# Patient Record
Sex: FEMALE | Race: WHITE | Employment: FULL TIME | URBAN - METROPOLITAN AREA
[De-identification: names, ages, dates, MRNs, and addresses within clinical notes are randomized per-mention and may not be internally consistent; named-entity substitution may affect disease eponyms.]

---

## 2018-08-04 ENCOUNTER — APPOINTMENT (OUTPATIENT)
Dept: CT IMAGING | Age: 33
End: 2018-08-04
Attending: EMERGENCY MEDICINE
Payer: COMMERCIAL

## 2018-08-04 ENCOUNTER — APPOINTMENT (OUTPATIENT)
Dept: GENERAL RADIOLOGY | Age: 33
End: 2018-08-04
Attending: EMERGENCY MEDICINE
Payer: COMMERCIAL

## 2018-08-04 ENCOUNTER — HOSPITAL ENCOUNTER (EMERGENCY)
Age: 33
Discharge: HOME | End: 2018-08-05
Attending: EMERGENCY MEDICINE
Payer: COMMERCIAL

## 2018-08-04 DIAGNOSIS — R53.83 MALAISE AND FATIGUE: ICD-10-CM

## 2018-08-04 DIAGNOSIS — N20.0 NEPHROLITHIASIS: ICD-10-CM

## 2018-08-04 DIAGNOSIS — R31.0 GROSS HEMATURIA: Primary | ICD-10-CM

## 2018-08-04 DIAGNOSIS — R53.81 MALAISE AND FATIGUE: ICD-10-CM

## 2018-08-04 LAB
ALBUMIN SERPL-MCNC: 3.1 G/DL (ref 3.5–5)
ALBUMIN/GLOB SERPL: 0.8 {RATIO} (ref 1.1–2.2)
ALP SERPL-CCNC: 80 U/L (ref 45–117)
ALT SERPL-CCNC: 33 U/L (ref 12–78)
ANION GAP SERPL CALC-SCNC: 7 MMOL/L (ref 5–15)
APPEARANCE UR: ABNORMAL
AST SERPL-CCNC: 24 U/L (ref 15–37)
BACTERIA URNS QL MICRO: NEGATIVE /HPF
BASOPHILS # BLD: 0 K/UL (ref 0–0.1)
BASOPHILS NFR BLD: 0 % (ref 0–1)
BILIRUB SERPL-MCNC: 0.2 MG/DL (ref 0.2–1)
BILIRUB UR QL: NEGATIVE
BUN SERPL-MCNC: 8 MG/DL (ref 6–20)
BUN/CREAT SERPL: 7 (ref 12–20)
CALCIUM SERPL-MCNC: 9.2 MG/DL (ref 8.5–10.1)
CHLORIDE SERPL-SCNC: 106 MMOL/L (ref 97–108)
CO2 SERPL-SCNC: 25 MMOL/L (ref 21–32)
COLOR UR: ABNORMAL
CREAT SERPL-MCNC: 1.09 MG/DL (ref 0.55–1.02)
DIFFERENTIAL METHOD BLD: NORMAL
EOSINOPHIL # BLD: 0 K/UL (ref 0–0.4)
EOSINOPHIL NFR BLD: 0 % (ref 0–7)
EPITH CASTS URNS QL MICRO: ABNORMAL /LPF
ERYTHROCYTE [DISTWIDTH] IN BLOOD BY AUTOMATED COUNT: 14.3 % (ref 11.5–14.5)
GLOBULIN SER CALC-MCNC: 4 G/DL (ref 2–4)
GLUCOSE SERPL-MCNC: 83 MG/DL (ref 65–100)
GLUCOSE UR STRIP.AUTO-MCNC: NEGATIVE MG/DL
HCT VFR BLD AUTO: 38.4 % (ref 35–47)
HGB BLD-MCNC: 12.8 G/DL (ref 11.5–16)
HGB UR QL STRIP: ABNORMAL
IMM GRANULOCYTES # BLD: 0 K/UL (ref 0–0.04)
IMM GRANULOCYTES NFR BLD AUTO: 0 % (ref 0–0.5)
KETONES UR QL STRIP.AUTO: ABNORMAL MG/DL
LACTATE SERPL-SCNC: 1 MMOL/L (ref 0.4–2)
LEUKOCYTE ESTERASE UR QL STRIP.AUTO: ABNORMAL
LYMPHOCYTES # BLD: 1.8 K/UL (ref 0.8–3.5)
LYMPHOCYTES NFR BLD: 25 % (ref 12–49)
MCH RBC QN AUTO: 27.5 PG (ref 26–34)
MCHC RBC AUTO-ENTMCNC: 33.3 G/DL (ref 30–36.5)
MCV RBC AUTO: 82.4 FL (ref 80–99)
MONOCYTES # BLD: 0.8 K/UL (ref 0–1)
MONOCYTES NFR BLD: 12 % (ref 5–13)
NEUTS SEG # BLD: 4.4 K/UL (ref 1.8–8)
NEUTS SEG NFR BLD: 62 % (ref 32–75)
NITRITE UR QL STRIP.AUTO: NEGATIVE
NRBC # BLD: 0 K/UL (ref 0–0.01)
NRBC BLD-RTO: 0 PER 100 WBC
PH UR STRIP: 5.5 [PH] (ref 5–8)
PLATELET # BLD AUTO: 346 K/UL (ref 150–400)
PMV BLD AUTO: 10.2 FL (ref 8.9–12.9)
POTASSIUM SERPL-SCNC: 3.4 MMOL/L (ref 3.5–5.1)
PROT SERPL-MCNC: 7.1 G/DL (ref 6.4–8.2)
PROT UR STRIP-MCNC: 300 MG/DL
RBC # BLD AUTO: 4.66 M/UL (ref 3.8–5.2)
RBC #/AREA URNS HPF: >100 /HPF (ref 0–5)
SODIUM SERPL-SCNC: 138 MMOL/L (ref 136–145)
SP GR UR REFRACTOMETRY: 1.01 (ref 1–1.03)
UA: UC IF INDICATED,UAUC: ABNORMAL
UROBILINOGEN UR QL STRIP.AUTO: 0.2 EU/DL (ref 0.2–1)
WBC # BLD AUTO: 7 K/UL (ref 3.6–11)
WBC URNS QL MICRO: ABNORMAL /HPF (ref 0–4)

## 2018-08-04 PROCEDURE — 87040 BLOOD CULTURE FOR BACTERIA: CPT | Performed by: EMERGENCY MEDICINE

## 2018-08-04 PROCEDURE — 83605 ASSAY OF LACTIC ACID: CPT | Performed by: EMERGENCY MEDICINE

## 2018-08-04 PROCEDURE — 80053 COMPREHEN METABOLIC PANEL: CPT | Performed by: EMERGENCY MEDICINE

## 2018-08-04 PROCEDURE — 74176 CT ABD & PELVIS W/O CONTRAST: CPT

## 2018-08-04 PROCEDURE — 96361 HYDRATE IV INFUSION ADD-ON: CPT

## 2018-08-04 PROCEDURE — 96374 THER/PROPH/DIAG INJ IV PUSH: CPT

## 2018-08-04 PROCEDURE — 87086 URINE CULTURE/COLONY COUNT: CPT | Performed by: EMERGENCY MEDICINE

## 2018-08-04 PROCEDURE — 85025 COMPLETE CBC W/AUTO DIFF WBC: CPT | Performed by: EMERGENCY MEDICINE

## 2018-08-04 PROCEDURE — 81001 URINALYSIS AUTO W/SCOPE: CPT | Performed by: EMERGENCY MEDICINE

## 2018-08-04 PROCEDURE — 99285 EMERGENCY DEPT VISIT HI MDM: CPT

## 2018-08-04 PROCEDURE — 36415 COLL VENOUS BLD VENIPUNCTURE: CPT | Performed by: EMERGENCY MEDICINE

## 2018-08-04 PROCEDURE — 87880 STREP A ASSAY W/OPTIC: CPT | Performed by: EMERGENCY MEDICINE

## 2018-08-04 PROCEDURE — 74011250636 HC RX REV CODE- 250/636: Performed by: EMERGENCY MEDICINE

## 2018-08-04 PROCEDURE — 71046 X-RAY EXAM CHEST 2 VIEWS: CPT

## 2018-08-04 PROCEDURE — 93005 ELECTROCARDIOGRAM TRACING: CPT

## 2018-08-04 PROCEDURE — 74011250637 HC RX REV CODE- 250/637: Performed by: EMERGENCY MEDICINE

## 2018-08-04 PROCEDURE — 87147 CULTURE TYPE IMMUNOLOGIC: CPT | Performed by: EMERGENCY MEDICINE

## 2018-08-04 PROCEDURE — 87070 CULTURE OTHR SPECIMN AEROBIC: CPT | Performed by: EMERGENCY MEDICINE

## 2018-08-04 RX ORDER — OXYCODONE AND ACETAMINOPHEN 5; 325 MG/1; MG/1
2 TABLET ORAL
Status: COMPLETED | OUTPATIENT
Start: 2018-08-04 | End: 2018-08-04

## 2018-08-04 RX ORDER — ONDANSETRON 2 MG/ML
4 INJECTION INTRAMUSCULAR; INTRAVENOUS
Status: COMPLETED | OUTPATIENT
Start: 2018-08-04 | End: 2018-08-04

## 2018-08-04 RX ADMIN — SODIUM CHLORIDE 1000 ML: 900 INJECTION, SOLUTION INTRAVENOUS at 23:58

## 2018-08-04 RX ADMIN — OXYCODONE HYDROCHLORIDE AND ACETAMINOPHEN 2 TABLET: 5; 325 TABLET ORAL at 23:51

## 2018-08-04 RX ADMIN — SODIUM CHLORIDE 500 ML: 900 INJECTION, SOLUTION INTRAVENOUS at 23:59

## 2018-08-04 RX ADMIN — ONDANSETRON 4 MG: 2 INJECTION, SOLUTION INTRAMUSCULAR; INTRAVENOUS at 23:56

## 2018-08-05 VITALS
RESPIRATION RATE: 15 BRPM | WEIGHT: 187.39 LBS | HEIGHT: 62 IN | TEMPERATURE: 98.6 F | BODY MASS INDEX: 34.48 KG/M2 | OXYGEN SATURATION: 99 % | DIASTOLIC BLOOD PRESSURE: 70 MMHG | SYSTOLIC BLOOD PRESSURE: 118 MMHG | HEART RATE: 92 BPM

## 2018-08-05 LAB
ATRIAL RATE: 113 BPM
CALCULATED P AXIS, ECG09: 34 DEGREES
CALCULATED R AXIS, ECG10: 12 DEGREES
CALCULATED T AXIS, ECG11: 29 DEGREES
DEPRECATED S PYO AG THROAT QL EIA: NEGATIVE
DIAGNOSIS, 93000: NORMAL
P-R INTERVAL, ECG05: 130 MS
Q-T INTERVAL, ECG07: 324 MS
QRS DURATION, ECG06: 82 MS
QTC CALCULATION (BEZET), ECG08: 444 MS
VENTRICULAR RATE, ECG03: 113 BPM

## 2018-08-05 PROCEDURE — 96361 HYDRATE IV INFUSION ADD-ON: CPT

## 2018-08-05 PROCEDURE — 74011250637 HC RX REV CODE- 250/637: Performed by: EMERGENCY MEDICINE

## 2018-08-05 RX ORDER — SODIUM CHLORIDE 9 MG/ML
50 INJECTION, SOLUTION INTRAVENOUS
Status: DISCONTINUED | OUTPATIENT
Start: 2018-08-05 | End: 2018-08-05

## 2018-08-05 RX ORDER — SODIUM CHLORIDE 0.9 % (FLUSH) 0.9 %
10 SYRINGE (ML) INJECTION
Status: DISCONTINUED | OUTPATIENT
Start: 2018-08-05 | End: 2018-08-05

## 2018-08-05 RX ORDER — OXYCODONE HYDROCHLORIDE 5 MG/1
10 TABLET ORAL
Status: COMPLETED | OUTPATIENT
Start: 2018-08-05 | End: 2018-08-05

## 2018-08-05 RX ORDER — TRAMADOL HYDROCHLORIDE 50 MG/1
50 TABLET ORAL
Qty: 10 TAB | Refills: 0 | Status: SHIPPED | OUTPATIENT
Start: 2018-08-05 | End: 2018-10-05

## 2018-08-05 RX ADMIN — OXYCODONE HYDROCHLORIDE 10 MG: 5 TABLET ORAL at 02:32

## 2018-08-05 NOTE — ED PROVIDER NOTES
EMERGENCY DEPARTMENT HISTORY AND PHYSICAL EXAM 
 
Date: 2018 Patient Name: Salome Rodrigez History of Presenting Illness Chief Complaint Patient presents with  Fever  
  recent travel to [facility], swimming in ocean. reports onset of sore throat yesterday, fever today, and generalized body aches. Patient also noted potentially bloody urine  Sore Throat  Generalized Body Aches History Provided By: Patient HPI: Salome Rodrigez is a 35 y.o. female, who presents ambulatory to the ED c/o persistent fever of 102 F since this this afternoon. Pt states she just returned home from traveling to the [place], [place]. Pt states upon waking this morning she had a sore throat, and throughout the day she became progressively fatigued, weak with generalized body aches. Pt also notes mild SOB on exertion. She reports taking Tylenol without any relief of her fever or sxs. She notes she is scheduled to start her menstrual cycle next week, but when using the bathroom PTA she noticed her urine was pink in color. Pt report calling the on-call for her PCP's office who recommended the pt to come to the ED for evaluation. Pt is otherwise healthy and denies any long standing illnesses or medications. Pt specifically denies any congestion, cough, chest pain, abdominal pain, nausea, vomiting, diarrhea, dysuria, or urinary frequency. PCP: None PMHx: Significant for none PSHx: Significant for appendectomy,  Section Social Hx: -tobacco, -EtOH, -Illicit Drugs There are no other complaints, changes, or physical findings at this time. Current Facility-Administered Medications Medication Dose Route Frequency Provider Last Rate Last Dose  oxyCODONE IR (ROXICODONE) tablet 10 mg  10 mg Oral NOW Kenisha Lui MD      
 
Current Outpatient Prescriptions Medication Sig Dispense Refill  traMADol (ULTRAM) 50 mg tablet Take 1 Tab by mouth every six (6) hours as needed for Pain. Max Daily Amount: 200 mg. 10 Tab 0  
 omeprazole (PRILOSEC) 40 mg capsule Take 1 Cap by mouth daily. 30 Cap 0  
 oxyCODONE-acetaminophen (PERCOCET) 5-325 mg per tablet Take 1 Tab by mouth every six (6) hours as needed for Pain. Max Daily Amount: 4 Tabs. 10 Tab 0  
 ondansetron (ZOFRAN ODT) 4 mg disintegrating tablet Take 1 Tab by mouth every eight (8) hours as needed for Nausea. 12 Tab 0  
 sucralfate (CARAFATE) 100 mg/mL suspension Take 10 mL by mouth four (4) times daily. 414 mL 0  
 QUEtiapine (SEROQUEL) 50 mg tablet Take 50 mg by mouth two (2) times a day.  norgestimate-ethinyl estradiol (TRI-SPRINTEC, 28,) 0.18/0.215/0.25 mg-35 mcg (28) tablet Take 1 Tab by mouth daily. Past History Past Medical History: 
Past Medical History:  
Diagnosis Date  Abnormal Pap smear Biopsy done last year and came back ok  Ill-defined condition   
 kidney stones Past Surgical History: 
Past Surgical History:  
Procedure Laterality Date  HX APPENDECTOMY  HX GYN    
  Family History: 
Family History Problem Relation Age of Onset  Hypertension Mother  Heart Disease Mother  Cancer Mother  Diabetes Mother Social History: 
Social History Substance Use Topics  Smoking status: Former Smoker  Smokeless tobacco: Never Used  Alcohol use Yes Comment: occassionally Allergies: Allergies Allergen Reactions  Avelox [Moxifloxacin] Swelling and Unknown (comments)  Macrolide Antibiotics Hives, Rash and Swelling Per patient reported as a previous reaction to a \"mycin\" drug. Did not know which drug.  Nsaids (Non-Steroidal Anti-Inflammatory Drug) Other (comments) D/t esophageal erosion and GERD Review of Systems Review of Systems Constitutional: Positive for fever. HENT: Positive for sore throat. Eyes: Negative. Respiratory: Positive for shortness of breath. Cardiovascular: Negative for chest pain.   
Gastrointestinal: Negative for abdominal pain, nausea and vomiting. Endocrine: Negative. Genitourinary: Positive for hematuria. Negative for difficulty urinating, dysuria and vaginal bleeding. Musculoskeletal: Positive for myalgias. Skin: Negative. Allergic/Immunologic: Negative. Neurological: Positive for weakness. Psychiatric/Behavioral: Negative for suicidal ideas. All other systems reviewed and are negative. Physical Exam  
Physical Exam  
Constitutional: She is oriented to person, place, and time. She appears well-developed and well-nourished. She appears distressed. HENT:  
Head: Normocephalic and atraumatic. Nose: Nose normal.  
Eyes: Conjunctivae and EOM are normal. No scleral icterus. Neck: Normal range of motion. No tracheal deviation present. Cardiovascular: Regular rhythm, normal heart sounds and intact distal pulses. Tachycardia present. Exam reveals no friction rub. No murmur heard. Pulmonary/Chest: Effort normal and breath sounds normal. No stridor. No respiratory distress. She has no wheezes. She has no rales. Abdominal: Soft. Bowel sounds are normal. She exhibits no distension. There is no tenderness. There is no rebound. Musculoskeletal: Normal range of motion. She exhibits no tenderness. Neurological: She is alert and oriented to person, place, and time. She has normal strength. She is not disoriented. No cranial nerve deficit or sensory deficit. GCS eye subscore is 4. GCS verbal subscore is 5. GCS motor subscore is 6. Skin: Skin is warm and dry. No rash noted. She is not diaphoretic. Psychiatric: Her speech is normal and behavior is normal. Judgment and thought content normal. Her mood appears anxious. Cognition and memory are normal.  
Nursing note and vitals reviewed. Diagnostic Study Results Labs - Recent Results (from the past 12 hour(s)) EKG, 12 LEAD, INITIAL Collection Time: 08/04/18  9:49 PM  
Result Value Ref Range  Ventricular Rate 113 BPM  
 Atrial Rate 113 BPM  
 P-R Interval 130 ms QRS Duration 82 ms Q-T Interval 324 ms QTC Calculation (Bezet) 444 ms Calculated P Axis 34 degrees Calculated R Axis 12 degrees Calculated T Axis 29 degrees Diagnosis Sinus tachycardia No previous ECGs available CBC WITH AUTOMATED DIFF Collection Time: 08/04/18 10:05 PM  
Result Value Ref Range WBC 7.0 3.6 - 11.0 K/uL  
 RBC 4.66 3.80 - 5.20 M/uL  
 HGB 12.8 11.5 - 16.0 g/dL HCT 38.4 35.0 - 47.0 % MCV 82.4 80.0 - 99.0 FL  
 MCH 27.5 26.0 - 34.0 PG  
 MCHC 33.3 30.0 - 36.5 g/dL  
 RDW 14.3 11.5 - 14.5 % PLATELET 670 628 - 388 K/uL MPV 10.2 8.9 - 12.9 FL  
 NRBC 0.0 0  WBC ABSOLUTE NRBC 0.00 0.00 - 0.01 K/uL NEUTROPHILS 62 32 - 75 % LYMPHOCYTES 25 12 - 49 % MONOCYTES 12 5 - 13 % EOSINOPHILS 0 0 - 7 % BASOPHILS 0 0 - 1 % IMMATURE GRANULOCYTES 0 0.0 - 0.5 % ABS. NEUTROPHILS 4.4 1.8 - 8.0 K/UL  
 ABS. LYMPHOCYTES 1.8 0.8 - 3.5 K/UL  
 ABS. MONOCYTES 0.8 0.0 - 1.0 K/UL  
 ABS. EOSINOPHILS 0.0 0.0 - 0.4 K/UL  
 ABS. BASOPHILS 0.0 0.0 - 0.1 K/UL  
 ABS. IMM. GRANS. 0.0 0.00 - 0.04 K/UL  
 DF AUTOMATED METABOLIC PANEL, COMPREHENSIVE Collection Time: 08/04/18 10:05 PM  
Result Value Ref Range Sodium 138 136 - 145 mmol/L Potassium 3.4 (L) 3.5 - 5.1 mmol/L Chloride 106 97 - 108 mmol/L  
 CO2 25 21 - 32 mmol/L Anion gap 7 5 - 15 mmol/L Glucose 83 65 - 100 mg/dL BUN 8 6 - 20 MG/DL Creatinine 1.09 (H) 0.55 - 1.02 MG/DL  
 BUN/Creatinine ratio 7 (L) 12 - 20 GFR est AA >60 >60 ml/min/1.73m2 GFR est non-AA 58 (L) >60 ml/min/1.73m2 Calcium 9.2 8.5 - 10.1 MG/DL Bilirubin, total 0.2 0.2 - 1.0 MG/DL  
 ALT (SGPT) 33 12 - 78 U/L  
 AST (SGOT) 24 15 - 37 U/L Alk. phosphatase 80 45 - 117 U/L Protein, total 7.1 6.4 - 8.2 g/dL Albumin 3.1 (L) 3.5 - 5.0 g/dL Globulin 4.0 2.0 - 4.0 g/dL A-G Ratio 0.8 (L) 1.1 - 2.2 LACTIC ACID  Collection Time: 08/04/18 10:05 PM  
Result Value Ref Range Lactic acid 1.0 0.4 - 2.0 MMOL/L  
URINALYSIS W/ REFLEX CULTURE Collection Time: 08/04/18 10:05 PM  
Result Value Ref Range Color RED Appearance CLOUDY (A) CLEAR Specific gravity 1.009 1.003 - 1.030    
 pH (UA) 5.5 5.0 - 8.0 Protein 300 (A) NEG mg/dL Glucose NEGATIVE  NEG mg/dL Ketone TRACE (A) NEG mg/dL Bilirubin NEGATIVE  NEG Blood LARGE (A) NEG Urobilinogen 0.2 0.2 - 1.0 EU/dL Nitrites NEGATIVE  NEG Leukocyte Esterase SMALL (A) NEG    
 WBC 5-10 0 - 4 /hpf  
 RBC >100 (H) 0 - 5 /hpf Epithelial cells FEW FEW /lpf Bacteria NEGATIVE  NEG /hpf  
 UA:UC IF INDICATED URINE CULTURE ORDERED (A) CNI    
STREP AG SCREEN, GROUP A Collection Time: 08/04/18 11:43 PM  
Result Value Ref Range Group A Strep Ag ID NEGATIVE  NEG Radiologic Studies -  
CT ABD PELV WO CONT Final Result XR CHEST PA LAT Final Result CT Results  (Last 48 hours) 08/05/18 0026  CT ABD PELV WO CONT Final result Impression:  IMPRESSION:  
   
Nonobstructing right nephrolithiasis. No acute process on noncontrast CT. Narrative:  EXAM:  CT ABD PELV WO CONT INDICATION: Hematuria, fever, and generalized body aches. Appendectomy. COMPARISON: None. CONTRAST:  None. TECHNIQUE:   
Thin axial images were obtained through the abdomen and pelvis. Coronal and  
sagittal reconstructions were generated. Oral contrast was not administered. CT  
dose reduction was achieved through use of a standardized protocol tailored for  
this examination and automatic exposure control for dose modulation. The absence of intravenous contrast material reduces the sensitivity for  
evaluation of the solid parenchymal organs of the abdomen. FINDINGS:   
LUNG BASES: Mild dependent atelectasis. No pneumonia. INCIDENTALLY IMAGED HEART AND MEDIASTINUM: Normal cardiac size. Small  
sliding-type hiatal hernia. LIVER: No mass or biliary dilatation. GALLBLADDER: Unremarkable. SPLEEN: Normal size. PANCREAS: No inflammation. ADRENALS: Unremarkable. KIDNEYS/URETERS: Tiny 1 mm calculus is in the upper pole of the right kidney. No  
hydronephrosis. No left nephrolithiasis. STOMACH: Nondistended. SMALL BOWEL: No dilatation or wall thickening. COLON: No dilatation or wall thickening. APPENDIX: Appendectomy per PERITONEUM: No ascites or pneumoperitoneum. RETROPERITONEUM: No lymphadenopathy or aortic aneurysm. REPRODUCTIVE ORGANS: Uterus is not enlarged. Left ovarian cyst is physiologic in  
this age group. URINARY BLADDER: No mass or calculus. BONES: No destructive bone lesion. ADDITIONAL COMMENTS: N/A  
   
  
  
 
CXR Results  (Last 48 hours) 08/04/18 2351  XR CHEST PA LAT Final result Impression:  IMPRESSION:  
   
Normal PA and lateral chest views. Narrative:  EXAM:  XR CHEST PA LAT INDICATION:  Fever, generalized body aches, sore throat. COMPARISON: None TECHNIQUE: PA and lateral chest views FINDINGS: The cardiomediastinal and hilar contours are within normal limits. The  
pulmonary vasculature is within normal limits. The lungs and pleural spaces are clear. The visualized bones and upper abdomen  
are age-appropriate. Medical Decision Making I am the first provider for this patient. I reviewed the vital signs, available nursing notes, past medical history, past surgical history, family history and social history. Vital Signs-Reviewed the patient's vital signs. Patient Vitals for the past 12 hrs: 
 Temp Pulse Resp BP SpO2  
08/05/18 0200 98.6 °F (37 °C) 92 15 118/70 99 % 08/05/18 0115 - 96 - 109/70 -  
08/05/18 0100 - (!) 102 - 114/70 -  
08/05/18 0045 - 99 - 113/70 -  
08/04/18 2143 99.9 °F (37.7 °C) (!) 113 22 (!) 176/107 98 % Pulse Oximetry Analysis - 98% on RA Cardiac Monitor:  
Rate: 113 bpm 
Rhythm: Sinus Tachycardia Records Reviewed: Nursing Notes and Old Medical Records Provider Notes (Medical Decision Making): DDX: 
Uti, stone, pna, sepsis, dehydration, pregnancy Plan: 
Ua, upt, labs, ivf, analgesic, cxr, ct w/o Impression: 
Acute febrile illness, fatigue and malaise ED Course:  
Initial assessment performed. The patients presenting problems have been discussed, and they are in agreement with the care plan formulated and outlined with them. I have encouraged them to ask questions as they arise throughout their visit. I reviewed our electronic medical record system for any past medical records that were available that may contribute to the patients current condition, the nursing notes and and vital signs from today's visit Nursing notes will be reviewed as they become available in realtime while the pt has been in the ED. Mendel Branch, MD 
 
EKG interpretation 2149: sinus tach, nl Axis, rate 113; , QRS 82, QTc 444; no acute ischemia; Mendel Branch, MD 
 
I personally reviewed pt's imaging. Official read by radiology listed above. Mendel Branch, MD 
 
PROGRESS NOTE: 
2:23 AM 
Pt reevaluated. Pt reports to be feeling better after Percocet. Will plan for d/c. Written by Darling Yanez, ED Scribe, as dictated by Mendel Branch, MD 
 
2:25 AM 
Progress note: 
Pt noted to be feeling better, ready for discharge. Discussed lab and imaging findings with pt and/or family, specifically noting otherwise negative workup. Pt will follow up as instructed. All questions have been answered, pt voiced understanding and agreement with plan. If narcotics were prescribed, pt was advised not to drive or operate heavy machinery. If abx were prescribed, pt advised that diarrhea and rash are possible side effects of the medications. Specific return precautions provided in addition to instructions for pt to return to the ED immediately should sx worsen at any time.  
Mendel Branch, MD 
 
 
Critical Care Time:  
 
none PLAN: 
1. Current Discharge Medication List  
  
START taking these medications Details  
traMADol (ULTRAM) 50 mg tablet Take 1 Tab by mouth every six (6) hours as needed for Pain. Max Daily Amount: 200 mg. Qty: 10 Tab, Refills: 0 Associated Diagnoses: Malaise and fatigue 2. Follow-up Information Follow up With Details Comments Contact Info [facility] Schedule an appointment as soon as possible for a visit in 2 days  [address] [facility] [address]   [phone] Return to ED if worse Disposition: 
 
2:24 AM  
The patient's results have been reviewed with family and/or caregiver. They verbally convey their understanding and agreement of the patient's signs, symptoms, diagnosis, treatment and prognosis and additionally agree to follow up as recommended in the discharge instructions or to return to the Emergency Room should the patient's condition change prior to their follow-up appointment. The family and/or caregiver verbally agrees with the care-plan and all of their questions have been answered. The discharge instructions have also been provided to the them with educational information regarding the patient's diagnosis as well a list of reasons why the patient would want to return to the ER prior to their follow-up appointment should their condition change. Cheyenne Marcus MD 
 
 
Diagnosis Clinical Impression: 1. Gross hematuria 2. Malaise and fatigue 3. Nephrolithiasis Attestations: This note is prepared by Gayla Avila, acting as Scribe for MD Cheyenne Corrales MD : The scribe's documentation has been prepared under my direction and personally reviewed by me in its entirety. I confirm that the note above accurately reflects all work, treatment, procedures, and medical decision making performed by me. This note will not be viewable in [address]

## 2018-08-05 NOTE — DISCHARGE INSTRUCTIONS
Blood in the Urine: Care Instructions  Your Care Instructions    Blood in the urine, or hematuria, may make the urine look red, brown, or pink. There may be blood every time you urinate or just from time to time. You cannot always see blood in the urine, but it will show up in a urine test.  Blood in the urine may be serious. It should always be checked by a doctor. Your doctor may recommend more tests, including an X-ray, a CT scan, or a cystoscopy (which lets a doctor look inside the urethra and bladder). Blood in the urine can be a sign of another problem. Common causes are bladder infections and kidney stones. An injury to your groin or your genital area can also cause bleeding in the urinary tract. Very hard exercise-such as running a marathon-can cause blood in the urine. Blood in the urine can also be a sign of kidney disease or cancer in the bladder or kidney. Many cases of blood in the urine are caused by a harmless condition that runs in families. This is called benign familial hematuria. It does not need any treatment. Sometimes your urine may look red or brown even though it does not contain blood. For example, not getting enough fluids (dehydration), taking certain medicines, or having a liver problem can change the color of your urine. Eating foods such as beets, rhubarb, or blackberries or foods with red food coloring can make your urine look red or pink. Follow-up care is a key part of your treatment and safety. Be sure to make and go to all appointments, and call your doctor if you are having problems. It's also a good idea to know your test results and keep a list of the medicines you take. When should you call for help? Call your doctor now or seek immediate medical care if:    · You have symptoms of a urinary infection. For example:  ¨ You have pus in your urine. ¨ You have pain in your back just below your rib cage. This is called flank pain.   ¨ You have a fever, chills, or body aches.  ¨ It hurts to urinate. ¨ You have groin or belly pain.     · You have more blood in your urine.    Watch closely for changes in your health, and be sure to contact your doctor if:    · You have new urination problems.     · You do not get better as expected. Where can you learn more? Go to http://jody-clara.info/. Enter W082 in the search box to learn more about \"Blood in the Urine: Care Instructions. \"  Current as of: May 12, 2017  Content Version: 11.7  © 0575-2666 [employer]. Care instructions adapted under license by [employer] (which disclaims liability or warranty for this information). If you have questions about a medical condition or this instruction, always ask your healthcare professional. [address] any warranty or liability for your use of this information. Fatigue: Care Instructions  Your Care Instructions    Fatigue is a feeling of tiredness, exhaustion, or lack of energy. You may feel fatigue because of too much or not enough activity. It can also come from stress, lack of sleep, boredom, and poor diet. Many medical problems, such as viral infections, can cause fatigue. Emotional problems, especially depression, are often the cause of fatigue. Fatigue is most often a symptom of another problem. Treatment for fatigue depends on the cause. For example, if you have fatigue because you have a certain health problem, treating this problem also treats your fatigue. If depression or anxiety is the cause, treatment may help. Follow-up care is a key part of your treatment and safety. Be sure to make and go to all appointments, and call your doctor if you are having problems. It's also a good idea to know your test results and keep a list of the medicines you take. How can you care for yourself at home? · Get regular exercise. But don't overdo it. Go back and forth between rest and exercise.   · Get plenty of rest.  · Eat a healthy diet. Do not skip meals, especially breakfast.  · Reduce your use of caffeine, tobacco, and alcohol. Caffeine is most often found in coffee, tea, cola drinks, and chocolate. · Limit medicines that can cause fatigue. This includes tranquilizers and cold and allergy medicines. When should you call for help? Watch closely for changes in your health, and be sure to contact your doctor if:    · You have new symptoms such as fever or a rash.     · Your fatigue gets worse.     · You have been feeling down, depressed, or hopeless. Or you may have lost interest in things that you usually enjoy.     · You are not getting better as expected. Where can you learn more? Go to http://jody-clara.info/. Enter B092 in the search box to learn more about \"Fatigue: Care Instructions. \"  Current as of: November 20, 2017  Content Version: 11.7  © 5798-3446 [employer]. Care instructions adapted under license by [employer] (which disclaims liability or warranty for this information). If you have questions about a medical condition or this instruction, always ask your healthcare professional. [place] [address] any warranty or liability for your use of this information. Weakness: Care Instructions  Your Care Instructions    Weakness is a lack of physical or muscle strength. You may feel that you need to make extra effort to move your arms, legs, or other muscles. Generalized weakness means that you feel weak in most areas of your body. Another type of weakness may affect just one muscle or group of muscles. You may feel weak and tired after you have done too much activity, such as taking an extra-long hike. This is not a serious problem. It often goes away on its own. Feeling weak can also be caused by medical conditions like thyroid problems, depression, or a virus. Sometimes the cause can be serious.  Your doctor may want to do more tests to try to find the cause of the weakness. The doctor has checked you carefully, but problems can develop later. If you notice any problems or new symptoms, get medical treatment right away. Follow-up care is a key part of your treatment and safety. Be sure to make and go to all appointments, and call your doctor if you are having problems. It's also a good idea to know your test results and keep a list of the medicines you take. How can you care for yourself at home? · Rest when you feel tired. · Be safe with medicines. If your doctor prescribed medicine, take it exactly as prescribed. Call your doctor if you think you are having a problem with your medicine. You will get more details on the specific medicines your doctor prescribes. · Do not skip meals. Eating a balanced diet may increase your energy level. · Get some physical activity every day, but do not get too tired. When should you call for help? Call your doctor now or seek immediate medical care if:    · You have new or worse weakness.     · You are dizzy or lightheaded, or you feel like you may faint.    Watch closely for changes in your health, and be sure to contact your doctor if:    · You do not get better as expected. Where can you learn more? Go to http://jody-clara.info/. Enter 951 [id number] 8612 in the search box to learn more about \"Weakness: Care Instructions. \"  Current as of: November 20, 2017  Content Version: 11.7  © 7841-3655 [employer]. Care instructions adapted under license by [employer] (which disclaims liability or warranty for this information). If you have questions about a medical condition or this instruction, always ask your healthcare professional. [address] any warranty or liability for your use of this information.

## 2018-08-05 NOTE — ED NOTES
Patient reports continued pain relief. Ambulatory to bathroom and reports still urinating dark-colored urine.

## 2018-08-06 LAB
BACTERIA SPEC CULT: NORMAL
CC UR VC: NORMAL
SERVICE CMNT-IMP: NORMAL

## 2018-08-07 LAB
BACTERIA SPEC CULT: ABNORMAL
BACTERIA SPEC CULT: ABNORMAL
SERVICE CMNT-IMP: ABNORMAL

## 2018-08-07 RX ORDER — AMOXICILLIN 875 MG/1
875 TABLET, FILM COATED ORAL 2 TIMES DAILY
Qty: 20 TAB | Refills: 0 | Status: SHIPPED | OUTPATIENT
Start: 2018-08-07 | End: 2018-08-17

## 2018-08-07 NOTE — PROGRESS NOTES
Called patient. Verified demographics. Pt continues with ST. Will treat with Abx.  Amoxil efiled to [facility].

## 2018-08-09 LAB
BACTERIA SPEC CULT: NORMAL
SERVICE CMNT-IMP: NORMAL

## 2018-10-04 NOTE — PERIOP NOTES
Reminder call to patient. PAT appointment confirmed. Driving directions given. Pt denies hx of blood transfusion or pregnancy in the last 90 days.

## 2018-10-05 ENCOUNTER — HOSPITAL ENCOUNTER (OUTPATIENT)
Dept: SURGERY | Age: 33
Setting detail: OUTPATIENT SURGERY
Discharge: HOME | End: 2018-10-05
Payer: COMMERCIAL

## 2018-10-05 VITALS
RESPIRATION RATE: 16 BRPM | HEIGHT: 62 IN | HEART RATE: 91 BPM | BODY MASS INDEX: 34.23 KG/M2 | WEIGHT: 186 LBS | SYSTOLIC BLOOD PRESSURE: 142 MMHG | OXYGEN SATURATION: 99 % | DIASTOLIC BLOOD PRESSURE: 87 MMHG | TEMPERATURE: 98.5 F

## 2018-10-05 LAB
ABO + RH BLD: NORMAL
APPEARANCE UR: ABNORMAL
BACTERIA URNS QL MICRO: ABNORMAL /HPF
BILIRUB UR QL: NEGATIVE
BLOOD GROUP ANTIBODIES SERPL: NORMAL
COLOR UR: ABNORMAL
EPITH CASTS URNS QL MICRO: ABNORMAL /LPF
ERYTHROCYTE [DISTWIDTH] IN BLOOD BY AUTOMATED COUNT: 13.6 % (ref 11.5–14.5)
GLUCOSE UR STRIP.AUTO-MCNC: NEGATIVE MG/DL
GRAN CASTS URNS QL MICRO: ABNORMAL /LPF
HCT VFR BLD AUTO: 39 % (ref 35–47)
HGB BLD-MCNC: 12.7 G/DL (ref 11.5–16)
HGB UR QL STRIP: ABNORMAL
KETONES UR QL STRIP.AUTO: NEGATIVE MG/DL
LEUKOCYTE ESTERASE UR QL STRIP.AUTO: NEGATIVE
MCH RBC QN AUTO: 27.4 PG (ref 26–34)
MCHC RBC AUTO-ENTMCNC: 32.6 G/DL (ref 30–36.5)
MCV RBC AUTO: 84.1 FL (ref 80–99)
NITRITE UR QL STRIP.AUTO: NEGATIVE
NRBC # BLD: 0 K/UL (ref 0–0.01)
NRBC BLD-RTO: 0 PER 100 WBC
PH UR STRIP: 6 [PH] (ref 5–8)
PLATELET # BLD AUTO: 419 K/UL (ref 150–400)
PMV BLD AUTO: 10 FL (ref 8.9–12.9)
PROT UR STRIP-MCNC: 300 MG/DL
RBC # BLD AUTO: 4.64 M/UL (ref 3.8–5.2)
RBC #/AREA URNS HPF: ABNORMAL /HPF (ref 0–5)
SP GR UR REFRACTOMETRY: 1.02 (ref 1–1.03)
SPECIMEN EXP DATE BLD: NORMAL
UA: UC IF INDICATED,UAUC: ABNORMAL
UROBILINOGEN UR QL STRIP.AUTO: 0.2 EU/DL (ref 0.2–1)
WBC # BLD AUTO: 8.1 K/UL (ref 3.6–11)
WBC URNS QL MICRO: ABNORMAL /HPF (ref 0–4)
YEAST BUDDING URNS QL: PRESENT

## 2018-10-05 PROCEDURE — 86900 BLOOD TYPING SEROLOGIC ABO: CPT | Performed by: ANESTHESIOLOGY

## 2018-10-05 PROCEDURE — 81001 URINALYSIS AUTO W/SCOPE: CPT | Performed by: ANESTHESIOLOGY

## 2018-10-05 PROCEDURE — 85027 COMPLETE CBC AUTOMATED: CPT | Performed by: ANESTHESIOLOGY

## 2018-10-05 PROCEDURE — 87086 URINE CULTURE/COLONY COUNT: CPT | Performed by: ANESTHESIOLOGY

## 2018-10-05 PROCEDURE — 36415 COLL VENOUS BLD VENIPUNCTURE: CPT | Performed by: ANESTHESIOLOGY

## 2018-10-05 NOTE — PERIOP NOTES
Lab draw attempt x 2, by self, unable to get flash, labwork done by LYNNE Holland RN to R inner Wrist, psi and dsg applied, no c/o pain, pt did get lightheaded, wet wash cloth provided along with quease ease.

## 2018-10-05 NOTE — PERIOP NOTES
[facility] Ambulatory Surgery Unit Pre-operative Instructions Surgery/Procedure Date  Monday, Oct 15, 2018            Tentative Arrival Time unable to give 1. On the day of your surgery/procedure, please report to the Ambulatory Surgery Unit Registration Desk and sign in at your designated time. The Ambulatory Surgery Unit is located in [facility] on the [facility] side of the [facility] across from the [address] [place] [address]. Please have all of your health insurance cards and a photo ID. 2. You must have someone with you to drive you home, as you should not drive a car for 24 hours following anesthesia. Please make arrangements for a responsible adult friend or family member to stay with you for at least the first 24 hours after your surgery. 3. Do not have anything to eat or drink (including water, gum, mints, coffee, juice) after 11:59 PM, Sunday. This may not apply to medications prescribed by your physician. (Please note below the special instructions with medications to take the morning of surgery, if applicable.) 4. We recommend you do not drink any alcoholic beverages for 24 hours before and after your surgery. 5. Contact your surgeons office for instructions on the following medications: non-steroidal anti-inflammatory drugs (i.e. Advil, Aleve), vitamins, and supplements. (Some surgeons will want you to stop these medications prior to surgery and others may allow you to take them) **If you are currently taking Plavix, Coumadin, Aspirin and/or other blood-thinning agents, contact your surgeon for instructions. ** Your surgeon will partner with the physician prescribing these medications to determine if it is safe to stop or if you need to continue taking. Please do not stop taking these medications without instructions from your surgeon.  
 
6. In an effort to help prevent surgical site infection, we ask that you shower with an anti-bacterial soap (i.e. Dial or Safeguard) for 3 days prior to and on the morning of surgery, using a fresh towel after each shower. (Please begin this process with fresh bed linens.) Do not apply any lotions, powders, or deodorants after the shower on the day of your procedure. If applicable, please do not shave the operative site for 48 hours prior to surgery. 7. Wear comfortable clothes. Wear glasses instead of contacts. Do not bring any jewelry or money (other than copays or fees as instructed). Do not wear make-up, particularly mascara, the morning of your surgery. Do not wear nail polish, particularly if you are having foot /hand surgery. Wear your hair loose or down, no ponytails, buns, abelino pins or clips. All body piercings must be removed. 8. You should understand that if you do not follow these instructions your surgery may be cancelled. If your physical condition changes (i.e. fever, cold or flu) please contact your surgeon as soon as possible. 9. It is important that you be on time. If a situation occurs where you may be late, or if you have any questions or problems, please call [phone]. 
 
10. Your surgery time may be subject to change. You will receive a phone call the day prior to surgery to confirm your arrival time. 11. Pediatric patients: please bring a change of clothes, diapers, bottle/sippy cup, pacifier, etc. 
 
 
Special Instructions: Take all medications and inhalers, as prescribed, on the morning of surgery with a sip of water EXCEPT: no medications day of surgery except for omeprazole I understand a pre-operative phone call will be made to verify my surgery time. In the event that I am not available, I give permission for a message to be left on my answering service and/or with another person? yes Preop instructions reviewed  Pt verbalized understanding.  
 
 ___________________      ___________________      ________________ (Signature of Patient)          (Witness)                   (Date and Time)

## 2018-10-06 LAB
BACTERIA SPEC CULT: NORMAL
CC UR VC: NORMAL
SERVICE CMNT-IMP: NORMAL

## 2018-10-08 NOTE — PERIOP NOTES
Pt had stopped by to get instructions at 7:30, I was not here, called and went over instructions again with pt, verbalized understanding. Checked urine and results showed no growth.

## 2018-10-12 ENCOUNTER — ANESTHESIA EVENT (OUTPATIENT)
Dept: SURGERY | Age: 33
End: 2018-10-12
Payer: COMMERCIAL

## 2018-10-12 NOTE — H&P
Chief Complaint Pre-op rm-1 Vitals Ht: 5 ft 2 in (157.48 cm) 10/11/2018 01:26 pm 
Wt: 185.4 lbs (84.1 kg) 10/11/2018 01:27 pm 
BMI: 33.9 10/11/2018 01:27 pm 
BP: 140/92 10/11/2018 01:31 pm 
 
Allergies Reviewed Allergies AVELOX: Hives (Moderate) - Reaction: hives, swelling;Severity: Critical; Comment: Entered By: Alexandra JONESigned By: Nico Mendenhall MDUncoded: Y;  
NSAIDS (NON-STEROIDAL ANTI-INFLAMMATORY DRUG): - Reaction: Pt has esophageal erosion & cannot take NSAIDS - reaction has been severe & required hospitalization. ;Severity: Severe; Comment: Entered By: Ariel EspañaSigned By: Ariel EspañaType: GPICode: [id number]: N;  
MYACINS (Active): Reaction: itching, GI, rash;Severity: Moderate; OnsetDate: 2010; Comment: Entered By: Arturo Petty By: Kit Roberts MDUncoded: Y; 
 
Medications Reviewed Medications NexIUM 40 mg capsule,delayed release Take 1 capsule(s) every day by oral route. 10/04/18   entered LYDIA Viveros Family History Reviewed Family History Mother - Hypertensive disorder 
  - Heart failure Maternal Grandmother - Malignant tumor of breast 
Unspecified Relation - Malignant tumor of colon 
- aunt Social History Reviewed Social History OB/GYN Social History Smoking Status: Former smoker On average, how many days per week do you engage in moderate to strenuous EXERCISE (like walking fast, running, jogging, dancing, swimming, biking, or other activities that cause a light or heavy sweat)?: 7 How often do you have a DRINK containing ALCOHOL?: Monthly or less Surgical History Reviewed Surgical History Appendectomy Oral / Dental Surgery - wisdom teeth  LEEP - 2017 GYN History Reviewed GYN History Date of LMP: 10/01/2018. Date of Last Pap Smear: 2018 (Notes: HGSIL/hpv pos). Date of HPV testin2018 (Notes: positive). HPV testing results: Positive. 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 , LPN Obstetric History Reviewed Obstetric History TOTAL FULL PRE AB. I AB. S ECTOPICS MULTIPLE LIVING 
2 1 1     1 Past Medical History Past Medical History not reviewed (last reviewed 10/04/2018) Gastroesophageal Reflux Disease (GERD): Y - erosions in esophagus IBS: Y Kidney Stones: Y Notes: Erosions in Espohagus G E R D Hernia hx of Abnormal Pap Smear IBS Nephrolithiasis 2010 small hiatal hernia Weight Disorder HPI Patient presents for pre-op visit. Patient is scheduled for TLH and bilateral salpingectomy 10/15/18. Doing well. previously had long discussion regarding patient's clinical course and desire to definitively eliminate possibility for cervical dysplasia/cancer patient advised of possible risk of vaginal dysplasia and need for continued pap patient advised of possible risk of occult cancer with hyst and benign surgery being inadequate treatment in rare case of this but she desires to forgo oncologic evaluation 
 
also has heavy menses failing medical management ROS Additionally reports: Except as noted in the HPI, the review of systems is negative for General, Breast, , CV, Resp, GI, Endo, MS, Derm, Neuro, Psych, Eyes, ENT, Allergy and Heme. Physical Exam 
Patient is a 70-year-old female. Constitutional: General Appearance: well developed and nourished and pleasant. Level of Distress: no acute distress. Ambulation: ambulating normally. Head: Head: normocephalic. Eyes: Extraocular Movements extraocular movements intact. Ears, Nose, Mouth, Throat: Ears normal hearing. Nose: no external nose lesions. Neck: Appearance supple, trachea midline, and no neck masses. Thyroid: no enlargement or nodules and non-tender. Lungs / Chest: Respiratory effort: unlabored. Inspection / Palpation: no deformity, tenderness, or swelling. Auscultation: no wheezing or rales/crackles. Cardiovascular: Rate And Rhythm: regular.  Heart Sounds: no murmurs, rub, or gallops. Extremities: no clubbing, cyanosis, or edema. Abdomen: Inspection and Palpation: no tenderness, guarding, masses, rebound tenderness, or CVA tenderness and soft and non-distended; markedly obesity limiting exam, well healed c/s scar. Liver: non-tender; no masses. Spleen: no masses. Hernia: none palpable. Extremities: Extremities: no swelling or inflammation of extremities and pulses normal. 
 
Skin: General Skin no rash or suspicious lesions. Neurologic: Gait and Station: normal gait. Sensation: grossly intact. Motor: grossly intact. Mental Status Exam: Orientation oriented to person, place, and time. Assessment / Plan 1. High grade squamous intraepithelial lesion on cervical Papanicolaou smear - We will plan total laparoscopic hysterectomy. We discussed the risks and benefits of ovarian conservation and the pt would like to have her ovaries left in place as long as they are normal. She understands that oopherectomy would mean immediate menopause I reviewed with the patient indications, alternatives, risks, and benefits of proposed procedure, the risks of which include a possibility of laparotomy conversion, injury to bowel, bladder, nerves, blood vessels, or ureters, injury to any other intraabdominal structure, risk of bleeding and infection, and inherent risks of anesthesia, including death. The patient indicates understanding of these risks, and agrees to the proposed procedure 
 
again discussed possible risk of cancer and need for further surgery or insufficient margins 
 
declines oncologic referral 
 
rx's to be given in recovery 
 
consent signed R87.613: High grade squamous intraepithelial lesion on cytologic smear of cervix (HGSIL) 2. Menorrhagia - 
as above 
 
N92.0: Excessive and frequent menstruation with regular cycle

## 2018-10-15 ENCOUNTER — ANESTHESIA (OUTPATIENT)
Dept: SURGERY | Age: 33
End: 2018-10-15
Payer: COMMERCIAL

## 2018-10-15 ENCOUNTER — HOSPITAL ENCOUNTER (OUTPATIENT)
Age: 33
Setting detail: OUTPATIENT SURGERY
Discharge: HOME | End: 2018-10-15
Attending: OBSTETRICS & GYNECOLOGY | Admitting: OBSTETRICS & GYNECOLOGY
Payer: COMMERCIAL

## 2018-10-15 VITALS
BODY MASS INDEX: 34.32 KG/M2 | TEMPERATURE: 98.3 F | DIASTOLIC BLOOD PRESSURE: 88 MMHG | RESPIRATION RATE: 13 BRPM | HEART RATE: 89 BPM | SYSTOLIC BLOOD PRESSURE: 121 MMHG | OXYGEN SATURATION: 94 % | WEIGHT: 186.5 LBS | HEIGHT: 62 IN

## 2018-10-15 DIAGNOSIS — Z90.710 S/P HYSTERECTOMY: Primary | ICD-10-CM

## 2018-10-15 LAB — HCG UR QL: NEGATIVE

## 2018-10-15 PROCEDURE — 74011000250 HC RX REV CODE- 250: Performed by: OBSTETRICS & GYNECOLOGY

## 2018-10-15 PROCEDURE — 76030000003 HC AMB SURG OR TIME 1.5 TO 2: Performed by: OBSTETRICS & GYNECOLOGY

## 2018-10-15 PROCEDURE — 74011000250 HC RX REV CODE- 250

## 2018-10-15 PROCEDURE — 76210000055 HC AMBSU PH II REC 2 TO 2.5 HR: Performed by: OBSTETRICS & GYNECOLOGY

## 2018-10-15 PROCEDURE — 77030005538 HC CATH URETH FOL44 BARD -B: Performed by: OBSTETRICS & GYNECOLOGY

## 2018-10-15 PROCEDURE — 77030018684: Performed by: OBSTETRICS & GYNECOLOGY

## 2018-10-15 PROCEDURE — 77030008606 HC TRCR ENDOSC KII AMR -B: Performed by: OBSTETRICS & GYNECOLOGY

## 2018-10-15 PROCEDURE — 77030018832 HC SOL IRR H20 ICUM -A: Performed by: OBSTETRICS & GYNECOLOGY

## 2018-10-15 PROCEDURE — 77030020255 HC SOL INJ LR 1000ML BG: Performed by: OBSTETRICS & GYNECOLOGY

## 2018-10-15 PROCEDURE — 74011250636 HC RX REV CODE- 250/636: Performed by: OBSTETRICS & GYNECOLOGY

## 2018-10-15 PROCEDURE — 77030018778 HC MANIP UTER VCAR CNMD -B: Performed by: OBSTETRICS & GYNECOLOGY

## 2018-10-15 PROCEDURE — 88309 TISSUE EXAM BY PATHOLOGIST: CPT | Performed by: OBSTETRICS & GYNECOLOGY

## 2018-10-15 PROCEDURE — 77030019908 HC STETH ESOPH SIMS -A: Performed by: ANESTHESIOLOGY

## 2018-10-15 PROCEDURE — 74011250636 HC RX REV CODE- 250/636: Performed by: ANESTHESIOLOGY

## 2018-10-15 PROCEDURE — 77030020061 HC IV BLD WRMR ADMIN SET 3M -B: Performed by: ANESTHESIOLOGY

## 2018-10-15 PROCEDURE — 77030034154 HC SHR COAG HARM ACE J&J -F: Performed by: OBSTETRICS & GYNECOLOGY

## 2018-10-15 PROCEDURE — 77030035236 HC SUT PDS STRATFX BARB J&J -B: Performed by: OBSTETRICS & GYNECOLOGY

## 2018-10-15 PROCEDURE — 74011250637 HC RX REV CODE- 250/637

## 2018-10-15 PROCEDURE — 77030008771 HC TU NG SALEM SUMP -A: Performed by: ANESTHESIOLOGY

## 2018-10-15 PROCEDURE — 77030008684 HC TU ET CUF COVD -B: Performed by: ANESTHESIOLOGY

## 2018-10-15 PROCEDURE — 74011250636 HC RX REV CODE- 250/636

## 2018-10-15 PROCEDURE — 77030039266 HC ADH SKN EXOFIN S2SG -A: Performed by: OBSTETRICS & GYNECOLOGY

## 2018-10-15 PROCEDURE — 81025 URINE PREGNANCY TEST: CPT

## 2018-10-15 PROCEDURE — 77030021352 HC CBL LD SYS DISP COVD -B: Performed by: OBSTETRICS & GYNECOLOGY

## 2018-10-15 PROCEDURE — 77030018836 HC SOL IRR NACL ICUM -A: Performed by: OBSTETRICS & GYNECOLOGY

## 2018-10-15 PROCEDURE — 77030011640 HC PAD GRND REM COVD -A: Performed by: OBSTETRICS & GYNECOLOGY

## 2018-10-15 PROCEDURE — 77030026438 HC STYL ET INTUB CARD -A: Performed by: ANESTHESIOLOGY

## 2018-10-15 PROCEDURE — 76210000036 HC AMBSU PH I REC 1.5 TO 2 HR: Performed by: OBSTETRICS & GYNECOLOGY

## 2018-10-15 PROCEDURE — 76060000063 HC AMB SURG ANES 1.5 TO 2 HR: Performed by: OBSTETRICS & GYNECOLOGY

## 2018-10-15 PROCEDURE — 77030002933 HC SUT MCRYL J&J -A: Performed by: OBSTETRICS & GYNECOLOGY

## 2018-10-15 RX ORDER — OXYCODONE AND ACETAMINOPHEN 5; 325 MG/1; MG/1
1 TABLET ORAL
Status: DISCONTINUED | OUTPATIENT
Start: 2018-10-15 | End: 2018-10-15 | Stop reason: HOSPADM

## 2018-10-15 RX ORDER — SCOLOPAMINE TRANSDERMAL SYSTEM 1 MG/1
PATCH, EXTENDED RELEASE TRANSDERMAL AS NEEDED
Status: DISCONTINUED | OUTPATIENT
Start: 2018-10-15 | End: 2018-10-15 | Stop reason: HOSPADM

## 2018-10-15 RX ORDER — ROCURONIUM BROMIDE 10 MG/ML
INJECTION, SOLUTION INTRAVENOUS AS NEEDED
Status: DISCONTINUED | OUTPATIENT
Start: 2018-10-15 | End: 2018-10-15 | Stop reason: HOSPADM

## 2018-10-15 RX ORDER — SODIUM CHLORIDE 0.9 % (FLUSH) 0.9 %
5-10 SYRINGE (ML) INJECTION AS NEEDED
Status: DISCONTINUED | OUTPATIENT
Start: 2018-10-15 | End: 2018-10-15 | Stop reason: HOSPADM

## 2018-10-15 RX ORDER — SUCCINYLCHOLINE CHLORIDE 20 MG/ML
INJECTION INTRAMUSCULAR; INTRAVENOUS AS NEEDED
Status: DISCONTINUED | OUTPATIENT
Start: 2018-10-15 | End: 2018-10-15 | Stop reason: HOSPADM

## 2018-10-15 RX ORDER — FENTANYL CITRATE 50 UG/ML
25 INJECTION, SOLUTION INTRAMUSCULAR; INTRAVENOUS
Status: DISCONTINUED | OUTPATIENT
Start: 2018-10-15 | End: 2018-10-15 | Stop reason: HOSPADM

## 2018-10-15 RX ORDER — ONDANSETRON 4 MG/1
4 TABLET, ORALLY DISINTEGRATING ORAL
Qty: 10 TAB | Refills: 1 | Status: SHIPPED | OUTPATIENT
Start: 2018-10-15 | End: 2019-07-13

## 2018-10-15 RX ORDER — SODIUM CHLORIDE, SODIUM LACTATE, POTASSIUM CHLORIDE, CALCIUM CHLORIDE 600; 310; 30; 20 MG/100ML; MG/100ML; MG/100ML; MG/100ML
25 INJECTION, SOLUTION INTRAVENOUS CONTINUOUS
Status: DISCONTINUED | OUTPATIENT
Start: 2018-10-15 | End: 2018-10-15 | Stop reason: HOSPADM

## 2018-10-15 RX ORDER — DOCUSATE SODIUM 100 MG/1
100 CAPSULE, LIQUID FILLED ORAL 2 TIMES DAILY
Qty: 20 CAP | Refills: 0 | Status: SHIPPED | OUTPATIENT
Start: 2018-10-15 | End: 2018-11-14

## 2018-10-15 RX ORDER — DEXAMETHASONE SODIUM PHOSPHATE 4 MG/ML
INJECTION, SOLUTION INTRA-ARTICULAR; INTRALESIONAL; INTRAMUSCULAR; INTRAVENOUS; SOFT TISSUE AS NEEDED
Status: DISCONTINUED | OUTPATIENT
Start: 2018-10-15 | End: 2018-10-15 | Stop reason: HOSPADM

## 2018-10-15 RX ORDER — SODIUM CHLORIDE 0.9 % (FLUSH) 0.9 %
5-10 SYRINGE (ML) INJECTION EVERY 8 HOURS
Status: DISCONTINUED | OUTPATIENT
Start: 2018-10-15 | End: 2018-10-15 | Stop reason: HOSPADM

## 2018-10-15 RX ORDER — HYDROMORPHONE HYDROCHLORIDE 2 MG/ML
INJECTION, SOLUTION INTRAMUSCULAR; INTRAVENOUS; SUBCUTANEOUS AS NEEDED
Status: DISCONTINUED | OUTPATIENT
Start: 2018-10-15 | End: 2018-10-15 | Stop reason: HOSPADM

## 2018-10-15 RX ORDER — LIDOCAINE HYDROCHLORIDE 20 MG/ML
INJECTION, SOLUTION EPIDURAL; INFILTRATION; INTRACAUDAL; PERINEURAL AS NEEDED
Status: DISCONTINUED | OUTPATIENT
Start: 2018-10-15 | End: 2018-10-15 | Stop reason: HOSPADM

## 2018-10-15 RX ORDER — MIDAZOLAM HYDROCHLORIDE 1 MG/ML
INJECTION, SOLUTION INTRAMUSCULAR; INTRAVENOUS AS NEEDED
Status: DISCONTINUED | OUTPATIENT
Start: 2018-10-15 | End: 2018-10-15 | Stop reason: HOSPADM

## 2018-10-15 RX ORDER — HYDROGEN PEROXIDE 3 %
20 SOLUTION, NON-ORAL MISCELLANEOUS DAILY
COMMUNITY
End: 2019-07-13

## 2018-10-15 RX ORDER — LIDOCAINE HYDROCHLORIDE 10 MG/ML
0.1 INJECTION, SOLUTION EPIDURAL; INFILTRATION; INTRACAUDAL; PERINEURAL AS NEEDED
Status: DISCONTINUED | OUTPATIENT
Start: 2018-10-15 | End: 2018-10-15 | Stop reason: HOSPADM

## 2018-10-15 RX ORDER — BUPIVACAINE HYDROCHLORIDE AND EPINEPHRINE 5; 5 MG/ML; UG/ML
30 INJECTION, SOLUTION EPIDURAL; INTRACAUDAL; PERINEURAL ONCE
Status: COMPLETED | OUTPATIENT
Start: 2018-10-15 | End: 2018-10-15

## 2018-10-15 RX ORDER — CEFAZOLIN SODIUM/WATER 2 G/20 ML
2 SYRINGE (ML) INTRAVENOUS ONCE
Status: COMPLETED | OUTPATIENT
Start: 2018-10-15 | End: 2018-10-15

## 2018-10-15 RX ORDER — HYDROMORPHONE HYDROCHLORIDE 1 MG/ML
.2-.5 INJECTION, SOLUTION INTRAMUSCULAR; INTRAVENOUS; SUBCUTANEOUS ONCE
Status: DISCONTINUED | OUTPATIENT
Start: 2018-10-15 | End: 2018-10-15 | Stop reason: HOSPADM

## 2018-10-15 RX ORDER — NEOSTIGMINE METHYLSULFATE 1 MG/ML
INJECTION INTRAVENOUS AS NEEDED
Status: DISCONTINUED | OUTPATIENT
Start: 2018-10-15 | End: 2018-10-15 | Stop reason: HOSPADM

## 2018-10-15 RX ORDER — ACETAMINOPHEN 10 MG/ML
1000 INJECTION, SOLUTION INTRAVENOUS ONCE
Status: COMPLETED | OUTPATIENT
Start: 2018-10-15 | End: 2018-10-15

## 2018-10-15 RX ORDER — FENTANYL CITRATE 50 UG/ML
INJECTION, SOLUTION INTRAMUSCULAR; INTRAVENOUS AS NEEDED
Status: DISCONTINUED | OUTPATIENT
Start: 2018-10-15 | End: 2018-10-15 | Stop reason: HOSPADM

## 2018-10-15 RX ORDER — OXYCODONE AND ACETAMINOPHEN 5; 325 MG/1; MG/1
1-2 TABLET ORAL
Qty: 30 TAB | Refills: 0 | Status: SHIPPED | OUTPATIENT
Start: 2018-10-15 | End: 2019-07-13

## 2018-10-15 RX ORDER — ACETAMINOPHEN 10 MG/ML
INJECTION, SOLUTION INTRAVENOUS
Status: COMPLETED
Start: 2018-10-15 | End: 2018-10-15

## 2018-10-15 RX ORDER — ONDANSETRON 2 MG/ML
INJECTION INTRAMUSCULAR; INTRAVENOUS AS NEEDED
Status: DISCONTINUED | OUTPATIENT
Start: 2018-10-15 | End: 2018-10-15 | Stop reason: HOSPADM

## 2018-10-15 RX ORDER — PROPOFOL 10 MG/ML
INJECTION, EMULSION INTRAVENOUS AS NEEDED
Status: DISCONTINUED | OUTPATIENT
Start: 2018-10-15 | End: 2018-10-15 | Stop reason: HOSPADM

## 2018-10-15 RX ORDER — DIPHENHYDRAMINE HYDROCHLORIDE 50 MG/ML
12.5 INJECTION, SOLUTION INTRAMUSCULAR; INTRAVENOUS AS NEEDED
Status: DISCONTINUED | OUTPATIENT
Start: 2018-10-15 | End: 2018-10-15 | Stop reason: HOSPADM

## 2018-10-15 RX ORDER — MORPHINE SULFATE 10 MG/ML
2 INJECTION, SOLUTION INTRAMUSCULAR; INTRAVENOUS
Status: DISCONTINUED | OUTPATIENT
Start: 2018-10-15 | End: 2018-10-15 | Stop reason: HOSPADM

## 2018-10-15 RX ORDER — GLYCOPYRROLATE 0.2 MG/ML
INJECTION INTRAMUSCULAR; INTRAVENOUS AS NEEDED
Status: DISCONTINUED | OUTPATIENT
Start: 2018-10-15 | End: 2018-10-15 | Stop reason: HOSPADM

## 2018-10-15 RX ADMIN — FENTANYL CITRATE 25 MCG: 50 INJECTION, SOLUTION INTRAMUSCULAR; INTRAVENOUS at 10:30

## 2018-10-15 RX ADMIN — LIDOCAINE HYDROCHLORIDE 80 MG: 20 INJECTION, SOLUTION EPIDURAL; INFILTRATION; INTRACAUDAL; PERINEURAL at 07:35

## 2018-10-15 RX ADMIN — ROCURONIUM BROMIDE 25 MG: 10 INJECTION, SOLUTION INTRAVENOUS at 07:45

## 2018-10-15 RX ADMIN — ONDANSETRON 4 MG: 2 INJECTION INTRAMUSCULAR; INTRAVENOUS at 08:50

## 2018-10-15 RX ADMIN — MIDAZOLAM HYDROCHLORIDE 2 MG: 1 INJECTION, SOLUTION INTRAMUSCULAR; INTRAVENOUS at 07:32

## 2018-10-15 RX ADMIN — PROPOFOL 150 MG: 10 INJECTION, EMULSION INTRAVENOUS at 07:35

## 2018-10-15 RX ADMIN — FENTANYL CITRATE 100 MCG: 50 INJECTION, SOLUTION INTRAMUSCULAR; INTRAVENOUS at 07:35

## 2018-10-15 RX ADMIN — GLYCOPYRROLATE 0.4 MG: 0.2 INJECTION INTRAMUSCULAR; INTRAVENOUS at 08:56

## 2018-10-15 RX ADMIN — FENTANYL CITRATE 50 MCG: 50 INJECTION, SOLUTION INTRAMUSCULAR; INTRAVENOUS at 07:47

## 2018-10-15 RX ADMIN — SODIUM CHLORIDE, SODIUM LACTATE, POTASSIUM CHLORIDE, AND CALCIUM CHLORIDE 25 ML/HR: 600; 310; 30; 20 INJECTION, SOLUTION INTRAVENOUS at 11:44

## 2018-10-15 RX ADMIN — SODIUM CHLORIDE, SODIUM LACTATE, POTASSIUM CHLORIDE, AND CALCIUM CHLORIDE 25 ML/HR: 600; 310; 30; 20 INJECTION, SOLUTION INTRAVENOUS at 12:48

## 2018-10-15 RX ADMIN — PROMETHAZINE HYDROCHLORIDE 2.5 MG: 25 INJECTION INTRAMUSCULAR; INTRAVENOUS at 11:39

## 2018-10-15 RX ADMIN — DEXAMETHASONE SODIUM PHOSPHATE 8 MG: 4 INJECTION, SOLUTION INTRA-ARTICULAR; INTRALESIONAL; INTRAMUSCULAR; INTRAVENOUS; SOFT TISSUE at 07:45

## 2018-10-15 RX ADMIN — SCOLOPAMINE TRANSDERMAL SYSTEM 1 PATCH: 1 PATCH, EXTENDED RELEASE TRANSDERMAL at 07:39

## 2018-10-15 RX ADMIN — FENTANYL CITRATE 25 MCG: 50 INJECTION, SOLUTION INTRAMUSCULAR; INTRAVENOUS at 11:06

## 2018-10-15 RX ADMIN — HYDROMORPHONE HYDROCHLORIDE 0.5 MG: 2 INJECTION, SOLUTION INTRAMUSCULAR; INTRAVENOUS; SUBCUTANEOUS at 08:30

## 2018-10-15 RX ADMIN — NEOSTIGMINE METHYLSULFATE 2 MG: 1 INJECTION INTRAVENOUS at 08:56

## 2018-10-15 RX ADMIN — SUCCINYLCHOLINE CHLORIDE 120 MG: 20 INJECTION INTRAMUSCULAR; INTRAVENOUS at 07:35

## 2018-10-15 RX ADMIN — ACETAMINOPHEN 1000 MG: 10 INJECTION, SOLUTION INTRAVENOUS at 07:23

## 2018-10-15 RX ADMIN — SODIUM CHLORIDE, SODIUM LACTATE, POTASSIUM CHLORIDE, AND CALCIUM CHLORIDE 25 ML/HR: 600; 310; 30; 20 INJECTION, SOLUTION INTRAVENOUS at 07:30

## 2018-10-15 RX ADMIN — Medication 2 G: at 07:45

## 2018-10-15 RX ADMIN — ROCURONIUM BROMIDE 5 MG: 10 INJECTION, SOLUTION INTRAVENOUS at 07:35

## 2018-10-15 RX ADMIN — MEPERIDINE HYDROCHLORIDE 12.5 MG: 50 INJECTION, SOLUTION INTRAMUSCULAR; INTRAVENOUS; SUBCUTANEOUS at 10:05

## 2018-10-15 RX ADMIN — FENTANYL CITRATE 50 MCG: 50 INJECTION, SOLUTION INTRAMUSCULAR; INTRAVENOUS at 07:56

## 2018-10-15 NOTE — ANESTHESIA POSTPROCEDURE EVALUATION
Post-Anesthesia Evaluation and Assessment Patient: Ida Gunn MRN: [id number]  SSN: [id number] YOB: 1985  Age: 35 y.o. Sex: female Cardiovascular Function/Vital Signs Visit Vitals  /88 (BP 1 Location: Left arm, BP Patient Position: At rest)  Pulse 89  Temp 36.8 °C (98.3 °F)  Resp 13  Ht 5' 2\" (1.575 m)  Wt 84.6 kg (186 lb 8 oz)  SpO2 94%  BMI 34.11 kg/m2 Patient is status post general anesthesia for Procedure(s): 
TOTAL LAPAROSCOPIC HYSTERECTOMY, BILATERAL SALPINGECTOMY. Nausea/Vomiting: None Postoperative hydration reviewed and adequate. Pain: 
Pain Scale 1: Numeric (0 - 10) (10/15/18 1245) Pain Intensity 1: 0 (10/15/18 1245) Managed Neurological Status:  
Neuro (WDL): Within Defined Limits (10/15/18 1245) Neuro Neurologic State: Alert (10/15/18 1245) At baseline Mental Status and Level of Consciousness: Arousable Pulmonary Status:  
O2 Device: Room air (10/15/18 1230) Adequate oxygenation and airway patent Complications related to anesthesia: None Post-anesthesia assessment completed. No concerns Signed By: Ravindra Beltran MD   
 October 15, 2018

## 2018-10-15 NOTE — PERIOP NOTES
Permission received to review discharge instructions and discuss private health information with  Magalys Esquivel

## 2018-10-15 NOTE — IP AVS SNAPSHOT
Summary of Care Report The Summary of Care report has been created to help improve care coordination. Users with access to [employer] or [address] (Web-based application) may access additional patient information including the Discharge Summary. If you are not currently a [address] user and need more information, please call the number listed below in the [place] [address] section and ask to be connected with Medical Records. Facility Information Name Address Phone [address] [address] [phone] Patient Information Patient Name Sex PTARICIA Walters ([id number]) Female 1985 Discharge Information Admitting Provider Service Area Unit Rolanda Lesches, MD / [phone] [address] [facility] [address] [facility] / [phone] Discharge Provider Discharge Date/Time Discharge Disposition Destination (none) 10/15/2018 Morning (Pending) AHR (none) Patient Language Language ENGLISH [13] Hospital Problems as of 10/15/2018  Reviewed: 10/15/2018  7:22 AM by Kathrin Paul MD  
  
  
  
 Class Noted - Resolved Last Modified POA Active Problems S/P hysterectomy  10/15/2018 - Present 10/15/2018 by Rolanda Lesches, MD Unknown Entered by Rolanda Lesches, MD  
  
Non-Hospital Problems as of 10/15/2018  Reviewed: 10/15/2018  7:22 AM by Kathrin Paul MD  
  
  
  
 Class Noted - Resolved Last Modified Active Problems Normal pregnancy  2011 - Present 1/3/2012 Entered by Daniela Worthington MD  
  SROM (spontaneous rupture of membranes)  2011 - Present 1/3/2012 Entered by Daniela Worthington MD  
  Arrested active phase of labor  2012 - Present 1/3/2012 Entered by Sandra Davila MD  
  
You are allergic to the following Allergen Reactions Avelox (Moxifloxacin) Swelling Unknown (comments) Macrolide Antibiotics Hives Rash Swelling Per patient reported as a previous reaction to a \"mycin\" drug. Did not know which drug. Nsaids (Non-Steroidal Anti-Inflammatory Drug) Other (comments) D/t esophageal erosion and GERD Current Discharge Medication List  
  
START taking these medications Dose & Instructions Dispensing Information Comments  
 docusate sodium 100 mg capsule Commonly known as:  Nimo Celso Dose:  100 mg Take 1 Cap by mouth two (2) times a day for 30 days. Quantity:  20 Cap Refills:  0  
   
 oxyCODONE-acetaminophen 5-325 mg per tablet Commonly known as:  PERCOCET Dose:  1-2 Tab Take 1-2 Tabs by mouth every four (4) hours as needed for Pain. Max Daily Amount: 12 Tabs. Quantity:  30 Tab Refills:  0 CONTINUE these medications which have CHANGED Dose & Instructions Dispensing Information Comments * ondansetron 4 mg disintegrating tablet Commonly known as:  ZOFRAN ODT What changed:  Another medication with the same name was added. Make sure you understand how and when to take each. Dose:  4 mg Take 1 Tab by mouth every eight (8) hours as needed for Nausea. Quantity:  12 Tab Refills:  0  
   
 * ondansetron 4 mg disintegrating tablet Commonly known as:  ZOFRAN ODT What changed: You were already taking a medication with the same name, and this prescription was added. Make sure you understand how and when to take each. Dose:  4 mg Take 1 Tab by mouth every eight (8) hours as needed for Nausea. Quantity:  10 Tab Refills:  1  
   
 * Notice: This list has 2 medication(s) that are the same as other medications prescribed for you. Read the directions carefully, and ask your doctor or other care provider to review them with you. CONTINUE these medications which have NOT CHANGED Dose & Instructions Dispensing Information Comments NexIUM 20 mg capsule Generic drug:  esomeprazole Dose:  20 mg Take 20 mg by mouth daily. Refills:  0  
   
 omeprazole 40 mg capsule Commonly known as:  PRILOSEC Dose:  40 mg Take 1 Cap by mouth daily. Quantity:  30 Cap Refills:  0 Surgery Information ID Date/Time Status Primary Surgeon All Procedures Location [id number] 10/15/2018 0730 Unposted Alline Mohs, MD TOTAL LAPAROSCOPIC HYSTERECTOMY, BILATERAL SALPINGECTOMY [facility] Follow-up Information Follow up With Details Comments Contact Info None   None (395) Patient stated that they have no PCP Discharge Instructions Laparoscopic Hysterectomy: What to Expect at [facility] Your Recovery A hysterectomy is surgery to take out the uterus. In some cases, the ovaries and fallopian tubes also are taken out at the same time. You can expect to feel better and stronger each day, but you may need pain medicine for a week or two. You may get tired easily or have less energy than usual. The tiredness may last for several weeks after surgery. You will probably notice that your belly is swollen and puffy. This is common. The swelling will take several weeks to go down. You may take about 4 to 6 weeks to fully recover. It is important to avoid lifting while you are recovering so that you can heal. 
This care sheet gives you a general idea about how long it will take for you to recover. But each person recovers at a different pace. Follow the steps below to get better as quickly as possible. How can you care for yourself at home? Activity 
  · Rest when you feel tired.  
  · Be active. Walking is a good choice.  
  · Allow the area to heal. Don't move quickly or lift anything heavy until you are feeling better.  
  · You may shower 24 to 48 hours after surgery, if your doctor okays it. Pat the incision dry. Do not take a bath for the first 2 weeks, or until your doctor tells you it is okay.   · Ask your doctor when it is okay for you to have sex. Diet 
  · You can eat your normal diet. If your stomach is upset, try bland, low-fat foods like plain rice, broiled chicken, toast, and yogurt.  
  · If your bowel movements are not regular right after surgery, try to avoid constipation and straining. Drink plenty of water. Your doctor may suggest fiber, a stool softener, or a mild laxative. Medicines 
  · Your doctor will tell you if and when you can restart your medicines. He or she will also give you instructions about taking any new medicines.  
  · If you take blood thinners, such as warfarin (Coumadin), clopidogrel (Plavix), or aspirin, be sure to talk to your doctor. He or she will tell you if and when to start taking those medicines again. Make sure that you understand exactly what your doctor wants you to do.  
  · Be safe with medicines. Read and follow all instructions on the label. ¨ If the doctor gave you a prescription medicine for pain, take it as prescribed. ¨ If you are not taking a prescription pain medicine, ask your doctor if you can take an over-the-counter medicine.  
  · If your doctor prescribed antibiotics, take them as directed. Do not stop taking them just because you feel better. You need to take the full course of antibiotics. Incision care 
  · You may have stitches over the cuts (incisions) the doctor made in your belly.  
  · If you have strips of tape on the cut (incision) the doctor made, leave the tape on for a week or until it falls off.  
  · Wash the area daily with warm, soapy water, and pat it dry. Don't use hydrogen peroxide or alcohol. They can slow healing.  
  · You may cover the area with a gauze bandage if it oozes fluid or rubs against clothing.  
  · Change the bandage every day. Other instructions 
  · You may have some light vaginal bleeding. Wear sanitary pads if needed. Do not douche or use tampons.   · Don't have sex until the doctor says it is okay. Follow-up care is a key part of your treatment and safety. Be sure to make and go to all appointments, and call your doctor if you are having problems. It's also a good idea to know your test results and keep a list of the medicines you take. When should you call for help? Call 911 anytime you think you may need emergency care. For example, call if: 
  · You passed out (lost consciousness).  
  · You have chest pain, are short of breath, or cough up blood.  
 Call your doctor now or seek immediate medical care if: 
  · You have pain that does not get better after you take pain medicine.  
  · You cannot pass stoolsl or gas.  
  · You have vaginal discharge that has increased in amount or smells bad.  
  · You are sick to your stomach or cannot drink fluids.  
  · You have loose stitches, or your incision comes open.  
  · Bright red blood has soaked through the bandage over your incision.  
  · You have signs of infection, such as: 
¨ Increased pain, swelling, warmth, or redness. ¨ Red streaks leading from the incision. ¨ Pus draining from the incision. ¨ A fever.  
  · You have bright red vaginal bleeding that soaks one or more pads in an hour, or you have large clots.  
  · You have signs of a blood clot in your leg (called a deep vein thrombosis), such as: 
¨ Pain in your calf, back of the knee, thigh, or groin. ¨ Redness and swelling in your leg or groin.  
 Watch closely for changes in your health, and be sure to contact your doctor if you have any problems. Where can you learn more? Go to http://jody-clara.info/. Enter Q131 in the search box to learn more about \"Laparoscopic Hysterectomy: What to Expect at Home. \" Current as of: May 15, 2018 Content Version: 11.8 © 1878-3545 [employer].  Care instructions adapted under license by [employer] (which disclaims liability or warranty for this information). If you have questions about a medical condition or this instruction, always ask your healthcare professional. [place] [address] any warranty or liability for your use of this information. 
 
>>>You received an IV form of Tylenol 1000mg during your surgery, you may take tylenol (or pain medication containing Tylenol or Acetaminophen) in 6 hours at 1:20pm.<<< 
 
DO  [facility] [address], [address], [address] [address] [facility]. TAKE NARCOTIC PAIN MEDICATIONS WITH FOOD Narcotics tend to be constipating, we suggest taking a stool softener such as Colace or Miralax (follow package instructions). DO NOT DRIVE WHILE TAKING NARCOTIC PAIN MEDICATIONS. DO NOT TAKE SLEEPING MEDICATIONS OR ANTIANXIETY MEDICATIONS WHILE TAKING NARCOTIC PAIN MEDICATIONS,  ESPECIALLY THE NIGHT OF ANESTHESIA! CPAP PATIENTS BE SURE TO WEAR MACHINE WHENEVER NAPPING OR SLEEPING! DISCHARGE SUMMARY from Nurse The following personal items collected during your admission are returned to you:  
Dental Appliance: Dental Appliances: None Vision: Visual Aid: Glasses Hearing Aid:   
Jewelry: Jewelry: None Clothing: Clothing: With patient Other Valuables: Other Valuables: Ron Early Valuables sent to safe:   
 
 
PATIENT INSTRUCTIONS: 
 
 
B - Balance E - Eyes F-  Face looks uneven A-  Arms unable to move or move even S-  Speech slurred or non-existent T-  Time-call 911 as soon as signs and symptoms begin-DO NOT go Back to bed or wait to see if you get better-TIME IS BRAIN. If you have not received your influenza and/or pneumococcal vaccine, please follow up with your primary care physician. The discharge information has been reviewed with the patient and caregiver. The patient and caregiver verbalized understanding. [address] THROMBOSIS AND PULMONARY EMBOLUS 
 
SURGICAL PATIENTS Surgical patients are the #1 risk for DVT and PE. WHAT IS DVT? WHAT IS PE? 
DVT is a serious condition where blood clots develop deep in the veins of the legs. PE occurs when a blood clot breaks loose from the wall of a vein and travels to the lungs blocking the pulmonary artery or one of its branches impairing blood flow from the heart, which could result in death. RISK FACTORS 
? Surgery lasting longer than 45 minutes ? History of inflammatory bowel disease 
? Oral contraceptive or hormone replacement therapy ? Immobilization ? Varicose veins / swollen legs ? Smoking  
? CHF / Acute MI / Irregular heart beat ? Family history of thrombosis ? General anesthesia greater than 2 hours ? Obesity ? Infection of less than one month ? Less than 1 month postpartum ? COPD / Pneumonia ? Arthroscopic surgery ? Malignancy / cancer ? Spine surgery ? Blood abnormalities ? Stroke / Paralysis / Coma SIGNS AND SYMPTOMS OF DEEP VEIN TROMBOSIS Usually occurs in one leg, above or below the knee ? Swelling  one calf or thigh may be larger than the other ? Feeling increased warmth in the area of the leg that is swollen or painful ? Leg pain, which may increase when standing or walking ? Swelling along the vein of the leg ? When swollen areas is pressed with a finger, a depression may remain ? Tenderness of the leg that may be confined to one area ? Change in leg color (bluish or red) SIGNS AND SYMPTOMS OF PULMONARY EMBOLUS 
? Chest pain that gets worse with deep breath, coughing or chest movement ? Coughing up blood ? Sweating ? Shortness of breath or difficulty breathing ? Rapid heart beat ? Lightheadedness HOW TO REDUCE THE POSSIBLE RISK OF DVT ? Exercise  simple activities as rotating ankles and wrists, wiggling toes and fingers, tightening and relaxing muscles in calves and thighs promotes circulation while recovering from surgery. Please do these exercises every hour during waking hours ? Take mediation as prescribed by your physician (Lovenox, Coumadin, Aspirin) ? Resume your normal activities as soon as your doctor advises you to do so. 
? Remember, when traveling, to [place] your legs frequently. PATIENTS WHO BELIEVE THEY MAY BE EXPERIENCING SIGNS AND SYMPTOMS OF DVT OR PE SHOULD SEEK MEDICAL HELP IMMEDIATELY Chart Review Routing History No Routing History on File

## 2018-10-15 NOTE — OP NOTES
Operative Note    Patient ID:   Name: Maren Edmondson    Medical Record Number: [id number]    YOB: 1985    Preoperative Diagnosis: HGSIL CERVIX, MENOMETRORRHAGIA    Postoperative Diagnosis: HGSIL CERVIX, MENOMETRORRHAGIA    Surgeon: Sari Ayala MD     Assistant:  Toni Miguel    Anesthesia: General    Procedure: Total Laparoscopic Hysterectomy with bilateral  salpingectomy less than 250 grams    Findings: normal uterus, endometriosis and normal BSO    Estimated Blood Loss: 15 mL    Drains: none    Pathology /Specimens:     ID Type Source Tests Collected by Time Destination   1 : UTERUS, CERVIX, AND BILATERAL FALLOPIAN TUBES Preservative Uterus with Bilateral Fallopian Tubes  Sari Ayala MD 10/15/2018 4974 Pathology       DVT Prophylaxis: ANNALEE Hose    Antibiotic Prophylaxis: Ancef    The patient was taken to the operating room with intravenous fluids running, where she was administered general anesthesia in the supine position. Arcos was placed in the bladder using sterile techniques. She was then placed in the dorsal lithotomy position and prepped and draped in usual sterile fashion.  A M/A-COMare uterine manipulator is placed and the balloon inflated with 3 cc sterile saline.  Gloves are changed and attention is turned to the abdomen.       A LUQ incision is made, and access is gained using the optiview technique with a 5 mm trocar.  Pneumoperitoneum was obtained and intraabdominal placement was verified. Chantale Harjinder was no bleeding and no evidence of injury to the bowel. 5 mm incisions were then made in the umbilical as well as left and right lower quadrants and 5 mm ports placed in each of those under direct visualization. Findings were noted as above. The Sonicision was used. The ureters were identified on either side.  The left round ligament was transected with the Harmonic and take down to the level of the lower uterine segment where adhesion and endometriotic implants were noted to be obscuring the bladder reflection, the bladder flap was carefully developed layer by layer until normal anatomy was restored,  The posterior leaf of the broad ligament was then identified and a small window was made to isolate the uteroovarian ligament, insuring the ureter was well lateral and inferior. The uteroovarian ligament was then cauterized with the bipolar and transected with the harmonic scalpel.  At this time the posterior peritoneum was taken down further and additional blunt dissection was done thus skeletonizing the uterine arteries. The uterine arteries were clamped, coagulated and cut across the ascending branches using bipolar cautery and the Sonicision. Cardinal ligament was developed. The same thing was done on the right hand only requiring less overall dissection but the same step wise approach to restore normal anatomy. The fornices of the vagina are identified via the VCare cup.  Colpotomy was made with the Sonicision. The remainder of the cardinal and uterosacral ligaments were serially clamped, coagulated, and cut, and colpotomy carried around the VCare cup. When the specimen was free, it was delivered to the opening of the vagina     An asepto bulb was placed in the vagina to assist with maintaining the pneumoperitoneum. The vaginal cuff was then closed with a running suture of 2-0 stratafix with care taken to incorporate the angles of the cuff on each side.  Hemostasis was achieved. The pelvis was irrigated with copious amounts of saline and suctioned away. Hemostasis was assured. Next the tubes were excised using the harmonic scalpel and withdrawn through the lower quadrant ports intact. The pelvis was irrigated and clots and debris suctioned away prior to hemostasis being confirmed once again. The left and right lower trocars as well as the LUQ trocar were removed under direct visualization. Once the pneumoperitoneum was completely reduced and hemostasis is still effective, the final trocar was removed.  Skin of all incisions was closed with 4-0 Monocryl in a subcuticular closure.  0.5% Marcaine with epinephrine is injected at each incision site.  Dermabond was applied to the skin. The bulb is removed from the vagina and bright red blood was noted at the os. The canal was inspected and superficial bilateral vaginal sidewall lacerations were noted. Pressure was held bilaterally and the bleeding slowed to a light ooze. The canal was unperturbed for ~4-5 minutes and then re inspected and no further significant bleeding was seen.  All sponge, lap, needle, and instrument counts were correct times two and the flores was removed.     Signed By: Estelle Gastelum MD

## 2018-10-15 NOTE — PERIOP NOTES
Factor 
1985 [id number] Situation: 
Verbal report given from: Cameron Bruner and  
Noris Ledesma Procedure: Procedure(s): 
TOTAL LAPAROSCOPIC HYSTERECTOMY, BILATERAL SALPINGECTOMY Background: 
 
Preoperative diagnosis: HGSIL CERVIX Postoperative diagnosis: HGSIL CERVIX :  Dr. Manjit Hurtado Assistant(s): Circ-1: Aryan Gordon RN Scrub Tech-1: Bridgette [employer] Surg Asst-1: Shakira Berry [facility] Staff: Lauren Ji RN Specimens:  
ID Type Source Tests Collected by Time Destination 1 : UTERUS, CERVIX, AND BILATERAL FALLOPIAN TUBES Preservative Uterus with Bilateral Fallopian Tubes  Elba Mullins MD 10/15/2018 5664 Pathology Assessment: 
Intra-procedure medications Anesthesia gave intra-procedure sedation and medications, see anesthesia flow sheet Intravenous fluids: Candie Billy Vital signs stable Recommendation: 
 
Permission to share finding with  Queta Sellers

## 2018-10-15 NOTE — IP AVS SNAPSHOT
[address] [facility] 
[phone] Patient: Desi Russell MRN: [id number] ZJD:[id number] About your hospitalization You were admitted on:  October 15, 2018 You last received care in the:  [facility] PACU You were discharged on:  October 15, 2018 Why you were hospitalized Your primary diagnosis was:  Not on File Your diagnoses also included:  S/P Hysterectomy Follow-up Information Follow up With Details Comments Contact Info None   None ([phone]) Patient stated that they have no PCP Discharge Orders None A check bobo indicates which time of day the medication should be taken. My Medications START taking these medications Instructions Each Dose to Equal  
 Morning Noon Evening Bedtime  
 docusate sodium 100 mg capsule Commonly known as:  Maximo Hess Your last dose was: Your next dose is: Take 1 Cap by mouth two (2) times a day for 30 days. 100 mg  
    
   
   
   
  
 oxyCODONE-acetaminophen 5-325 mg per tablet Commonly known as:  PERCOCET Your last dose was: Your next dose is: Take 1-2 Tabs by mouth every four (4) hours as needed for Pain. Max Daily Amount: 12 Tabs. 1-2 Tab CHANGE how you take these medications Instructions Each Dose to Equal  
 Morning Noon Evening Bedtime * ondansetron 4 mg disintegrating tablet Commonly known as:  ZOFRAN ODT What changed:  Another medication with the same name was added. Make sure you understand how and when to take each. Your last dose was: Your next dose is: Take 1 Tab by mouth every eight (8) hours as needed for Nausea. 4 mg * ondansetron 4 mg disintegrating tablet Commonly known as:  ZOFRAN ODT What changed:   You were already taking a medication with the same name, and this prescription was added. Make sure you understand how and when to take each. Your last dose was: Your next dose is: Take 1 Tab by mouth every eight (8) hours as needed for Nausea. 4 mg * Notice: This list has 2 medication(s) that are the same as other medications prescribed for you. Read the directions carefully, and ask your doctor or other care provider to review them with you. CONTINUE taking these medications Instructions Each Dose to Equal  
 Morning Noon Evening Bedtime NexIUM 20 mg capsule Generic drug:  esomeprazole Your last dose was: Your next dose is: Take 20 mg by mouth daily. 20 mg  
    
   
   
   
  
 omeprazole 40 mg capsule Commonly known as:  PRILOSEC Your last dose was: Your next dose is: Take 1 Cap by mouth daily. 40 mg Where to Get Your Medications Information on where to get these meds will be given to you by the nurse or doctor. ! Ask your nurse or doctor about these medications  
  docusate sodium 100 mg capsule  
 ondansetron 4 mg disintegrating tablet  
 oxyCODONE-acetaminophen 5-325 mg per tablet Opioid Education Prescription Opioids: What You Need to Know: 
 
Prescription opioids can be used to help relieve moderate-to-severe pain and are often prescribed following a surgery or injury, or for certain health conditions. These medications can be an important part of treatment but also come with serious risks. Opioids are strong pain medicines. Examples include hydrocodone, oxycodone, fentanyl, and morphine. Heroin is an example of an illegal opioid. It is important to work with your health care provider to make sure you are getting the safest, most effective care. WHAT ARE THE RISKS AND SIDE EFFECTS OF OPIOID USE?  
Prescription opioids carry serious risks of addiction and overdose, especially with prolonged use. An opioid overdose, often marked by slow breathing, can cause sudden death. The use of prescription opioids can have a number of side effects as well, even when taken as directed. · Tolerance-meaning you might need to take more of a medication for the same pain relief · Physical dependence-meaning you have symptoms of withdrawal when the medication is stopped. Withdrawal symptoms can include nausea, sweating, chills, diarrhea, stomach cramps, and muscle aches. Withdrawal can last up to several weeks, depending on which drug you took and how long you took it. · Increased sensitivity to pain · Constipation · Nausea, vomiting, and dry mouth · Sleepiness and dizziness · Confusion · Depression · Low levels of testosterone that can result in lower sex drive, energy, and strength · Itching and sweating RISKS ARE GREATER WITH:      
· History of drug misuse, substance use disorder, or overdose · Mental health conditions (such as depression or anxiety) · Sleep apnea · Older age (72 years or older) · Pregnancy Avoid alcohol while taking prescription opioids. Also, unless specifically advised by your health care provider, medications to avoid include: · Benzodiazepines (such as Xanax or Valium) · Muscle relaxants (such as Soma or Flexeril) · Hypnotics (such as Ambien or Lunesta) · Other prescription opioids KNOW YOUR OPTIONS Talk to your health care provider about ways to manage your pain that don't involve prescription opioids. Some of these options may actually work better and have fewer risks and side effects. Consult your physician before adding or stopping any medications, treatments, or physical activity. Options may include: 
· Pain relievers such as acetaminophen, ibuprofen, and naproxen · Some medications that are also used for depression or seizures · Physical therapy and exercise · Counseling to help patients learn how to cope better with triggers of pain and stress. · Application of heat or cold compress · Massage therapy · Relaxation techniques Be Informed Make sure you know the name of your medication, how much and how often to take it, and its potential risks & side effects. IF YOU ARE PRESCRIBED OPIOIDS FOR PAIN: 
· Never take opioids in greater amounts or more often than prescribed. Remember the goal is not to be pain-free but to manage your pain at a tolerable level. · Follow up with your primary care provider to: · Work together to create a plan on how to manage your pain. · Talk about ways to help manage your pain that don't involve prescription opioids. · Talk about any and all concerns and side effects. · Help prevent misuse and abuse. · Never sell or share prescription opioids · Help prevent misuse and abuse. · Store prescription opioids in a secure place and out of reach of others (this may include visitors, children, friends, and family). · Safely dispose of unused/unwanted prescription opioids: Find your community drug take-back program or your pharmacy mail-back program, or flush them down the toilet, following guidance from the Food and Drug Administration (www.fda.gov/Drugs/ResourcesForYou). · Visit www.cdc.gov/drugoverdose to learn about the risks of opioid abuse and overdose. · If you believe you may be struggling with addiction, tell your health care provider and ask for guidance or call [address] [place], [place] [address] at 2-933-455-TWIV. Discharge Instructions Laparoscopic Hysterectomy: What to Expect at [facility] Your Recovery A hysterectomy is surgery to take out the uterus. In some cases, the ovaries and fallopian tubes also are taken out at the same time.  
You can expect to feel better and stronger each day, but you may need pain medicine for a week or two. You may get tired easily or have less energy than usual. The tiredness may last for several weeks after surgery. You will probably notice that your belly is swollen and puffy. This is common. The swelling will take several weeks to go down. You may take about 4 to 6 weeks to fully recover. It is important to avoid lifting while you are recovering so that you can heal. 
This care sheet gives you a general idea about how long it will take for you to recover. But each person recovers at a different pace. Follow the steps below to get better as quickly as possible. How can you care for yourself at home? Activity 
  · Rest when you feel tired.  
  · Be active. Walking is a good choice.  
  · Allow the area to heal. Don't move quickly or lift anything heavy until you are feeling better.  
  · You may shower 24 to 48 hours after surgery, if your doctor okays it. Pat the incision dry. Do not take a bath for the first 2 weeks, or until your doctor tells you it is okay.  
  · Ask your doctor when it is okay for you to have sex. Diet 
  · You can eat your normal diet. If your stomach is upset, try bland, low-fat foods like plain rice, broiled chicken, toast, and yogurt.  
  · If your bowel movements are not regular right after surgery, try to avoid constipation and straining. Drink plenty of water. Your doctor may suggest fiber, a stool softener, or a mild laxative. Medicines 
  · Your doctor will tell you if and when you can restart your medicines. He or she will also give you instructions about taking any new medicines.  
  · If you take blood thinners, such as warfarin (Coumadin), clopidogrel (Plavix), or aspirin, be sure to talk to your doctor. He or she will tell you if and when to start taking those medicines again. Make sure that you understand exactly what your doctor wants you to do.  
  · Be safe with medicines. Read and follow all instructions on the label. ¨ If the doctor gave you a prescription medicine for pain, take it as prescribed. ¨ If you are not taking a prescription pain medicine, ask your doctor if you can take an over-the-counter medicine.  
  · If your doctor prescribed antibiotics, take them as directed. Do not stop taking them just because you feel better. You need to take the full course of antibiotics. Incision care 
  · You may have stitches over the cuts (incisions) the doctor made in your belly.  
  · If you have strips of tape on the cut (incision) the doctor made, leave the tape on for a week or until it falls off.  
  · Wash the area daily with warm, soapy water, and pat it dry. Don't use hydrogen peroxide or alcohol. They can slow healing.  
  · You may cover the area with a gauze bandage if it oozes fluid or rubs against clothing.  
  · Change the bandage every day. Other instructions 
  · You may have some light vaginal bleeding. Wear sanitary pads if needed. Do not douche or use tampons.  
  · Don't have sex until the doctor says it is okay. Follow-up care is a key part of your treatment and safety. Be sure to make and go to all appointments, and call your doctor if you are having problems. It's also a good idea to know your test results and keep a list of the medicines you take. When should you call for help? Call 911 anytime you think you may need emergency care. For example, call if: 
  · You passed out (lost consciousness).  
  · You have chest pain, are short of breath, or cough up blood.  
 Call your doctor now or seek immediate medical care if: 
  · You have pain that does not get better after you take pain medicine.  
  · You cannot pass stoolsl or gas.  
  · You have vaginal discharge that has increased in amount or smells bad.  
  · You are sick to your stomach or cannot drink fluids.  
  · You have loose stitches, or your incision comes open.  
  · Bright red blood has soaked through the bandage over your incision.   · You have signs of infection, such as: 
¨ Increased pain, swelling, warmth, or redness. ¨ Red streaks leading from the incision. ¨ Pus draining from the incision. ¨ A fever.  
  · You have bright red vaginal bleeding that soaks one or more pads in an hour, or you have large clots.  
  · You have signs of a blood clot in your leg (called a deep vein thrombosis), such as: 
¨ Pain in your calf, back of the knee, thigh, or groin. ¨ Redness and swelling in your leg or groin.  
 Watch closely for changes in your health, and be sure to contact your doctor if you have any problems. Where can you learn more? Go to http://jody-clara.info/. Enter Q131 in the search box to learn more about \"Laparoscopic Hysterectomy: What to Expect at Home. \" Current as of: May 15, 2018 Content Version: 11.8 © 4809-7457 [employer]. Care instructions adapted under license by [employer] (which disclaims liability or warranty for this information). If you have questions about a medical condition or this instruction, always ask your healthcare professional. [place] [address] any warranty or liability for your use of this information. 
 
>>>You received an IV form of Tylenol 1000mg during your surgery, you may take tylenol (or pain medication containing Tylenol or Acetaminophen) in 6 hours at 1:20pm.<<< 
 
DO  [facility], [address], [address] [address] [facility]. TAKE NARCOTIC PAIN MEDICATIONS WITH FOOD Narcotics tend to be constipating, we suggest taking a stool softener such as Colace or Miralax (follow package instructions). DO NOT DRIVE WHILE TAKING NARCOTIC PAIN MEDICATIONS. DO NOT TAKE SLEEPING MEDICATIONS OR ANTIANXIETY MEDICATIONS WHILE TAKING NARCOTIC PAIN MEDICATIONS,  ESPECIALLY THE NIGHT OF ANESTHESIA! CPAP PATIENTS BE SURE TO WEAR MACHINE WHENEVER NAPPING OR SLEEPING! DISCHARGE SUMMARY from Nurse The following personal items collected during your admission are returned to you:  
Dental Appliance: Dental Appliances: None Vision: Visual Aid: Glasses Hearing Aid:   
Jewelry: Jewelry: None Clothing: Clothing: With patient Other Valuables: Other Valuables: Dulcie Line Valuables sent to safe:   
 
 
PATIENT INSTRUCTIONS: 
 
 
B - Balance E - Eyes F-  Face looks uneven A-  Arms unable to move or move even S-  Speech slurred or non-existent T-  Time-call 911 as soon as signs and symptoms begin-DO NOT go Back to bed or wait to see if you get better-TIME IS BRAIN. If you have not received your influenza and/or pneumococcal vaccine, please follow up with your primary care physician.  
 
The discharge information has been reviewed with the patient and caregiver. The patient and caregiver verbalized understanding. [address] THROMBOSIS AND PULMONARY EMBOLUS 
 
SURGICAL PATIENTS Surgical patients are the #1 risk for DVT and PE. WHAT IS DVT? WHAT IS PE? 
DVT is a serious condition where blood clots develop deep in the veins of the legs. PE occurs when a blood clot breaks loose from the wall of a vein and travels to the lungs blocking the pulmonary artery or one of its branches impairing blood flow from the heart, which could result in death. RISK FACTORS 
? Surgery lasting longer than 45 minutes ? History of inflammatory bowel disease 
? Oral contraceptive or hormone replacement therapy ? Immobilization ? Varicose veins / swollen legs ? Smoking  
? CHF / Acute MI / Irregular heart beat ? Family history of thrombosis ? General anesthesia greater than 2 hours ? Obesity ? Infection of less than one month ? Less than 1 month postpartum 
? COPD / Pneumonia ? Arthroscopic surgery ? Malignancy / cancer ? Spine surgery ? Blood abnormalities ? Stroke / Paralysis / Coma SIGNS AND SYMPTOMS OF DEEP VEIN TROMBOSIS Usually occurs in one leg, above or below the knee ? Swelling  one calf or thigh may be larger than the other ? Feeling increased warmth in the area of the leg that is swollen or painful ? Leg pain, which may increase when standing or walking ? Swelling along the vein of the leg ? When swollen areas is pressed with a finger, a depression may remain ? Tenderness of the leg that may be confined to one area ? Change in leg color (bluish or red) SIGNS AND SYMPTOMS OF PULMONARY EMBOLUS 
? Chest pain that gets worse with deep breath, coughing or chest movement ? Coughing up blood ? Sweating ? Shortness of breath or difficulty breathing ? Rapid heart beat ? Lightheadedness HOW TO REDUCE THE POSSIBLE RISK OF DVT ?  Exercise  simple activities as rotating ankles and wrists, wiggling toes and fingers, tightening and relaxing muscles in calves and thighs promotes circulation while recovering from surgery. Please do these exercises every hour during waking hours ? Take mediation as prescribed by your physician (Lovenox, Coumadin, Aspirin) ? Resume your normal activities as soon as your doctor advises you to do so. 
? Remember, when traveling, to [place] your legs frequently. PATIENTS WHO BELIEVE THEY MAY BE EXPERIENCING SIGNS AND SYMPTOMS OF DVT OR PE SHOULD SEEK MEDICAL HELP IMMEDIATELY Introducing [facility]! [facility] introduces [employer] patient portal. Now you can access parts of your medical record, email your doctor's office, and request medication refills online. 1. In your internet browser, go to [email or web address] 2. Click on the First Time User? Click Here link in the Sign In box. You will see the New Member Sign Up page. 3. Enter your Adomos Access Code exactly as it appears below. You will not need to use this code after youve completed the sign-up process. If you do not sign up before the expiration date, you must request a new code. · Adomos Access Code: [email or web address] Expires: 11/2/2018  9:43 PM 
 
4. Enter the last four digits of your Social Security Number (xxxx) and Date of Birth (mm/dd/yyyy) as indicated and click Submit. You will be taken to the next sign-up page. 5. Create a Adomos ID. This will be your Adomos login ID and cannot be changed, so think of one that is secure and easy to remember. 6. Create a Adomos password. You can change your password at any time. 7. Enter your Password Reset Question and Answer. This can be used at a later time if you forget your password. 8. Enter your e-mail address. You will receive e-mail notification when new information is available in [address] 9. Click Sign Up. You can now view and download portions of your medical record. 10. Click the Download Summary menu link to download a portable copy of your medical information. If you have questions, please visit the Frequently Asked Questions section of the [employer] website. Remember, [employer] is NOT to be used for urgent needs. For medical emergencies, dial 911. Now available from your iPhone and Android! Introducing Boone [place] As a [facility] patient, I wanted to make you aware of our electronic visit tool called [place]. [employer] 24/7 allows you to connect within minutes with a medical provider 24 hours a day, seven days a week via a mobile device or tablet or logging into a secure website from your computer. You can access Boone [place] from anywhere in the [place]. A virtual visit might be right for you when you have a simple condition and feel like you just dont want to get out of bed, or cant get away from work for an appointment, when your regular [facility] Eloy provider is not available (evenings, weekends or holidays), or when youre out of town and need minor care. Electronic visits cost only $49 and if the [employer] 24/7 provider determines a prescription is needed to treat your condition, one can be electronically transmitted to a nearby pharmacy*. Please take a moment to enroll today if you have not already done so. The enrollment process is free and takes just a few minutes. To enroll, please download the [employer] 24/7 rogers to your tablet or phone, or visit [email or web address] to enroll on your computer. And, as an [address] [place] [address] patient with a [email or web address] account, the results of your visits will be scanned into your electronic medical record and your primary care provider will be able to view the scanned results. We urge you to continue to see your regular [facility] provider for your ongoing medical care.   And while your primary care provider may not be the one available when you seek a [employer] virtual visit, the peace of mind you get from getting a real diagnosis real time can be priceless. For more information on [employer], view our Frequently Asked Questions (FAQs) at [email or web address]. Sincerely, 
 
Ariella Lopez MD 
Chief Medical Officer [place] [place] *:  certain medications cannot be prescribed via [employer] Providers Seen During Your Hospitalization Provider Specialty Primary office phone Jama Morales MD Obstetrics & Gynecology [phone] Your Primary Care Physician (PCP) Primary Care Physician Office Phone Office Fax NONE ** None ** ** None ** You are allergic to the following Allergen Reactions Avelox (Moxifloxacin) Swelling Unknown (comments) Macrolide Antibiotics Hives Rash Swelling Per patient reported as a previous reaction to a \"mycin\" drug. Did not know which drug. Nsaids (Non-Steroidal Anti-Inflammatory Drug) Other (comments) D/t esophageal erosion and GERD Recent Documentation Height Weight BMI OB Status Smoking Status 1.575 m 84.6 kg 34.11 kg/m2 Having regular periods Former Smoker Emergency Contacts Name Discharge Info Relation Home Work Mobile Telly Jerez CAREGIVER [3] Spouse [3] [phone] Patient Belongings The following personal items are in your possession at time of discharge: 
  Dental Appliances: None  Visual Aid: Glasses      Home Medications: None   Jewelry: None  Clothing: With patient    Other Valuables: Eyeglasses Discharge Instructions Attachments/References MEFS - OXYCODONE/ACETAMINOPHEN (PERCOCET, ROXICET) - (BY MOUTH) (ENGLISH) MEFS - ONDANSETRON (ZOFRAN, ZOFRAN ODT, ZUPLENZ) - (BY MOUTH, INTO THE MOUTH) (ENGLISH) MEFS - LAXATIVE, STOOL SOFTENERS (DOCULAX, COLACE, COLACE CLEAR, DSS) - (BY MOUTH) (ENGLISH) MEFS - SCOPOLAMINE (TRANSDERM SCOP) - (ABSORBED THROUGH THE SKIN) (ENGLISH) Patient Handouts Oxycodone/Acetaminophen (Percocet, Roxicet) - (By mouth) Why this medicine is used:  
Treats pain. This medicine contains a narcotic pain reliever. Contact a nurse or doctor right away if you have: 
· Extreme weakness, shallow breathing, slow heartbeat · Sweating or cold, clammy skin · Skin blisters, rash, or peeling Common side effects: 
· Constipation · Nausea, vomiting · Tiredness © 2017 [facility] Information is for End User's use only and may not be sold, redistributed or otherwise used for commercial purposes. Ondansetron (Zofran, Zofran ODT, Zuplenz) - (By mouth, Into the mouth) Why this medicine is used:  
Prevents nausea and vomiting. Contact a nurse or doctor right away if you have: 
· Fast, pounding, or uneven heartbeat · Lightheadedness or fainting · Trouble breathing Common side effects: 
· Headache, tiredness · Constipation, diarrhea © 2017 [facility] Information is for End User's use only and may not be sold, redistributed or otherwise used for commercial purposes. Laxative, Stool Softeners (Doculax, Colace, Colace Clear, DSS) - (By mouth) Why this medicine is used:  
Treats constipation by helping you have a bowel movement. Contact a nurse or doctor right away if you have: · Dark urine or pale stools · Vomiting, loss of appetite, stomach pain · Yellow skin or eyes Common side effects: 
· Nausea, diarrhea, stomach cramps, bitter taste in mouth © 2017 [facility] Information is for End User's use only and may not be sold, redistributed or otherwise used for commercial purposes. Scopolamine (Transderm Scop) - (Absorbed through the skin) Why this medicine is used:  
Treat nausea and vomiting. Contact a nurse or doctor right away if you have: · Seeing, hearing, or feeling things that are not there · Confusion or memory loss · Lightheadedness, dizziness, drowsiness, or fainting · Blurred vision · Trouble urinating Common side effects: 
· Skin rash or redness · Restlessness or tiredness · Dry mouth © 2017 [facility] Information is for End User's use only and may not be sold, redistributed or otherwise used for commercial purposes. Please provide this summary of care documentation to your next provider. Signatures-by signing, you are acknowledging that this After Visit Summary has been reviewed with you and you have received a copy. Patient Signature:  ____________________________________________________________ Date:  ____________________________________________________________  
  
[facility] Provider Signature:  ____________________________________________________________ Date:  ____________________________________________________________

## 2018-10-15 NOTE — PROGRESS NOTES
Date of Surgery Update: 
Stan Singh was seen and examined. History and physical has been reviewed. The patient has been examined.  There have been no significant clinical changes since the completion of the originally dated History and Physical. 
 
Signed By: Den Villagomez MD   
 October 15, 2018 7:22 AM

## 2018-10-15 NOTE — PERIOP NOTES
Patient: Mauricio Gamboa MRN: [id number]  SSN: [id number] YOB: 1985  Age: 35 y.o. Sex: female Patient is status post Procedure(s): 
TOTAL LAPAROSCOPIC HYSTERECTOMY, BILATERAL SALPINGECTOMY. Surgeon(s) and Role: 
   * Estelle Gastelum MD - Primary Local/Dose/Irrigation:  SEE MAR Peripheral IV 10/15/18 Right Wrist (Active) Site Assessment Clean, dry, & intact 10/15/2018  7:23 AM  
Phlebitis Assessment 0 10/15/2018  7:23 AM  
Infiltration Assessment 0 10/15/2018  7:23 AM  
Dressing Status Clean, dry, & intact 10/15/2018  7:23 AM  
Dressing Type Tape;Transparent 10/15/2018  7:23 AM  
Hub Color/Line Status Pink; Infusing 10/15/2018  7:23 AM  
                 
 
 
 
Dressing/Packing:  Wound Abdomen-DRESSING TYPE: Topical skin adhesive/glue (TO 4 TROCAR SITES) (10/15/18 0700) Wound Vagina-DRESSING TYPE: Carlita-pad (10/15/18 0700)

## 2018-10-15 NOTE — DISCHARGE INSTRUCTIONS
Laparoscopic Hysterectomy: What to Expect at [address]    A hysterectomy is surgery to take out the uterus. In some cases, the ovaries and fallopian tubes also are taken out at the same time. You can expect to feel better and stronger each day, but you may need pain medicine for a week or two. You may get tired easily or have less energy than usual. The tiredness may last for several weeks after surgery. You will probably notice that your belly is swollen and puffy. This is common. The swelling will take several weeks to go down. You may take about 4 to 6 weeks to fully recover. It is important to avoid lifting while you are recovering so that you can heal.  This care sheet gives you a general idea about how long it will take for you to recover. But each person recovers at a different pace. Follow the steps below to get better as quickly as possible. How can you care for yourself at home? Activity    · Rest when you feel tired.     · Be active. Walking is a good choice.     · Allow the area to heal. Don't move quickly or lift anything heavy until you are feeling better.     · You may shower 24 to 48 hours after surgery, if your doctor okays it. Pat the incision dry. Do not take a bath for the first 2 weeks, or until your doctor tells you it is okay.     · Ask your doctor when it is okay for you to have sex. Diet    · You can eat your normal diet. If your stomach is upset, try bland, low-fat foods like plain rice, broiled chicken, toast, and yogurt.     · If your bowel movements are not regular right after surgery, try to avoid constipation and straining. Drink plenty of water. Your doctor may suggest fiber, a stool softener, or a mild laxative. Medicines    · Your doctor will tell you if and when you can restart your medicines.  He or she will also give you instructions about taking any new medicines.     · If you take blood thinners, such as warfarin (Coumadin), clopidogrel (Plavix), or aspirin, be sure to talk to your doctor. He or she will tell you if and when to start taking those medicines again. Make sure that you understand exactly what your doctor wants you to do.     · Be safe with medicines. Read and follow all instructions on the label. ¨ If the doctor gave you a prescription medicine for pain, take it as prescribed. ¨ If you are not taking a prescription pain medicine, ask your doctor if you can take an over-the-counter medicine.     · If your doctor prescribed antibiotics, take them as directed. Do not stop taking them just because you feel better. You need to take the full course of antibiotics. Incision care    · You may have stitches over the cuts (incisions) the doctor made in your belly.     · If you have strips of tape on the cut (incision) the doctor made, leave the tape on for a week or until it falls off.     · Wash the area daily with warm, soapy water, and pat it dry. Don't use hydrogen peroxide or alcohol. They can slow healing.     · You may cover the area with a gauze bandage if it oozes fluid or rubs against clothing.     · Change the bandage every day. Other instructions    · You may have some light vaginal bleeding. Wear sanitary pads if needed. Do not douche or use tampons.     · Don't have sex until the doctor says it is okay. Follow-up care is a key part of your treatment and safety. Be sure to make and go to all appointments, and call your doctor if you are having problems. It's also a good idea to know your test results and keep a list of the medicines you take. When should you call for help? Call 911 anytime you think you may need emergency care.  For example, call if:    · You passed out (lost consciousness).     · You have chest pain, are short of breath, or cough up blood.    Call your doctor now or seek immediate medical care if:    · You have pain that does not get better after you take pain medicine.     · You cannot pass stoolsl or gas.     · You have vaginal discharge that has increased in amount or smells bad.     · You are sick to your stomach or cannot drink fluids.     · You have loose stitches, or your incision comes open.     · Bright red blood has soaked through the bandage over your incision.     · You have signs of infection, such as:  ¨ Increased pain, swelling, warmth, or redness. ¨ Red streaks leading from the incision. ¨ Pus draining from the incision. ¨ A fever.     · You have bright red vaginal bleeding that soaks one or more pads in an hour, or you have large clots.     · You have signs of a blood clot in your leg (called a deep vein thrombosis), such as:  ¨ Pain in your calf, back of the knee, thigh, or groin. ¨ Redness and swelling in your leg or groin.    Watch closely for changes in your health, and be sure to contact your doctor if you have any problems. Where can you learn more? Go to http://jody-clara.info/. Enter Q131 in the search box to learn more about \"Laparoscopic Hysterectomy: What to Expect at Home. \"  Current as of: May 15, 2018  Content Version: 11.8  © 0976-7553 [employer]. Care instructions adapted under license by [employer] (which disclaims liability or warranty for this information). If you have questions about a medical condition or this instruction, always ask your healthcare professional. [place] [address] any warranty or liability for your use of this information.    >>>You received an IV form of Tylenol 1000mg during your surgery, you may take tylenol (or pain medication containing Tylenol or Acetaminophen) in 6 hours at 1:20pm.<<<    DO  [facility], [address], [address] [address] [facility]. TAKE NARCOTIC PAIN MEDICATIONS WITH FOOD       Narcotics tend to be constipating, we suggest taking a stool softener such as Colace or Miralax (follow package instructions). DO NOT DRIVE WHILE TAKING NARCOTIC PAIN MEDICATIONS.     DO NOT TAKE SLEEPING MEDICATIONS OR ANTIANXIETY MEDICATIONS WHILE TAKING NARCOTIC PAIN MEDICATIONS,  ESPECIALLY THE NIGHT OF ANESTHESIA! CPAP PATIENTS BE SURE TO WEAR MACHINE WHENEVER NAPPING OR SLEEPING! DISCHARGE SUMMARY from Nurse    The following personal items collected during your admission are returned to you:   Dental Appliance: Dental Appliances: None  Vision: Visual Aid: Glasses  Hearing Aid:    Jewelry: Jewelry: None  Clothing: Clothing: With patient  Other Valuables: Other Valuables: Eyeglasses  Valuables sent to safe:        PATIENT INSTRUCTIONS:    After General Anesthesia or Intravenous Sedation, for 24 hours or while taking prescription Narcotics:        Someone should be with you for the next 24 hours. For your own safety, a responsible adult must drive you home. · Limit your activities  · Recommended activity: Rest today, up with assistance today. Do not climb stairs or shower unattended for the next 24 hours. · Please start with a soft bland diet and advance as tolerated (no nausea) to regular diet. · If you have a sore throat you should try the following: fluids, warm salt water gargles, or throat lozenges. If it does not improve after several days please follow up with your primary physician. · Do not drive and operate hazardous machinery  · Do not make important personal or business decisions  · Do  not drink alcoholic beverages  · If you have not urinated within 8 hours after discharge, please contact your surgeon on call. Report the following to your surgeon:  · Excessive pain, swelling, redness or odor of or around the surgical area  · Temperature over 100.5  · Nausea and vomiting lasting longer than 4 hours or if unable to take medications      · You will receive a Post Operative Call from one of the Recovery Room Nurses on the day after your surgery to check on you. It is very important for us to know how you are recovering after your surgery.  If you have an issue or need to speak with someone, please call your surgeon, do not wait for the post operative call. · You may receive an e-mail or letter in the mail from [employer] regarding your experience with us in the Ambulatory Surgery Unit. Your feedback is valuable to us and we appreciate your participation in the survey. · If the above instructions are not adequate or you are having problems after your surgery, call the physician at their office number. · We wish you a speedy recovery ? What to do at Home:      *  Please give a list of your current medications to your Primary Care Provider. *  Please update this list whenever your medications are discontinued, doses are      changed, or new medications (including over-the-counter products) are added. *  Please carry medication information at all times in case of emergency situations. These are general instructions for a healthy lifestyle:    No smoking/ No tobacco products/ Avoid exposure to second hand smoke    Surgeon General's Warning:  Quitting smoking now greatly reduces serious risk to your health. Obesity, smoking, and sedentary lifestyle greatly increases your risk for illness    A healthy diet, regular physical exercise & weight monitoring are important for maintaining a healthy lifestyle    You may be retaining fluid if you have a history of heart failure or if you experience any of the following symptoms:  Weight gain of 3 pounds or more overnight or 5 pounds in a week, increased swelling in our hands or feet or shortness of breath while lying flat in bed. Please call your doctor as soon as you notice any of these symptoms; do not wait until your next office visit.     Recognize signs and symptoms of STROKE:    B - Balance  E - Eyes    F-  Face looks uneven  A-  Arms unable to move or move even  S-  Speech slurred or non-existent  T-  Time-call 911 as soon as signs and symptoms begin-DO NOT go       Back to bed or wait to see if you get better-TIME IS BRAIN. If you have not received your influenza and/or pneumococcal vaccine, please follow up with your primary care physician. The discharge information has been reviewed with the patient and caregiver. The patient and caregiver verbalized understanding. Rashi Colunga THROMBOSIS AND PULMONARY EMBOLUS    SURGICAL PATIENTS  Surgical patients are the #1 risk for DVT and PE. WHAT IS DVT? WHAT IS PE?  DVT is a serious condition where blood clots develop deep in the veins of the legs. PE occurs when a blood clot breaks loose from the wall of a vein and travels to the lungs blocking the pulmonary artery or one of its branches impairing blood flow from the heart, which could result in death.   RISK FACTORS   Surgery lasting longer than 45 minutes   History of inflammatory bowel disease   Oral contraceptive or hormone replacement therapy   Immobilization   Varicose veins / swollen legs   Smoking    CHF / Acute MI / Irregular heart beat   Family history of thrombosis   General anesthesia greater than 2 hours   Obesity   Infection of less than one month   Less than 1 month postpartum   COPD / Pneumonia   Arthroscopic surgery   Malignancy / cancer   Spine surgery   Blood abnormalities   Stroke / Paralysis / Coma    SIGNS AND SYMPTOMS OF DEEP VEIN TROMBOSIS  Usually occurs in one leg, above or below the knee   Swelling - one calf or thigh may be larger than the other   Feeling increased warmth in the area of the leg that is swollen or painful   Leg pain, which may increase when standing or walking   Swelling along the vein of the leg   When swollen areas is pressed with a finger, a depression may remain   Tenderness of the leg that may be confined to one area   Change in leg color (bluish or red)    SIGNS AND SYMPTOMS OF PULMONARY EMBOLUS   Chest pain that gets worse with deep breath, coughing or chest movement   Coughing up blood   Sweating   Shortness of breath or difficulty breathing   Rapid heart beat   Lightheadedness    HOW TO REDUCE THE POSSIBLE RISK OF DVT   Exercise - simple activities as rotating ankles and wrists, wiggling toes and fingers, tightening and relaxing muscles in calves and thighs promotes circulation while recovering from surgery. Please do these exercises every hour during waking hours   Take mediation as prescribed by your physician (Lovenox, Coumadin, Aspirin)   Resume your normal activities as soon as your doctor advises you to do so.  Remember, when traveling, to [place] your legs frequently.         PATIENTS WHO BELIEVE THEY MAY BE EXPERIENCING SIGNS AND SYMPTOMS OF DVT OR PE SHOULD SEEK MEDICAL HELP IMMEDIATELY

## 2018-10-15 NOTE — ANESTHESIA PREPROCEDURE EVALUATION
Anesthetic History PONV (denies motion sickness) Review of Systems / Medical History Patient summary reviewed, nursing notes reviewed and pertinent labs reviewed Pulmonary Asthma : well controlled Neuro/Psych Within defined limits Cardiovascular Within defined limits Exercise tolerance: >4 METS 
  
GI/Hepatic/Renal 
  
GERD Comments: IBS Endo/Other Within defined limits Other Findings Physical Exam 
 
Airway Mallampati: II 
TM Distance: 4 - 6 cm Neck ROM: normal range of motion Mouth opening: Normal 
 
 Cardiovascular Rhythm: regular Rate: normal 
 
 
Pertinent negatives: No murmur Dental 
 
Dentition: Implants Pulmonary Breath sounds clear to auscultation Abdominal 
GI exam deferred Other Findings Anesthetic Plan ASA: 2 Anesthesia type: general 
 
Monitoring Plan: BIS Induction: Intravenous Anesthetic plan and risks discussed with: Patient PONV prophylaxis

## 2019-07-13 ENCOUNTER — OFFICE VISIT (OUTPATIENT)
Dept: URGENT CARE | Age: 34
End: 2019-07-13

## 2019-07-13 VITALS
SYSTOLIC BLOOD PRESSURE: 136 MMHG | HEIGHT: 62 IN | WEIGHT: 166.7 LBS | OXYGEN SATURATION: 98 % | DIASTOLIC BLOOD PRESSURE: 84 MMHG | BODY MASS INDEX: 30.67 KG/M2 | HEART RATE: 67 BPM | RESPIRATION RATE: 18 BRPM | TEMPERATURE: 98.4 F

## 2019-07-13 DIAGNOSIS — J02.0 STREP PHARYNGITIS: Primary | ICD-10-CM

## 2019-07-13 LAB
S PYO AG THROAT QL: POSITIVE
VALID INTERNAL CONTROL?: YES

## 2019-07-13 RX ORDER — PREDNISONE 20 MG/1
20 TABLET ORAL
Qty: 5 TAB | Refills: 0 | Status: SHIPPED | OUTPATIENT
Start: 2019-07-13 | End: 2019-09-06 | Stop reason: CLARIF

## 2019-07-13 RX ORDER — AMOXICILLIN 500 MG/1
500 TABLET, FILM COATED ORAL 2 TIMES DAILY
Qty: 14 TAB | Refills: 0 | Status: SHIPPED | OUTPATIENT
Start: 2019-07-13 | End: 2019-07-20

## 2019-07-13 NOTE — PATIENT INSTRUCTIONS
Follow Up with PCP in 3-5 days for routine care. Call to be seen sooner or return Here/ER, if no improvement with treatments advised/prescribed or symptoms/pain worsen (develop fever, pain worsens significantly, or develop NEW symptoms). Strep Throat: Care Instructions  Your Care Instructions    Strep throat is a bacterial infection that causes sudden, severe sore throat and fever. Strep throat, which is caused by bacteria called streptococcus, is treated with antibiotics. Sometimes a strep test is necessary to tell if the sore throat is caused by strep bacteria. Treatment can help ease symptoms and may prevent future problems. Follow-up care is a key part of your treatment and safety. Be sure to make and go to all appointments, and call your doctor if you are having problems. It's also a good idea to know your test results and keep a list of the medicines you take. How can you care for yourself at home? · Take your antibiotics as directed. Do not stop taking them just because you feel better. You need to take the full course of antibiotics. · Strep throat can spread to others until 24 hours after you begin taking antibiotics. During this time, you should avoid contact with other people at work or home, especially infants and children. Do not sneeze or cough on others, and wash your hands often. Keep your drinking glass and eating utensils separate from those of others, and wash these items well in hot, soapy water. · Gargle with warm salt water at least once each hour to help reduce swelling and make your throat feel better. Use 1 teaspoon of salt mixed in 8 fluid ounces of warm water. · Take an over-the-counter pain medication, such as acetaminophen (Tylenol), ibuprofen (Advil, Motrin), or naproxen (Aleve). Read and follow all instructions on the label. · Try an over-the-counter anesthetic throat spray or throat lozenges, which may help relieve throat pain. · Drink plenty of fluids.  Fluids may help soothe an irritated throat. Hot fluids, such as tea or soup, may help your throat feel better. · Eat soft solids and drink plenty of clear liquids. Flavored ice pops, ice cream, scrambled eggs, sherbet, and gelatin dessert (such as Jell-O) may also soothe the throat. · Get lots of rest.  · Do not smoke, and avoid secondhand smoke. If you need help quitting, talk to your doctor about stop-smoking programs and medicines. These can increase your chances of quitting for good. · Use a vaporizer or humidifier to add moisture to the air in your bedroom. Follow the directions for cleaning the machine. When should you call for help? Call your doctor now or seek immediate medical care if:    · You have a new or higher fever.     · You have a fever with a stiff neck or severe headache.     · You have new or worse trouble swallowing.     · Your sore throat gets much worse on one side.     · Your pain becomes much worse on one side of your throat.    Watch closely for changes in your health, and be sure to contact your doctor if:    · You are not getting better after 2 days (48 hours).     · You do not get better as expected. Where can you learn more? Go to http://jody-clara.info/. Enter K625 in the search box to learn more about \"Strep Throat: Care Instructions. \"  Current as of: March 27, 2018  Content Version: 11.9  © 4235-9338 [employer]. Care instructions adapted under license by [employer] (which disclaims liability or warranty for this information). If you have questions about a medical condition or this instruction, always ask your healthcare professional. [address] any warranty or liability for your use of this information.

## 2019-07-13 NOTE — PROGRESS NOTES
Sore Throat    The history is provided by the patient. This is a new problem. The current episode started yesterday. The problem has not changed since onset. Associated symptoms include congestion, headaches, plugged ear sensation and swollen glands. Pertinent negatives include no ear pain and no cough. She has had no exposure to strep. She has tried NSAIDs for the symptoms.         Past Medical History:   Diagnosis Date    Abnormal Pap smear     Biopsy done last year and came back ok    Asthma     flares with bronchitis has been over a year as stated 10/5/2018    Difficult intravenous access     GERD (gastroesophageal reflux disease)     IBS (irritable bowel syndrome)     Kidney stones     Nausea & vomiting         Past Surgical History:   Procedure Laterality Date    HX APPENDECTOMY      HX COLONOSCOPY      HX ENDOSCOPY      HX GYN          HX GYN  2004        HX GYN      hysterectomy         Family History   Problem Relation Age of Onset    Hypertension Mother     Heart Disease Mother     Cancer Mother     Diabetes Mother         Social History     Socioeconomic History    Marital status:      Spouse name: Not on file    Number of children: Not on file    Years of education: Not on file    Highest education level: Not on file   Occupational History    Not on file   Social Needs    Financial resource strain: Not on file    Food insecurity:     Worry: Not on file     Inability: Not on file    Transportation needs:     Medical: Not on file     Non-medical: Not on file   Tobacco Use    Smoking status: Former Smoker    Smokeless tobacco: Never Used   Substance and Sexual Activity    Alcohol use: Yes     Comment: occassionally    Drug use: No    Sexual activity: Yes     Partners: Male     Birth control/protection: None   Lifestyle    Physical activity:     Days per week: Not on file     Minutes per session: Not on file    Stress: Not on file   Relationships    Social connections:     Talks on phone: Not on file     Gets together: Not on file     Attends Yazdanism service: Not on file     Active member of club or organization: Not on file     Attends meetings of clubs or organizations: Not on file     Relationship status: Not on file    Intimate partner violence:     Fear of current or ex partner: Not on file     Emotionally abused: Not on file     Physically abused: Not on file     Forced sexual activity: Not on file   Other Topics Concern    Not on file   Social History Narrative    Not on file                ALLERGIES: Avelox [moxifloxacin]; Macrolide antibiotics; and Nsaids (non-steroidal anti-inflammatory drug)    Review of Systems   Constitutional: Positive for activity change and fever. Negative for fatigue. HENT: Positive for congestion and sore throat. Negative for ear pain, postnasal drip and sinus pain. Respiratory: Negative for cough. Cardiovascular: Negative for chest pain. Gastrointestinal: Negative for abdominal pain and nausea. Musculoskeletal: Negative for myalgias. Neurological: Positive for headaches. All other systems reviewed and are negative. Vitals:    07/13/19 1043   BP: 136/84   Pulse: 67   Resp: 18   Temp: 98.4 °F (36.9 °C)   SpO2: 98%   Weight: 166 lb 11.2 oz (75.6 kg)   Height: 5' 2\" (1.575 m)       Physical Exam   Constitutional: She is oriented to person, place, and time. She appears well-developed and well-nourished. No distress. HENT:   Head: Normocephalic and atraumatic. Right Ear: External ear normal. Tympanic membrane is injected. Tympanic membrane is not erythematous. Left Ear: External ear normal. Tympanic membrane is injected. Tympanic membrane is not erythematous. Nose: No mucosal edema or rhinorrhea. Right sinus exhibits no maxillary sinus tenderness and no frontal sinus tenderness. Left sinus exhibits no maxillary sinus tenderness and no frontal sinus tenderness.    Mouth/Throat: Mucous membranes are normal. Oropharyngeal exudate, posterior oropharyngeal edema and posterior oropharyngeal erythema present. Eyes: Pupils are equal, round, and reactive to light. Cardiovascular: Normal rate and intact distal pulses. Pulmonary/Chest: Effort normal. No respiratory distress. Abdominal: Soft. She exhibits no distension. Musculoskeletal: She exhibits no edema. Lymphadenopathy:        Head (right side): Tonsillar adenopathy present. Head (left side): Tonsillar adenopathy present. She has cervical adenopathy. Neurological: She is alert and oriented to person, place, and time. Skin: Skin is warm and dry. She is not diaphoretic. Psychiatric: She has a normal mood and affect. Her behavior is normal. Judgment normal.   Nursing note and vitals reviewed. MDM    Procedures    CLINICAL IMPRESSION:     ICD-10-CM ICD-9-CM    1. Strep pharyngitis J02.0 034.0 AMB POC RAPID STREP A      amoxicillin 500 mg tab      predniSONE (DELTASONE) 20 mg tablet         Plan:  F/U with PCP in 3-5 days INI  Orders Placed This Encounter    AMB POC RAPID STREP A    amoxicillin 500 mg tab     Sig: Take 500 mg by mouth two (2) times a day for 7 days. Indications: Acute Pharyngitis     Dispense:  14 Tab     Refill:  0    predniSONE (DELTASONE) 20 mg tablet     Sig: Take 20 mg by mouth daily (with breakfast). Dispense:  5 Tab     Refill:  0           The patients condition was discussed with the patient and they understand. The patient is to follow up with PCP. If signs and symptoms become worse the pt is to go to the ER. The patient is to take medications as prescribed.

## 2019-08-04 ENCOUNTER — APPOINTMENT (OUTPATIENT)
Dept: CT IMAGING | Age: 34
End: 2019-08-04
Attending: EMERGENCY MEDICINE
Payer: COMMERCIAL

## 2019-08-04 ENCOUNTER — HOSPITAL ENCOUNTER (EMERGENCY)
Age: 34
Discharge: HOME | End: 2019-08-05
Attending: EMERGENCY MEDICINE
Payer: COMMERCIAL

## 2019-08-04 DIAGNOSIS — R10.84 ABDOMINAL PAIN, GENERALIZED: Primary | ICD-10-CM

## 2019-08-04 LAB
ALBUMIN SERPL-MCNC: 3.4 G/DL (ref 3.5–5)
ALBUMIN/GLOB SERPL: 1 {RATIO} (ref 1.1–2.2)
ALP SERPL-CCNC: 69 U/L (ref 45–117)
ALT SERPL-CCNC: 33 U/L (ref 12–78)
ANION GAP SERPL CALC-SCNC: 7 MMOL/L (ref 5–15)
APPEARANCE UR: CLEAR
AST SERPL-CCNC: 24 U/L (ref 15–37)
BACTERIA URNS QL MICRO: NEGATIVE /HPF
BASOPHILS # BLD: 0.1 K/UL (ref 0–0.1)
BASOPHILS NFR BLD: 1 % (ref 0–1)
BILIRUB SERPL-MCNC: 0.3 MG/DL (ref 0.2–1)
BILIRUB UR QL: NEGATIVE
BUN SERPL-MCNC: 15 MG/DL (ref 6–20)
BUN/CREAT SERPL: 17 (ref 12–20)
CALCIUM SERPL-MCNC: 9.1 MG/DL (ref 8.5–10.1)
CHLORIDE SERPL-SCNC: 104 MMOL/L (ref 97–108)
CO2 SERPL-SCNC: 28 MMOL/L (ref 21–32)
COLOR UR: ABNORMAL
CREAT SERPL-MCNC: 0.86 MG/DL (ref 0.55–1.02)
DIFFERENTIAL METHOD BLD: ABNORMAL
EOSINOPHIL # BLD: 0.2 K/UL (ref 0–0.4)
EOSINOPHIL NFR BLD: 1 % (ref 0–7)
EPITH CASTS URNS QL MICRO: ABNORMAL /LPF
ERYTHROCYTE [DISTWIDTH] IN BLOOD BY AUTOMATED COUNT: 14.7 % (ref 11.5–14.5)
GLOBULIN SER CALC-MCNC: 3.3 G/DL (ref 2–4)
GLUCOSE SERPL-MCNC: 90 MG/DL (ref 65–100)
GLUCOSE UR STRIP.AUTO-MCNC: NEGATIVE MG/DL
HCG UR QL: NEGATIVE
HCT VFR BLD AUTO: 41.2 % (ref 35–47)
HGB BLD-MCNC: 13.7 G/DL (ref 11.5–16)
HGB UR QL STRIP: ABNORMAL
HYALINE CASTS URNS QL MICRO: ABNORMAL /LPF (ref 0–5)
IMM GRANULOCYTES # BLD AUTO: 0 K/UL (ref 0–0.04)
IMM GRANULOCYTES NFR BLD AUTO: 0 % (ref 0–0.5)
KETONES UR QL STRIP.AUTO: 15 MG/DL
LEUKOCYTE ESTERASE UR QL STRIP.AUTO: NEGATIVE
LIPASE SERPL-CCNC: 75 U/L (ref 73–393)
LYMPHOCYTES # BLD: 3.6 K/UL (ref 0.8–3.5)
LYMPHOCYTES NFR BLD: 34 % (ref 12–49)
MCH RBC QN AUTO: 27.5 PG (ref 26–34)
MCHC RBC AUTO-ENTMCNC: 33.3 G/DL (ref 30–36.5)
MCV RBC AUTO: 82.7 FL (ref 80–99)
MONOCYTES # BLD: 0.7 K/UL (ref 0–1)
MONOCYTES NFR BLD: 6 % (ref 5–13)
NEUTS SEG # BLD: 5.9 K/UL (ref 1.8–8)
NEUTS SEG NFR BLD: 58 % (ref 32–75)
NITRITE UR QL STRIP.AUTO: NEGATIVE
NRBC # BLD: 0 K/UL (ref 0–0.01)
NRBC BLD-RTO: 0 PER 100 WBC
PH UR STRIP: 7.5 [PH] (ref 5–8)
PLATELET # BLD AUTO: 394 K/UL (ref 150–400)
PMV BLD AUTO: 10.6 FL (ref 8.9–12.9)
POTASSIUM SERPL-SCNC: 3.7 MMOL/L (ref 3.5–5.1)
PROT SERPL-MCNC: 6.7 G/DL (ref 6.4–8.2)
PROT UR STRIP-MCNC: 100 MG/DL
RBC # BLD AUTO: 4.98 M/UL (ref 3.8–5.2)
RBC #/AREA URNS HPF: ABNORMAL /HPF (ref 0–5)
SODIUM SERPL-SCNC: 139 MMOL/L (ref 136–145)
SP GR UR REFRACTOMETRY: 1.01 (ref 1–1.03)
UA: UC IF INDICATED,UAUC: ABNORMAL
UROBILINOGEN UR QL STRIP.AUTO: 0.2 EU/DL (ref 0.2–1)
WBC # BLD AUTO: 10.4 K/UL (ref 3.6–11)
WBC URNS QL MICRO: ABNORMAL /HPF (ref 0–4)

## 2019-08-04 PROCEDURE — 74011250637 HC RX REV CODE- 250/637: Performed by: EMERGENCY MEDICINE

## 2019-08-04 PROCEDURE — 74177 CT ABD & PELVIS W/CONTRAST: CPT

## 2019-08-04 PROCEDURE — 83605 ASSAY OF LACTIC ACID: CPT

## 2019-08-04 PROCEDURE — 99284 EMERGENCY DEPT VISIT MOD MDM: CPT

## 2019-08-04 PROCEDURE — 83690 ASSAY OF LIPASE: CPT

## 2019-08-04 PROCEDURE — 85025 COMPLETE CBC W/AUTO DIFF WBC: CPT

## 2019-08-04 PROCEDURE — 96375 TX/PRO/DX INJ NEW DRUG ADDON: CPT

## 2019-08-04 PROCEDURE — 74011636320 HC RX REV CODE- 636/320: Performed by: EMERGENCY MEDICINE

## 2019-08-04 PROCEDURE — 81001 URINALYSIS AUTO W/SCOPE: CPT

## 2019-08-04 PROCEDURE — 36415 COLL VENOUS BLD VENIPUNCTURE: CPT

## 2019-08-04 PROCEDURE — 96376 TX/PRO/DX INJ SAME DRUG ADON: CPT

## 2019-08-04 PROCEDURE — 81025 URINE PREGNANCY TEST: CPT

## 2019-08-04 PROCEDURE — 74011250636 HC RX REV CODE- 250/636: Performed by: EMERGENCY MEDICINE

## 2019-08-04 PROCEDURE — 80053 COMPREHEN METABOLIC PANEL: CPT

## 2019-08-04 PROCEDURE — 96374 THER/PROPH/DIAG INJ IV PUSH: CPT

## 2019-08-04 RX ORDER — SODIUM CHLORIDE 0.9 % (FLUSH) 0.9 %
10 SYRINGE (ML) INJECTION
Status: COMPLETED | OUTPATIENT
Start: 2019-08-04 | End: 2019-08-04

## 2019-08-04 RX ORDER — ONDANSETRON 4 MG/1
8 TABLET, ORALLY DISINTEGRATING ORAL
Status: COMPLETED | OUTPATIENT
Start: 2019-08-04 | End: 2019-08-04

## 2019-08-04 RX ORDER — CLONIDINE HYDROCHLORIDE 0.1 MG/1
0.2 TABLET ORAL
Status: DISCONTINUED | OUTPATIENT
Start: 2019-08-04 | End: 2019-08-04

## 2019-08-04 RX ORDER — FENTANYL CITRATE 50 UG/ML
25 INJECTION, SOLUTION INTRAMUSCULAR; INTRAVENOUS
Status: COMPLETED | OUTPATIENT
Start: 2019-08-04 | End: 2019-08-04

## 2019-08-04 RX ORDER — FAMOTIDINE 10 MG/ML
20 INJECTION INTRAVENOUS
Status: COMPLETED | OUTPATIENT
Start: 2019-08-04 | End: 2019-08-04

## 2019-08-04 RX ADMIN — IOPAMIDOL 100 ML: 755 INJECTION, SOLUTION INTRAVENOUS at 23:49

## 2019-08-04 RX ADMIN — FENTANYL CITRATE 25 MCG: 50 INJECTION, SOLUTION INTRAMUSCULAR; INTRAVENOUS at 23:40

## 2019-08-04 RX ADMIN — SODIUM CHLORIDE 1000 ML: 900 INJECTION, SOLUTION INTRAVENOUS at 23:41

## 2019-08-04 RX ADMIN — Medication 10 ML: at 23:49

## 2019-08-04 RX ADMIN — ONDANSETRON 8 MG: 4 TABLET, ORALLY DISINTEGRATING ORAL at 21:17

## 2019-08-04 RX ADMIN — FAMOTIDINE 20 MG: 10 INJECTION, SOLUTION INTRAVENOUS at 23:40

## 2019-08-04 NOTE — LETTER
[address] 
[facility] EMERGENCY DEPT 
[address] [place] [address] [facility] [address] 
[phone] Work/School Note Date: 8/4/2019 To Whom It May concern: 
 
Gloria Gary was seen and treated today in the emergency room by the following provider(s): 
Attending Provider: Neda Lew MD. Gloria Gary should be excused from work on 8/4/2019. Sincerely, Carolina Wayne MD

## 2019-08-05 ENCOUNTER — APPOINTMENT (OUTPATIENT)
Dept: GENERAL RADIOLOGY | Age: 34
End: 2019-08-05
Attending: EMERGENCY MEDICINE
Payer: COMMERCIAL

## 2019-08-05 ENCOUNTER — APPOINTMENT (OUTPATIENT)
Dept: CT IMAGING | Age: 34
End: 2019-08-05
Attending: EMERGENCY MEDICINE
Payer: COMMERCIAL

## 2019-08-05 ENCOUNTER — HOSPITAL ENCOUNTER (OUTPATIENT)
Age: 34
Setting detail: OBSERVATION
Discharge: HOME | End: 2019-08-07
Attending: EMERGENCY MEDICINE | Admitting: INTERNAL MEDICINE
Payer: COMMERCIAL

## 2019-08-05 VITALS
DIASTOLIC BLOOD PRESSURE: 73 MMHG | RESPIRATION RATE: 16 BRPM | HEART RATE: 86 BPM | WEIGHT: 166.45 LBS | BODY MASS INDEX: 30.63 KG/M2 | TEMPERATURE: 98.2 F | SYSTOLIC BLOOD PRESSURE: 129 MMHG | HEIGHT: 62 IN | OXYGEN SATURATION: 97 %

## 2019-08-05 DIAGNOSIS — R10.31 ABDOMINAL PAIN, RIGHT LOWER QUADRANT: Primary | ICD-10-CM

## 2019-08-05 DIAGNOSIS — K59.00 CONSTIPATION, UNSPECIFIED CONSTIPATION TYPE: ICD-10-CM

## 2019-08-05 DIAGNOSIS — N83.201 CYST OF RIGHT OVARY: ICD-10-CM

## 2019-08-05 PROBLEM — N83.209 OVARIAN CYST RUPTURE: Status: ACTIVE | Noted: 2019-08-05

## 2019-08-05 LAB
ALBUMIN SERPL-MCNC: 3.3 G/DL (ref 3.5–5)
ALBUMIN/GLOB SERPL: 1 {RATIO} (ref 1.1–2.2)
ALP SERPL-CCNC: 71 U/L (ref 45–117)
ALT SERPL-CCNC: 30 U/L (ref 12–78)
ANION GAP SERPL CALC-SCNC: 8 MMOL/L (ref 5–15)
AST SERPL-CCNC: 20 U/L (ref 15–37)
BASOPHILS # BLD: 0 K/UL (ref 0–0.1)
BASOPHILS NFR BLD: 0 % (ref 0–1)
BILIRUB SERPL-MCNC: 0.5 MG/DL (ref 0.2–1)
BUN SERPL-MCNC: 13 MG/DL (ref 6–20)
BUN/CREAT SERPL: 16 (ref 12–20)
CALCIUM SERPL-MCNC: 8.4 MG/DL (ref 8.5–10.1)
CHLORIDE SERPL-SCNC: 110 MMOL/L (ref 97–108)
CO2 SERPL-SCNC: 24 MMOL/L (ref 21–32)
CREAT SERPL-MCNC: 0.81 MG/DL (ref 0.55–1.02)
DIFFERENTIAL METHOD BLD: ABNORMAL
EOSINOPHIL # BLD: 0.1 K/UL (ref 0–0.4)
EOSINOPHIL NFR BLD: 1 % (ref 0–7)
ERYTHROCYTE [DISTWIDTH] IN BLOOD BY AUTOMATED COUNT: 15.1 % (ref 11.5–14.5)
GLOBULIN SER CALC-MCNC: 3.3 G/DL (ref 2–4)
GLUCOSE SERPL-MCNC: 74 MG/DL (ref 65–100)
HCT VFR BLD AUTO: 42.8 % (ref 35–47)
HGB BLD-MCNC: 14 G/DL (ref 11.5–16)
IMM GRANULOCYTES # BLD AUTO: 0 K/UL (ref 0–0.04)
IMM GRANULOCYTES NFR BLD AUTO: 0 % (ref 0–0.5)
LACTATE SERPL-SCNC: 0.8 MMOL/L (ref 0.4–2)
LYMPHOCYTES # BLD: 2.8 K/UL (ref 0.8–3.5)
LYMPHOCYTES NFR BLD: 25 % (ref 12–49)
MCH RBC QN AUTO: 27.4 PG (ref 26–34)
MCHC RBC AUTO-ENTMCNC: 32.7 G/DL (ref 30–36.5)
MCV RBC AUTO: 83.8 FL (ref 80–99)
MONOCYTES # BLD: 0.6 K/UL (ref 0–1)
MONOCYTES NFR BLD: 5 % (ref 5–13)
NEUTS SEG # BLD: 7.6 K/UL (ref 1.8–8)
NEUTS SEG NFR BLD: 69 % (ref 32–75)
NRBC # BLD: 0 K/UL (ref 0–0.01)
NRBC BLD-RTO: 0 PER 100 WBC
PLATELET # BLD AUTO: 307 K/UL (ref 150–400)
PMV BLD AUTO: 11.6 FL (ref 8.9–12.9)
POTASSIUM SERPL-SCNC: 3.7 MMOL/L (ref 3.5–5.1)
PROT SERPL-MCNC: 6.6 G/DL (ref 6.4–8.2)
RBC # BLD AUTO: 5.11 M/UL (ref 3.8–5.2)
SODIUM SERPL-SCNC: 142 MMOL/L (ref 136–145)
WBC # BLD AUTO: 11.1 K/UL (ref 3.6–11)

## 2019-08-05 PROCEDURE — 80053 COMPREHEN METABOLIC PANEL: CPT

## 2019-08-05 PROCEDURE — 36415 COLL VENOUS BLD VENIPUNCTURE: CPT

## 2019-08-05 PROCEDURE — 85025 COMPLETE CBC W/AUTO DIFF WBC: CPT

## 2019-08-05 PROCEDURE — 77030008771 HC TU NG SALEM SUMP -A

## 2019-08-05 PROCEDURE — 74177 CT ABD & PELVIS W/CONTRAST: CPT

## 2019-08-05 PROCEDURE — 74011250637 HC RX REV CODE- 250/637: Performed by: INTERNAL MEDICINE

## 2019-08-05 PROCEDURE — 74011250636 HC RX REV CODE- 250/636: Performed by: INTERNAL MEDICINE

## 2019-08-05 PROCEDURE — 96376 TX/PRO/DX INJ SAME DRUG ADON: CPT

## 2019-08-05 PROCEDURE — 96375 TX/PRO/DX INJ NEW DRUG ADDON: CPT

## 2019-08-05 PROCEDURE — 74019 RADEX ABDOMEN 2 VIEWS: CPT

## 2019-08-05 PROCEDURE — 74011250637 HC RX REV CODE- 250/637: Performed by: EMERGENCY MEDICINE

## 2019-08-05 PROCEDURE — 74011636320 HC RX REV CODE- 636/320: Performed by: EMERGENCY MEDICINE

## 2019-08-05 PROCEDURE — 74011250636 HC RX REV CODE- 250/636: Performed by: EMERGENCY MEDICINE

## 2019-08-05 PROCEDURE — 74011000250 HC RX REV CODE- 250: Performed by: EMERGENCY MEDICINE

## 2019-08-05 PROCEDURE — 94760 N-INVAS EAR/PLS OXIMETRY 1: CPT

## 2019-08-05 PROCEDURE — 99284 EMERGENCY DEPT VISIT MOD MDM: CPT

## 2019-08-05 PROCEDURE — 99218 HC RM OBSERVATION: CPT

## 2019-08-05 PROCEDURE — 74011250636 HC RX REV CODE- 250/636

## 2019-08-05 PROCEDURE — 96361 HYDRATE IV INFUSION ADD-ON: CPT

## 2019-08-05 PROCEDURE — 96374 THER/PROPH/DIAG INJ IV PUSH: CPT

## 2019-08-05 RX ORDER — FENTANYL CITRATE 50 UG/ML
INJECTION, SOLUTION INTRAMUSCULAR; INTRAVENOUS
Status: COMPLETED
Start: 2019-08-05 | End: 2019-08-05

## 2019-08-05 RX ORDER — KETOROLAC TROMETHAMINE 30 MG/ML
30 INJECTION, SOLUTION INTRAMUSCULAR; INTRAVENOUS
Status: DISCONTINUED | OUTPATIENT
Start: 2019-08-05 | End: 2019-08-05

## 2019-08-05 RX ORDER — HYDROCODONE BITARTRATE AND ACETAMINOPHEN 5; 325 MG/1; MG/1
1 TABLET ORAL
Qty: 12 TAB | Refills: 0 | Status: SHIPPED | OUTPATIENT
Start: 2019-08-05 | End: 2019-08-08

## 2019-08-05 RX ORDER — FENTANYL CITRATE 50 UG/ML
25 INJECTION, SOLUTION INTRAMUSCULAR; INTRAVENOUS
Status: COMPLETED | OUTPATIENT
Start: 2019-08-05 | End: 2019-08-05

## 2019-08-05 RX ORDER — SODIUM CHLORIDE 9 MG/ML
75 INJECTION, SOLUTION INTRAVENOUS CONTINUOUS
Status: DISPENSED | OUTPATIENT
Start: 2019-08-05 | End: 2019-08-06

## 2019-08-05 RX ORDER — MORPHINE SULFATE 4 MG/ML
4 INJECTION INTRAVENOUS
Status: DISCONTINUED | OUTPATIENT
Start: 2019-08-05 | End: 2019-08-06

## 2019-08-05 RX ORDER — ADHESIVE BANDAGE
30 BANDAGE TOPICAL DAILY PRN
Status: DISCONTINUED | OUTPATIENT
Start: 2019-08-05 | End: 2019-08-07 | Stop reason: HOSPADM

## 2019-08-05 RX ORDER — DIPHENHYDRAMINE HYDROCHLORIDE 50 MG/ML
25 INJECTION, SOLUTION INTRAMUSCULAR; INTRAVENOUS
Status: COMPLETED | OUTPATIENT
Start: 2019-08-05 | End: 2019-08-05

## 2019-08-05 RX ORDER — LIDOCAINE HYDROCHLORIDE 20 MG/ML
SOLUTION OROPHARYNGEAL
Status: COMPLETED | OUTPATIENT
Start: 2019-08-05 | End: 2019-08-05

## 2019-08-05 RX ORDER — SODIUM CHLORIDE 0.9 % (FLUSH) 0.9 %
5-40 SYRINGE (ML) INJECTION EVERY 8 HOURS
Status: DISCONTINUED | OUTPATIENT
Start: 2019-08-05 | End: 2019-08-07 | Stop reason: HOSPADM

## 2019-08-05 RX ORDER — MORPHINE SULFATE 2 MG/ML
4 INJECTION, SOLUTION INTRAMUSCULAR; INTRAVENOUS ONCE
Status: COMPLETED | OUTPATIENT
Start: 2019-08-05 | End: 2019-08-05

## 2019-08-05 RX ORDER — DICYCLOMINE HYDROCHLORIDE 20 MG/1
20 TABLET ORAL
Qty: 20 TAB | Refills: 0 | Status: ON HOLD | OUTPATIENT
Start: 2019-08-05 | End: 2019-09-11 | Stop reason: SDUPTHER

## 2019-08-05 RX ORDER — NALOXONE HYDROCHLORIDE 0.4 MG/ML
0.4 INJECTION, SOLUTION INTRAMUSCULAR; INTRAVENOUS; SUBCUTANEOUS AS NEEDED
Status: DISCONTINUED | OUTPATIENT
Start: 2019-08-05 | End: 2019-08-07 | Stop reason: HOSPADM

## 2019-08-05 RX ORDER — METOCLOPRAMIDE HYDROCHLORIDE 5 MG/ML
10 INJECTION INTRAMUSCULAR; INTRAVENOUS
Status: COMPLETED | OUTPATIENT
Start: 2019-08-05 | End: 2019-08-05

## 2019-08-05 RX ORDER — DOCUSATE SODIUM 100 MG/1
100 CAPSULE, LIQUID FILLED ORAL 2 TIMES DAILY
Status: DISCONTINUED | OUTPATIENT
Start: 2019-08-06 | End: 2019-08-07 | Stop reason: HOSPADM

## 2019-08-05 RX ORDER — ONDANSETRON 2 MG/ML
8 INJECTION INTRAMUSCULAR; INTRAVENOUS
Status: COMPLETED | OUTPATIENT
Start: 2019-08-05 | End: 2019-08-05

## 2019-08-05 RX ORDER — ACETAMINOPHEN 325 MG/1
650 TABLET ORAL
Status: DISCONTINUED | OUTPATIENT
Start: 2019-08-05 | End: 2019-08-07 | Stop reason: HOSPADM

## 2019-08-05 RX ORDER — ONDANSETRON 4 MG/1
4 TABLET, ORALLY DISINTEGRATING ORAL
Qty: 10 TAB | Refills: 0 | Status: SHIPPED | OUTPATIENT
Start: 2019-08-05 | End: 2020-04-10 | Stop reason: SDUPTHER

## 2019-08-05 RX ORDER — DICYCLOMINE HYDROCHLORIDE 20 MG/1
20 TABLET ORAL
Status: COMPLETED | OUTPATIENT
Start: 2019-08-05 | End: 2019-08-05

## 2019-08-05 RX ORDER — SODIUM CHLORIDE 0.9 % (FLUSH) 0.9 %
10 SYRINGE (ML) INJECTION
Status: COMPLETED | OUTPATIENT
Start: 2019-08-05 | End: 2019-08-05

## 2019-08-05 RX ORDER — SODIUM CHLORIDE 0.9 % (FLUSH) 0.9 %
5-40 SYRINGE (ML) INJECTION AS NEEDED
Status: DISCONTINUED | OUTPATIENT
Start: 2019-08-05 | End: 2019-08-07 | Stop reason: HOSPADM

## 2019-08-05 RX ORDER — METRONIDAZOLE 500 MG/1
500 TABLET ORAL 2 TIMES DAILY
Qty: 14 TAB | Refills: 0 | Status: SHIPPED | OUTPATIENT
Start: 2019-08-05 | End: 2019-08-12

## 2019-08-05 RX ORDER — FENTANYL CITRATE 50 UG/ML
50 INJECTION, SOLUTION INTRAMUSCULAR; INTRAVENOUS
Status: DISCONTINUED | OUTPATIENT
Start: 2019-08-05 | End: 2019-08-05

## 2019-08-05 RX ORDER — KETOROLAC TROMETHAMINE 30 MG/ML
15 INJECTION, SOLUTION INTRAMUSCULAR; INTRAVENOUS
Status: COMPLETED | OUTPATIENT
Start: 2019-08-05 | End: 2019-08-05

## 2019-08-05 RX ORDER — OXYCODONE AND ACETAMINOPHEN 5; 325 MG/1; MG/1
1 TABLET ORAL
Status: DISCONTINUED | OUTPATIENT
Start: 2019-08-05 | End: 2019-08-07 | Stop reason: HOSPADM

## 2019-08-05 RX ORDER — ONDANSETRON 2 MG/ML
4 INJECTION INTRAMUSCULAR; INTRAVENOUS
Status: DISCONTINUED | OUTPATIENT
Start: 2019-08-05 | End: 2019-08-07 | Stop reason: HOSPADM

## 2019-08-05 RX ORDER — KETOROLAC TROMETHAMINE 30 MG/ML
30 INJECTION, SOLUTION INTRAMUSCULAR; INTRAVENOUS
Status: DISCONTINUED | OUTPATIENT
Start: 2019-08-05 | End: 2019-08-07 | Stop reason: HOSPADM

## 2019-08-05 RX ORDER — SULFAMETHOXAZOLE AND TRIMETHOPRIM 800; 160 MG/1; MG/1
1 TABLET ORAL 2 TIMES DAILY
Qty: 14 TAB | Refills: 0 | Status: SHIPPED | OUTPATIENT
Start: 2019-08-05 | End: 2019-08-12

## 2019-08-05 RX ADMIN — SODIUM CHLORIDE 75 ML/HR: 900 INJECTION, SOLUTION INTRAVENOUS at 23:11

## 2019-08-05 RX ADMIN — DICYCLOMINE HYDROCHLORIDE 20 MG: 20 TABLET ORAL at 02:36

## 2019-08-05 RX ADMIN — DIPHENHYDRAMINE HYDROCHLORIDE 25 MG: 50 INJECTION, SOLUTION INTRAMUSCULAR; INTRAVENOUS at 19:10

## 2019-08-05 RX ADMIN — Medication 10 ML: at 20:13

## 2019-08-05 RX ADMIN — METOCLOPRAMIDE 10 MG: 5 INJECTION, SOLUTION INTRAMUSCULAR; INTRAVENOUS at 19:09

## 2019-08-05 RX ADMIN — Medication 10 ML: at 23:13

## 2019-08-05 RX ADMIN — FENTANYL CITRATE 25 MCG: 50 INJECTION, SOLUTION INTRAMUSCULAR; INTRAVENOUS at 01:26

## 2019-08-05 RX ADMIN — ONDANSETRON 8 MG: 2 INJECTION INTRAMUSCULAR; INTRAVENOUS at 15:58

## 2019-08-05 RX ADMIN — IOPAMIDOL 100 ML: 755 INJECTION, SOLUTION INTRAVENOUS at 20:13

## 2019-08-05 RX ADMIN — OXYCODONE HYDROCHLORIDE AND ACETAMINOPHEN 1 TABLET: 5; 325 TABLET ORAL at 23:11

## 2019-08-05 RX ADMIN — LIDOCAINE HYDROCHLORIDE 15 ML: 20 SOLUTION ORAL; TOPICAL at 15:58

## 2019-08-05 RX ADMIN — KETOROLAC TROMETHAMINE 15 MG: 30 INJECTION, SOLUTION INTRAMUSCULAR at 04:08

## 2019-08-05 RX ADMIN — MORPHINE SULFATE 4 MG: 2 INJECTION, SOLUTION INTRAMUSCULAR; INTRAVENOUS at 19:10

## 2019-08-05 RX ADMIN — IOHEXOL 40 ML: 180 INJECTION INTRAVENOUS at 19:10

## 2019-08-05 RX ADMIN — MORPHINE SULFATE 4 MG: 2 INJECTION, SOLUTION INTRAMUSCULAR; INTRAVENOUS at 15:59

## 2019-08-05 NOTE — ED NOTES
Pt, reports no relief from previous medications. Per Dr. Jonathan De Luna give Toradol.   Per pt is ok to take Toradol due to IV medication, difficult taking ibuprofens orally

## 2019-08-05 NOTE — ED PROVIDER NOTES
EMERGENCY DEPARTMENT HISTORY AND PHYSICAL EXAM      Date: 8/4/2019  Patient Name: Mary Patrick    History of Presenting Illness     Chief Complaint   Patient presents with    Abdominal Pain     intermittent pain around the umbilicus for a couple hours, with nausea, but denies vomiting/diarrhea       History Provided By: Patient    HPI: Mary Patrick, 29 y.o. female with PMHx significant for peptic ulcer disease, cervical cancer status post a hysterectomy about 10 months ago and status post appendectomy at age 15, and hypertension, presents to the emergency room with chief complaint of abdominal pain. Patient states this evening about 530 after she ate a dinner of corn and pork chops, she developed abdominal pain that is epigastric and suprapubic in location, severe in intensity and cramping in nature. The symptoms are intermittent and come on about every 5 to 10 minutes. She reports associated nausea but no vomiting, diarrhea, constipation, blood in stools, urinary symptoms, fever, chills, chest pain, shortness of breath. Patient states that she had a hysterectomy for cervical cancer about 10 months ago but has not had any problems related to that recently. Patient states that she changed her diet significantly in April of this year and started eating much healthier with lots of fruits and vegetables and eats, and minimal carbohydrates and sugars. She went on vacation last week and she did on her diet a little bit. She did not have any pain through the week but only when she came home tonight. PCP: Wilman Lassiter MD   OB/GYN: Dr. Storm Cherry    No current facility-administered medications on file prior to encounter. Current Outpatient Medications on File Prior to Encounter   Medication Sig Dispense Refill    predniSONE (DELTASONE) 20 mg tablet Take 20 mg by mouth daily (with breakfast).  5 Tab 0       Past History     Past Medical History:  Past Medical History:   Diagnosis Date    Abnormal Pap smear     Biopsy done last year and came back ok    Asthma     flares with bronchitis has been over a year as stated 10/5/2018    Difficult intravenous access     GERD (gastroesophageal reflux disease)     IBS (irritable bowel syndrome)     Kidney stones     Nausea & vomiting        Past Surgical History:  Past Surgical History:   Procedure Laterality Date    HX APPENDECTOMY      HX COLONOSCOPY      HX ENDOSCOPY      HX GYN          HX GYN  2004        HX GYN      hysterectomy       Family History:  Family History   Problem Relation Age of Onset    Hypertension Mother     Heart Disease Mother     Cancer Mother     Diabetes Mother        Social History:  Social History     Tobacco Use    Smoking status: Former Smoker    Smokeless tobacco: Never Used   Substance Use Topics    Alcohol use: Yes     Comment: occassionally    Drug use: No       Allergies: Allergies   Allergen Reactions    Avelox [Moxifloxacin] Swelling and Unknown (comments)    Macrolide Antibiotics Hives, Rash and Swelling     Per patient reported as a previous reaction to a \"mycin\" drug. Did not know which drug.  Nsaids (Non-Steroidal Anti-Inflammatory Drug) Other (comments)     D/t esophageal erosion and GERD         Review of Systems   Review of Systems   Constitutional: Positive for appetite change. Negative for chills, diaphoresis and fever. HENT: Negative for congestion, ear pain, rhinorrhea and sinus pressure. Eyes: Negative. Respiratory: Negative for cough, chest tightness, shortness of breath and wheezing. Cardiovascular: Negative for chest pain, palpitations and leg swelling. Gastrointestinal: Positive for abdominal pain and nausea. Negative for blood in stool, constipation, diarrhea and vomiting. Genitourinary: Negative for dysuria, flank pain and hematuria. Musculoskeletal: Negative for arthralgias, back pain and neck pain. Skin: Negative for rash and wound.    Neurological: Negative for dizziness, syncope, weakness, numbness and headaches. Psychiatric/Behavioral: Negative for agitation and confusion. The patient is not nervous/anxious.           Physical Exam    General appearance - well nourished, well appearing, and in no distress  Eyes - pupils equal and reactive, extraocular eye movements intact  ENT - mucous membranes moist, pharynx normal without lesions  Neck - supple, no significant adenopathy; non-tender to palpation  Chest - clear to auscultation, no wheezes, rales or rhonchi; non-tender to palpation  Heart - normal rate and regular rhythm, S1 and S2 normal, no murmurs noted  Abdomen - soft, tender to palpation suprapubic and right lower quadrant, nondistended, no masses or organomegaly  Musculoskeletal - no joint tenderness, deformity or swelling; normal ROM  Extremities - peripheral pulses normal, no pedal edema  Skin - normal coloration and turgor, no rashes  Neurological - alert, oriented x3, normal speech, no focal findings or movement disorder noted    Diagnostic Study Results     Labs -     Recent Results (from the past 12 hour(s))   URINALYSIS W/ REFLEX CULTURE    Collection Time: 08/04/19  8:47 PM   Result Value Ref Range    Color YELLOW/STRAW      Appearance CLEAR CLEAR      Specific gravity 1.013 1.003 - 1.030      pH (UA) 7.5 5.0 - 8.0      Protein 100 (A) NEG mg/dL    Glucose NEGATIVE  NEG mg/dL    Ketone 15 (A) NEG mg/dL    Bilirubin NEGATIVE  NEG      Blood MODERATE (A) NEG      Urobilinogen 0.2 0.2 - 1.0 EU/dL    Nitrites NEGATIVE  NEG      Leukocyte Esterase NEGATIVE  NEG      WBC 0-4 0 - 4 /hpf    RBC 20-50 0 - 5 /hpf    Epithelial cells FEW FEW /lpf    Bacteria NEGATIVE  NEG /hpf    UA:UC IF INDICATED CULTURE NOT INDICATED BY UA RESULT CNI      Hyaline cast 2-5 0 - 5 /lpf   HCG URINE, QL    Collection Time: 08/04/19  8:57 PM   Result Value Ref Range    HCG urine, QL NEGATIVE  NEG     CBC WITH AUTOMATED DIFF    Collection Time: 08/04/19  9:03 PM   Result Value Ref Range    WBC 10.4 3.6 - 11.0 K/uL    RBC 4.98 3.80 - 5.20 M/uL    HGB 13.7 11.5 - 16.0 g/dL    HCT 41.2 35.0 - 47.0 %    MCV 82.7 80.0 - 99.0 FL    MCH 27.5 26.0 - 34.0 PG    MCHC 33.3 30.0 - 36.5 g/dL    RDW 14.7 (H) 11.5 - 14.5 %    PLATELET 745 481 - 500 K/uL    MPV 10.6 8.9 - 12.9 FL    NRBC 0.0 0  WBC    ABSOLUTE NRBC 0.00 0.00 - 0.01 K/uL    NEUTROPHILS 58 32 - 75 %    LYMPHOCYTES 34 12 - 49 %    MONOCYTES 6 5 - 13 %    EOSINOPHILS 1 0 - 7 %    BASOPHILS 1 0 - 1 %    IMMATURE GRANULOCYTES 0 0.0 - 0.5 %    ABS. NEUTROPHILS 5.9 1.8 - 8.0 K/UL    ABS. LYMPHOCYTES 3.6 (H) 0.8 - 3.5 K/UL    ABS. MONOCYTES 0.7 0.0 - 1.0 K/UL    ABS. EOSINOPHILS 0.2 0.0 - 0.4 K/UL    ABS. BASOPHILS 0.1 0.0 - 0.1 K/UL    ABS. IMM. GRANS. 0.0 0.00 - 0.04 K/UL    DF AUTOMATED     METABOLIC PANEL, COMPREHENSIVE    Collection Time: 08/04/19 10:19 PM   Result Value Ref Range    Sodium 139 136 - 145 mmol/L    Potassium 3.7 3.5 - 5.1 mmol/L    Chloride 104 97 - 108 mmol/L    CO2 28 21 - 32 mmol/L    Anion gap 7 5 - 15 mmol/L    Glucose 90 65 - 100 mg/dL    BUN 15 6 - 20 MG/DL    Creatinine 0.86 0.55 - 1.02 MG/DL    BUN/Creatinine ratio 17 12 - 20      GFR est AA >60 >60 ml/min/1.73m2    GFR est non-AA >60 >60 ml/min/1.73m2    Calcium 9.1 8.5 - 10.1 MG/DL    Bilirubin, total 0.3 0.2 - 1.0 MG/DL    ALT (SGPT) 33 12 - 78 U/L    AST (SGOT) 24 15 - 37 U/L    Alk. phosphatase 69 45 - 117 U/L    Protein, total 6.7 6.4 - 8.2 g/dL    Albumin 3.4 (L) 3.5 - 5.0 g/dL    Globulin 3.3 2.0 - 4.0 g/dL    A-G Ratio 1.0 (L) 1.1 - 2.2     LIPASE    Collection Time: 08/04/19 10:19 PM   Result Value Ref Range    Lipase 75 73 - 393 U/L       Radiologic Studies -   CT ABD PELV W CONT    (Results Pending)     CT Results  (Last 48 hours)    None        CXR Results  (Last 48 hours)    None            Medical Decision Making   I am the first provider for this patient.     I reviewed the vital signs, available nursing notes, past medical history, past surgical history, family history and social history. Vital Signs-Reviewed the patient's vital signs. Patient Vitals for the past 12 hrs:   Temp Pulse Resp BP SpO2   08/04/19 2038 98.2 °F (36.8 °C) 86 16 (!) 153/93 99 %           Records Reviewed: Nursing Notes and Old Medical Records    Provider Notes (Medical Decision Making):   Differential diagnosis: Gastritis, pancreatitis, cholecystitis, bowel obstruction, UTI    ED Course:   Initial assessment performed. The patients presenting problems have been discussed, and they are in agreement with the care plan formulated and outlined with them. I have encouraged them to ask questions as they arise throughout their visit. Progress Notes:   Patient is feeling slightly better after meds in the emergency room. CT scan shows small bowel fluid distention with adjacent inflammatory changes in the right lower quadrant, but no definite wall thickening. Will treat with antibiotics for possible infectious process and analgesics and antiemetics. Will encourage close follow-up and return to ED if symptoms worsen. Disposition:  Discharge home    PLAN:  1. Discharge Medication List as of 8/5/2019  4:23 AM      START taking these medications    Details   trimethoprim-sulfamethoxazole (BACTRIM DS) 160-800 mg per tablet Take 1 Tab by mouth two (2) times a day for 7 days. , Print, Disp-14 Tab, R-0      metroNIDAZOLE (FLAGYL) 500 mg tablet Take 1 Tab by mouth two (2) times a day for 7 days. , Print, Disp-14 Tab, R-0      ondansetron (ZOFRAN ODT) 4 mg disintegrating tablet Take 1 Tab by mouth every eight (8) hours as needed for Nausea. , Print, Disp-10 Tab, R-0      dicyclomine (BENTYL) 20 mg tablet Take 1 Tab by mouth every six (6) hours as needed (abdominal cramps) for up to 20 doses. , Print, Disp-20 Tab, R-0      HYDROcodone-acetaminophen (NORCO) 5-325 mg per tablet Take 1 Tab by mouth every six (6) hours as needed for Pain for up to 3 days. Max Daily Amount: 4 Tabs. , Print, Disp-12 Tab, R-0         CONTINUE these medications which have NOT CHANGED    Details   predniSONE (DELTASONE) 20 mg tablet Take 20 mg by mouth daily (with breakfast). , Normal, Disp-5 Tab, R-0           2. Follow-up Information     Follow up With Specialties Details Why Contact Info    [facility] EMERGENCY DEPT Emergency Medicine  If symptoms worsen [address] [facility] [address]    Joel Sweeney MD Family Practice In 2 days  [address]  [address] [address] [phone]      Susanna Gerber MD Gastroenterology Schedule an appointment as soon as possible for a visit  [address] [place] [address]  [phone]          Return to ED if worse     Diagnosis     Clinical Impression:   1.  Abdominal pain, generalized

## 2019-08-05 NOTE — ED PROVIDER NOTES
EMERGENCY DEPARTMENT HISTORY AND PHYSICAL EXAM          Date: 8/5/2019  Patient Name: Amarjit Vieira    History of Presenting Illness     Chief Complaint   Patient presents with    Abdominal Pain     pt reports she was seen yesterday, continues with abdominal pain, spoke to Dr. Josue Erwin and told to come for admission       History Provided By: Patient and Significant other    HPI: Amarjit Vieira is a 29 y.o. female, pmhx IBS, GERD, asthma and kidney stones, who presents dilatory to the ED c/o abdominal pain    Patient states that she is been having pain on and off since Saturday. She was seen here yesterday and discharged with antibiotics, antiemetics and pain medication prescription sent to her pharmacy. She notes this morning she severely symptomatic and try to drink some Gatorade but it immediately made her nauseous increase her abdominal pain so she did not take any of her medications. She states the pain is intermittent and comes and goes in cramping waves and reaches a severe intensity of in the right lower quadrant. She called a former colleague of hers Dr. Josue Erwin who reviewed her CT and recommend she come to the emergency room for admission. Patient specifically denies any recent fevers, chills, CP, SOB, urinary sxs or headache. She notes that her bowels have changed and she normally has approximately 2 bowel movements a day but has not had any stool output or flatus since yesterday morning at 8 AM she also notes she is had significant health changes over the past year has stopped smoking has stopped drinking frequently, has decreased her daily caffeine intake and exercises regularly with a loss of 32 pounds. PCP: Arnold Koch MD    Allergies: Macrolide, NSAIDs  Social Hx: Number tobacco, occasional EtOH, -Illicit Drugs    There are no other complaints, changes, or physical findings at this time.      Current Outpatient Medications   Medication Sig Dispense Refill    trimethoprim-sulfamethoxazole (BACTRIM DS) 160-800 mg per tablet Take 1 Tab by mouth two (2) times a day for 7 days. 14 Tab 0    metroNIDAZOLE (FLAGYL) 500 mg tablet Take 1 Tab by mouth two (2) times a day for 7 days. 14 Tab 0    ondansetron (ZOFRAN ODT) 4 mg disintegrating tablet Take 1 Tab by mouth every eight (8) hours as needed for Nausea. 10 Tab 0    dicyclomine (BENTYL) 20 mg tablet Take 1 Tab by mouth every six (6) hours as needed (abdominal cramps) for up to 20 doses. 20 Tab 0    HYDROcodone-acetaminophen (NORCO) 5-325 mg per tablet Take 1 Tab by mouth every six (6) hours as needed for Pain for up to 3 days. Max Daily Amount: 4 Tabs. 12 Tab 0    predniSONE (DELTASONE) 20 mg tablet Take 20 mg by mouth daily (with breakfast). 5 Tab 0       Past History     Past Medical History:  Past Medical History:   Diagnosis Date    Abnormal Pap smear     Biopsy done last year and came back ok    Asthma     flares with bronchitis has been over a year as stated 10/5/2018    Difficult intravenous access     GERD (gastroesophageal reflux disease)     IBS (irritable bowel syndrome)     Kidney stones     Nausea & vomiting        Past Surgical History:  Past Surgical History:   Procedure Laterality Date    HX APPENDECTOMY      HX COLONOSCOPY      HX ENDOSCOPY      HX GYN          HX GYN  2004        HX GYN      hysterectomy       Family History:  Family History   Problem Relation Age of Onset    Hypertension Mother     Heart Disease Mother     Cancer Mother     Diabetes Mother        Social History:  Social History     Tobacco Use    Smoking status: Former Smoker    Smokeless tobacco: Never Used   Substance Use Topics    Alcohol use: Yes     Comment: occassionally    Drug use: No       Allergies:   Allergies   Allergen Reactions    Avelox [Moxifloxacin] Swelling and Unknown (comments)    Macrolide Antibiotics Hives, Rash and Swelling     Per patient reported as a previous reaction to a \"mycin\" drug. Did not know which drug.  Nsaids (Non-Steroidal Anti-Inflammatory Drug) Other (comments)     D/t esophageal erosion and GERD         Review of Systems   Review of Systems   Constitutional: Negative for activity change, appetite change, chills, fever and unexpected weight change. HENT: Negative for congestion. Eyes: Negative for pain and visual disturbance. Respiratory: Negative for cough and shortness of breath. Cardiovascular: Negative for chest pain. Gastrointestinal: Positive for abdominal pain, nausea and vomiting. Negative for abdominal distention, constipation and diarrhea. Genitourinary: Negative for dysuria. Musculoskeletal: Negative for back pain. Skin: Negative for rash. Neurological: Negative for headaches. Physical Exam   Physical Exam   Constitutional: She is oriented to person, place, and time. She appears well-developed and well-nourished. Kyle Cornell female presenting in mild to moderate distress with intermittent tears during evaluation   HENT:   Head: Normocephalic and atraumatic. Mouth/Throat: Oropharynx is clear and moist.   Eyes: Pupils are equal, round, and reactive to light. Conjunctivae and EOM are normal. Right eye exhibits no discharge. Left eye exhibits no discharge. Neck: Normal range of motion. Neck supple. No JVD present. Cardiovascular: Normal rate, regular rhythm and normal heart sounds. No murmur heard. Pulmonary/Chest: Effort normal and breath sounds normal. No respiratory distress. She has no wheezes. She has no rales. Abdominal: Soft. Bowel sounds are normal. She exhibits no distension and no mass. There is no tenderness. There is no rebound and no guarding. Musculoskeletal: Normal range of motion. She exhibits no edema. Neurological: She is alert and oriented to person, place, and time. No cranial nerve deficit. She exhibits normal muscle tone. Skin: Skin is warm and dry. No rash noted. She is not diaphoretic.    Nursing note and vitals reviewed. Diagnostic Study Results     Labs -     Recent Results (from the past 12 hour(s))   CBC WITH AUTOMATED DIFF    Collection Time: 08/05/19  2:10 PM   Result Value Ref Range    WBC 11.1 (H) 3.6 - 11.0 K/uL    RBC 5.11 3.80 - 5.20 M/uL    HGB 14.0 11.5 - 16.0 g/dL    HCT 42.8 35.0 - 47.0 %    MCV 83.8 80.0 - 99.0 FL    MCH 27.4 26.0 - 34.0 PG    MCHC 32.7 30.0 - 36.5 g/dL    RDW 15.1 (H) 11.5 - 14.5 %    PLATELET 427 042 - 962 K/uL    MPV 11.6 8.9 - 12.9 FL    NRBC 0.0 0  WBC    ABSOLUTE NRBC 0.00 0.00 - 0.01 K/uL    NEUTROPHILS 69 32 - 75 %    LYMPHOCYTES 25 12 - 49 %    MONOCYTES 5 5 - 13 %    EOSINOPHILS 1 0 - 7 %    BASOPHILS 0 0 - 1 %    IMMATURE GRANULOCYTES 0 0.0 - 0.5 %    ABS. NEUTROPHILS 7.6 1.8 - 8.0 K/UL    ABS. LYMPHOCYTES 2.8 0.8 - 3.5 K/UL    ABS. MONOCYTES 0.6 0.0 - 1.0 K/UL    ABS. EOSINOPHILS 0.1 0.0 - 0.4 K/UL    ABS. BASOPHILS 0.0 0.0 - 0.1 K/UL    ABS. IMM. GRANS. 0.0 0.00 - 0.04 K/UL    DF AUTOMATED     METABOLIC PANEL, COMPREHENSIVE    Collection Time: 08/05/19  3:26 PM   Result Value Ref Range    Sodium 142 136 - 145 mmol/L    Potassium 3.7 3.5 - 5.1 mmol/L    Chloride 110 (H) 97 - 108 mmol/L    CO2 24 21 - 32 mmol/L    Anion gap 8 5 - 15 mmol/L    Glucose 74 65 - 100 mg/dL    BUN 13 6 - 20 MG/DL    Creatinine 0.81 0.55 - 1.02 MG/DL    BUN/Creatinine ratio 16 12 - 20      GFR est AA >60 >60 ml/min/1.73m2    GFR est non-AA >60 >60 ml/min/1.73m2    Calcium 8.4 (L) 8.5 - 10.1 MG/DL    Bilirubin, total 0.5 0.2 - 1.0 MG/DL    ALT (SGPT) 30 12 - 78 U/L    AST (SGOT) 20 15 - 37 U/L    Alk. phosphatase 71 45 - 117 U/L    Protein, total 6.6 6.4 - 8.2 g/dL    Albumin 3.3 (L) 3.5 - 5.0 g/dL    Globulin 3.3 2.0 - 4.0 g/dL    A-G Ratio 1.0 (L) 1.1 - 2.2         Radiologic Studies -   CT ABD PELV W CONT   Final Result   IMPRESSION:   1. Mild dilation and fecalization of bowel contents in the distal ileum is   likely reactive. The affected ileum is draped over the right ovary.  A corpus   luteum, and trace periovarian and pelvic fluid suggest recent follicle rupture. 2. Probable oral contrast in the terminal ileum beyond the right ovary. Serial   abdominal radiography could follow the progression of oral contrast, and further   exclude obstruction. XR ABD FLAT/ ERECT   Final Result   IMPRESSION: Ileal fold thickening. CT abdomen pelvis is recommended for further   evaluation. CT Results  (Last 48 hours)               08/05/19 2013  CT ABD PELV W CONT Final result    Impression:  IMPRESSION:   1. Mild dilation and fecalization of bowel contents in the distal ileum is   likely reactive. The affected ileum is draped over the right ovary. A corpus   luteum, and trace periovarian and pelvic fluid suggest recent follicle rupture. 2. Probable oral contrast in the terminal ileum beyond the right ovary. Serial   abdominal radiography could follow the progression of oral contrast, and further   exclude obstruction. Narrative:  CT ABDOMEN AND PELVIS WITH CONTRAST. 8/5/2019 8:13 PM        INDICATION: Abdominal pain. COMPARISON: 8/4/2019, same-day abdominal radiograph. TECHNIQUE: CT of the abdomen and pelvis was performed after the administration   100 cc IV Isovue-370 and oral contrast. CT dose reduction was achieved through   use of a standardized protocol tailored for this examination and automatic   exposure control for dose modulation. FINDINGS:   Abdomen: Fetal lobulation of the bilateral kidneys, a right renal junctional   cortical defect, and accessory hepatic veins draining segments 6 and 7 are   normal variants. Incidental note is made of vicarious excretion of contrast in   the gallbladder and a subcentimeter, hypodense, segment 4B hepatic lesion which   is too small to characterize, but likely represents a cyst. The main pancreatic   duct drains via the duct of Santorini (2-31, 926-34), consistent with pancreas   divisum. The lung bases are clear.  The heart size is normal. The distal   esophagus, stomach, duodenum, liver, gallbladder, pancreas, spleen, adrenals,   and kidneys are otherwise normal.       Pelvis: The distal ileum in the right lower quadrant is draped over the right   ovary. The right ovary is mildly hyperemic (601-52), with a corpus luteum on   images 602-73 and 601-48. There is trace free fluid around the right ovary, and   a small volume of fluid in the deep pelvis. Pain may be due to a recently   ruptured follicle. In the immediately adjacent ileum, there is stasis and   fecalization of small bowel contents, with mild upstream dilation in the mid and   distal ileum. Medial to the right ovary, fecalization and dilation of the ileum   transitions fairly abruptly to decompressed terminal ileum. There is gas and   possibly oral contrast (602-61, 602-60) in the terminal ileum, however. The proximal colon is normal; the distal colon is decompressed. The appendix is   not visualized. The bladder is normal. Post hysterectomy. No free air and no   abdominopelvic lymphadenopathy. 08/04/19 2349  CT ABD PELV W CONT Final result    Impression:  IMPRESSION:   Abnormal small bowel fluid distention with adjacent inflammatory type changes   right lower quadrant as above. Narrative:  EXAM: CT ABD PELV W CONT       INDICATION: Abdominal pain       COMPARISON: August 5, 2018        CONTRAST: 100 mL of Isovue-370. TECHNIQUE:    Following the uneventful intravenous administration of contrast, thin axial   images were obtained through the abdomen and pelvis. Coronal and sagittal   reconstructions were generated. Oral contrast, water, administered. CT dose   reduction was achieved through use of a standardized protocol tailored for this   examination and automatic exposure control for dose modulation. FINDINGS:    LUNG BASES: Clear. INCIDENTALLY IMAGED HEART AND MEDIASTINUM: Unremarkable. LIVER: No mass or biliary dilatation. GALLBLADDER: Unremarkable. SPLEEN: No mass. PANCREAS: No mass or ductal dilatation. ADRENALS: Unremarkable. KIDNEYS: No mass, calculus, or hydronephrosis. STOMACH: Unremarkable. SMALL BOWEL: Mildly distended fluid-filled loops of small bowel in the right   lower quadrant with some fat stranding around it. This suggest a inflammatory   process. There is no definite wall thickening. Proximal small bowel and colon   appear unremarkable. COLON: No dilatation or wall thickening. APPENDIX: Appendectomy   PERITONEUM: No  pneumoperitoneum. Small amount of free fluid in the pelvis. RETROPERITONEUM: No lymphadenopathy or aortic aneurysm. REPRODUCTIVE ORGANS: Hysterectomy   URINARY BLADDER: No mass or calculus. BONES: No destructive bone lesion. ADDITIONAL COMMENTS: N/A               CXR Results  (Last 48 hours)    None            Medical Decision Making   I am the first provider for this patient. I reviewed the vital signs, available nursing notes, past medical history, past surgical history, family history and social history. Vital Signs-Reviewed the patient's vital signs. Patient Vitals for the past 12 hrs:   Temp Pulse Resp BP SpO2   08/05/19 2000    137/86 98 %   08/05/19 1914    124/81 99 %   08/05/19 1600     98 %   08/05/19 1559    135/80    08/05/19 1356 98.1 °F (36.7 °C) 80 16 146/89 100 %       Pulse Oximetry Analysis - 98% on RA    Cardiac Monitor:   Rate: 80bpm  Rhythm: Normal Sinus Rhythm      Records Reviewed: Nursing Notes, Old Medical Records, Previous Radiology Studies and Previous Laboratory Studies    Provider Notes (Medical Decision Making):   MDM: Previously healthy young female presenting with right-sided abdominal pain. CT x-ray notable for slight proximal bowel dilatation with stranding around the ileum. Patient was prescribed antibiotics which she has yet to start taking.   Presenting with continued symptoms that do not appear to be worsening but do not appear to be resolving. Her vital signs are stable and without concern for sepsis and acute peritonitis. We will try lab evaluation with symptom management and attempt to hold repeat CT scan as she was just irradiated yesterday. ED Course:   Initial assessment performed. The patients presenting problems have been discussed, and they are in agreement with the care plan formulated and outlined with them. I have encouraged them to ask questions as they arise throughout their visit. PROGRESS NOTE:  4:45 PM  I discussed the case with Dr. Vaishnavi English and attempt to admit the patient for abdominal pain. He is requesting repeat imaging given that we do not know the source of her current pain. Patient awaits abdominal x-ray which was previously ordered    5:40 PM  X-ray with possible ileum abnormality but without obvious bowel dilatation. Repeat CT scan to be completed with oral contrast.    6:45 PM  Discussed results and recommendations with patient. She is hesitant to repeat her CT scan and drink contrast due to her pain. Repeat antiemetics to be given and patient in agreement to go for repeat imaging. CONSULT NOTE:   8:50 PM  Bora Silverman MD spoke with Dr. Vaishnavi English,   Specialty: Internal medicine  Discussed pt's hx, disposition, and available diagnostic and imaging results. Reviewed care plans. Consultant agrees with plans as outlined. Given her CT scan is without evidence of acute pathology requiring surgery is agreed to admit the patient for pain control and observation. Critical Care Time:   0      Diagnosis     Clinical Impression:   1. Abdominal pain, right lower quadrant    2. Constipation, unspecified constipation type    3. Cyst of right ovary        PLAN:  Admission to the internal medicine service for pain control      Please note, this dictation was completed with [employer], the [employer] voice recognition software.  Quite often unanticipated grammatical, syntax, homophones, and other interpretive errors are inadvertently transcribed by the computer software. Please disregard these errors. Please excuse any errors that have escaped final proof reading.

## 2019-08-05 NOTE — ED NOTES
Assumed care of patient from Tu, [address] [facility]. Redraw of CMP sent from [facility] in [facility]. Patient states she has not passed any gas or had a BM since yesterday morning. She has had poor appetite with nausea, denies vomiting or diarrhea. Partner at bedside. Call light in reach. Will continue to monitor.

## 2019-08-05 NOTE — ED NOTES
Assumed care of pt from triage. Pt and mom upset with length of time they were in waiting area. Apologized. Redrew blood work as needed. Mid abd pain began today after returning from vacation and eating fried foods and bread after being on a low carb diet, with only lean proteins and fresh fruit/vegatables x 9 months. A&Ox4. Monitor x2.

## 2019-08-05 NOTE — ED NOTES
Reports pain continues to be severe in episodic waves every 5-10minutes. Fentanyl helped for 15 minutes. Dr. Wendy Rausch made aware and will order more medication as needed. 78

## 2019-08-05 NOTE — ED NOTES
Unsuccessful NG insertion attempt x2, attempted in bilateral nares. Patient could not tolerate, was vomiting during insertion and asked for attempt to be stopped. Dr. Arreola Pay informed, pending KUB. Patient does state she feels relief after Morphine administered.

## 2019-08-05 NOTE — ED NOTES
Dr. Charolet Carrel has reviewed discharge instructions with the patient. The patient verbalized understanding. Pt. A&Ox4, respirations even and unlabored. VS stable as noted in flowsheet. Declined wheelchair assist from department; paperwork in hand.

## 2019-08-05 NOTE — ED NOTES
Medicated with Toradol. Complained of pain at IV site when giving Toradol, positive blood return post giving Toradol, flushed well with NS. Offered to remove the IV declined.

## 2019-08-05 NOTE — DISCHARGE INSTRUCTIONS

## 2019-08-06 ENCOUNTER — APPOINTMENT (OUTPATIENT)
Dept: GENERAL RADIOLOGY | Age: 34
End: 2019-08-06
Attending: NURSE PRACTITIONER
Payer: COMMERCIAL

## 2019-08-06 PROBLEM — R11.2 NAUSEA WITH VOMITING: Status: ACTIVE | Noted: 2019-08-06

## 2019-08-06 PROBLEM — R10.9 ABDOMINAL PAIN: Status: ACTIVE | Noted: 2019-08-06

## 2019-08-06 LAB
ANION GAP SERPL CALC-SCNC: 5 MMOL/L (ref 5–15)
APPEARANCE UR: CLEAR
BACTERIA URNS QL MICRO: NEGATIVE /HPF
BASOPHILS # BLD: 0 K/UL (ref 0–0.1)
BASOPHILS NFR BLD: 0 % (ref 0–1)
BILIRUB UR QL: NEGATIVE
BUN SERPL-MCNC: 10 MG/DL (ref 6–20)
BUN/CREAT SERPL: 13 (ref 12–20)
CALCIUM SERPL-MCNC: 8.2 MG/DL (ref 8.5–10.1)
CHLORIDE SERPL-SCNC: 107 MMOL/L (ref 97–108)
CO2 SERPL-SCNC: 26 MMOL/L (ref 21–32)
COLOR UR: ABNORMAL
CREAT SERPL-MCNC: 0.78 MG/DL (ref 0.55–1.02)
CRP SERPL HS-MCNC: >9.5 MG/L
DIFFERENTIAL METHOD BLD: ABNORMAL
EOSINOPHIL # BLD: 0.3 K/UL (ref 0–0.4)
EOSINOPHIL NFR BLD: 4 % (ref 0–7)
EPITH CASTS URNS QL MICRO: ABNORMAL /LPF
ERYTHROCYTE [DISTWIDTH] IN BLOOD BY AUTOMATED COUNT: 15 % (ref 11.5–14.5)
GLUCOSE SERPL-MCNC: 74 MG/DL (ref 65–100)
GLUCOSE UR STRIP.AUTO-MCNC: NEGATIVE MG/DL
HCT VFR BLD AUTO: 39.4 % (ref 35–47)
HGB BLD-MCNC: 12.2 G/DL (ref 11.5–16)
HGB UR QL STRIP: ABNORMAL
HYALINE CASTS URNS QL MICRO: ABNORMAL /LPF (ref 0–5)
IMM GRANULOCYTES # BLD AUTO: 0 K/UL (ref 0–0.04)
IMM GRANULOCYTES NFR BLD AUTO: 0 % (ref 0–0.5)
KETONES UR QL STRIP.AUTO: 80 MG/DL
LEUKOCYTE ESTERASE UR QL STRIP.AUTO: NEGATIVE
LYMPHOCYTES # BLD: 2.7 K/UL (ref 0.8–3.5)
LYMPHOCYTES NFR BLD: 37 % (ref 12–49)
MCH RBC QN AUTO: 26.6 PG (ref 26–34)
MCHC RBC AUTO-ENTMCNC: 31 G/DL (ref 30–36.5)
MCV RBC AUTO: 86 FL (ref 80–99)
MONOCYTES # BLD: 0.5 K/UL (ref 0–1)
MONOCYTES NFR BLD: 6 % (ref 5–13)
NEUTS SEG # BLD: 3.9 K/UL (ref 1.8–8)
NEUTS SEG NFR BLD: 53 % (ref 32–75)
NITRITE UR QL STRIP.AUTO: NEGATIVE
NRBC # BLD: 0 K/UL (ref 0–0.01)
NRBC BLD-RTO: 0 PER 100 WBC
PH UR STRIP: 5.5 [PH] (ref 5–8)
PLATELET # BLD AUTO: 321 K/UL (ref 150–400)
PMV BLD AUTO: 10.6 FL (ref 8.9–12.9)
POTASSIUM SERPL-SCNC: 3.6 MMOL/L (ref 3.5–5.1)
PROT UR STRIP-MCNC: 30 MG/DL
RBC # BLD AUTO: 4.58 M/UL (ref 3.8–5.2)
RBC #/AREA URNS HPF: ABNORMAL /HPF (ref 0–5)
SODIUM SERPL-SCNC: 138 MMOL/L (ref 136–145)
SP GR UR REFRACTOMETRY: 1.02 (ref 1–1.03)
UA: UC IF INDICATED,UAUC: ABNORMAL
UROBILINOGEN UR QL STRIP.AUTO: 0.2 EU/DL (ref 0.2–1)
WBC # BLD AUTO: 7.4 K/UL (ref 3.6–11)
WBC URNS QL MICRO: ABNORMAL /HPF (ref 0–4)

## 2019-08-06 PROCEDURE — 96376 TX/PRO/DX INJ SAME DRUG ADON: CPT

## 2019-08-06 PROCEDURE — 85025 COMPLETE CBC W/AUTO DIFF WBC: CPT

## 2019-08-06 PROCEDURE — 74011250636 HC RX REV CODE- 250/636: Performed by: EMERGENCY MEDICINE

## 2019-08-06 PROCEDURE — 86141 C-REACTIVE PROTEIN HS: CPT

## 2019-08-06 PROCEDURE — 81001 URINALYSIS AUTO W/SCOPE: CPT

## 2019-08-06 PROCEDURE — 36415 COLL VENOUS BLD VENIPUNCTURE: CPT

## 2019-08-06 PROCEDURE — 74011250637 HC RX REV CODE- 250/637: Performed by: INTERNAL MEDICINE

## 2019-08-06 PROCEDURE — C9113 INJ PANTOPRAZOLE SODIUM, VIA: HCPCS | Performed by: INTERNAL MEDICINE

## 2019-08-06 PROCEDURE — 74011000250 HC RX REV CODE- 250: Performed by: PHYSICIAN ASSISTANT

## 2019-08-06 PROCEDURE — 74011000250 HC RX REV CODE- 250: Performed by: INTERNAL MEDICINE

## 2019-08-06 PROCEDURE — 99218 HC RM OBSERVATION: CPT

## 2019-08-06 PROCEDURE — 94760 N-INVAS EAR/PLS OXIMETRY 1: CPT

## 2019-08-06 PROCEDURE — 87086 URINE CULTURE/COLONY COUNT: CPT

## 2019-08-06 PROCEDURE — 74018 RADEX ABDOMEN 1 VIEW: CPT

## 2019-08-06 PROCEDURE — 74011250636 HC RX REV CODE- 250/636: Performed by: INTERNAL MEDICINE

## 2019-08-06 PROCEDURE — 86671 FUNGUS NES ANTIBODY: CPT

## 2019-08-06 PROCEDURE — 80048 BASIC METABOLIC PNL TOTAL CA: CPT

## 2019-08-06 PROCEDURE — 87147 CULTURE TYPE IMMUNOLOGIC: CPT

## 2019-08-06 PROCEDURE — 96375 TX/PRO/DX INJ NEW DRUG ADDON: CPT

## 2019-08-06 PROCEDURE — 96361 HYDRATE IV INFUSION ADD-ON: CPT

## 2019-08-06 RX ORDER — FACIAL-BODY WIPES
10 EACH TOPICAL
Status: DISCONTINUED | OUTPATIENT
Start: 2019-08-06 | End: 2019-08-06

## 2019-08-06 RX ORDER — FACIAL-BODY WIPES
10 EACH TOPICAL DAILY PRN
Status: DISCONTINUED | OUTPATIENT
Start: 2019-08-06 | End: 2019-08-07 | Stop reason: HOSPADM

## 2019-08-06 RX ORDER — SORBITOL SOLUTION 70 %
30 SOLUTION, ORAL MISCELLANEOUS
Status: DISCONTINUED | OUTPATIENT
Start: 2019-08-06 | End: 2019-08-06

## 2019-08-06 RX ORDER — MORPHINE SULFATE 4 MG/ML
2 INJECTION INTRAVENOUS
Status: DISCONTINUED | OUTPATIENT
Start: 2019-08-06 | End: 2019-08-07 | Stop reason: HOSPADM

## 2019-08-06 RX ORDER — PANTOPRAZOLE SODIUM 40 MG/1
40 TABLET, DELAYED RELEASE ORAL
Status: DISCONTINUED | OUTPATIENT
Start: 2019-08-07 | End: 2019-08-07 | Stop reason: HOSPADM

## 2019-08-06 RX ADMIN — MORPHINE SULFATE 4 MG: 4 INJECTION INTRAVENOUS at 07:51

## 2019-08-06 RX ADMIN — OXYCODONE HYDROCHLORIDE AND ACETAMINOPHEN 1 TABLET: 5; 325 TABLET ORAL at 20:14

## 2019-08-06 RX ADMIN — OXYCODONE HYDROCHLORIDE AND ACETAMINOPHEN 1 TABLET: 5; 325 TABLET ORAL at 13:14

## 2019-08-06 RX ADMIN — DOCUSATE SODIUM 100 MG: 100 CAPSULE, LIQUID FILLED ORAL at 18:02

## 2019-08-06 RX ADMIN — MAGNESIUM HYDROXIDE 30 ML: 400 SUSPENSION ORAL at 11:10

## 2019-08-06 RX ADMIN — SODIUM CHLORIDE 75 ML/HR: 900 INJECTION, SOLUTION INTRAVENOUS at 13:14

## 2019-08-06 RX ADMIN — DOCUSATE SODIUM 100 MG: 100 CAPSULE, LIQUID FILLED ORAL at 11:10

## 2019-08-06 RX ADMIN — ONDANSETRON 4 MG: 2 INJECTION INTRAMUSCULAR; INTRAVENOUS at 18:04

## 2019-08-06 RX ADMIN — ONDANSETRON 4 MG: 2 INJECTION INTRAMUSCULAR; INTRAVENOUS at 08:51

## 2019-08-06 RX ADMIN — Medication 10 ML: at 06:00

## 2019-08-06 RX ADMIN — SODIUM CHLORIDE 40 MG: 9 INJECTION, SOLUTION INTRAMUSCULAR; INTRAVENOUS; SUBCUTANEOUS at 11:11

## 2019-08-06 RX ADMIN — POLYETHYLENE GLYCOL-3350 AND ELECTROLYTES 4000 ML: 236; 6.74; 5.86; 2.97; 22.74 POWDER, FOR SOLUTION ORAL at 18:02

## 2019-08-06 NOTE — PROGRESS NOTES
Colonoscopy procedure explained by Anais DUNNE with patient, her , and RN present in the room. Opportunity for questions and clarifications given. Consent signed by patient with RN as the witness. Consent for colonoscopy placed on the patient chart.

## 2019-08-06 NOTE — PROGRESS NOTES
Reason for Admission:   Ovarian Cyst Rupture                    RRAT Score:   3               Plan for utilizing home health:   No prior HH or SNF in the past.  Pt is independent w/all ADL's and requires no assistance. Current Advanced Directive/Advance Care Plan: FULL code. No Advanced Medical Directive. Spouse is primary decision maker. Transition of Care Plan:   D/c home w/family. PCP follow up appt to be made prior to discharge. Utilizes [facility] ([email or web address] Has prescription w/health insurance coverage. SW to continue to assist.    Care Management Interventions  PCP Verified by CM: Yes(April 2019)  Mode of Transport at Discharge: Other (see comment)(Family)  Transition of Care Consult (CM Consult): Discharge Planning  Discharge Durable Medical Equipment: (No DMe or O2.  )  Current Support Network: Lives with Spouse, Own Home(Two story home w/5 steps to enter the front door.   There are approx 12 stairs on the inside, and patient goes up/down all stairs. )  Confirm Follow Up Transport: Family  Plan discussed with Pt/Family/Caregiver: Yes  Discharge Location  Discharge Placement: (Home)      Nirali Silva, MSW  [id number]

## 2019-08-06 NOTE — PROGRESS NOTES
Hospitalist Progress Note    NAME: Valentina Bennett   :  1985   MRN:  [id number]       Interim Hospital Summary: 29 y.o. female whom presented on 2019 with      Assessment / Plan:  Abdominal pain due to ruptured ovarian cyst  Nausea without vomiting  - CT of ABD/PEL:   1. Mild dilation and fecalization of bowel contents in the distal ileum is  likely reactive. The affected ileum is draped over the right ovary. A corpus  luteum, and trace periovarian and pelvic fluid suggest recent follicle rupture. 2. Probable oral contrast in the terminal ileum beyond the right ovary. Serial  abdominal radiography could follow the progression of oral contrast, and further  exclude obstruction. KUB: no bowel obstruction, no small bowel dilation, no significant stool. Continue with PPI     Pt stated that she had last BM on . She feels a little constipated. Discussed the image' result with the pt     Appreciate GI input;  persistent right side abd pain and nausea without vomiting  Scheduled for colonoscopy tomorrow. Pain med and antiemetic PRN     To be seen by GYN as outpatient    Leukocytosis, mild, reactive     Hematuria  - Per patient happens post-coitally  - No obvious urinary tract pathology on CT    U/A with negative leukocyte and nitrites, negative bacteria     Hx IBS  Hx CIN3 s/p hysterectomy  Hx PUD  Hx kidney stones     Code Status: Full     DVT Prophylaxis: SCDs  GI Prophylaxis: not indicated    Recommended Disposition: Home w/Family     Subjective:     Chief Complaint / Reason for Physician Visit  \"I still have my right side abdomen pain\". Discussed with RN events overnight.      Review of Systems:  Symptom Y/N Comments  Symptom Y/N Comments   Fever/Chills n   Chest Pain n    Poor Appetite    Edema     Cough    Abdominal Pain     Sputum    Joint Pain     SOB/BENAVIDES n   Pruritis/Rash     Nausea/vomit y   Tolerating PT/OT     Diarrhea n   Tolerating Diet     Constipation n   Other       Could NOT obtain due to:      Objective:     VITALS:   Last 24hrs VS reviewed since prior progress note. Most recent are:  Patient Vitals for the past 24 hrs:   Temp Pulse Resp BP SpO2   08/06/19 0729 98.4 °F (36.9 °C) 80 16 125/76 97 %   08/05/19 2245 98.2 °F (36.8 °C) 83 16 126/84 98 %   08/05/19 2000    137/86 98 %   08/05/19 1914    124/81 99 %   08/05/19 1600     98 %   08/05/19 1559    135/80    08/05/19 1356 98.1 °F (36.7 °C) 80 16 146/89 100 %       Intake/Output Summary (Last 24 hours) at 8/6/2019 1256  Last data filed at 8/6/2019 0305  Gross per 24 hour   Intake 292.5 ml   Output    Net 292.5 ml        PHYSICAL EXAM:  General: WD, WN. Alert, cooperative, no acute distress    EENT:  EOMI. Anicteric sclerae. MMM  Resp:  CTA bilaterally, no wheezing or rales. No accessory muscle use  CV:  Regular  rhythm,  No edema  GI:  Soft, Non distended, diffuse tender in right side of abdomen.  +Bowel sounds  Neurologic:  Alert and oriented X 3, normal speech,   Psych:   Good insight. Not anxious nor agitated  Skin:  No rashes. No jaundice    Reviewed most current lab test results and cultures  YES  Reviewed most current radiology test results   YES  Review and summation of old records today    NO  Reviewed patient's current orders and MAR    YES  PMH/ reviewed - no change compared to H&P  ________________________________________________________________________  Care Plan discussed with:    Comments   Patient y    Family  y  at bedside   RN y    Care Manager     Consultant                       y Multidiciplinary team rounds were held today with , nursing, pharmacist and clinical coordinator. Patient's plan of care was discussed; medications were reviewed and discharge planning was addressed.      ________________________________________________________________________  _______________________________________________________________  Eunice Rust, NP     Procedures: see electronic medical records for all procedures/Xrays and details which were not copied into this note but were reviewed prior to creation of Plan. LABS:  I reviewed today's most current labs and imaging studies.   Pertinent labs include:  Recent Labs     08/05/19  1410 08/04/19  2103   WBC 11.1* 10.4   HGB 14.0 13.7   HCT 42.8 41.2    394     Recent Labs     08/05/19  1526 08/04/19  2219    139   K 3.7 3.7   * 104   CO2 24 28   GLU 74 90   BUN 13 15   CREA 0.81 0.86   CA 8.4* 9.1   ALB 3.3* 3.4*   TBILI 0.5 0.3   SGOT 20 24   ALT 30 33       Signed: )Andre Donaldson, NP

## 2019-08-06 NOTE — CONSULTS
Gastroenterology Consultation Note  VIBHA Munoz Covering for Dr. Radha Lopez    NAME: Serjio Marroquin : 1985 MRN: [id number]   ATTG: Dr. Oh Cruz  PCP: Saida Mathur MD  Date/Time:  2019 1:42 PM  Subjective:   REASON FOR CONSULT:      Serjio Marroquin is a 29 y.o.  female who I was asked to see for persistent right sided abdominal pain. Patient reports she was in her usual state of health when Graham evening she started having sharp abdominal pain coming in waves starting in her epigasrum radiation to her suprapubic area. She denies any N/V just severe pain. No fevers or chills. Thinking back she didn't feel great on Saturday but attributed it to nerves/being car sick. Went to the ED because pain was so bad and she knew something was wrong. CT of abdomen with contrast was done showing: Mildly distended fluid-filled loops of small bowel in the right  lower quadrant with some fat stranding around it. This suggest a inflammatory  process. There is no definite wall thickening. Proximal small bowel and colon  appear unremarkable. Labs were done including CBC, CMP and lipase and all WNL. Was given pain meds and bentyl. Told to come back if pain is persistent. Reports that morning she went home and rested. The following day pain returned and was severe. She called Dr. Skip Arana for medical advice, he reviewed CT findings and advised to go back to ED given CT findings or SB. Came back to ED and Xray repeated showing IMPRESSION: Ileal fold thickening. CT abdomen pelvis is recommended for further  Evaluation:  IMPRESSION:  1. Mild dilation and fecalization of bowel contents in the distal ileum is  likely reactive. The affected ileum is draped over the right ovary. A corpus  luteum, and trace periovarian and pelvic fluid suggest recent follicle rupture. 2. Probable oral contrast in the terminal ileum beyond the right ovary.  Serial  abdominal radiography could follow the progression of oral contrast, and further  exclude obstruction. She was admitted for observation given issues with pain control. She reports she was given CLD this morning and this resulted in nausea with vomiting and worsening pain in same location. She reports her last BM was , she feels she just started to pass gas. She had a f/u Xray this morning FINDINGS: Portable supine KUB at 1023 hours shows no small bowel dilation. Oral contrast from yesterday's CT has passed into the nondilated colon. There is no significant stool. No acid reflux type sx, fevers or chills. Reports hx of NSAID induced ulcer and GERD that is now diet controlled. Has not had an EGD recently and no procedure record in [facility] or [facility]. She is loosing weight intentionally with diet and exercise. She reports hx of IBS-D in the past that she feels she grew out of and feels her BM are more regular now than ever before. No tobacco use, drinks one beverage once a month.       Past Medical History:   Diagnosis Date    Abnormal Pap smear     Biopsy done last year and came back ok    Asthma     flares with bronchitis has been over a year as stated 10/5/2018    Difficult intravenous access     GERD (gastroesophageal reflux disease)     IBS (irritable bowel syndrome)     Kidney stones     Nausea & vomiting       Past Surgical History:   Procedure Laterality Date    HX APPENDECTOMY      HX COLONOSCOPY      HX ENDOSCOPY      HX GYN          HX GYN  2004        HX GYN      hysterectomy     Social History     Tobacco Use    Smoking status: Former Smoker    Smokeless tobacco: Never Used   Substance Use Topics    Alcohol use: Yes     Comment: occassionally      Family History   Problem Relation Age of Onset    Hypertension Mother     Heart Disease Mother     Cancer Mother     Diabetes Mother       Allergies   Allergen Reactions    Avelox [Moxifloxacin] Swelling and Unknown (comments)    Macrolide Antibiotics Hives, Rash and Swelling     Per patient reported as a previous reaction to a \"mycin\" drug. Did not know which drug.  Nsaids (Non-Steroidal Anti-Inflammatory Drug) Other (comments)     D/t esophageal erosion and GERD      Home Medications:  Prior to Admission Medications   Prescriptions Last Dose Informant Patient Reported? Taking? HYDROcodone-acetaminophen (NORCO) 5-325 mg per tablet 8/4/2019 at Unknown time  No Yes   Sig: Take 1 Tab by mouth every six (6) hours as needed for Pain for up to 3 days. Max Daily Amount: 4 Tabs. dicyclomine (BENTYL) 20 mg tablet 8/4/2019 at Unknown time  No Yes   Sig: Take 1 Tab by mouth every six (6) hours as needed (abdominal cramps) for up to 20 doses. metroNIDAZOLE (FLAGYL) 500 mg tablet 8/4/2019 at Unknown time  No Yes   Sig: Take 1 Tab by mouth two (2) times a day for 7 days. ondansetron (ZOFRAN ODT) 4 mg disintegrating tablet 8/4/2019 at Unknown time  No Yes   Sig: Take 1 Tab by mouth every eight (8) hours as needed for Nausea. predniSONE (DELTASONE) 20 mg tablet 8/4/2019 at Unknown time  No Yes   Sig: Take 20 mg by mouth daily (with breakfast). trimethoprim-sulfamethoxazole (BACTRIM DS) 160-800 mg per tablet 8/4/2019 at Unknown time  No Yes   Sig: Take 1 Tab by mouth two (2) times a day for 7 days.       Facility-Administered Medications: None     Hospital medications:  Current Facility-Administered Medications   Medication Dose Route Frequency    bisacodyl (DULCOLAX) suppository 10 mg  10 mg Rectal DAILY PRN    [START ON 8/7/2019] pantoprazole (PROTONIX) tablet 40 mg  40 mg Oral ACB    morphine injection 2 mg  2 mg IntraVENous Q4H PRN    acetaminophen (TYLENOL) tablet 650 mg  650 mg Oral Q6H PRN    ondansetron (ZOFRAN) injection 4 mg  4 mg IntraVENous Q4H PRN    sodium chloride (NS) flush 5-40 mL  5-40 mL IntraVENous Q8H    sodium chloride (NS) flush 5-40 mL  5-40 mL IntraVENous PRN    0.9% sodium chloride infusion  75 mL/hr IntraVENous CONTINUOUS    naloxone [facility]) injection 0.4 mg  0.4 mg IntraVENous PRN    magnesium hydroxide (MILK OF MAGNESIA) 400 mg/5 mL oral suspension 30 mL  30 mL Oral DAILY PRN    docusate sodium (COLACE) capsule 100 mg  100 mg Oral BID    ketorolac (TORADOL) injection 30 mg  30 mg IntraVENous Q6H PRN    oxyCODONE-acetaminophen (PERCOCET) 5-325 mg per tablet 1 Tab  1 Tab Oral Q6H PRN     REVIEW OF SYSTEMS:     []     Unable to obtain  ROS due to  []    mental status change  []    sedated   []    intubated  Review of Systems -   History obtained from the patient  General ROS: negative for - fever  Psychological ROS: negative for - depression  Hematological and Lymphatic ROS: negative for - blood clots  Respiratory ROS: no cough, shortness of breath, or wheezing  Cardiovascular ROS: no chest pain or dyspnea on exertion  Gastrointestinal ROS: no abdominal pain, change in bowel habits, or black or bloody stools  Genito-Urinary ROS: no dysuria, trouble voiding, + hematuria  Musculoskeletal ROS: negative for - joint pain  Neurological ROS: no TIA or stroke symptoms  Dermatological ROS: negative for - rash    Objective:   VITALS:    Visit Vitals  /76 (BP 1 Location: Right arm, BP Patient Position: At rest)   Pulse 80   Temp 98.4 °F (36.9 °C)   Resp 16   Ht 5' 2\" (1.575 m)   Wt 74.9 kg (165 lb 2 oz)   LMP 10/08/2018   SpO2 97%   BMI 30.20 kg/m²     Temp (24hrs), Av.2 °F (36.8 °C), Min:98.1 °F (36.7 °C), Max:98.4 °F (36.9 °C)    PHYSICAL EXAM:   General:    Alert, cooperative, no distress, appears stated age. Head:   Normocephalic, without obvious abnormality, atraumatic. Eyes:   Conjunctivae clear, anicteric sclerae. Pupils are equal  Nose:  Nares normal. No drainage or sinus tenderness. Throat:    Lips, mucosa, and tongue normal.  No Thrush  Neck:  Supple, symmetrical,  no adenopathy, thyroid: non tender  Back:    Symmetric,  No CVA tenderness. Lungs:   CTA bilaterally. No wheezing/rhonchi/rales.   Heart:   Regular rate and rhythm,  no murmur, rub or gallop. Abdomen:   Soft, mild subjective umbilical and suprapubic tenderness. Not distended. Bowel sounds normal. No masses. No hepatosplenomegaly. Rectal:  Deferred  Extremities: No cyanosis. No edema. No clubbing  Skin:     Texture, turgor normal. No rashes/lesions/jaundice  Lymph: Cervical, supraclavicular normal.  Psych:  Good insight. Not depressed. Not anxious or agitated. Neurologic: EOMs intact. No facial asymmetry. No aphasia or slurred speech normal strength, A/O X 3. LAB DATA REVIEWED:    Lab Results   Component Value Date/Time    WBC 11.1 (H) 08/05/2019 02:10 PM    HGB 14.0 08/05/2019 02:10 PM    HCT 42.8 08/05/2019 02:10 PM    PLATELET 936 47/66/2158 02:10 PM    MCV 83.8 08/05/2019 02:10 PM     Lab Results   Component Value Date/Time    ALT (SGPT) 30 08/05/2019 03:26 PM    AST (SGOT) 20 08/05/2019 03:26 PM    Alk. phosphatase 71 08/05/2019 03:26 PM    Bilirubin, total 0.5 08/05/2019 03:26 PM     Lab Results   Component Value Date/Time    Sodium 142 08/05/2019 03:26 PM    Potassium 3.7 08/05/2019 03:26 PM    Chloride 110 (H) 08/05/2019 03:26 PM    CO2 24 08/05/2019 03:26 PM    Anion gap 8 08/05/2019 03:26 PM    Glucose 74 08/05/2019 03:26 PM    BUN 13 08/05/2019 03:26 PM    Creatinine 0.81 08/05/2019 03:26 PM    BUN/Creatinine ratio 16 08/05/2019 03:26 PM    GFR est AA >60 08/05/2019 03:26 PM    GFR est non-AA >60 08/05/2019 03:26 PM    Calcium 8.4 (L) 08/05/2019 03:26 PM     Lab Results   Component Value Date/Time    Lipase 75 08/04/2019 10:19 PM     CT Results (most recent):  Results from Hospital Encounter encounter on 08/05/19   CT ABD PELV W CONT    Narrative CT ABDOMEN AND PELVIS WITH CONTRAST. 8/5/2019 8:13 PM     INDICATION: Abdominal pain. COMPARISON: 8/4/2019, same-day abdominal radiograph.     TECHNIQUE: CT of the abdomen and pelvis was performed after the administration  100 cc IV Isovue-370 and oral contrast. CT dose reduction was achieved through  use of a standardized protocol tailored for this examination and automatic  exposure control for dose modulation. FINDINGS:  Abdomen: Fetal lobulation of the bilateral kidneys, a right renal junctional  cortical defect, and accessory hepatic veins draining segments 6 and 7 are  normal variants. Incidental note is made of vicarious excretion of contrast in  the gallbladder and a subcentimeter, hypodense, segment 4B hepatic lesion which  is too small to characterize, but likely represents a cyst. The main pancreatic  duct drains via the duct of [place] (2-31, 601-54), consistent with pancreas  divisum. The lung bases are clear. The heart size is normal. The distal  esophagus, stomach, duodenum, liver, gallbladder, pancreas, spleen, adrenals,  and kidneys are otherwise normal.    Pelvis: The distal ileum in the right lower quadrant is draped over the right  ovary. The right ovary is mildly hyperemic (601-52), with a corpus luteum on  images 602-73 and 601-48. There is trace free fluid around the right ovary, and  a small volume of fluid in the deep pelvis. Pain may be due to a recently  ruptured follicle. In the immediately adjacent ileum, there is stasis and  fecalization of small bowel contents, with mild upstream dilation in the mid and  distal ileum. Medial to the right ovary, fecalization and dilation of the ileum  transitions fairly abruptly to decompressed terminal ileum. There is gas and  possibly oral contrast (602-61, 602-60) in the terminal ileum, however. The proximal colon is normal; the distal colon is decompressed. The appendix is  not visualized. The bladder is normal. Post hysterectomy. No free air and no  abdominopelvic lymphadenopathy. Impression IMPRESSION:  1. Mild dilation and fecalization of bowel contents in the distal ileum is  likely reactive. The affected ileum is draped over the right ovary.  A corpus  luteum, and trace periovarian and pelvic fluid suggest recent follicle rupture. 2. Probable oral contrast in the terminal ileum beyond the right ovary. Serial  abdominal radiography could follow the progression of oral contrast, and further  exclude obstruction. XR Results (most recent):  Results from Hospital Encounter encounter on 08/05/19   XR ABD (KUB)    Narrative INDICATION: constipation     EXAM: Abdomen KUB. FINDINGS: Portable supine KUB at 1023 hours shows no small bowel dilation. Oral  contrast from yesterday's CT has passed into the nondilated colon. There is no  significant stool. Impression IMPRESSION: No bowel obstruction. Impression:  Active Problems:    Ovarian cyst rupture (8/5/2019)      Nausea with vomiting (8/6/2019)      Abdominal pain (8/6/2019)         Plan:  Patient is presenting with persistent abdominal pain that is coming and going in waves with findings of dilation/fecalization of the distal ileum that is likely reactive secondary to possible cyst rupture. We reviewed images with radiology, if pt can tolerate a bowel prep we will prep and try to complete colonoscopy tomorrow. It unable to prep can completed as an O/P. Will also check IBD panel and CRP.         ___________________________________________________  Care Plan discussed with:    [x]    Patient   [x]    Family   []    Nursing   []    Attending  Total Time :  30   minutes   ___________________________________________________  GI: VIBHA Garcia       The patient was seen and examined independently by me. I have discussed the case with the mid-level provider in detail. I have reviewed the patient's chart and the note. Please see  note for full details. Physical exam:  General:AAO x 3,  is at bedside  HEENT:  EOMI,   Chest:  CTA,Heart: S1, S2, RRR  GI: Soft, c.o pain on examining casper umblical area ND + bowel sounds  Extremities: No edema    Data Review:  reviewed   CT showing:  IMPRESSION:  \"1.  Mild dilation and fecalization of bowel contents in the distal ileum is  likely reactive. The affected ileum is draped over the right ovary. A corpus  luteum, and trace periovarian and pelvic fluid suggest recent follicle rupture. 2. Probable oral contrast in the terminal ileum beyond the right ovary. Serial  abdominal radiography could follow the progression of oral contrast, and further  exclude obstruction. \"     Impression:   Pain in abdomen,  N/V  Abnormal CT x 2  Possible recent  rt ovarian follicle rupture    Plan and discussion:  DDx d.w the pt and her . She was treated for strep throat about 10 days or so ago. No family members with similar symptoms. No Fhx of IBD. Was told in the past by Dr Carl Cleary that she probably had 'IBS'. Her BMs have been normal lately. I agree with attempting  a careful bowel prep and attempt a colonoscopy with TI intubation tomorrow with Dr Carl Cleary. She is aware that if she develops N/V we may have to back off the prep. Agree with IBD panel and CRP. KUB to make sure contrast goes through tomorrow if prep does not work. Consider Ob/Gyn consult as well. Signed By: Conchita Wahl.  Douglas Dowling MD    8/6/2019  3:49 PM

## 2019-08-06 NOTE — H&P
Hospitalist Admission Note    NAME: Nemesio Johnson   :  1985   MRN:  [id number]     Date/Time:  2019 9:51 PM    Patient PCP: Aung Patel MD  ______________________________________________________________________  Given the patient's current clinical presentation, I have a high level of concern for decompensation if discharged from the emergency department. Complex decision making was performed, which includes reviewing the patient's available past medical records, laboratory results, and x-ray films. My assessment of this patient's clinical condition and my plan of care is as follows. Assessment / Plan:  Abdominal pain due to ruptured ovarian cyst  -Currently better controlled with toradol, morphine  -Aim to control with oral analgesics  -Start PPI due to patient's prior history of NSAID-related PUD  -Supportive care  -Bowel regimen    Leukocytosis, mild, reactive    Hematuria  -Per patient happens post-coitally  -No obvious urinary tract pathology on CT -- advised follow up with PCP for repeat UA after discharge    Hx IBS  Hx CIN3 s/p hysterectomy  Hx PUD  Hx kidney stones    Code Status: Full    DVT Prophylaxis: SCDs  GI Prophylaxis: not indicated    Baseline: independent, working, exercising 7 days a week      Subjective:   CHIEF COMPLAINT: Abdominal pain    HISTORY OF PRESENT ILLNESS:     Nemesio Johnson is a 29 y.o.  female who presents with abdominal pain. Her symptoms started on Saturday evening when she was in the car with her . She thought she was carsick or what normally would have been the \"PMS stage\" of her menstrual cycle (she has had a hysterectomy but not oophorectomy and is still symptomatic with her cycles). The following evening, her symptoms acutely worsened after eating dinner, with severe right lower quadrant abdominal pain that \"came in waves\" every five minutes.  She was seen in the emergency department yesterday evening and underwent labs and CT abdomen and pelvis which showed \"mildly distended fluid-filled loops of small bowel in the right lower quadrant with some fat stranding around it. This suggest a inflammatory process. There is no definite wall thickening. Proximal small bowel and colon appear unremarkable. \" She was discharged to home this morning and was able to sleep for about an hour when the pain recurred and was severe. She contacted Dr. 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 Here, she had repeat labs, that were reassuring, a plain film of the abdomen that showed \"ileal fold thickening,\" and a repeat CT that now demonstrates \"mild dilation and fecalization of bowel contents in the distal ileum is likely reactive. The affected ileum is draped over the right ovary. A corpus luteum, and trace periovarian and pelvic fluid suggest recent follicle rupture. \" Due to issues with pain control, the patient is being observed in the hospital. Of note, she reports passing no flatus since yesterday morning.     Past Medical History:   Diagnosis Date    Abnormal Pap smear     Biopsy done last year and came back ok    Asthma     flares with bronchitis has been over a year as stated 10/5/2018    Difficult intravenous access     GERD (gastroesophageal reflux disease)     IBS (irritable bowel syndrome)     Kidney stones     Nausea & vomiting         Past Surgical History:   Procedure Laterality Date    HX APPENDECTOMY      HX COLONOSCOPY      HX ENDOSCOPY      HX GYN          HX GYN  2004        HX GYN      hysterectomy     Social History     Tobacco Use    Smoking status: Former Smoker    Smokeless tobacco: Never Used   Substance Use Topics    Alcohol use: Yes     Comment: occassionally        Family History   Problem Relation Age of Onset    Hypertension Mother     Heart Disease Mother     Cancer Mother     Diabetes Mother      Allergies   Allergen Reactions    Avelox [Moxifloxacin] Swelling and Unknown (comments)    Macrolide Antibiotics Hives, Rash and Swelling     Per patient reported as a previous reaction to a \"mycin\" drug. Did not know which drug.  Nsaids (Non-Steroidal Anti-Inflammatory Drug) Other (comments)     D/t esophageal erosion and GERD        Prior to Admission medications    Medication Sig Start Date End Date Taking? Authorizing Provider   trimethoprim-sulfamethoxazole (BACTRIM DS) 160-800 mg per tablet Take 1 Tab by mouth two (2) times a day for 7 days. 8/5/19 8/12/19 Yes Ruth Ann Quijano MD   metroNIDAZOLE (FLAGYL) 500 mg tablet Take 1 Tab by mouth two (2) times a day for 7 days. 8/5/19 8/12/19 Yes Ruth Ann Quijano MD   ondansetron (ZOFRAN ODT) 4 mg disintegrating tablet Take 1 Tab by mouth every eight (8) hours as needed for Nausea. 8/5/19  Yes Ruth Ann Quijano MD   dicyclomine (BENTYL) 20 mg tablet Take 1 Tab by mouth every six (6) hours as needed (abdominal cramps) for up to 20 doses. 8/5/19  Yes Ruth Ann Quijano MD   HYDROcodone-acetaminophen [facility]) 5-325 mg per tablet Take 1 Tab by mouth every six (6) hours as needed for Pain for up to 3 days. Max Daily Amount: 4 Tabs. 8/5/19 8/8/19 Yes Ruth Ann Quijano MD   predniSONE (DELTASONE) 20 mg tablet Take 20 mg by mouth daily (with breakfast). 7/13/19  Yes Jean Marie Mcclelland NP       REVIEW OF SYSTEMS:     I am not able to complete the review of systems because:    The patient is intubated and sedated    The patient has altered mental status due to his acute medical problems    The patient has baseline aphasia from prior stroke(s)    The patient has baseline dementia and is not reliable historian    The patient is in acute medical distress and unable to provide information           Total of 12 systems reviewed as follows:       POSITIVE= underlined text  Negative = text not underlined  General:  fever, chills, sweats, generalized weakness, weight loss/gain,      loss of appetite   Eyes:    blurred vision, eye pain, loss of vision, double vision  ENT:    rhinorrhea, pharyngitis   Respiratory:   cough, sputum production, SOB, BENAVIDES, wheezing, pleuritic pain   Cardiology:   chest pain, palpitations, orthopnea, PND, edema, syncope   Gastrointestinal:  abdominal pain , N/V, diarrhea, dysphagia, constipation, bleeding   Genitourinary:  frequency, urgency, dysuria, hematuria (postcoital), incontinence   Muskuloskeletal :  arthralgia, myalgia, back pain  Hematology:  easy bruising, nose or gum bleeding, lymphadenopathy   Dermatological: rash, ulceration, pruritis, color change / jaundice  Endocrine:   hot flashes or polydipsia   Neurological:  headache, dizziness, confusion, focal weakness, paresthesia,     Speech difficulties, memory loss, gait difficulty  Psychological: Feelings of anxiety, depression, agitation    Objective:   VITALS:    Visit Vitals  /86   Pulse 80   Temp 98.1 °F (36.7 °C)   Resp 16   Ht 5' 2\" (1.575 m)   Wt 74.9 kg (165 lb 2 oz)   SpO2 98%   BMI 30.20 kg/m²       PHYSICAL EXAM:    General:    Alert, cooperative, no distress, appears stated age. HEENT: Atraumatic, anicteric sclerae, pink conjunctivae     No oral ulcers, mucosa moist, throat clear, dentition fair  Neck:  Supple, symmetrical,  thyroid: non tender  Lungs:   Clear to auscultation bilaterally. No Wheezing or Rhonchi. No rales. Chest wall:  No tenderness  No Accessory muscle use. Heart:   Regular  rhythm,  No  murmur   No edema  Abdomen:   Soft, tender in RLQ without guarding or rebound. Not distended. Bowel sounds normal  Extremities: No cyanosis. No clubbing,      Skin turgor normal, Capillary refill normal, Radial dial pulse 2+  Skin:     Not pale. Not Jaundiced  No rashes   Psych:  Good insight. Not depressed. Not anxious or agitated. Neurologic: EOMs intact. No facial asymmetry. No aphasia or slurred speech. Symmetrical strength, Sensation grossly intact. Alert and oriented X 4. _______________________________________________________________________  Care Plan discussed with:    Comments   Patient x    Family  x    RN     Care Manager                    Consultant:      _______________________________________________________________________  Expected  Disposition:   Home with Family x   HH/PT/OT/RN    SNF/LTC    CALE    ________________________________________________________________________  TOTAL TIME:  27 Minutes    Critical Care Provided     Minutes non procedure based      Comments    x Reviewed previous records   >50% of visit spent in counseling and coordination of care x Discussion with patient and/or family and questions answered       ________________________________________________________________________  Signed: Reina Elizondo MD    Procedures: see electronic medical records for all procedures/Xrays and details which were not copied into this note but were reviewed prior to creation of Plan. LAB DATA REVIEWED:    Recent Results (from the past 24 hour(s))   METABOLIC PANEL, COMPREHENSIVE    Collection Time: 08/04/19 10:19 PM   Result Value Ref Range    Sodium 139 136 - 145 mmol/L    Potassium 3.7 3.5 - 5.1 mmol/L    Chloride 104 97 - 108 mmol/L    CO2 28 21 - 32 mmol/L    Anion gap 7 5 - 15 mmol/L    Glucose 90 65 - 100 mg/dL    BUN 15 6 - 20 MG/DL    Creatinine 0.86 0.55 - 1.02 MG/DL    BUN/Creatinine ratio 17 12 - 20      GFR est AA >60 >60 ml/min/1.73m2    GFR est non-AA >60 >60 ml/min/1.73m2    Calcium 9.1 8.5 - 10.1 MG/DL    Bilirubin, total 0.3 0.2 - 1.0 MG/DL    ALT (SGPT) 33 12 - 78 U/L    AST (SGOT) 24 15 - 37 U/L    Alk.  phosphatase 69 45 - 117 U/L    Protein, total 6.7 6.4 - 8.2 g/dL    Albumin 3.4 (L) 3.5 - 5.0 g/dL    Globulin 3.3 2.0 - 4.0 g/dL    A-G Ratio 1.0 (L) 1.1 - 2.2     LIPASE    Collection Time: 08/04/19 10:19 PM   Result Value Ref Range    Lipase 75 73 - 393 U/L   LACTIC ACID    Collection Time: 08/04/19 11:36 PM   Result Value Ref Range Lactic acid 0.8 0.4 - 2.0 MMOL/L   CBC WITH AUTOMATED DIFF    Collection Time: 08/05/19  2:10 PM   Result Value Ref Range    WBC 11.1 (H) 3.6 - 11.0 K/uL    RBC 5.11 3.80 - 5.20 M/uL    HGB 14.0 11.5 - 16.0 g/dL    HCT 42.8 35.0 - 47.0 %    MCV 83.8 80.0 - 99.0 FL    MCH 27.4 26.0 - 34.0 PG    MCHC 32.7 30.0 - 36.5 g/dL    RDW 15.1 (H) 11.5 - 14.5 %    PLATELET 824 095 - 274 K/uL    MPV 11.6 8.9 - 12.9 FL    NRBC 0.0 0  WBC    ABSOLUTE NRBC 0.00 0.00 - 0.01 K/uL    NEUTROPHILS 69 32 - 75 %    LYMPHOCYTES 25 12 - 49 %    MONOCYTES 5 5 - 13 %    EOSINOPHILS 1 0 - 7 %    BASOPHILS 0 0 - 1 %    IMMATURE GRANULOCYTES 0 0.0 - 0.5 %    ABS. NEUTROPHILS 7.6 1.8 - 8.0 K/UL    ABS. LYMPHOCYTES 2.8 0.8 - 3.5 K/UL    ABS. MONOCYTES 0.6 0.0 - 1.0 K/UL    ABS. EOSINOPHILS 0.1 0.0 - 0.4 K/UL    ABS. BASOPHILS 0.0 0.0 - 0.1 K/UL    ABS. IMM. GRANS. 0.0 0.00 - 0.04 K/UL    DF AUTOMATED     METABOLIC PANEL, COMPREHENSIVE    Collection Time: 08/05/19  3:26 PM   Result Value Ref Range    Sodium 142 136 - 145 mmol/L    Potassium 3.7 3.5 - 5.1 mmol/L    Chloride 110 (H) 97 - 108 mmol/L    CO2 24 21 - 32 mmol/L    Anion gap 8 5 - 15 mmol/L    Glucose 74 65 - 100 mg/dL    BUN 13 6 - 20 MG/DL    Creatinine 0.81 0.55 - 1.02 MG/DL    BUN/Creatinine ratio 16 12 - 20      GFR est AA >60 >60 ml/min/1.73m2    GFR est non-AA >60 >60 ml/min/1.73m2    Calcium 8.4 (L) 8.5 - 10.1 MG/DL    Bilirubin, total 0.5 0.2 - 1.0 MG/DL    ALT (SGPT) 30 12 - 78 U/L    AST (SGOT) 20 15 - 37 U/L    Alk.  phosphatase 71 45 - 117 U/L    Protein, total 6.6 6.4 - 8.2 g/dL    Albumin 3.3 (L) 3.5 - 5.0 g/dL    Globulin 3.3 2.0 - 4.0 g/dL    A-G Ratio 1.0 (L) 1.1 - 2.2

## 2019-08-06 NOTE — INTERDISCIPLINARY ROUNDS
Oncology Interdisciplinary rounds were held today to discuss patient plan of care and outcomes. The following members were present: Nursing, Physician, Case Management, Pharmacy, and PT/OT Actual Length of Stay: 0 Expected Length of Stay: - - - Plan            Discharge [facility] 8/5/19 Home with family when discharged. 2 person assist/verbal cues/nonverbal cues (demo/gestures)

## 2019-08-06 NOTE — ED NOTES
Report given to [facility], RN for inpatient assignment. Transport pending for transfer. All belongings sent.

## 2019-08-06 NOTE — PROGRESS NOTES
Oncology End of Shift Note      Bedside shift change report given to Kathleen Elaine RN (incoming nurse) by Wade Jeff (outgoing nurse) on [facility]. Report included the following information SBAR, Kardex, Intake/Output and MAR. Shift Summary: Pt transferred from ED. Pt remained stable. PRN Percocet x1. Pt still needs UA completed. Some abdominal discomfort with sips of water. Issues for Physician to Address:       Patient on Cardiac Monitoring?     [] Yes  [x] No    Rhythm:          Wade Jeff

## 2019-08-06 NOTE — PROGRESS NOTES
Problem: Falls - Risk of  Goal: *Absence of Falls  Description  Document Mason Bond Fall Risk and appropriate interventions in the flowsheet.   Outcome: Progressing Towards Goal  Note:   Fall Risk Interventions:            Medication Interventions: Patient to call before getting OOB                   Problem: Patient Education: Go to Patient Education Activity  Goal: Patient/Family Education  Outcome: Progressing Towards Goal     Problem: Pain  Goal: *Control of Pain  Outcome: Progressing Towards Goal  Goal: *PALLIATIVE CARE:  Alleviation of Pain  Outcome: Progressing Towards Goal     Problem: Constipation - Risk of  Goal: *Prevention of constipation  Outcome: Progressing Towards Goal  Goal: *PALLIATIVE CARE:  Prevention/Alleviation of constipation  Outcome: Progressing Towards Goal

## 2019-08-06 NOTE — PROGRESS NOTES
Oncology End of Shift Note      Bedside shift change report given to Yeny Díaz RN (incoming nurse) by Nguyễn Salcido (outgoing nurse) on [facility]. Report included the following information SBAR, Kardex, Intake/Output, MAR, Accordion and Recent Results. Shift Summary: labs drawn; clear liquid diet; ambulating in hallway; family at the bedside; bowel -prep started for planned colonoscopy in AM; PRN zofran and pain meds given x2      Issues for Physician to Address:       Patient on Cardiac Monitoring?     [] Yes  [x] No    Rhythm:          Shift Events        Nguyễn Salcido

## 2019-08-06 NOTE — PROGRESS NOTES
TRANSFER - IN REPORT:    Verbal report received from Amisha(name) on Cadence Vega  being received from ED(unit) for routine progression of care      Report consisted of patients Situation, Background, Assessment and   Recommendations(SBAR). Information from the following report(s) SBAR, Kardex, Intake/Output and MAR was reviewed with the receiving nurse. Opportunity for questions and clarification was provided. Assessment completed upon patients arrival to unit and care assumed. 2245: Pt arrived to unit. Pt oriented to room and plan of care. Pain 5/10. Will see what pain regimen is ordered. Assessment completed. No distress. Pt reports no BM. IV fluids started. Pt informed to call nurse before getting up.

## 2019-08-06 NOTE — PROGRESS NOTES
Problem: Falls - Risk of  Goal: *Absence of Falls  Description  Document Jalen Boyle Fall Risk and appropriate interventions in the flowsheet.   Outcome: Progressing Towards Goal  Note:   Fall Risk Interventions:            Medication Interventions: Patient to call before getting OOB, Teach patient to arise slowly                   Problem: Pain  Goal: *Control of Pain  Outcome: Progressing Towards Goal

## 2019-08-07 ENCOUNTER — ANESTHESIA (OUTPATIENT)
Dept: ENDOSCOPY | Age: 34
End: 2019-08-07
Payer: COMMERCIAL

## 2019-08-07 ENCOUNTER — ANESTHESIA EVENT (OUTPATIENT)
Dept: ENDOSCOPY | Age: 34
End: 2019-08-07
Payer: COMMERCIAL

## 2019-08-07 VITALS
DIASTOLIC BLOOD PRESSURE: 63 MMHG | BODY MASS INDEX: 30.39 KG/M2 | RESPIRATION RATE: 12 BRPM | HEIGHT: 62 IN | SYSTOLIC BLOOD PRESSURE: 100 MMHG | WEIGHT: 165.12 LBS | OXYGEN SATURATION: 99 % | TEMPERATURE: 97.8 F | HEART RATE: 77 BPM

## 2019-08-07 LAB
ANION GAP SERPL CALC-SCNC: 8 MMOL/L (ref 5–15)
BACTERIA SPEC CULT: ABNORMAL
BACTERIA SPEC CULT: ABNORMAL
BASOPHILS # BLD: 0 K/UL (ref 0–0.1)
BASOPHILS NFR BLD: 0 % (ref 0–1)
BUN SERPL-MCNC: 7 MG/DL (ref 6–20)
BUN/CREAT SERPL: 11 (ref 12–20)
CALCIUM SERPL-MCNC: 8.2 MG/DL (ref 8.5–10.1)
CC UR VC: ABNORMAL
CHLORIDE SERPL-SCNC: 109 MMOL/L (ref 97–108)
CO2 SERPL-SCNC: 24 MMOL/L (ref 21–32)
CREAT SERPL-MCNC: 0.64 MG/DL (ref 0.55–1.02)
DIFFERENTIAL METHOD BLD: ABNORMAL
EOSINOPHIL # BLD: 0.3 K/UL (ref 0–0.4)
EOSINOPHIL NFR BLD: 4 % (ref 0–7)
ERYTHROCYTE [DISTWIDTH] IN BLOOD BY AUTOMATED COUNT: 14.9 % (ref 11.5–14.5)
GLUCOSE SERPL-MCNC: 72 MG/DL (ref 65–100)
HCT VFR BLD AUTO: 39 % (ref 35–47)
HGB BLD-MCNC: 12.3 G/DL (ref 11.5–16)
IMM GRANULOCYTES # BLD AUTO: 0 K/UL (ref 0–0.04)
IMM GRANULOCYTES NFR BLD AUTO: 0 % (ref 0–0.5)
LYMPHOCYTES # BLD: 2.8 K/UL (ref 0.8–3.5)
LYMPHOCYTES NFR BLD: 34 % (ref 12–49)
MCH RBC QN AUTO: 27 PG (ref 26–34)
MCHC RBC AUTO-ENTMCNC: 31.5 G/DL (ref 30–36.5)
MCV RBC AUTO: 85.7 FL (ref 80–99)
MONOCYTES # BLD: 0.6 K/UL (ref 0–1)
MONOCYTES NFR BLD: 8 % (ref 5–13)
NEUTS SEG # BLD: 4.3 K/UL (ref 1.8–8)
NEUTS SEG NFR BLD: 54 % (ref 32–75)
NRBC # BLD: 0 K/UL (ref 0–0.01)
NRBC BLD-RTO: 0 PER 100 WBC
PLATELET # BLD AUTO: 289 K/UL (ref 150–400)
PMV BLD AUTO: 10.4 FL (ref 8.9–12.9)
POTASSIUM SERPL-SCNC: 3.8 MMOL/L (ref 3.5–5.1)
RBC # BLD AUTO: 4.55 M/UL (ref 3.8–5.2)
SERVICE CMNT-IMP: ABNORMAL
SODIUM SERPL-SCNC: 141 MMOL/L (ref 136–145)
WBC # BLD AUTO: 8 K/UL (ref 3.6–11)

## 2019-08-07 PROCEDURE — 76060000031 HC ANESTHESIA FIRST 0.5 HR: Performed by: SPECIALIST

## 2019-08-07 PROCEDURE — 36415 COLL VENOUS BLD VENIPUNCTURE: CPT

## 2019-08-07 PROCEDURE — 80048 BASIC METABOLIC PNL TOTAL CA: CPT

## 2019-08-07 PROCEDURE — 88305 TISSUE EXAM BY PATHOLOGIST: CPT

## 2019-08-07 PROCEDURE — 74011250636 HC RX REV CODE- 250/636: Performed by: SPECIALIST

## 2019-08-07 PROCEDURE — 76040000019: Performed by: SPECIALIST

## 2019-08-07 PROCEDURE — 74011250636 HC RX REV CODE- 250/636: Performed by: ANESTHESIOLOGY

## 2019-08-07 PROCEDURE — 99218 HC RM OBSERVATION: CPT

## 2019-08-07 PROCEDURE — 77030021593 HC FCPS BIOP ENDOSC BSC -A: Performed by: SPECIALIST

## 2019-08-07 PROCEDURE — 74011250637 HC RX REV CODE- 250/637: Performed by: ANESTHESIOLOGY

## 2019-08-07 PROCEDURE — 94760 N-INVAS EAR/PLS OXIMETRY 1: CPT

## 2019-08-07 PROCEDURE — 85025 COMPLETE CBC W/AUTO DIFF WBC: CPT

## 2019-08-07 RX ORDER — DOCUSATE SODIUM 100 MG/1
100 CAPSULE, LIQUID FILLED ORAL 2 TIMES DAILY
Qty: 60 CAP | Refills: 2 | Status: SHIPPED | COMMUNITY
Start: 2019-08-07 | End: 2019-09-26

## 2019-08-07 RX ORDER — SODIUM CHLORIDE 9 MG/ML
75 INJECTION, SOLUTION INTRAVENOUS CONTINUOUS
Status: DISCONTINUED | OUTPATIENT
Start: 2019-08-07 | End: 2019-08-07 | Stop reason: HOSPADM

## 2019-08-07 RX ORDER — EPINEPHRINE 0.1 MG/ML
1 INJECTION INTRACARDIAC; INTRAVENOUS
Status: DISCONTINUED | OUTPATIENT
Start: 2019-08-07 | End: 2019-08-07 | Stop reason: HOSPADM

## 2019-08-07 RX ORDER — LIDOCAINE HYDROCHLORIDE 20 MG/ML
INJECTION, SOLUTION EPIDURAL; INFILTRATION; INTRACAUDAL; PERINEURAL AS NEEDED
Status: DISCONTINUED | OUTPATIENT
Start: 2019-08-07 | End: 2019-08-07 | Stop reason: HOSPADM

## 2019-08-07 RX ORDER — PROPOFOL 10 MG/ML
INJECTION, EMULSION INTRAVENOUS AS NEEDED
Status: DISCONTINUED | OUTPATIENT
Start: 2019-08-07 | End: 2019-08-07 | Stop reason: HOSPADM

## 2019-08-07 RX ORDER — DEXTROMETHORPHAN/PSEUDOEPHED 2.5-7.5/.8
1.2 DROPS ORAL
Status: DISCONTINUED | OUTPATIENT
Start: 2019-08-07 | End: 2019-08-07 | Stop reason: HOSPADM

## 2019-08-07 RX ORDER — NALOXONE HYDROCHLORIDE 0.4 MG/ML
0.4 INJECTION, SOLUTION INTRAMUSCULAR; INTRAVENOUS; SUBCUTANEOUS
Status: DISCONTINUED | OUTPATIENT
Start: 2019-08-07 | End: 2019-08-07 | Stop reason: HOSPADM

## 2019-08-07 RX ORDER — SODIUM CHLORIDE 0.9 % (FLUSH) 0.9 %
5-40 SYRINGE (ML) INJECTION AS NEEDED
Status: DISCONTINUED | OUTPATIENT
Start: 2019-08-07 | End: 2019-08-07 | Stop reason: HOSPADM

## 2019-08-07 RX ORDER — SODIUM CHLORIDE 0.9 % (FLUSH) 0.9 %
5-40 SYRINGE (ML) INJECTION EVERY 8 HOURS
Status: DISCONTINUED | OUTPATIENT
Start: 2019-08-07 | End: 2019-08-07 | Stop reason: HOSPADM

## 2019-08-07 RX ORDER — ATROPINE SULFATE 0.1 MG/ML
0.5 INJECTION INTRAVENOUS
Status: DISCONTINUED | OUTPATIENT
Start: 2019-08-07 | End: 2019-08-07 | Stop reason: HOSPADM

## 2019-08-07 RX ORDER — FLUMAZENIL 0.1 MG/ML
0.2 INJECTION INTRAVENOUS
Status: DISCONTINUED | OUTPATIENT
Start: 2019-08-07 | End: 2019-08-07 | Stop reason: HOSPADM

## 2019-08-07 RX ADMIN — SIMETHICONE 80 MG: 20 SUSPENSION/ DROPS ORAL at 12:00

## 2019-08-07 RX ADMIN — LIDOCAINE HYDROCHLORIDE 40 MG: 20 INJECTION, SOLUTION EPIDURAL; INFILTRATION; INTRACAUDAL; PERINEURAL at 11:50

## 2019-08-07 RX ADMIN — PROPOFOL 350 MG: 10 INJECTION, EMULSION INTRAVENOUS at 12:11

## 2019-08-07 RX ADMIN — SODIUM CHLORIDE 75 ML/HR: 900 INJECTION, SOLUTION INTRAVENOUS at 11:27

## 2019-08-07 NOTE — ANESTHESIA POSTPROCEDURE EVALUATION
Procedure(s):  COLONOSCOPY  COLON BIOPSY. total IV anesthesia    Anesthesia Post Evaluation        Patient location during evaluation: PACU  Note status: Adequate. Level of consciousness: responsive to verbal stimuli and sleepy but conscious  Pain management: satisfactory to patient  Airway patency: patent  Anesthetic complications: no  Cardiovascular status: acceptable  Respiratory status: acceptable  Hydration status: acceptable  Comments: +Post-Anesthesia Evaluation and Assessment    Patient: Sorin Garcia MRN: [id number]  SSN: [id number]   YOB: 1985  Age: 29 y.o. Sex: female      Cardiovascular Function/Vital Signs    /63   Pulse 77   Temp 36.6 °C (97.8 °F)   Resp 12   Ht 5' 2\" (1.575 m)   Wt 74.9 kg (165 lb 2 oz)   SpO2 99%   Breastfeeding? No   BMI 30.20 kg/m²     Patient is status post Procedure(s):  COLONOSCOPY  COLON BIOPSY. Nausea/Vomiting: Controlled. Postoperative hydration reviewed and adequate. Pain:  Pain Scale 1: Numeric (0 - 10) (08/07/19 1306)  Pain Intensity 1: 0 (08/07/19 1306)   Managed. Neurological Status: At baseline. Mental Status and Level of Consciousness: Arousable. Pulmonary Status:   O2 Device: Room air (08/07/19 1234)   Adequate oxygenation and airway patent. Complications related to anesthesia: None    Post-anesthesia assessment completed. No concerns. Signed By: Estephanie Bee DO    8/7/2019  Post anesthesia nausea and vomiting:  controlled      Vitals Value Taken Time   /63 8/7/2019 12:34 PM   Temp 36.6 °C (97.8 °F) 8/7/2019 12:24 PM   Pulse 0 8/7/2019 12:38 PM   Resp 0 8/7/2019 12:38 PM   SpO2 97 % 8/7/2019 12:35 PM   Vitals shown include unvalidated device data.
MED

## 2019-08-07 NOTE — PROGRESS NOTES
Problem: Falls - Risk of  Goal: *Absence of Falls  Description  Document Aditi Newberry Fall Risk and appropriate interventions in the flowsheet.   Outcome: Progressing Towards Goal  Note:   Fall Risk Interventions:            Medication Interventions: Patient to call before getting OOB

## 2019-08-07 NOTE — DISCHARGE SUMMARY
Hospitalist Discharge Summary     Patient ID:  Jerolyn Collet  [id number]  29 y.o.  1985 8/5/2019    PCP on record: Katherine Curtis MD    Admit date: 8/5/2019  Discharge date and time: 8/7/2019    DISCHARGE DIAGNOSIS:    Ruptured ovarian cyst  Abdominal pain  Nausea with 1 episode of vomiting  Leukocytosis- resolved  Hematuria   Hx IBS  Hx hysterectomy     CONSULTATIONS:  IP CONSULT TO HOSPITALIST  IP CONSULT TO GASTROENTEROLOGY    Excerpted HPI from H&P of Laura Christensen MD:  Taty Rivero is a 29 y.o.  female who presents with abdominal pain. Her symptoms started on Saturday evening when she was in the car with her . She thought she was carsick or what normally would have been the \"PMS stage\" of her menstrual cycle (she has had a hysterectomy but not oophorectomy and is still symptomatic with her cycles). The following evening, her symptoms acutely worsened after eating dinner, with severe right lower quadrant abdominal pain that \"came in waves\" every five minutes. She was seen in the emergency department yesterday evening and underwent labs and CT abdomen and pelvis which showed \"mildly distended fluid-filled loops of small bowel in the right lower quadrant with some fat stranding around it. This suggest a inflammatory process. There is no definite wall thickening. Proximal small bowel and colon appear unremarkable. \" She was discharged to home this morning and was able to sleep for about an hour when the pain recurred and was severe. 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 Here, she had repeat labs, that were reassuring, a plain film of the abdomen that showed \"ileal fold thickening,\" and a repeat CT that now demonstrates \"mild dilation and fecalization of bowel contents in the distal ileum is likely reactive. The affected ileum is draped over the right ovary.  A corpus luteum, and trace periovarian and pelvic fluid suggest recent follicle rupture. \" Due to issues with pain control, the patient is being observed in the hospital. Of note, she reports passing no flatus since yesterday morning. \"  ______________________________________________________________________  DISCHARGE SUMMARY/HOSPITAL COURSE:  for full details see H&P, daily progress notes, labs, consult notes. Cadence Vega 29 y.o. female was admitted to [address] [place], [place] [address]as [facility] and treated for the following medical conditions:    Abdominal pain d/t ruptured ovarian cyst  Nausea with 1 episode of vomiting   · 08/05/2019:  CT abd/pel shows 1. Mild dilation and fecalization of bowel contents in the distal ileum is likely reactive. The affected ileum is draped over the right ovary. A corpus luteum, and trace periovarian and pelvic fluid suggest recent follicle rupture. 2. Probable oral contrast in the terminal ileum beyond the right ovary. Serial abdominal radiography could follow the progression of oral contrast, and further exclude obstruction. · KUB shows no bowel obstruction   · Continue with PPI  · Continues with mid to right sided abdominal pain without vomiting  · Appreciate GI input. Colonoscopy negative. Biopsies sent. Follow-up OP  · Pain meds and antiemetics PRN  · Needs to follow-up with GYN as OP     Leukocytosis, mild, reactive- resolved   · WBC 8.0     Hematuria  · Patient states it happens post-coitally   · No obvious urinary tract pathology on CT  · UA negative      Hx IBS  Hx CIN3 s/p hysterectomy  Hx PUD  Hx kidney stones   Patient is being discharged home with family. She has no needs at home. Reviewed plan of care with patient. Patient verbalizes understanding. Patient without questions or concerns at this time. _______________________________________________________________________  Patient seen and examined by me on discharge day. Pertinent Findings:  Gen:    Not in distress  Chest: Clear lungs  CVS:   Regular rhythm.   No edema  Abd:  Soft, not distended, not tender  Neuro:  Alert, oriented x 3   _______________________________________________________________________  DISCHARGE MEDICATIONS:   Current Discharge Medication List      START taking these medications    Details   docusate sodium (COLACE) 100 mg capsule Take 1 Cap by mouth two (2) times a day. Qty: 60 Cap, Refills: 2         CONTINUE these medications which have NOT CHANGED    Details   trimethoprim-sulfamethoxazole (BACTRIM DS) 160-800 mg per tablet Take 1 Tab by mouth two (2) times a day for 7 days. Qty: 14 Tab, Refills: 0      metroNIDAZOLE (FLAGYL) 500 mg tablet Take 1 Tab by mouth two (2) times a day for 7 days. Qty: 14 Tab, Refills: 0      ondansetron (ZOFRAN ODT) 4 mg disintegrating tablet Take 1 Tab by mouth every eight (8) hours as needed for Nausea. Qty: 10 Tab, Refills: 0      dicyclomine (BENTYL) 20 mg tablet Take 1 Tab by mouth every six (6) hours as needed (abdominal cramps) for up to 20 doses. Qty: 20 Tab, Refills: 0      HYDROcodone-acetaminophen ([place]) 5-325 mg per tablet Take 1 Tab by mouth every six (6) hours as needed for Pain for up to 3 days. Max Daily Amount: 4 Tabs. Qty: 12 Tab, Refills: 0    Associated Diagnoses: Abdominal pain, generalized      predniSONE (DELTASONE) 20 mg tablet Take 20 mg by mouth daily (with breakfast). Qty: 5 Tab, Refills: 0    Associated Diagnoses: Strep pharyngitis               Patient Follow Up Instructions: Activity: Activity as tolerated  Diet: Regular Diet  Wound Care: None needed    Follow-up with PCP in 1 week.     Follow-up Information     Follow up With Specialties Details Why Contact Info    Mitchell Peña MD [facility]   [address]  [address] [address] [phone]          ________________________________________________________________    Risk of deterioration: Low    Condition at Discharge:  Stable  __________________________________________________________________    Disposition  Home with family, no needs    ____________________________________________________________________    Code Status: Full Code  ___________________________________________________________________      Total time in minutes spent coordinating this discharge (includes going over instructions, follow-up, prescriptions, and preparing report for sign off to her PCP) :  31 minutes    Signed:  Antoin Lawrence NP

## 2019-08-07 NOTE — PROGRESS NOTES
Problem: Falls - Risk of  Goal: *Absence of Falls  Description  Document Marcell Galvan Fall Risk and appropriate interventions in the flowsheet.   8/7/2019 1357 by Wild Gracia  Outcome: Resolved/Met  Note:   Fall Risk Interventions:            Medication Interventions: Patient to call before getting OOB                8/7/2019 1027 by Wild Garcia  Outcome: Progressing Towards Goal  Note:   Fall Risk Interventions:            Medication Interventions: Patient to call before getting OOB                   Problem: Patient Education: Go to Patient Education Activity  Goal: Patient/Family Education  8/7/2019 1357 by Wild Garcia  Outcome: Resolved/Met  8/7/2019 1027 by Wild Garcia  Outcome: Progressing Towards Goal     Problem: Pain  Goal: *Control of Pain  8/7/2019 1357 by Wild Garcia  Outcome: Resolved/Met  8/7/2019 1027 by Wild Garcia  Outcome: Progressing Towards Goal  Goal: *PALLIATIVE CARE:  Alleviation of Pain  8/7/2019 1357 by Wild Garcia  Outcome: Resolved/Met  8/7/2019 1027 by Wild Garcia  Outcome: Progressing Towards Goal     Problem: Constipation - Risk of  Goal: *Prevention of constipation  8/7/2019 1357 by Wild Garcia  Outcome: Resolved/Met  8/7/2019 1027 by Wild Garcia  Outcome: Progressing Towards Goal  Goal: *PALLIATIVE CARE:  Prevention/Alleviation of constipation  8/7/2019 1357 by Wild Garcia  Outcome: Resolved/Met  8/7/2019 1027 by Wild Garcia  Outcome: Progressing Towards Goal

## 2019-08-07 NOTE — PROGRESS NOTES
Patient educated on discharge instructions, medications, and follow up appointments. Opportunity given for questions and clarifications. PIV removed prior to discharge. Patient ambulatory at time of discharge with  and all of her belongings.

## 2019-08-07 NOTE — PROGRESS NOTES
I reviewed pertinent labs and imaging, and discussed /agreed on the plan of care with Dr. Lili Acevedo    Hospitalist Progress Note    NAME: Jerolyn Collet   :  1985   MRN:  [id number]       Assessment / Plan:    Colonoscopy today; possible discharge this afternoon. Abdominal pain d/t ruptured ovarian cyst  Nausea with 1 episode of vomiting   · 2019:  CT abd/pel shows 1. Mild dilation and fecalization of bowel contents in the distal ileum is likely reactive. The affected ileum is draped over the right ovary. A corpus luteum, and trace periovarian and pelvic fluid suggest recent follicle rupture. 2. Probable oral contrast in the terminal ileum beyond the right ovary. Serial abdominal radiography could follow the progression of oral contrast, and further exclude obstruction. · KUB shows no bowel obstruction   · Continue with PPI  · Continues with mid to right sided abdominal pain without vomiting  · Appreciate GI input; plan for colonoscopy today   · Pain meds and antiemetics PRN  · Needs to follow-up with GYN as OP    Leukocytosis, mild, reactive- resolved   · WBC 8.0    Hematuria  · Patient states it happens post-coitally   · No obvious urinary tract pathology on CT  · UA negative     Hx IBS  Hx CIN3 s/p hysterectomy  Hx PUD  Hx kidney stones     30.0 - 39.9 Obese / Body mass index is 30.2 kg/m². Code status: Full  Prophylaxis: SCD's  Recommended Disposition: Home w/Family     Subjective:     Chief Complaint / Reason for Physician Visit  \"My stomach still hurts. I feel like I have done a million sit-ups. \"  Discussed with RN events overnight.      Review of Systems:  Symptom Y/N Comments  Symptom Y/N Comments   Fever/Chills n   Chest Pain n    Poor Appetite  NPO  Edema     Cough    Abdominal Pain y    Sputum    Joint Pain     SOB/BENAVIDES n   Pruritis/Rash     Nausea/vomit y   Tolerating PT/OT     Diarrhea    Tolerating Diet  npo   Constipation n   Other       Could NOT obtain due to:      Objective:     VITALS: Last 24hrs VS reviewed since prior progress note. Most recent are:  Patient Vitals for the past 24 hrs:   Temp Pulse Resp BP SpO2   08/07/19 0747 98.3 °F (36.8 °C) 80 18 113/71 99 %   08/06/19 2319 98.2 °F (36.8 °C) 62 17 134/66 100 %   08/06/19 2003 98 °F (36.7 °C) 73 18 137/90 99 %   08/06/19 1558 98.1 °F (36.7 °C) 71 16 120/71 100 %       Intake/Output Summary (Last 24 hours) at 8/7/2019 0902  Last data filed at 8/6/2019 2333  Gross per 24 hour   Intake 1585 ml   Output    Net 1585 ml        PHYSICAL EXAM:  General: Pleasant  female. NAD    EENT:  EOMI. Anicteric sclerae. MMM  Resp:  CTA bilaterally, no wheezing or rales. No accessory muscle use  CV:  Regular  rhythm,  No edema  GI:  Soft, Non distended, Non tender. Hypoactive BS  Neurologic:  Alert and oriented X 3, normal speech,   Psych:   Good insight. Not anxious nor agitated  Skin:  No rashes. No jaundice    Reviewed most current lab test results and cultures  YES  Reviewed most current radiology test results   YES  Review and summation of old records today    NO  Reviewed patient's current orders and MAR    YES  PMH/SH reviewed - no change compared to H&P  ________________________________________________________________________  Care Plan discussed with:    Comments   Patient x    Family      RN x    Care Manager     Consultant  x                     x Multidiciplinary team rounds were held today with , nursing, pharmacist and clinical coordinator. Patient's plan of care was discussed; medications were reviewed and discharge planning was addressed.      ________________________________________________________________________  Total NON critical care TIME:  25   Minutes    Total CRITICAL CARE TIME Spent:   Minutes non procedure based      Comments   >50% of visit spent in counseling and coordination of care     ________________________________________________________________________  Erica Garcia NP     Procedures: see electronic medical records for all procedures/Xrays and details which were not copied into this note but were reviewed prior to creation of Plan. LABS:  I reviewed today's most current labs and imaging studies.   Pertinent labs include:  Recent Labs     08/07/19 0348 08/06/19 1647 08/05/19  1410   WBC 8.0 7.4 11.1*   HGB 12.3 12.2 14.0   HCT 39.0 39.4 42.8    321 307     Recent Labs     08/07/19 0348 08/06/19 1647 08/05/19  1526 08/04/19  2219    138 142 139   K 3.8 3.6 3.7 3.7   * 107 110* 104   CO2 24 26 24 28   GLU 72 74 74 90   BUN 7 10 13 15   CREA 0.64 0.78 0.81 0.86   CA 8.2* 8.2* 8.4* 9.1   ALB  --   --  3.3* 3.4*   TBILI  --   --  0.5 0.3   SGOT  --   --  20 24   ALT  --   --  30 33       Signed: Gray Mulu, NP

## 2019-08-07 NOTE — PROGRESS NOTES
DEBI Plan:    -d/c home w/family  -F/U w/PCP      Pt to discharge home w/family; and follow up w/PCP.   PCP appointment is 8/14/19 @ 2:30 PM.     Hoang Lam, MSW  [phone]

## 2019-08-07 NOTE — ANESTHESIA PREPROCEDURE EVALUATION
Anesthetic History     PONV (denies motion sickness)          Review of Systems / Medical History  Patient summary reviewed, nursing notes reviewed and pertinent labs reviewed    Pulmonary          Smoker  Asthma : well controlled    Comments: Former smoker   Neuro/Psych   Within defined limits           Cardiovascular  Within defined limits                Exercise tolerance: >4 METS     GI/Hepatic/Renal     GERD: well controlled    Renal disease: stones      Comments: IBS Endo/Other        Obesity     Other Findings   Comments: IBS    TMJ syndrome, worse on the left side         Physical Exam    Airway  Mallampati: II  TM Distance: 4 - 6 cm  Neck ROM: normal range of motion   Mouth opening: Normal     Cardiovascular    Rhythm: regular  Rate: normal      Pertinent negatives: No murmur   Dental    Dentition: Implants     Pulmonary  Breath sounds clear to auscultation               Abdominal  GI exam deferred       Other Findings            Anesthetic Plan    ASA: 2  Anesthesia type: general    Monitoring Plan: BIS      Induction: Intravenous  Anesthetic plan and risks discussed with: Patient      Previous grade 1 view for hysterectomy here

## 2019-08-07 NOTE — DISCHARGE INSTRUCTIONS
HOSPITALIST DISCHARGE INSTRUCTIONS    NAME: Yesenia Rahman   :  1985   MRN:  [id number]     Date/Time:  2019 1:49 PM    ADMIT DATE: 2019   DISCHARGE DATE: 2019     Attending Physician: Kezia Norton NP    DISCHARGE DIAGNOSIS:    Ruptured ovarian cyst  Abdominal pain  Nausea with 1 episode of vomiting  Leukocytosis- resolved  Hematuria   Hx IBS  Hx hysterectomy     Medications: Per above medication reconciliation. Pain Management: per above medications    Recommended diet: Regular Diet    Recommended activity: Activity as tolerated    Wound care: None    Indwelling devices:  None    Supplemental Oxygen: None    Code status: Full        Outside physician follow up: Follow-up Information     Follow up With Specialties Details Why Contact Info    Beatriz Acevedo MD [place] [address]  [address] [address] [phone]          Follow-up with OB-GYN       Information obtained by :  I understand that if any problems occur once I am at home I am to contact my physician. I understand and acknowledge receipt of the instructions indicated above.                                                                                                                                            Physician's or R.N.'s Signature                                                                  Date/Time                                                                                                                                              Patient or Repres

## 2019-08-07 NOTE — PROGRESS NOTES
Problem: Falls - Risk of  Goal: *Absence of Falls  Description  Document Yamileth Leslie Fall Risk and appropriate interventions in the flowsheet.   Outcome: Progressing Towards Goal  Note:   Fall Risk Interventions:            Medication Interventions: Patient to call before getting OOB                   Problem: Patient Education: Go to Patient Education Activity  Goal: Patient/Family Education  Outcome: Progressing Towards Goal     Problem: Pain  Goal: *Control of Pain  Outcome: Progressing Towards Goal  Goal: *PALLIATIVE CARE:  Alleviation of Pain  Outcome: Progressing Towards Goal     Problem: Constipation - Risk of  Goal: *Prevention of constipation  Outcome: Progressing Towards Goal  Goal: *PALLIATIVE CARE:  Prevention/Alleviation of constipation  Outcome: Progressing Towards Goal

## 2019-08-07 NOTE — ROUTINE PROCESS
Cassy Molina 
1985 [id number] Situation: 
Verbal report received from: Jarad Sandhu Procedure: Procedure(s): 
COLONOSCOPY 
COLON BIOPSY Background: 
 
Preoperative diagnosis: abnormal CT Postoperative diagnosis: abnormal CT :  Dr. Gabi Thorpe Assistant(s): Endoscopy Technician-1: Cheryle Millman Endoscopy RN-1: Alex Bailey RN Specimens:  
ID Type Source Tests Collected by Time Destination 1 : Ileum, Terminal BX Preservative Ileum, Terminal  Oly Ballard MD 8/7/2019 1205 Pathology 2 : Random colon BX Preservative Random colon  Oly Ballard MD 8/7/2019 1208 Pathology H. Pylori  no Assessment: 
Intra-procedure medications Anesthesia gave intra-procedure sedation and medications, see anesthesia flow sheet yes Intravenous fluids: NS@ [employer] Vital signs stable Abdominal assessment: round and soft Recommendation: 
Discharge patient per MD order. Return to floor  Family or Michael Salen,  in room Permission to share finding with family or friend yes

## 2019-08-07 NOTE — PROCEDURES
Colonoscopy Procedure Note    Indications: RLQ pain, abnormal ileum on CT    Referring Physician: Wendy Pearson MD  Anesthesia/Sedation: MAC anesthesia Propofol  Endoscopist:  Dr. Beryle Overland    Procedure in Detail:  Informed consent was obtained for the procedure, including sedation. Risks of perforation, hemorrhage, adverse drug reaction, and aspiration were discussed. The patient was placed in the left lateral decubitus position. Based on the pre-procedure assessment, including review of the patient's medical history, medications, allergies, and review of systems, she had been deemed to be an appropriate candidate for moderate sedation; she was therefore sedated with the medications listed above. The patient was monitored continuously with ECG tracing, pulse oximetry, blood pressure monitoring, and direct observations. A rectal examination was performed. The [id number] was inserted into the rectum and advanced under direct vision to the terminal ileum. The quality of the colonic preparation was adequate. A careful inspection was made as the colonoscope was withdrawn, including a retroflexed view of the rectum; findings and interventions are described below. Appropriate photodocumentation was obtained. Findings:     1. Scope advanced to the cecum and terminal ileum. 2.  NORMAL mucosa visualized throughout the entire colon and also the distal 30 cm of terminal ileum visualized. No evidence of any IBD or Crohn's seen. S/P Random bxs of TI and colon. Therapies:  See above    Specimen: Specimens were collected as described above and sent to pathology. Complications: None were encountered during the procedure. EBL: < 10 ml.     Recommendations:   -advance diet  -no sign of any inflammatory process in ileum or colon to explain CT finding that likely were findings secondary to ovarian cyst fluid  Signed By: Lucinda Drake MD                        August 7, 2019

## 2019-08-07 NOTE — ROUTINE PROCESS
TRANSFER - IN REPORT: 
 
Verbal report received from ORIN Baltazar (name) on Tyler Naqvi  being received from Oncology (unit) for ordered procedure Report consisted of patients Situation, Background, Assessment and  
Recommendations(SBAR). Information from the following report(s) SBAR and MAR was reviewed with the receiving nurse. Opportunity for questions and clarification was provided. Assessment completed upon patients arrival to unit and care assumed.

## 2019-08-07 NOTE — PROGRESS NOTES
Pt had an uneventful night. Colon prep complete and for colonoscopy today. No complaints of pain or nausea.

## 2019-08-08 LAB
BAKER'S YEAST IGA QN: 43.2 UNITS (ref 0–24.9)
BAKER'S YEAST IGG QN: 40.5 UNITS (ref 0–24.9)
P-ANCA ATYPICAL TITR SER IF: ABNORMAL TITER

## 2019-08-17 ENCOUNTER — HOSPITAL ENCOUNTER (OUTPATIENT)
Dept: GENERAL RADIOLOGY | Age: 34
Discharge: HOME | End: 2019-08-17
Payer: COMMERCIAL

## 2019-08-17 DIAGNOSIS — R93.3 ABNORMAL FINDINGS ON EXAMINATION OF GASTROINTESTINAL TRACT: ICD-10-CM

## 2019-08-17 DIAGNOSIS — R19.7 DIARRHEA: ICD-10-CM

## 2019-08-17 DIAGNOSIS — R10.813 RIGHT LOWER QUADRANT ABDOMINAL TENDERNESS: ICD-10-CM

## 2019-08-17 PROCEDURE — 74022 RADEX COMPL AQT ABD SERIES: CPT

## 2019-08-21 ENCOUNTER — HOSPITAL ENCOUNTER (OUTPATIENT)
Dept: CT IMAGING | Age: 34
Discharge: HOME | End: 2019-08-21
Attending: PHYSICIAN ASSISTANT
Payer: COMMERCIAL

## 2019-08-21 DIAGNOSIS — R10.84 ABDOMINAL PAIN, GENERALIZED: ICD-10-CM

## 2019-08-21 DIAGNOSIS — R11.2 NAUSEA WITH VOMITING: ICD-10-CM

## 2019-08-21 DIAGNOSIS — K92.1 BLOOD IN STOOL: ICD-10-CM

## 2019-08-21 DIAGNOSIS — K52.9 ILEITIS: ICD-10-CM

## 2019-08-21 PROCEDURE — 74011636320 HC RX REV CODE- 636/320: Performed by: PHYSICIAN ASSISTANT

## 2019-08-21 PROCEDURE — 74011000255 HC RX REV CODE- 255: Performed by: PHYSICIAN ASSISTANT

## 2019-08-21 PROCEDURE — 74177 CT ABD & PELVIS W/CONTRAST: CPT

## 2019-08-21 RX ORDER — SODIUM CHLORIDE 0.9 % (FLUSH) 0.9 %
10 SYRINGE (ML) INJECTION
Status: COMPLETED | OUTPATIENT
Start: 2019-08-21 | End: 2019-08-21

## 2019-08-21 RX ADMIN — BARIUM SULFATE 900 ML: 1 SUSPENSION ORAL at 09:00

## 2019-08-21 RX ADMIN — IOPAMIDOL 100 ML: 755 INJECTION, SOLUTION INTRAVENOUS at 10:10

## 2019-08-21 RX ADMIN — Medication 10 ML: at 10:10

## 2019-08-23 ENCOUNTER — HOSPITAL ENCOUNTER (OUTPATIENT)
Dept: GENERAL RADIOLOGY | Age: 34
Discharge: HOME | End: 2019-08-23
Payer: COMMERCIAL

## 2019-08-23 DIAGNOSIS — Z18.9 RETAINED FOREIGN BODY: ICD-10-CM

## 2019-08-23 PROCEDURE — 74018 RADEX ABDOMEN 1 VIEW: CPT

## 2019-08-29 ENCOUNTER — HOSPITAL ENCOUNTER (OUTPATIENT)
Dept: GENERAL RADIOLOGY | Age: 34
Discharge: HOME | End: 2019-08-29
Payer: COMMERCIAL

## 2019-08-29 DIAGNOSIS — R10.9 ABDOMINAL PAIN, ACUTE: ICD-10-CM

## 2019-08-29 DIAGNOSIS — K50.90 CROHN DISEASE (HCC): ICD-10-CM

## 2019-08-29 PROCEDURE — 74018 RADEX ABDOMEN 1 VIEW: CPT

## 2019-09-05 ENCOUNTER — HOSPITAL ENCOUNTER (OUTPATIENT)
Dept: GENERAL RADIOLOGY | Age: 34
Discharge: HOME | End: 2019-09-05
Payer: COMMERCIAL

## 2019-09-05 DIAGNOSIS — R52 PAIN: ICD-10-CM

## 2019-09-05 PROCEDURE — 74018 RADEX ABDOMEN 1 VIEW: CPT

## 2019-09-06 ENCOUNTER — OFFICE VISIT (OUTPATIENT)
Dept: SURGERY | Age: 34
End: 2019-09-06

## 2019-09-06 VITALS
TEMPERATURE: 99.1 F | HEIGHT: 62 IN | HEART RATE: 95 BPM | SYSTOLIC BLOOD PRESSURE: 146 MMHG | BODY MASS INDEX: 28.69 KG/M2 | WEIGHT: 155.9 LBS | OXYGEN SATURATION: 98 % | RESPIRATION RATE: 20 BRPM | DIASTOLIC BLOOD PRESSURE: 94 MMHG

## 2019-09-06 DIAGNOSIS — K50.012 CROHN'S DISEASE OF SMALL INTESTINE WITH INTESTINAL OBSTRUCTION (HCC): Primary | ICD-10-CM

## 2019-09-06 RX ORDER — BISMUTH SUBSALICYLATE 262 MG
1 TABLET,CHEWABLE ORAL DAILY
COMMUNITY
End: 2020-12-30

## 2019-09-06 RX ORDER — TRAMADOL HYDROCHLORIDE 50 MG/1
50 TABLET ORAL
COMMUNITY
End: 2019-09-12

## 2019-09-06 RX ORDER — ERYTHROMYCIN 250 MG/1
TABLET, DELAYED RELEASE ORAL 4 TIMES DAILY
COMMUNITY
End: 2019-09-12

## 2019-09-06 NOTE — PROGRESS NOTES
HISTORY OF PRESENT ILLNESS  Mitch Villeda is a 29 y.o. female. Colonoscopy on 8/7: 1. Terminal ileum, biopsy:  Focal active enteritis, nonspecific  2. Random colon, biopsy:  Benign colonic mucosa having an intact architecture with mildly increased surface intraepithelial lymphocytes without increased inflammation in lamina propria (of uncertain clinical significance)    CT 8/5: dilatation and fecalization in distal ileum. Started on steroids 8/16    Capsule study initiated 8/19: shallow ulcers with some blood distal SB suspicious for Crohn's    CT 8/21: IMPRESSION:  1. Mild inflammatory changes around the distal ileum consistent with patient's  history of Crohn's disease. The capsule endoscopy camera is noted within these  loops of bowel.     2.  Otherwise no acute intra-abdominal pathology. Last AXR 8/29: no change      Currently on Pentasa for 2 weeks and steroids 9 mg daily    Reports bowel issues for 10 years. +GERD, esophagitis  Got sick in Dec    Currently with RLQ pain with intermittant suprapubic cramping.        ____________________________________________________________________________  Patient presents with:  Abdominal Pain: Pt seen @ the request of  to evaluate Pill cam in small bowel.     BP (!) 146/94 (BP 1 Location: Left arm, BP Patient Position: Sitting)   Pulse 95   Temp 99.1 °F (37.3 °C) (Oral)   Resp 20   Ht 5' 2\" (1.575 m)   Wt 70.7 kg (155 lb 14.4 oz)   LMP 10/08/2018   SpO2 98%   BMI 28.51 kg/m²   Past Medical History:  No date: Abnormal Pap smear      Comment:  Biopsy done last year and came back ok  No date: Asthma      Comment:  flares with bronchitis has been over a year as stated                10/5/2018  08/2019: Crohn's disease ([facility] [address]  No date: Difficult intravenous access  No date: GERD (gastroesophageal reflux disease)  No date: IBS (irritable bowel syndrome)  No date: Kidney stones  No date: Nausea & vomiting  Past Surgical History:  8/7/2019: COLONOSCOPY; N/A      Comment:  COLONOSCOPY performed by Prince Kristal MD at [facility]                ENDOSCOPY  No date: HX APPENDECTOMY      Comment:  7th grade. No date: HX COLONOSCOPY  No date: HX ENDOSCOPY  No date: HX GYN      Comment:    2004: HX GYN      Comment:    No date: HX GYN      Comment:  hysterectomy  Social History    Socioeconomic History      Marital status: LEGALLY       Spouse name: Not on file      Number of children: Not on file      Years of education: Not on file      Highest education level: Not on file    Tobacco Use      Smoking status: Former Smoker      Smokeless tobacco: Never Used    Substance and Sexual Activity      Alcohol use: Yes        Comment: occassionally      Drug use: No      Sexual activity: Yes        Partners: Male        Birth control/protection: None    Review of patient's family history indicates:  Problem: Heart Disease      Relation: Mother          Age of Onset: (Not Specified)  Problem: Diabetes      Relation: Mother          Age of Onset: (Not Specified)  Problem: Cancer      Relation: Maternal Aunt          Age of Onset: (Not Specified)          Comment: colon cancer    Current Outpatient Medications:  BUDESONIDE PO, Take 3 mg by mouth daily. mesalamine (PENTASA PO), Take 500 mg by mouth daily. erythromycin (FAUSTO-TAB) 250 mg tablet, Take  by mouth four (4) times daily. traMADol (ULTRAM) 50 mg tablet, Take 50 mg by mouth every six (6) hours as needed for Pain.  multivitamin (ONE A DAY) tablet, Take 1 Tab by mouth daily. docusate sodium (COLACE) 100 mg capsule, Take 1 Cap by mouth two (2) times a day. ondansetron (ZOFRAN ODT) 4 mg disintegrating tablet, Take 1 Tab by mouth every eight (8) hours as needed for Nausea. dicyclomine (BENTYL) 20 mg tablet, Take 1 Tab by mouth every six (6) hours as needed (abdominal cramps) for up to 20 doses. predniSONE (DELTASONE) 20 mg tablet, Take 20 mg by mouth daily (with breakfast).     No current facility-administered medications for this visit. Allergies:  -- Avelox (Moxifloxacin) -- Swelling and Unknown (comments)   -- Macrolide Antibiotics -- Hives, Rash and Swelling    --  Per patient reported as a previous reaction to a             \"mycin\" drug. Did not know which drug. -- Nsaids (Non-Steroidal Anti-Inflammatory Drug) -- Other (comments)    --  D/t esophageal erosion and GERD  _____________________________________________________________________________'             Abdominal Pain   The history is provided by the patient. This is a chronic problem. The current episode started more than 1 week ago. The problem occurs daily. The problem has not changed since onset. Associated symptoms include abdominal pain. Pertinent negatives include no chest pain, no headaches and no shortness of breath. The symptoms are aggravated by eating. Nothing relieves the symptoms. The treatment provided no relief. Review of Systems   Constitutional: Negative for chills, fever and weight loss. HENT: Negative for ear pain. Eyes: Negative for pain. Respiratory: Negative for shortness of breath. Cardiovascular: Negative for chest pain. Gastrointestinal: Positive for abdominal pain. Negative for blood in stool. Genitourinary: Negative for hematuria. Musculoskeletal: Negative for joint pain. Skin: Negative for rash. Neurological: Negative for dizziness, focal weakness, seizures and headaches. Endo/Heme/Allergies: Does not bruise/bleed easily. Psychiatric/Behavioral: The patient does not have insomnia. Physical Exam   Constitutional: She is oriented to person, place, and time. She appears well-developed and well-nourished. No distress. HENT:   Head: Normocephalic and atraumatic. Mouth/Throat: No oropharyngeal exudate. Eyes: Pupils are equal, round, and reactive to light. Neck: Normal range of motion. No tracheal deviation present.    Cardiovascular: Normal rate, regular rhythm and normal heart sounds. No murmur heard. Pulmonary/Chest: Effort normal and breath sounds normal. No respiratory distress. She has no wheezes. Abdominal: Soft. Bowel sounds are normal. She exhibits no distension and no mass. There is tenderness in the right lower quadrant and suprapubic area. There is no rebound and no guarding. No hernia. Musculoskeletal: Normal range of motion. She exhibits no edema or tenderness. Lymphadenopathy:     She has no cervical adenopathy. Neurological: She is alert and oriented to person, place, and time. Skin: Skin is warm. No rash noted. She is not diaphoretic. No erythema. Psychiatric: She has a normal mood and affect. Her behavior is normal.       ASSESSMENT and PLAN    ICD-10-CM ICD-9-CM    1. [facility] K50.012 555.0        The patient has acute on chronic symptoms and has recently been diagnosed with Crohn's disease. Currently on Pentasa and prednisone. And has a PilCam stuck in the distal small bowel. I have interviewed and examined this patient in detail and have conducted an extensive review of her electronic medical record and I have explained the clinical implications of the findings of diagnostic testing, the treatment options and recommendations for care. Most recent imaging (CT and AXR) unchanged. PLAN   · Diagnostic Laparoscopy with removal of PilCam, possible small bowel resection, robot assisted. · Indications of operation, nature, risks, benefits, alternatives and expected outcomes described in detail for the patient.      Specifically, the risks of colon surgery were discussed including:   · Anesthesia complications  · Wound healing problems (cellulitis, abscess)  · Operative site bleeding necessitating re-exploration  · Anastomotic leak and/or stricture  · Incomplete resection  · Recurrence  · Need for further surgery  · Lack of symptomatic improvement    I explained to the patient that:   · Operation is ~1.5-2.5 hour in duration  · Hospital stay is typically 2 nights  · Recovery - most patients return to work within 2 weeks    The patient asked relevant questions about the disease and its treatment. I did my best to respond to each question, to her apparent satisfaction. Laurence Thomas voices understanding of what we discussed and wishes to proceed with the operation as recommended   I have asked her to call if an questions arise after she leaves the office. Thank you for this consult.

## 2019-09-06 NOTE — PROGRESS NOTES
Chief Complaint   Patient presents with    Swallowed Foreign Body     Pt seen @ the request of  to evaluate Pill cam in small bowel. 1. Have you been to the ER, urgent care clinic since your last visit? Hospitalized since your last visit? New consult    2. Have you seen or consulted any other health care providers outside of the [address] [place] [address] since your last visit? Include any pap smears or colon screening. Yes   Discussed advanced directive. Patient states that she does not have an advanced directive.

## 2019-09-06 NOTE — LETTER
9/6/2019 12:30 PM 
 
Ms. Valentina Bennett [address] [facility] [address] [place] [address] Surgery Instruction Sheet You have been scheduled for surgery on 9/9/19 at 11;30am at [facility]. Please report to the Surgery Center at 9:30am, this is approximately 2 hours prior to your surgery time. The Surgery Center is located on the [facility] [address] side of the hospital, just next to the Emergency Room. Reserved parking is available and  parking if lot is full. You will not need to have a pre-op visit before surgery, but the Pre-op Nurse will call you before surgery. The Pre-op nurse will review your medical history, medications and give you additional instructions. They will also confirm your arrival time. Call your physician immediately if you notice a change in your health between the time you saw your physician and the day of surgery. If you take a blood thinner, please let us know. Call your ordering Doctor to make sure you can stop taking it prior to your surgery. STOP YOUR ASPIRIN TO DAY PRIOR TO SURGERY. DO NOT TAKE  IBUPROFEN, ADVIL, MOTRIN, ALEVE, EXCEDRIN, BC POWDER, GOODIES, FISH OIL OR ANY MEDICATION CONTAINING ASPIRIN TODAY PRIOR TO YOUR SURGERY. MAY TAKE TYLENOL. Eat a light dinner the evening before your surgery. DO NOT EAT OR DRINK ANYTHING AFTER MIDNIGHT THE NIGHT BEFORE YOUR SURGERY. This includes water, chewing gum, lifesavers, etc.  The Pre op nurse will check with you about any medication that you may need to take the morning of surgery. Shower with a new bar of anti-bacterial soap (Dial, Safeguard) or solution given to you by Pre-op, the night before surgery. Do not use lotion, powder, deodorant on the skin after showering.   Wear loose, comfortable clothing the day of surgery and bring a container to store your contacts, eyeglasses, dentures, hearing aid, etc.  Do not bring money, valuables, jewelry, etc. to the hospital.   
 
 If you are having outpatient surgery, someone must come with you the morning of surgery to drive you home. You can not drive for 24 hours after any anesthesia. Sometimes it is necessary to stay overnight and leave the next morning. This is still considered outpatient for most insurance deductibles. Someone will still need to drive you home. If you have questions or concerns, please feel free to call /Stacy COWAN at [phone]. If you need to cancel your surgery, please call as soon as possible. Sincerely, Brittani Burnette MD

## 2019-09-06 NOTE — PERIOP NOTES
= coming in early for [address] Calixto  Preoperative Instructions        Surgery Date 9/9/19          Time of Arrival 0900  Coming in early for T & S    1. On the day of your surgery, please report to the Surgical Services Registration Desk and sign in at your designated time. The Surgery Center is located to the right of the Emergency Room. 2. You must have someone with you to drive you home. You should not drive a car for 24 hours following surgery. Please make arrangements for a friend or family member to stay with you for the first 24 hours after your surgery. 3. Do not have anything to eat or drink (including water, gum, mints, coffee, juice) after midnight ?9/8/19? Gene Damico ? This may not apply to medications prescribed by your physician. ?(Please note below the special instructions with medications to take the morning of your procedure.)    4. We recommend you do not drink any alcoholic beverages for 24 hours before and after your surgery. 5. Contact your surgeons office for instructions on the following medications: non-steroidal anti-inflammatory drugs (i.e. Advil, Aleve), vitamins, and supplements. (Some surgeons will want you to stop these medications prior to surgery and others may allow you to take them)  **If you are currently taking Plavix, Coumadin, Aspirin and/or other blood-thinning agents, contact your surgeon for instructions. ** Your surgeon will partner with the physician prescribing these medications to determine if it is safe to stop or if you need to continue taking. Please do not stop taking these medications without instructions from your surgeon    6. Wear comfortable clothes. Wear glasses instead of contacts. Do not bring any money or jewelry. Please bring picture ID, insurance card, and any prearranged co-payment or hospital payment. Do not wear make-up, particularly mascara the morning of your surgery.   Do not wear nail polish, particularly if you are having foot /hand surgery. Wear your hair loose or down, no ponytails, buns, abelino pins or clips. All body piercings must be removed. Please shower with antibacterial soap for three consecutive days before and on the morning of surgery, but do not apply any lotions, powders or deodorants after the shower on the day of surgery. Please use a fresh towels after each shower. Please sleep in clean clothes and change bed linens the night before surgery. Please do not shave for 48 hours prior to surgery. Shaving of the face is acceptable. 7. You should understand that if you do not follow these instructions your surgery may be cancelled. If your physical condition changes (I.e. fever, cold or flu) please contact your surgeon as soon as possible. 8. It is important that you be on time. If a situation occurs where you may be late, please call [phone] (OR Holding Area). 9. If you have any questions and or problems, please call [phone] (Pre-admission Testing). 10. Your surgery time may be subject to change. You will receive a phone call the evening prior if your time changes. 11.  If having outpatient surgery, you must have someone to drive you here, stay with you during the duration of your stay, and to drive you home at time of discharge. 12.   In an effort to improve the efficiency, privacy, and safety for all of our Pre-op patients visitors are not allowed in the Holding area. Once you arrive and are registered your family/visitors will be asked to remain in the waiting room. The Pre-op staff will get you from the Surgical Waiting Area and will explain to you and your family/visitors that the Pre-op phase is beginning. The staff will answer any questions and provide instructions for tracking of the patient, by use of the existing tracking number and color-coded status board in the waiting room.   At this time the staff will also ask for your designated spokesperson information in the event that the physician or staff need to provide an update or obtain any pertinent information. The designated spokesperson will be notified if the physician needs to speak to family during the pre-operative phase. If at any time your family/visitors has questions or concerns they may approach the volunteer desk in the waiting area for assistance. Special Instructions:none    MEDICATIONS TO TAKE THE MORNING OF SURGERY WITH A SIP OF WATER:bentyl,zofran, tramadol if needed. I understand a pre-operative phone call will be made to verify my surgery time. In the event that I am not available, I give permission for a message to be left on my answering service and/or with another person? {yes.          ___________________      __________   _________    (Signature of Patient)             (Witness)                (Date and Time)

## 2019-09-06 NOTE — H&P (VIEW-ONLY)
HISTORY OF PRESENT ILLNESS  Little Moreira is a 29 y.o. female. Colonoscopy on 8/7: 1. Terminal ileum, biopsy:  Focal active enteritis, nonspecific  2. Random colon, biopsy:  Benign colonic mucosa having an intact architecture with mildly increased surface intraepithelial lymphocytes without increased inflammation in lamina propria (of uncertain clinical significance)    CT 8/5: dilatation and fecalization in distal ileum. Started on steroids 8/16    Capsule study initiated 8/19: shallow ulcers with some blood distal SB suspicious for Crohn's    CT 8/21: IMPRESSION:  1. Mild inflammatory changes around the distal ileum consistent with patient's  history of Crohn's disease. The capsule endoscopy camera is noted within these  loops of bowel.     2.  Otherwise no acute intra-abdominal pathology. Last AXR 8/29: no change      Currently on Pentasa for 2 weeks and steroids 9 mg daily    Reports bowel issues for 10 years. +GERD, esophagitis  Got sick in Dec    Currently with RLQ pain with intermittant suprapubic cramping.        ____________________________________________________________________________  Patient presents with:  Abdominal Pain: Pt seen @ the request of  to evaluate Pill cam in small bowel.     BP (!) 146/94 (BP 1 Location: Left arm, BP Patient Position: Sitting)   Pulse 95   Temp 99.1 °F (37.3 °C) (Oral)   Resp 20   Ht 5' 2\" (1.575 m)   Wt 70.7 kg (155 lb 14.4 oz)   LMP 10/08/2018   SpO2 98%   BMI 28.51 kg/m²   Past Medical History:  No date: Abnormal Pap smear      Comment:  Biopsy done last year and came back ok  No date: Asthma      Comment:  flares with bronchitis has been over a year as stated                10/5/2018  08/2019: Crohn's disease ([address]  No date: Difficult intravenous access  No date: GERD (gastroesophageal reflux disease)  No date: IBS (irritable bowel syndrome)  No date: Kidney stones  No date: Nausea & vomiting  Past Surgical History:  8/7/2019: COLONOSCOPY; N/A      Comment:  COLONOSCOPY performed by Oly [facility]ard MD at [facility]                ENDOSCOPY  No date: HX APPENDECTOMY      Comment:  7th grade. No date: HX COLONOSCOPY  No date: HX ENDOSCOPY  No date: HX GYN      Comment:    2004: HX GYN      Comment:    No date: HX GYN      Comment:  hysterectomy  Social History    Socioeconomic History      Marital status: LEGALLY       Spouse name: Not on file      Number of children: Not on file      Years of education: Not on file      Highest education level: Not on file    Tobacco Use      Smoking status: Former Smoker      Smokeless tobacco: Never Used    Substance and Sexual Activity      Alcohol use: Yes        Comment: occassionally      Drug use: No      Sexual activity: Yes        Partners: Male        Birth control/protection: None    Review of patient's family history indicates:  Problem: Heart Disease      Relation: Mother          Age of Onset: (Not Specified)  Problem: Diabetes      Relation: Mother          Age of Onset: (Not Specified)  Problem: Cancer      Relation: Maternal Aunt          Age of Onset: (Not Specified)          Comment: colon cancer    Current Outpatient Medications:  BUDESONIDE PO, Take 3 mg by mouth daily. mesalamine (PENTASA PO), Take 500 mg by mouth daily. erythromycin (FAUSTO-TAB) 250 mg tablet, Take  by mouth four (4) times daily. traMADol (ULTRAM) 50 mg tablet, Take 50 mg by mouth every six (6) hours as needed for Pain.  multivitamin (ONE A DAY) tablet, Take 1 Tab by mouth daily. docusate sodium (COLACE) 100 mg capsule, Take 1 Cap by mouth two (2) times a day. ondansetron (ZOFRAN ODT) 4 mg disintegrating tablet, Take 1 Tab by mouth every eight (8) hours as needed for Nausea. dicyclomine (BENTYL) 20 mg tablet, Take 1 Tab by mouth every six (6) hours as needed (abdominal cramps) for up to 20 doses. predniSONE (DELTASONE) 20 mg tablet, Take 20 mg by mouth daily (with breakfast).     No current facility-administered medications for this visit. Allergies:  -- Avelox (Moxifloxacin) -- Swelling and Unknown (comments)   -- Macrolide Antibiotics -- Hives, Rash and Swelling    --  Per patient reported as a previous reaction to a             \"mycin\" drug. Did not know which drug. -- Nsaids (Non-Steroidal Anti-Inflammatory Drug) -- Other (comments)    --  D/t esophageal erosion and GERD  _____________________________________________________________________________'             Abdominal Pain   The history is provided by the patient. This is a chronic problem. The current episode started more than 1 week ago. The problem occurs daily. The problem has not changed since onset. Associated symptoms include abdominal pain. Pertinent negatives include no chest pain, no headaches and no shortness of breath. The symptoms are aggravated by eating. Nothing relieves the symptoms. The treatment provided no relief. Review of Systems   Constitutional: Negative for chills, fever and weight loss. HENT: Negative for ear pain. Eyes: Negative for pain. Respiratory: Negative for shortness of breath. Cardiovascular: Negative for chest pain. Gastrointestinal: Positive for abdominal pain. Negative for blood in stool. Genitourinary: Negative for hematuria. Musculoskeletal: Negative for joint pain. Skin: Negative for rash. Neurological: Negative for dizziness, focal weakness, seizures and headaches. Endo/Heme/Allergies: Does not bruise/bleed easily. Psychiatric/Behavioral: The patient does not have insomnia. Physical Exam   Constitutional: She is oriented to person, place, and time. She appears well-developed and well-nourished. No distress. HENT:   Head: Normocephalic and atraumatic. Mouth/Throat: No oropharyngeal exudate. Eyes: Pupils are equal, round, and reactive to light. Neck: Normal range of motion. No tracheal deviation present.    Cardiovascular: Normal rate, regular rhythm and normal heart sounds. No murmur heard. Pulmonary/Chest: Effort normal and breath sounds normal. No respiratory distress. She has no wheezes. Abdominal: Soft. Bowel sounds are normal. She exhibits no distension and no mass. There is tenderness in the right lower quadrant and suprapubic area. There is no rebound and no guarding. No hernia. Musculoskeletal: Normal range of motion. She exhibits no edema or tenderness. Lymphadenopathy:     She has no cervical adenopathy. Neurological: She is alert and oriented to person, place, and time. Skin: Skin is warm. No rash noted. She is not diaphoretic. No erythema. Psychiatric: She has a normal mood and affect. Her behavior is normal.       ASSESSMENT and PLAN    ICD-10-CM ICD-9-CM    1. [facility]'s [facility] of small intestine with intestinal obstruction [facility] K50.012 555.0        The patient has acute on chronic symptoms and has recently been diagnosed with Crohn's disease. Currently on Pentasa and prednisone. And has a PilCam stuck in the distal small bowel. I have interviewed and examined this patient in detail and have conducted an extensive review of her electronic medical record and I have explained the clinical implications of the findings of diagnostic testing, the treatment options and recommendations for care. Most recent imaging (CT and AXR) unchanged. PLAN   · Diagnostic Laparoscopy with removal of PilCam, possible small bowel resection, robot assisted. · Indications of operation, nature, risks, benefits, alternatives and expected outcomes described in detail for the patient.      Specifically, the risks of colon surgery were discussed including:   · Anesthesia complications  · Wound healing problems (cellulitis, abscess)  · Operative site bleeding necessitating re-exploration  · Anastomotic leak and/or stricture  · Incomplete resection  · Recurrence  · Need for further surgery  · Lack of symptomatic improvement    I explained to the patient that:   · Operation is ~1.5-2.5 hour in duration  · Hospital stay is typically 2 nights  · Recovery - most patients return to work within 2 weeks    The patient asked relevant questions about the disease and its treatment. I did my best to respond to each question, to her apparent satisfaction. Julissa Buckner voices understanding of what we discussed and wishes to proceed with the operation as recommended   I have asked her to call if an questions arise after she leaves the office. Thank you for this consult.

## 2019-09-09 ENCOUNTER — APPOINTMENT (OUTPATIENT)
Dept: GENERAL RADIOLOGY | Age: 34
DRG: 330 | End: 2019-09-09
Attending: SURGERY
Payer: COMMERCIAL

## 2019-09-09 ENCOUNTER — ANESTHESIA EVENT (OUTPATIENT)
Dept: SURGERY | Age: 34
DRG: 330 | End: 2019-09-09
Payer: COMMERCIAL

## 2019-09-09 ENCOUNTER — ANESTHESIA (OUTPATIENT)
Dept: SURGERY | Age: 34
DRG: 330 | End: 2019-09-09
Payer: COMMERCIAL

## 2019-09-09 ENCOUNTER — HOSPITAL ENCOUNTER (INPATIENT)
Age: 34
LOS: 3 days | Discharge: HOME | DRG: 330 | End: 2019-09-12
Attending: SURGERY | Admitting: SURGERY
Payer: COMMERCIAL

## 2019-09-09 DIAGNOSIS — K56.690 OTHER PARTIAL INTESTINAL OBSTRUCTION (HCC): Primary | ICD-10-CM

## 2019-09-09 PROBLEM — K50.90 CROHN DISEASE (HCC): Status: ACTIVE | Noted: 2019-09-09

## 2019-09-09 PROBLEM — K56.609 BOWEL OBSTRUCTION (HCC): Status: ACTIVE | Noted: 2019-09-09

## 2019-09-09 LAB
ABO + RH BLD: NORMAL
ALBUMIN SERPL-MCNC: 3.5 G/DL (ref 3.5–5)
ALBUMIN/GLOB SERPL: 0.9 {RATIO} (ref 1.1–2.2)
ALP SERPL-CCNC: 71 U/L (ref 45–117)
ALT SERPL-CCNC: 21 U/L (ref 12–78)
ANION GAP SERPL CALC-SCNC: 6 MMOL/L (ref 5–15)
APPEARANCE UR: CLEAR
APTT PPP: 22.5 SEC (ref 22.1–32)
AST SERPL-CCNC: 15 U/L (ref 15–37)
BACTERIA URNS QL MICRO: ABNORMAL /HPF
BILIRUB SERPL-MCNC: 0.7 MG/DL (ref 0.2–1)
BILIRUB UR QL CFM: NEGATIVE
BLOOD GROUP ANTIBODIES SERPL: NORMAL
BUN SERPL-MCNC: 17 MG/DL (ref 6–20)
BUN/CREAT SERPL: 20 (ref 12–20)
CALCIUM SERPL-MCNC: 9.1 MG/DL (ref 8.5–10.1)
CHLORIDE SERPL-SCNC: 108 MMOL/L (ref 97–108)
CO2 SERPL-SCNC: 24 MMOL/L (ref 21–32)
COLOR UR: ABNORMAL
CREAT SERPL-MCNC: 0.83 MG/DL (ref 0.55–1.02)
EPITH CASTS URNS QL MICRO: ABNORMAL /LPF
ERYTHROCYTE [DISTWIDTH] IN BLOOD BY AUTOMATED COUNT: 14.5 % (ref 11.5–14.5)
GLOBULIN SER CALC-MCNC: 3.7 G/DL (ref 2–4)
GLUCOSE SERPL-MCNC: 76 MG/DL (ref 65–100)
GLUCOSE UR STRIP.AUTO-MCNC: NEGATIVE MG/DL
HCT VFR BLD AUTO: 43.6 % (ref 35–47)
HGB BLD-MCNC: 14.3 G/DL (ref 11.5–16)
HGB UR QL STRIP: ABNORMAL
HYALINE CASTS URNS QL MICRO: ABNORMAL /LPF (ref 0–5)
INR PPP: 1 (ref 0.9–1.1)
KETONES UR QL STRIP.AUTO: 15 MG/DL
LEUKOCYTE ESTERASE UR QL STRIP.AUTO: NEGATIVE
MCH RBC QN AUTO: 27.6 PG (ref 26–34)
MCHC RBC AUTO-ENTMCNC: 32.8 G/DL (ref 30–36.5)
MCV RBC AUTO: 84 FL (ref 80–99)
MUCOUS THREADS URNS QL MICRO: ABNORMAL /LPF
NITRITE UR QL STRIP.AUTO: NEGATIVE
NRBC # BLD: 0 K/UL (ref 0–0.01)
NRBC BLD-RTO: 0 PER 100 WBC
PH UR STRIP: 5.5 [PH] (ref 5–8)
PLATELET # BLD AUTO: 333 K/UL (ref 150–400)
PMV BLD AUTO: 10.7 FL (ref 8.9–12.9)
POTASSIUM SERPL-SCNC: 3.9 MMOL/L (ref 3.5–5.1)
PROT SERPL-MCNC: 7.2 G/DL (ref 6.4–8.2)
PROT UR STRIP-MCNC: 100 MG/DL
PROTHROMBIN TIME: 10.2 SEC (ref 9–11.1)
RBC # BLD AUTO: 5.19 M/UL (ref 3.8–5.2)
RBC #/AREA URNS HPF: ABNORMAL /HPF (ref 0–5)
SODIUM SERPL-SCNC: 138 MMOL/L (ref 136–145)
SP GR UR REFRACTOMETRY: 1.02 (ref 1–1.03)
SPECIMEN EXP DATE BLD: NORMAL
THERAPEUTIC RANGE,PTTT: NORMAL SECS (ref 58–77)
UA: UC IF INDICATED,UAUC: ABNORMAL
UROBILINOGEN UR QL STRIP.AUTO: 0.2 EU/DL (ref 0.2–1)
WBC # BLD AUTO: 9.6 K/UL (ref 3.6–11)
WBC URNS QL MICRO: ABNORMAL /HPF (ref 0–4)

## 2019-09-09 PROCEDURE — 77030016151 HC PROTCTR LNS DFOG COVD -B: Performed by: SURGERY

## 2019-09-09 PROCEDURE — 77030011640 HC PAD GRND REM COVD -A: Performed by: SURGERY

## 2019-09-09 PROCEDURE — 77030020703 HC SEAL CANN DISP INTU -B: Performed by: SURGERY

## 2019-09-09 PROCEDURE — 77030029640 HC SEAL VSL ENDOWR ONE INTU -F: Performed by: SURGERY

## 2019-09-09 PROCEDURE — 85027 COMPLETE CBC AUTOMATED: CPT

## 2019-09-09 PROCEDURE — 81001 URINALYSIS AUTO W/SCOPE: CPT

## 2019-09-09 PROCEDURE — 77030010293 HC STPLR INT TI J&J -B: Performed by: SURGERY

## 2019-09-09 PROCEDURE — 74011250636 HC RX REV CODE- 250/636: Performed by: SURGERY

## 2019-09-09 PROCEDURE — 87147 CULTURE TYPE IMMUNOLOGIC: CPT

## 2019-09-09 PROCEDURE — 77030032521: Performed by: SURGERY

## 2019-09-09 PROCEDURE — 77030035236 HC SUT PDS STRATFX BARB J&J -B: Performed by: SURGERY

## 2019-09-09 PROCEDURE — 77030016154: Performed by: SURGERY

## 2019-09-09 PROCEDURE — 77030003029 HC SUT VCRL J&J -B: Performed by: SURGERY

## 2019-09-09 PROCEDURE — 77030031139 HC SUT VCRL2 J&J -A: Performed by: SURGERY

## 2019-09-09 PROCEDURE — 80053 COMPREHEN METABOLIC PANEL: CPT

## 2019-09-09 PROCEDURE — 74011250636 HC RX REV CODE- 250/636: Performed by: ANESTHESIOLOGY

## 2019-09-09 PROCEDURE — 77030019895 HC PCKNG STRP IODO -A: Performed by: SURGERY

## 2019-09-09 PROCEDURE — 77030036731 HC STPLR ENDOSC J&J -F: Performed by: SURGERY

## 2019-09-09 PROCEDURE — 77030034133 HC APPL ENDOSCP FLX SPRY BAXT -C: Performed by: SURGERY

## 2019-09-09 PROCEDURE — 77030009979 HC RELD STPLR TCR J&J -C: Performed by: SURGERY

## 2019-09-09 PROCEDURE — 77030020782 HC GWN BAIR PAWS FLX 3M -B

## 2019-09-09 PROCEDURE — 74011250636 HC RX REV CODE- 250/636

## 2019-09-09 PROCEDURE — 86900 BLOOD TYPING SEROLOGIC ABO: CPT

## 2019-09-09 PROCEDURE — 74011250637 HC RX REV CODE- 250/637: Performed by: SURGERY

## 2019-09-09 PROCEDURE — 36415 COLL VENOUS BLD VENIPUNCTURE: CPT

## 2019-09-09 PROCEDURE — 85610 PROTHROMBIN TIME: CPT

## 2019-09-09 PROCEDURE — 77030002996 HC SUT SLK J&J -A: Performed by: SURGERY

## 2019-09-09 PROCEDURE — 77030002933 HC SUT MCRYL J&J -A: Performed by: SURGERY

## 2019-09-09 PROCEDURE — 88307 TISSUE EXAM BY PATHOLOGIST: CPT

## 2019-09-09 PROCEDURE — 76060000035 HC ANESTHESIA 2 TO 2.5 HR: Performed by: SURGERY

## 2019-09-09 PROCEDURE — 77030035278 HC STPLR SEAL ENDOWR INTU -B: Performed by: SURGERY

## 2019-09-09 PROCEDURE — 77030019908 HC STETH ESOPH SIMS -A: Performed by: ANESTHESIOLOGY

## 2019-09-09 PROCEDURE — 77030008771 HC TU NG SALEM SUMP -A: Performed by: ANESTHESIOLOGY

## 2019-09-09 PROCEDURE — 77030020263 HC SOL INJ SOD CL0.9% LFCR 1000ML: Performed by: SURGERY

## 2019-09-09 PROCEDURE — 77030008684 HC TU ET CUF COVD -B: Performed by: ANESTHESIOLOGY

## 2019-09-09 PROCEDURE — 77030035489 HC REDUCR CANN ENDOWR INTU -C: Performed by: SURGERY

## 2019-09-09 PROCEDURE — 77030013533 HC SEAL FBRN TISSL RDYTUSE 10ML BAXT -E: Performed by: SURGERY

## 2019-09-09 PROCEDURE — 74011250636 HC RX REV CODE- 250/636: Performed by: NURSE ANESTHETIST, CERTIFIED REGISTERED

## 2019-09-09 PROCEDURE — 65270000029 HC RM PRIVATE

## 2019-09-09 PROCEDURE — 77030032522 HC SHT STPL PK ENDOWR INTU -B: Performed by: SURGERY

## 2019-09-09 PROCEDURE — 77030026438 HC STYL ET INTUB CARD -A: Performed by: ANESTHESIOLOGY

## 2019-09-09 PROCEDURE — 76210000017 HC OR PH I REC 1.5 TO 2 HR: Performed by: SURGERY

## 2019-09-09 PROCEDURE — 87086 URINE CULTURE/COLONY COUNT: CPT

## 2019-09-09 PROCEDURE — 77030040361 HC SLV COMPR DVT MDII -B: Performed by: SURGERY

## 2019-09-09 PROCEDURE — 76010000876 HC OR TIME 2 TO 2.5HR INTENSV - TIER 2: Performed by: SURGERY

## 2019-09-09 PROCEDURE — 77030039266 HC ADH SKN EXOFIN S2SG -A: Performed by: SURGERY

## 2019-09-09 PROCEDURE — 74011000250 HC RX REV CODE- 250: Performed by: NURSE ANESTHETIST, CERTIFIED REGISTERED

## 2019-09-09 PROCEDURE — 77030008756 HC TU IRR SUC STRY -B: Performed by: SURGERY

## 2019-09-09 PROCEDURE — 74018 RADEX ABDOMEN 1 VIEW: CPT

## 2019-09-09 PROCEDURE — 74011000250 HC RX REV CODE- 250: Performed by: SURGERY

## 2019-09-09 PROCEDURE — 77030035277 HC OBTRTR BLDELSS DISP INTU -B: Performed by: SURGERY

## 2019-09-09 PROCEDURE — 94760 N-INVAS EAR/PLS OXIMETRY 1: CPT

## 2019-09-09 PROCEDURE — 85730 THROMBOPLASTIN TIME PARTIAL: CPT

## 2019-09-09 RX ORDER — FENTANYL CITRATE 50 UG/ML
INJECTION, SOLUTION INTRAMUSCULAR; INTRAVENOUS AS NEEDED
Status: DISCONTINUED | OUTPATIENT
Start: 2019-09-09 | End: 2019-09-09 | Stop reason: HOSPADM

## 2019-09-09 RX ORDER — SODIUM CHLORIDE 0.9 % (FLUSH) 0.9 %
5-40 SYRINGE (ML) INJECTION AS NEEDED
Status: DISCONTINUED | OUTPATIENT
Start: 2019-09-09 | End: 2019-09-09 | Stop reason: HOSPADM

## 2019-09-09 RX ORDER — BUDESONIDE 3 MG/1
9 CAPSULE, COATED PELLETS ORAL DAILY
Status: DISCONTINUED | OUTPATIENT
Start: 2019-09-10 | End: 2019-09-12 | Stop reason: HOSPADM

## 2019-09-09 RX ORDER — SODIUM CHLORIDE, SODIUM LACTATE, POTASSIUM CHLORIDE, CALCIUM CHLORIDE 600; 310; 30; 20 MG/100ML; MG/100ML; MG/100ML; MG/100ML
25 INJECTION, SOLUTION INTRAVENOUS CONTINUOUS
Status: DISCONTINUED | OUTPATIENT
Start: 2019-09-09 | End: 2019-09-09 | Stop reason: HOSPADM

## 2019-09-09 RX ORDER — PROPOFOL 10 MG/ML
INJECTION, EMULSION INTRAVENOUS AS NEEDED
Status: DISCONTINUED | OUTPATIENT
Start: 2019-09-09 | End: 2019-09-09 | Stop reason: HOSPADM

## 2019-09-09 RX ORDER — LIDOCAINE HYDROCHLORIDE 10 MG/ML
0.1 INJECTION, SOLUTION EPIDURAL; INFILTRATION; INTRACAUDAL; PERINEURAL AS NEEDED
Status: DISCONTINUED | OUTPATIENT
Start: 2019-09-09 | End: 2019-09-09 | Stop reason: HOSPADM

## 2019-09-09 RX ORDER — ROCURONIUM BROMIDE 10 MG/ML
INJECTION, SOLUTION INTRAVENOUS AS NEEDED
Status: DISCONTINUED | OUTPATIENT
Start: 2019-09-09 | End: 2019-09-09 | Stop reason: HOSPADM

## 2019-09-09 RX ORDER — HYDRALAZINE HYDROCHLORIDE 20 MG/ML
10 INJECTION INTRAMUSCULAR; INTRAVENOUS
Status: DISCONTINUED | OUTPATIENT
Start: 2019-09-09 | End: 2019-09-12 | Stop reason: HOSPADM

## 2019-09-09 RX ORDER — LIDOCAINE HYDROCHLORIDE 20 MG/ML
INJECTION, SOLUTION EPIDURAL; INFILTRATION; INTRACAUDAL; PERINEURAL AS NEEDED
Status: DISCONTINUED | OUTPATIENT
Start: 2019-09-09 | End: 2019-09-09 | Stop reason: HOSPADM

## 2019-09-09 RX ORDER — METRONIDAZOLE 500 MG/100ML
500 INJECTION, SOLUTION INTRAVENOUS ONCE
Status: COMPLETED | OUTPATIENT
Start: 2019-09-09 | End: 2019-09-09

## 2019-09-09 RX ORDER — SODIUM CHLORIDE 0.9 % (FLUSH) 0.9 %
5-40 SYRINGE (ML) INJECTION EVERY 8 HOURS
Status: DISCONTINUED | OUTPATIENT
Start: 2019-09-09 | End: 2019-09-12 | Stop reason: HOSPADM

## 2019-09-09 RX ORDER — OXYCODONE HYDROCHLORIDE 5 MG/1
10 TABLET ORAL
Status: DISCONTINUED | OUTPATIENT
Start: 2019-09-09 | End: 2019-09-12 | Stop reason: HOSPADM

## 2019-09-09 RX ORDER — BUPIVACAINE HYDROCHLORIDE AND EPINEPHRINE 5; 5 MG/ML; UG/ML
INJECTION, SOLUTION EPIDURAL; INTRACAUDAL; PERINEURAL AS NEEDED
Status: DISCONTINUED | OUTPATIENT
Start: 2019-09-09 | End: 2019-09-09 | Stop reason: HOSPADM

## 2019-09-09 RX ORDER — NEOSTIGMINE METHYLSULFATE 1 MG/ML
INJECTION INTRAVENOUS AS NEEDED
Status: DISCONTINUED | OUTPATIENT
Start: 2019-09-09 | End: 2019-09-09 | Stop reason: HOSPADM

## 2019-09-09 RX ORDER — MIDAZOLAM HYDROCHLORIDE 1 MG/ML
INJECTION, SOLUTION INTRAMUSCULAR; INTRAVENOUS AS NEEDED
Status: DISCONTINUED | OUTPATIENT
Start: 2019-09-09 | End: 2019-09-09 | Stop reason: HOSPADM

## 2019-09-09 RX ORDER — SODIUM CHLORIDE 0.9 % (FLUSH) 0.9 %
5-40 SYRINGE (ML) INJECTION EVERY 8 HOURS
Status: DISCONTINUED | OUTPATIENT
Start: 2019-09-09 | End: 2019-09-09 | Stop reason: HOSPADM

## 2019-09-09 RX ORDER — EPHEDRINE SULFATE/0.9% NACL/PF 50 MG/5 ML
SYRINGE (ML) INTRAVENOUS AS NEEDED
Status: DISCONTINUED | OUTPATIENT
Start: 2019-09-09 | End: 2019-09-09 | Stop reason: HOSPADM

## 2019-09-09 RX ORDER — HYDROMORPHONE HYDROCHLORIDE 2 MG/ML
INJECTION, SOLUTION INTRAMUSCULAR; INTRAVENOUS; SUBCUTANEOUS AS NEEDED
Status: DISCONTINUED | OUTPATIENT
Start: 2019-09-09 | End: 2019-09-09 | Stop reason: HOSPADM

## 2019-09-09 RX ORDER — OXYCODONE HYDROCHLORIDE 5 MG/1
5 TABLET ORAL
Status: DISCONTINUED | OUTPATIENT
Start: 2019-09-09 | End: 2019-09-12 | Stop reason: HOSPADM

## 2019-09-09 RX ORDER — DIPHENHYDRAMINE HYDROCHLORIDE 50 MG/ML
12.5 INJECTION, SOLUTION INTRAMUSCULAR; INTRAVENOUS
Status: ACTIVE | OUTPATIENT
Start: 2019-09-09 | End: 2019-09-10

## 2019-09-09 RX ORDER — ONDANSETRON 2 MG/ML
INJECTION INTRAMUSCULAR; INTRAVENOUS AS NEEDED
Status: DISCONTINUED | OUTPATIENT
Start: 2019-09-09 | End: 2019-09-09 | Stop reason: HOSPADM

## 2019-09-09 RX ORDER — GLYCOPYRROLATE 0.2 MG/ML
INJECTION INTRAMUSCULAR; INTRAVENOUS AS NEEDED
Status: DISCONTINUED | OUTPATIENT
Start: 2019-09-09 | End: 2019-09-09 | Stop reason: HOSPADM

## 2019-09-09 RX ORDER — DOCUSATE SODIUM 100 MG/1
100 CAPSULE, LIQUID FILLED ORAL 2 TIMES DAILY
Status: DISCONTINUED | OUTPATIENT
Start: 2019-09-09 | End: 2019-09-12 | Stop reason: HOSPADM

## 2019-09-09 RX ORDER — ACETAMINOPHEN 500 MG
1000 TABLET ORAL 4 TIMES DAILY
Status: DISCONTINUED | OUTPATIENT
Start: 2019-09-09 | End: 2019-09-12 | Stop reason: HOSPADM

## 2019-09-09 RX ORDER — ONDANSETRON 2 MG/ML
4 INJECTION INTRAMUSCULAR; INTRAVENOUS
Status: DISCONTINUED | OUTPATIENT
Start: 2019-09-09 | End: 2019-09-12 | Stop reason: HOSPADM

## 2019-09-09 RX ORDER — DEXAMETHASONE SODIUM PHOSPHATE 4 MG/ML
INJECTION, SOLUTION INTRA-ARTICULAR; INTRALESIONAL; INTRAMUSCULAR; INTRAVENOUS; SOFT TISSUE AS NEEDED
Status: DISCONTINUED | OUTPATIENT
Start: 2019-09-09 | End: 2019-09-09 | Stop reason: HOSPADM

## 2019-09-09 RX ORDER — GABAPENTIN 300 MG/1
300 CAPSULE ORAL 2 TIMES DAILY
Status: DISCONTINUED | OUTPATIENT
Start: 2019-09-09 | End: 2019-09-12 | Stop reason: HOSPADM

## 2019-09-09 RX ORDER — KETOROLAC TROMETHAMINE 30 MG/ML
INJECTION, SOLUTION INTRAMUSCULAR; INTRAVENOUS AS NEEDED
Status: DISCONTINUED | OUTPATIENT
Start: 2019-09-09 | End: 2019-09-09 | Stop reason: HOSPADM

## 2019-09-09 RX ORDER — SODIUM CHLORIDE 0.9 % (FLUSH) 0.9 %
5-40 SYRINGE (ML) INJECTION AS NEEDED
Status: DISCONTINUED | OUTPATIENT
Start: 2019-09-09 | End: 2019-09-12 | Stop reason: HOSPADM

## 2019-09-09 RX ORDER — HYDROMORPHONE HYDROCHLORIDE 1 MG/ML
.5-1 INJECTION, SOLUTION INTRAMUSCULAR; INTRAVENOUS; SUBCUTANEOUS
Status: DISCONTINUED | OUTPATIENT
Start: 2019-09-09 | End: 2019-09-12

## 2019-09-09 RX ORDER — DEXTROSE, SODIUM CHLORIDE, AND POTASSIUM CHLORIDE 5; .45; .15 G/100ML; G/100ML; G/100ML
50 INJECTION INTRAVENOUS CONTINUOUS
Status: DISCONTINUED | OUTPATIENT
Start: 2019-09-09 | End: 2019-09-10

## 2019-09-09 RX ORDER — FENTANYL CITRATE 50 UG/ML
25 INJECTION, SOLUTION INTRAMUSCULAR; INTRAVENOUS
Status: DISCONTINUED | OUTPATIENT
Start: 2019-09-09 | End: 2019-09-09 | Stop reason: HOSPADM

## 2019-09-09 RX ORDER — HYDROMORPHONE HYDROCHLORIDE 1 MG/ML
0.2 INJECTION, SOLUTION INTRAMUSCULAR; INTRAVENOUS; SUBCUTANEOUS
Status: DISCONTINUED | OUTPATIENT
Start: 2019-09-09 | End: 2019-09-09 | Stop reason: HOSPADM

## 2019-09-09 RX ORDER — DEXTROSE, SODIUM CHLORIDE, AND POTASSIUM CHLORIDE 5; .45; .15 G/100ML; G/100ML; G/100ML
INJECTION INTRAVENOUS
Status: COMPLETED
Start: 2019-09-09 | End: 2019-09-09

## 2019-09-09 RX ORDER — DIPHENHYDRAMINE HYDROCHLORIDE 50 MG/ML
12.5 INJECTION, SOLUTION INTRAMUSCULAR; INTRAVENOUS AS NEEDED
Status: DISCONTINUED | OUTPATIENT
Start: 2019-09-09 | End: 2019-09-09 | Stop reason: HOSPADM

## 2019-09-09 RX ORDER — CEFAZOLIN SODIUM/WATER 2 G/20 ML
2 SYRINGE (ML) INTRAVENOUS ONCE
Status: COMPLETED | OUTPATIENT
Start: 2019-09-09 | End: 2019-09-09

## 2019-09-09 RX ADMIN — DEXTROSE MONOHYDRATE, SODIUM CHLORIDE, AND POTASSIUM CHLORIDE 110 ML/HR: 50; 4.5; 1.49 INJECTION, SOLUTION INTRAVENOUS at 21:18

## 2019-09-09 RX ADMIN — KETOROLAC TROMETHAMINE 30 MG: 30 INJECTION, SOLUTION INTRAMUSCULAR; INTRAVENOUS at 11:39

## 2019-09-09 RX ADMIN — Medication 2 G: at 10:45

## 2019-09-09 RX ADMIN — GABAPENTIN 300 MG: 300 CAPSULE ORAL at 17:07

## 2019-09-09 RX ADMIN — DEXTROSE MONOHYDRATE, SODIUM CHLORIDE, AND POTASSIUM CHLORIDE 110 ML/HR: 50; 4.5; 1.49 INJECTION, SOLUTION INTRAVENOUS at 22:10

## 2019-09-09 RX ADMIN — ROCURONIUM BROMIDE 40 MG: 10 INJECTION INTRAVENOUS at 10:36

## 2019-09-09 RX ADMIN — Medication 20 MG: at 10:58

## 2019-09-09 RX ADMIN — DEXTROSE MONOHYDRATE, SODIUM CHLORIDE, AND POTASSIUM CHLORIDE 110 ML/HR: 50; 4.5; 1.49 INJECTION, SOLUTION INTRAVENOUS at 13:17

## 2019-09-09 RX ADMIN — NEOSTIGMINE METHYLSULFATE 3 MG: 1 INJECTION INTRAVENOUS at 12:40

## 2019-09-09 RX ADMIN — METRONIDAZOLE 500 MG: 500 INJECTION, SOLUTION INTRAVENOUS at 10:50

## 2019-09-09 RX ADMIN — ONDANSETRON HYDROCHLORIDE 4 MG: 2 INJECTION, SOLUTION INTRAMUSCULAR; INTRAVENOUS at 11:25

## 2019-09-09 RX ADMIN — MESALAMINE 1000 MG: 250 CAPSULE ORAL at 21:15

## 2019-09-09 RX ADMIN — Medication 10 ML: at 21:19

## 2019-09-09 RX ADMIN — ACETAMINOPHEN 1000 MG: 500 TABLET ORAL at 17:06

## 2019-09-09 RX ADMIN — Medication 20 MG: at 11:00

## 2019-09-09 RX ADMIN — FENTANYL CITRATE 50 MCG: 50 INJECTION, SOLUTION INTRAMUSCULAR; INTRAVENOUS at 11:27

## 2019-09-09 RX ADMIN — HYDROMORPHONE HYDROCHLORIDE 0.2 MG: 1 INJECTION, SOLUTION INTRAMUSCULAR; INTRAVENOUS; SUBCUTANEOUS at 15:41

## 2019-09-09 RX ADMIN — HYDROMORPHONE HYDROCHLORIDE 1 MG: 2 INJECTION, SOLUTION INTRAMUSCULAR; INTRAVENOUS; SUBCUTANEOUS at 10:31

## 2019-09-09 RX ADMIN — HYDROMORPHONE HYDROCHLORIDE 1 MG: 1 INJECTION, SOLUTION INTRAMUSCULAR; INTRAVENOUS; SUBCUTANEOUS at 18:46

## 2019-09-09 RX ADMIN — MIDAZOLAM HYDROCHLORIDE 1 MG: 1 INJECTION INTRAMUSCULAR; INTRAVENOUS at 10:31

## 2019-09-09 RX ADMIN — PROPOFOL 200 MG: 10 INJECTION, EMULSION INTRAVENOUS at 10:36

## 2019-09-09 RX ADMIN — FENTANYL CITRATE 50 MCG: 50 INJECTION, SOLUTION INTRAMUSCULAR; INTRAVENOUS at 11:39

## 2019-09-09 RX ADMIN — DOCUSATE SODIUM 100 MG: 100 CAPSULE, LIQUID FILLED ORAL at 17:06

## 2019-09-09 RX ADMIN — ROCURONIUM BROMIDE 10 MG: 10 INJECTION INTRAVENOUS at 11:24

## 2019-09-09 RX ADMIN — ACETAMINOPHEN 1000 MG: 500 TABLET ORAL at 21:15

## 2019-09-09 RX ADMIN — MIDAZOLAM HYDROCHLORIDE 1 MG: 1 INJECTION INTRAMUSCULAR; INTRAVENOUS at 10:34

## 2019-09-09 RX ADMIN — SODIUM CHLORIDE, SODIUM LACTATE, POTASSIUM CHLORIDE, AND CALCIUM CHLORIDE 25 ML/HR: 600; 310; 30; 20 INJECTION, SOLUTION INTRAVENOUS at 09:47

## 2019-09-09 RX ADMIN — DEXAMETHASONE SODIUM PHOSPHATE 8 MG: 4 INJECTION, SOLUTION INTRAMUSCULAR; INTRAVENOUS at 10:36

## 2019-09-09 RX ADMIN — GLYCOPYRROLATE 0.4 MG: 0.2 INJECTION, SOLUTION INTRAMUSCULAR; INTRAVENOUS at 12:40

## 2019-09-09 RX ADMIN — ONDANSETRON 4 MG: 2 INJECTION INTRAMUSCULAR; INTRAVENOUS at 17:07

## 2019-09-09 RX ADMIN — LIDOCAINE HYDROCHLORIDE 80 MG: 20 INJECTION, SOLUTION EPIDURAL; INFILTRATION; INTRACAUDAL; PERINEURAL at 10:36

## 2019-09-09 RX ADMIN — HYDROMORPHONE HYDROCHLORIDE 0.5 MG: 1 INJECTION, SOLUTION INTRAMUSCULAR; INTRAVENOUS; SUBCUTANEOUS at 14:32

## 2019-09-09 RX ADMIN — HYDROMORPHONE HYDROCHLORIDE 1 MG: 2 INJECTION, SOLUTION INTRAMUSCULAR; INTRAVENOUS; SUBCUTANEOUS at 10:41

## 2019-09-09 RX ADMIN — HYDROMORPHONE HYDROCHLORIDE 1 MG: 1 INJECTION, SOLUTION INTRAMUSCULAR; INTRAVENOUS; SUBCUTANEOUS at 22:26

## 2019-09-09 RX ADMIN — HYDROMORPHONE HYDROCHLORIDE 0.2 MG: 1 INJECTION, SOLUTION INTRAMUSCULAR; INTRAVENOUS; SUBCUTANEOUS at 16:00

## 2019-09-09 NOTE — PERIOP NOTES
1325: Report received from [facility]. Fredis Mendez 89 in PACU     1400: Report given to Daisy Alonzo RN in PACU

## 2019-09-09 NOTE — INTERVAL H&P NOTE
H&P Update:  Laurence Thomas was seen and examined. AXR today. History and physical has been reviewed. The patient has been examined.  There have been no significant clinical changes since the completion of the originally dated History and Physical.

## 2019-09-09 NOTE — PERIOP NOTES
Handoff Report from Operating Room to PACU    Report received from LYNNE Cruz RN and Sebastien Portillo CRNA regarding [employer]. Surgeon(s):  Tammy Price MD  And Procedure(s) (LRB):  ROBOTIC ASSISTED DIAGNOSTIC LAPAROSCOPY WITH LYSIS OF ADHESIONS, OPEN SMALL BOWEL RESECTION WITH REMOVAL OF PILLCAM IN SMALL BOWEL (N/A)  confirmed   with allergies and dressings discussed. Anesthesia type, drugs, patient history, complications, estimated blood loss, vital signs, intake and output, and last pain medication, lines, reversal medications and temperature were reviewed.

## 2019-09-09 NOTE — ANESTHESIA PREPROCEDURE EVALUATION
Anesthetic History     PONV          Review of Systems / Medical History  Patient summary reviewed, nursing notes reviewed and pertinent labs reviewed    Pulmonary            Asthma     Comments: Former smoker   Neuro/Psych         Psychiatric history     Cardiovascular  Within defined limits                Exercise tolerance: >4 METS     GI/Hepatic/Renal     GERD      PUD    Comments: IBS Endo/Other  Within defined limits           Other Findings   Comments: Crohn's dz  Foreign body    IBS    TMJ syndrome, worse on the left side       Anesthetic History     PONV (denies motion sickness)          Review of Systems / Medical History  Patient summary reviewed, nursing notes reviewed and pertinent labs reviewed    Pulmonary          Smoker  Asthma : well controlled    Comments: Former smoker   Neuro/Psych   Within defined limits           Cardiovascular  Within defined limits                Exercise tolerance: >4 METS     GI/Hepatic/Renal     GERD: well controlled    Renal disease: stones      Comments: IBS Endo/Other        Obesity     Other Findings   Comments: IBS    TMJ syndrome, worse on the left side         Physical Exam    Airway  Mallampati: II  TM Distance: 4 - 6 cm  Neck ROM: normal range of motion   Mouth opening: Normal     Cardiovascular    Rhythm: regular  Rate: normal      Pertinent negatives: No murmur   Dental    Dentition: Implants     Pulmonary  Breath sounds clear to auscultation               Abdominal  GI exam deferred       Other Findings            Anesthetic Plan    ASA: 2  Anesthesia type: general    Monitoring Plan: BIS      Induction: Intravenous  Anesthetic plan and risks discussed with: Patient      Previous grade 1 view for hysterectomy here        Physical Exam    Airway  Mallampati: II  TM Distance: 4 - 6 cm  Neck ROM: normal range of motion   Mouth opening: Normal     Cardiovascular    Rhythm: regular  Rate: normal      Pertinent negatives: No murmur   Dental    Dentition: Implants Pulmonary  Breath sounds clear to auscultation               Abdominal  GI exam deferred       Other Findings            Anesthetic Plan    ASA: 2  Anesthesia type: general    Monitoring Plan: BIS      Induction: Intravenous  Anesthetic plan and risks discussed with: Patient      Previous grade 1 view for hysterectomy here

## 2019-09-09 NOTE — PERIOP NOTES
TRANSFER - OUT REPORT:    Verbal report given to Pal Palafox RN (name) on Edi Spain  being transferred to 2120 (unit) for routine progression of care       Report consisted of patients Situation, Background, Assessment and   Recommendations(SBAR). Information from the following report(s) SBAR, OR Summary, Intake/Output, MAR and Cardiac Rhythm Sinus Tach was reviewed with the receiving nurse. Opportunity for questions and clarification was provided.       Patient transported with:   [employer]

## 2019-09-09 NOTE — ANESTHESIA POSTPROCEDURE EVALUATION
Procedure(s):  ROBOTIC ASSISTED DIAGNOSTIC LAPAROSCOPY WITH LYSIS OF ADHESIONS, OPEN SMALL BOWEL RESECTION WITH REMOVAL OF PILLCAM IN SMALL BOWEL. general    Anesthesia Post Evaluation        Patient location during evaluation: PACU  Note status: Adequate. Level of consciousness: responsive to verbal stimuli and sleepy but conscious  Pain management: satisfactory to patient  Airway patency: patent  Anesthetic complications: no  Cardiovascular status: acceptable  Respiratory status: acceptable  Hydration status: acceptable  Comments: +Post-Anesthesia Evaluation and Assessment    Patient: Ar Olivares MRN: [id number]  SSN: [id number]   YOB: 1985  Age: 29 y.o. Sex: female      Cardiovascular Function/Vital Signs    /68 (BP 1 Location: Right arm, BP Patient Position: At rest)   Pulse 100   Temp 37 °C (98.6 °F)   Resp 14   Ht 5' 2\" (1.575 m)   Wt 69.9 kg (154 lb 1.6 oz)   SpO2 98%   BMI 28.19 kg/m²     Patient is status post Procedure(s):  ROBOTIC ASSISTED DIAGNOSTIC LAPAROSCOPY WITH LYSIS OF ADHESIONS, OPEN SMALL BOWEL RESECTION WITH REMOVAL OF PILLCAM IN SMALL BOWEL. Nausea/Vomiting: Controlled. Postoperative hydration reviewed and adequate. Pain:  Pain Scale 1: Numeric (0 - 10) (09/09/19 1400)  Pain Intensity 1: 0 (09/09/19 1400)   Managed. Neurological Status:   Neuro (WDL): Exceptions to WDL (09/09/19 1257)   At baseline. Mental Status and Level of Consciousness: Arousable. Pulmonary Status:   O2 Device: Nasal cannula (09/09/19 1400)   Adequate oxygenation and airway patent. Complications related to anesthesia: None    Post-anesthesia assessment completed. No concerns.     Signed By: Alexia Vee MD    9/9/2019  Post anesthesia nausea and vomiting:  controlled      Vitals Value Taken Time   /79 9/9/2019  2:00 PM   Temp 37 °C (98.6 °F) 9/9/2019 12:57 PM   Pulse 100 9/9/2019  2:13 PM   Resp 12 9/9/2019  2:13 PM   SpO2 98 % 9/9/2019  2:13 PM   Vitals shown include unvalidated device data.

## 2019-09-09 NOTE — BRIEF OP NOTE
BRIEF OPERATIVE NOTE    Date of Procedure: 9/9/2019   Preoperative Diagnosis: CROHN'S DISEASE, SMALL BOWL OBSTRUCTION, INGESTION FOREIGN BODY  Postoperative Diagnosis: CROHN'S DISEASE, SMALL BOWL OBSTRUCTION, INGESTION FOREIGN BODY      Procedure(s):  DIAGNOSTIC LAPAROSCOPY WITH LYSIS OF ADHESIONS  ROBOTIC ASSISTED  OPEN SMALL BOWEL RESECTION (WITH REMOVAL OF PILLCAM)    Surgeon(s) and Role:     * Jailene Webb MD - Primary         Surgical Staff:  Circ-1: Early Console, RN  Circ-Relief: Alisson Cruz RN  Scrub Tech-1: Randy Fisher  Scrub RN-1: Erlin Ellis RN  Surg Asst-1: Mackenzie Robbins RN  Surg Asst-Relief: Renée Villanueva  Event Time In Time Out   Incision Start 1053    Incision Close 1245      Anesthesia: General + local  Estimated Blood Loss: min  Specimens:   ID Type Source Tests Collected by Time Destination   1 : Small bowel resection Preservative Intestine  Jailene Webb MD 9/9/2019 1157 Pathology      Findings: stricture and adhesions of distal small bowel at site of prior surgery    Complications: none  Implants: * No implants in log *

## 2019-09-09 NOTE — PERIOP NOTES
Bedside shift change report given to [address] [place] [address] (oncoming nurse) by Mary Levy RN (offgoing nurse). Report included the following information SBAR.

## 2019-09-10 LAB
ANION GAP SERPL CALC-SCNC: 6 MMOL/L (ref 5–15)
BACTERIA SPEC CULT: ABNORMAL
BUN SERPL-MCNC: 15 MG/DL (ref 6–20)
BUN/CREAT SERPL: 15 (ref 12–20)
CALCIUM SERPL-MCNC: 8.3 MG/DL (ref 8.5–10.1)
CC UR VC: ABNORMAL
CHLORIDE SERPL-SCNC: 108 MMOL/L (ref 97–108)
CO2 SERPL-SCNC: 23 MMOL/L (ref 21–32)
CREAT SERPL-MCNC: 0.98 MG/DL (ref 0.55–1.02)
GLUCOSE SERPL-MCNC: 149 MG/DL (ref 65–100)
HCT VFR BLD AUTO: 36.2 % (ref 35–47)
HGB BLD-MCNC: 12 G/DL (ref 11.5–16)
POTASSIUM SERPL-SCNC: 4.4 MMOL/L (ref 3.5–5.1)
SERVICE CMNT-IMP: ABNORMAL
SODIUM SERPL-SCNC: 137 MMOL/L (ref 136–145)

## 2019-09-10 PROCEDURE — 8E0W4CZ ROBOTIC ASSISTED PROCEDURE OF TRUNK REGION, PERCUTANEOUS ENDOSCOPIC APPROACH: ICD-10-PCS | Performed by: SURGERY

## 2019-09-10 PROCEDURE — 80048 BASIC METABOLIC PNL TOTAL CA: CPT

## 2019-09-10 PROCEDURE — 74011250636 HC RX REV CODE- 250/636: Performed by: SURGERY

## 2019-09-10 PROCEDURE — 85018 HEMOGLOBIN: CPT

## 2019-09-10 PROCEDURE — 74011250637 HC RX REV CODE- 250/637: Performed by: SURGERY

## 2019-09-10 PROCEDURE — 36415 COLL VENOUS BLD VENIPUNCTURE: CPT

## 2019-09-10 PROCEDURE — 65270000029 HC RM PRIVATE

## 2019-09-10 PROCEDURE — 0DB80ZZ EXCISION OF SMALL INTESTINE, OPEN APPROACH: ICD-10-PCS | Performed by: SURGERY

## 2019-09-10 PROCEDURE — 0DN84ZZ RELEASE SMALL INTESTINE, PERCUTANEOUS ENDOSCOPIC APPROACH: ICD-10-PCS | Performed by: SURGERY

## 2019-09-10 PROCEDURE — 94760 N-INVAS EAR/PLS OXIMETRY 1: CPT

## 2019-09-10 RX ORDER — CALCIUM CARBONATE 200(500)MG
400 TABLET,CHEWABLE ORAL
Status: DISCONTINUED | OUTPATIENT
Start: 2019-09-10 | End: 2019-09-12 | Stop reason: HOSPADM

## 2019-09-10 RX ORDER — FAMOTIDINE 20 MG/1
20 TABLET, FILM COATED ORAL 2 TIMES DAILY
Status: DISCONTINUED | OUTPATIENT
Start: 2019-09-10 | End: 2019-09-12 | Stop reason: HOSPADM

## 2019-09-10 RX ADMIN — Medication 10 ML: at 13:28

## 2019-09-10 RX ADMIN — BUDESONIDE 9 MG: 3 CAPSULE, GELATIN COATED ORAL at 08:14

## 2019-09-10 RX ADMIN — ACETAMINOPHEN 1000 MG: 500 TABLET ORAL at 17:22

## 2019-09-10 RX ADMIN — CALCIUM CARBONATE (ANTACID) CHEW TAB 500 MG 200 MG: 500 CHEW TAB at 11:14

## 2019-09-10 RX ADMIN — GABAPENTIN 300 MG: 300 CAPSULE ORAL at 08:14

## 2019-09-10 RX ADMIN — ONDANSETRON 4 MG: 2 INJECTION INTRAMUSCULAR; INTRAVENOUS at 00:33

## 2019-09-10 RX ADMIN — MESALAMINE 1000 MG: 250 CAPSULE ORAL at 17:22

## 2019-09-10 RX ADMIN — HYDROMORPHONE HYDROCHLORIDE 1 MG: 1 INJECTION, SOLUTION INTRAMUSCULAR; INTRAVENOUS; SUBCUTANEOUS at 21:23

## 2019-09-10 RX ADMIN — FAMOTIDINE 20 MG: 20 TABLET ORAL at 11:14

## 2019-09-10 RX ADMIN — MESALAMINE 1000 MG: 250 CAPSULE ORAL at 12:40

## 2019-09-10 RX ADMIN — GABAPENTIN 300 MG: 300 CAPSULE ORAL at 17:22

## 2019-09-10 RX ADMIN — DEXTROSE MONOHYDRATE, SODIUM CHLORIDE, AND POTASSIUM CHLORIDE 110 ML/HR: 50; 4.5; 1.49 INJECTION, SOLUTION INTRAVENOUS at 07:25

## 2019-09-10 RX ADMIN — MESALAMINE 1000 MG: 250 CAPSULE ORAL at 21:27

## 2019-09-10 RX ADMIN — DOCUSATE SODIUM 100 MG: 100 CAPSULE, LIQUID FILLED ORAL at 17:22

## 2019-09-10 RX ADMIN — ACETAMINOPHEN 1000 MG: 500 TABLET ORAL at 21:27

## 2019-09-10 RX ADMIN — CALCIUM CARBONATE (ANTACID) CHEW TAB 500 MG 400 MG: 500 CHEW TAB at 02:33

## 2019-09-10 RX ADMIN — Medication 10 ML: at 05:24

## 2019-09-10 RX ADMIN — OXYCODONE HYDROCHLORIDE 10 MG: 5 TABLET ORAL at 09:51

## 2019-09-10 RX ADMIN — FAMOTIDINE 20 MG: 20 TABLET ORAL at 17:22

## 2019-09-10 RX ADMIN — ACETAMINOPHEN 1000 MG: 500 TABLET ORAL at 08:14

## 2019-09-10 RX ADMIN — OXYCODONE HYDROCHLORIDE 10 MG: 5 TABLET ORAL at 17:22

## 2019-09-10 RX ADMIN — DOCUSATE SODIUM 100 MG: 100 CAPSULE, LIQUID FILLED ORAL at 08:14

## 2019-09-10 RX ADMIN — ACETAMINOPHEN 1000 MG: 500 TABLET ORAL at 12:40

## 2019-09-10 RX ADMIN — MESALAMINE 1000 MG: 250 CAPSULE ORAL at 08:13

## 2019-09-10 RX ADMIN — HYDROMORPHONE HYDROCHLORIDE 1 MG: 1 INJECTION, SOLUTION INTRAMUSCULAR; INTRAVENOUS; SUBCUTANEOUS at 04:52

## 2019-09-10 RX ADMIN — HYDROMORPHONE HYDROCHLORIDE 1 MG: 1 INJECTION, SOLUTION INTRAMUSCULAR; INTRAVENOUS; SUBCUTANEOUS at 09:51

## 2019-09-10 NOTE — PROGRESS NOTES
General Surgery End of Shift Nursing Note    Bedside shift change report given to [address] (oncoming nurse) by Deborah Vargas RN (offgoing nurse). Report included the following information SBAR, Kardex, Procedure Summary, Intake/Output, MAR and Recent Results. Shift worked:   7a to 7p   Summary of shift:    Dressing changed this morning by MD and NP. Pt up in halls and chair. Tolerating Clears and SL IV. Issues for physician to address:   none     Number times ambulated in hallway past shift: 3    Number of times OOB to chair past shift: 3    Pain Management:  Current medication: Oxycodone  Patient states pain is manageable on current pain medication: YES    GI:    Current diet:  DIET CLEAR LIQUID    Tolerating current diet: YES  Passing flatus: YES  Last Bowel Movement: 8/26/19   Appearance: soft    Respiratory:    Incentive Spirometer at bedside: YES  Patient instructed on use: YES    Patient Safety:    Falls Score: 1  Bed Alarm On? No  Sitter?  No    Nando Sarmiento RN

## 2019-09-10 NOTE — PROGRESS NOTES
Spiritual Care Partner Volunteer visited patient in Gen Surg unit on September 10, 2019.       Documented by:  ETHAN Contreras, [facility], madhavi DAVIS [facility] Paging Service  [phone]

## 2019-09-10 NOTE — PROGRESS NOTES
Surgery NP Progress Note    s/p ROBOTIC ASSISTED DIAGNOSTIC LAPAROSCOPY WITH LYSIS OF ADHESIONS, OPEN SMALL BOWEL RESECTION WITH REMOVAL OF PILLCAM IN SMALL BOWEL on 2019       Assessment:   Active Problems:    Crohn disease ([facility] [address] (2019)      Bowel obstruction ([facility] [address] (2019)        Expected post-op progress. Expected post-op pain. Frequent trips to void but completely emptying. Plan/Recommendations/Medical Decision Making:     - Mobilize with nursing and OOB to chair for meals  - Continue diet  - Pain management- Continue current pain control methods. Discharge Planning    Plan for patient to discharge to home no needs    Anticipated discharge date 19    Incision/Wound Care Needs:  Routine post-op, patient will self manage. Discharge plan discussed with:  Patient, attending    Subjective:     Patient has no new complaints. Expected post-op pain. Pain control adequate. Patient reports PO intake adequate. No nausea/vomiting. Negative flatus. Negative stool output. Voiding status: Patient is voiding in adequate amounts. Objective:     Blood pressure 126/85, pulse 94, temperature 98.3 °F (36.8 °C), resp. rate 17, height 5' 2\" (1.575 m), weight 69.9 kg (154 lb 1.6 oz), last menstrual period 10/08/2018, SpO2 100 %, not currently breastfeeding.     Temp (24hrs), Av.3 °F (36.8 °C), Min:98 °F (36.7 °C), Max:98.9 °F (37.2 °C)      Recent Results (from the past 48 hour(s))   CBC W/O DIFF    Collection Time: 19  9:23 AM   Result Value Ref Range    WBC 9.6 3.6 - 11.0 K/uL    RBC 5.19 3.80 - 5.20 M/uL    HGB 14.3 11.5 - 16.0 g/dL    HCT 43.6 35.0 - 47.0 %    MCV 84.0 80.0 - 99.0 FL    MCH 27.6 26.0 - 34.0 PG    MCHC 32.8 30.0 - 36.5 g/dL    RDW 14.5 11.5 - 14.5 %    PLATELET 542 028 - 552 K/uL    MPV 10.7 8.9 - 12.9 FL    NRBC 0.0 0  WBC    ABSOLUTE NRBC 0.00 0.00 - 3.71 K/uL   METABOLIC PANEL, COMPREHENSIVE    Collection Time: 19  9:23 AM   Result Value Ref Range Sodium 138 136 - 145 mmol/L    Potassium 3.9 3.5 - 5.1 mmol/L    Chloride 108 97 - 108 mmol/L    CO2 24 21 - 32 mmol/L    Anion gap 6 5 - 15 mmol/L    Glucose 76 65 - 100 mg/dL    BUN 17 6 - 20 MG/DL    Creatinine 0.83 0.55 - 1.02 MG/DL    BUN/Creatinine ratio 20 12 - 20      GFR est AA >60 >60 ml/min/1.73m2    GFR est non-AA >60 >60 ml/min/1.73m2    Calcium 9.1 8.5 - 10.1 MG/DL    Bilirubin, total 0.7 0.2 - 1.0 MG/DL    ALT (SGPT) 21 12 - 78 U/L    AST (SGOT) 15 15 - 37 U/L    Alk.  phosphatase 71 45 - 117 U/L    Protein, total 7.2 6.4 - 8.2 g/dL    Albumin 3.5 3.5 - 5.0 g/dL    Globulin 3.7 2.0 - 4.0 g/dL    A-G Ratio 0.9 (L) 1.1 - 2.2     URINALYSIS W/ REFLEX CULTURE    Collection Time: 09/09/19  9:24 AM   Result Value Ref Range    Color YELLOW/STRAW      Appearance CLEAR CLEAR      Specific gravity 1.024 1.003 - 1.030      pH (UA) 5.5 5.0 - 8.0      Protein 100 (A) NEG mg/dL    Glucose NEGATIVE  NEG mg/dL    Ketone 15 (A) NEG mg/dL    Blood LARGE (A) NEG      Urobilinogen 0.2 0.2 - 1.0 EU/dL    Nitrites NEGATIVE  NEG      Leukocyte Esterase NEGATIVE  NEG      WBC 0-4 0 - 4 /hpf    RBC 5-10 0 - 5 /hpf    Epithelial cells FEW FEW /lpf    Bacteria 1+ (A) NEG /hpf    UA:UC IF INDICATED URINE CULTURE ORDERED (A) CNI      Mucus TRACE (A) NEG /lpf    Hyaline cast 0-2 0 - 5 /lpf   BILIRUBIN, CONFIRM    Collection Time: 09/09/19  9:24 AM   Result Value Ref Range    Bilirubin UA, confirm NEGATIVE  NEG     PROTHROMBIN TIME + INR    Collection Time: 09/09/19 10:06 AM   Result Value Ref Range    INR 1.0 0.9 - 1.1      Prothrombin time 10.2 9.0 - 11.1 sec   PTT    Collection Time: 09/09/19 10:06 AM   Result Value Ref Range    aPTT 22.5 22.1 - 32.0 sec    aPTT, therapeutic range     58.0 - 77.0 SECS   TYPE & SCREEN    Collection Time: 09/09/19 10:10 AM   Result Value Ref Range    Crossmatch Expiration 09/12/2019     ABO/Rh(D) A POSITIVE     Antibody screen NEG    METABOLIC PANEL, BASIC    Collection Time: 09/10/19  2:58 AM Result Value Ref Range    Sodium 137 136 - 145 mmol/L    Potassium 4.4 3.5 - 5.1 mmol/L    Chloride 108 97 - 108 mmol/L    CO2 23 21 - 32 mmol/L    Anion gap 6 5 - 15 mmol/L    Glucose 149 (H) 65 - 100 mg/dL    BUN 15 6 - 20 MG/DL    Creatinine 0.98 0.55 - 1.02 MG/DL    BUN/Creatinine ratio 15 12 - 20      GFR est AA >60 >60 ml/min/1.73m2    GFR est non-AA >60 >60 ml/min/1.73m2    Calcium 8.3 (L) 8.5 - 10.1 MG/DL   HGB & HCT    Collection Time: 09/10/19  2:58 AM   Result Value Ref Range    HGB 12.0 11.5 - 16.0 g/dL    HCT 36.2 35.0 - 47.0 %       Pt resting in chair. NAD   Incisions CDI. SCDs for mechanical DVT proph while in bed    Body mass index is 28.19 kg/m². Reference: BMI greater than 30 is classified as obesity and greater than 40 is classified as morbid obesity.      Last 3 Recorded Weights in this Encounter    09/06/19 1405 09/09/19 0918   Weight: 70.3 kg (155 lb) 69.9 kg (154 lb 1.6 oz)         Donna Roy, KRISTOFER   MSN, APRN, FNP-C, CWOCN-AP    09/10/19

## 2019-09-10 NOTE — PROGRESS NOTES
Reason for Admission:   Crohn disease, bowel obstruction                   RRAT Score:   9                  Plan for utilizing home health:   None                       Current Advanced Directive/Advance Care Plan: Not on file                         Transition of Care Plan:   Patient is independent with ADL/IADL to include driving. Patient denies past HH/Rehab and DME use. Patients support system includes , mom, dad and friends. Patient reports strong support system. Patient does not have any needs or concerns at this time. Anticipate d/c in a day or two. Awaiting return of bowel function. CM will continue to follow         PCP- Dr Keven Halsted  [facility] in [place] on [address] [facility] Management Interventions  PCP Verified by CM: Yes(Dr Keven Halsted)  Mode of Transport at Discharge:  Other (see comment)()  Transition of Care Consult (CM Consult): Discharge Planning  Discharge Durable Medical Equipment: No(No DME use)  Physical Therapy Consult: No  Occupational Therapy Consult: No  Speech Therapy Consult: No  Current Support Network: Lives with Spouse, Family Lives Nearby, Own Home(Lives in a two story home with 5 or 6 steps to enter the home)  Confirm Follow Up Transport: Self  Plan discussed with Pt/Family/Caregiver: Yes  Discharge Location  Discharge Placement: (Anticipate home with family)      Franki Sever  Ext [phone]

## 2019-09-10 NOTE — PROGRESS NOTES
General Surgery End of Shift Nursing Note    Bedside shift change report given to [address] [place] [address] Line Ciro S (oncoming nurse). Report included the following information SBAR, Kardex, OR Summary, Intake/Output, MAR and Accordion. Shift worked:   [id number]   Summary of shift:    Some reflux/indigestion during the night relieved with Tums. Pain control good. Issues for physician to address:        Number times ambulated in hallway past shift: x2    Number of times OOB to chair past shift: x1    Pain Management:  Current medication: Dilaudid  Patient states pain is manageable on current pain medication: YES    GI:    Current diet:  DIET CLEAR LIQUID    Tolerating current diet: YES  Passing flatus: NO  Last Bowel Movement: several days ago   Appearance:     Respiratory:    Incentive Spirometer at bedside: YES  Patient instructed on use: YES    Patient Safety:    Falls Score: 1  Bed Alarm On? No  Sitter?  No    Manasa Dyer RN

## 2019-09-11 PROCEDURE — 74011250637 HC RX REV CODE- 250/637: Performed by: SURGERY

## 2019-09-11 PROCEDURE — 65270000029 HC RM PRIVATE

## 2019-09-11 PROCEDURE — 74011250636 HC RX REV CODE- 250/636: Performed by: SURGERY

## 2019-09-11 RX ORDER — GABAPENTIN 300 MG/1
300 CAPSULE ORAL 2 TIMES DAILY
Qty: 14 CAP | Refills: 0 | Status: SHIPPED | OUTPATIENT
Start: 2019-09-11 | End: 2019-09-18

## 2019-09-11 RX ORDER — DICYCLOMINE HYDROCHLORIDE 20 MG/1
20 TABLET ORAL
Qty: 20 TAB | Refills: 0 | Status: SHIPPED | OUTPATIENT
Start: 2019-09-11 | End: 2020-12-30

## 2019-09-11 RX ORDER — ACETAMINOPHEN 500 MG
1000 TABLET ORAL 4 TIMES DAILY
Qty: 40 TAB | Refills: 0 | Status: SHIPPED | OUTPATIENT
Start: 2019-09-11 | End: 2019-09-16

## 2019-09-11 RX ORDER — OXYCODONE HYDROCHLORIDE 5 MG/1
5 TABLET ORAL
Qty: 15 TAB | Refills: 0 | Status: SHIPPED | OUTPATIENT
Start: 2019-09-11 | End: 2019-09-12

## 2019-09-11 RX ADMIN — OXYCODONE HYDROCHLORIDE 10 MG: 5 TABLET ORAL at 08:00

## 2019-09-11 RX ADMIN — MESALAMINE 1000 MG: 250 CAPSULE ORAL at 17:56

## 2019-09-11 RX ADMIN — GABAPENTIN 300 MG: 300 CAPSULE ORAL at 17:56

## 2019-09-11 RX ADMIN — ACETAMINOPHEN 1000 MG: 500 TABLET ORAL at 17:56

## 2019-09-11 RX ADMIN — ACETAMINOPHEN 1000 MG: 500 TABLET ORAL at 13:51

## 2019-09-11 RX ADMIN — DOCUSATE SODIUM 100 MG: 100 CAPSULE, LIQUID FILLED ORAL at 08:02

## 2019-09-11 RX ADMIN — OXYCODONE HYDROCHLORIDE 10 MG: 5 TABLET ORAL at 21:14

## 2019-09-11 RX ADMIN — ACETAMINOPHEN 1000 MG: 500 TABLET ORAL at 09:00

## 2019-09-11 RX ADMIN — MESALAMINE 1000 MG: 250 CAPSULE ORAL at 21:14

## 2019-09-11 RX ADMIN — Medication 10 ML: at 21:14

## 2019-09-11 RX ADMIN — Medication 10 ML: at 13:46

## 2019-09-11 RX ADMIN — ONDANSETRON 4 MG: 2 INJECTION INTRAMUSCULAR; INTRAVENOUS at 13:46

## 2019-09-11 RX ADMIN — Medication 10 ML: at 00:25

## 2019-09-11 RX ADMIN — FAMOTIDINE 20 MG: 20 TABLET ORAL at 17:56

## 2019-09-11 RX ADMIN — DOCUSATE SODIUM 100 MG: 100 CAPSULE, LIQUID FILLED ORAL at 17:56

## 2019-09-11 RX ADMIN — BUDESONIDE 9 MG: 3 CAPSULE, GELATIN COATED ORAL at 08:01

## 2019-09-11 RX ADMIN — MESALAMINE 1000 MG: 250 CAPSULE ORAL at 08:01

## 2019-09-11 RX ADMIN — MESALAMINE 1000 MG: 250 CAPSULE ORAL at 13:50

## 2019-09-11 RX ADMIN — Medication 10 ML: at 05:59

## 2019-09-11 RX ADMIN — FAMOTIDINE 20 MG: 20 TABLET ORAL at 08:03

## 2019-09-11 RX ADMIN — OXYCODONE HYDROCHLORIDE 10 MG: 5 TABLET ORAL at 13:51

## 2019-09-11 RX ADMIN — GABAPENTIN 300 MG: 300 CAPSULE ORAL at 08:02

## 2019-09-11 NOTE — OP NOTES
[facility]  OPERATIVE REPORT    Name:  Dimple Houser  MR#:  [id number]  :  1985  ACCOUNT #:  [de-identified]  DATE OF SERVICE:  2019    PREOPERATIVE DIAGNOSES:  1. Crohn's disease. 2.  Small-bowel obstruction. 3.  Ingestion of foreign body. POSTOPERATIVE DIAGNOSES:  1. Crohn's disease. 2.  Small-bowel obstruction. 3.  Ingestion of foreign body. PROCEDURES PERFORMED:  1. Diagnostic laparoscopy with lysis of adhesions. 2.  Robot assisted. 3.  Open small bowel resection (with removal of PillCam). SURGEON:  Irais Norman MD    ASSISTANT:  See brief OP note. ANESTHESIA:  General plus local.    COMPLICATIONS:  None. SPECIMENS REMOVED:  Small bowel resection. IMPLANTS:  See brief OP note. ESTIMATED BLOOD LOSS:  Minimal.    INDICATION:  The patient is a 71-year-old female, who has had symptoms of enteritis and abdominal pain with intermittent suprapubic cramping. She has undergone a workup, recently diagnosed with Crohn's disease, currently on Pentasa and steroids. Workup has included a PillCam and unfortunately, the PillCam is not passing the small bowel. Treatment plan was discussed with her and she has agreed to surgery as above. Risks, benefits, alternatives were discussed and consent was placed in chart. FINDINGS: Adhered loop of distal small bowel to RLQ containing the PilCam. Terminal ileum, cecum and remaining bowel all appeared normal.      PROCEDURE:  After satisfactory induction of general anesthesia, the patient was kept in supine position. The patient's abdomen was prepped and draped sterilely, after which she was given local anesthetic. An 8-mm optical entry trocar was placed intraabdominally just superior to the umbilicus. The abdomen was explored. Findings were as noted above. Two additional 8-mm ports were placed across the abdomen. The patient was placed in Trendelenburg and tilted towards the left. The robot was docked.   Robotic instruments inserted. Adhesiolysis was performed. Adhesions to the anterior abdominal wall of omentum and bowel were taken down and then adhesiolysis was carried down into the pelvis. The patient was noted to have a prior hysterectomy. The right ovary was adhesed to a loop of distal ileum and this also appeared to be the area of prior surgery. She does report a remote history of an appendectomy with a possible bowel resection at that time. Her ileocecal valve appeared to be intact and her terminal ileum appeared to be normal.  However, several centimeters proximal to this, there was a loop of intestine densely adhesed to itself with the apparent point of partial obstruction with the PillCam identified intraluminal in this area as well. The remaining bowel and colon appeared unremarkable and this appeared to be the point of obstruction likely source of her symptoms. I freed up the terminal ileum and a cecum from the pelvic sidewall. The ureter was identified and protected during the procedure. Once this area was fully mobilized, I made a small transverse incision on the right lower quadrant at the site of her prior appendectomy. This was carried down through the soft tissue. The rectus muscle fibers were split and the abdominal cavity was entered. A wound protector was placed through this location and this loop of bowel was brought up into the field. The field was protected with towels and 75-BEBETO stapler was fired defining our proximal and distal resection points. The mesentery was taken down with Kezia clamps and silk ties, and the specimen was sent to Pathology. The PillCam was noted to be palpable within the specimen. A side-to-side functional end-to-end stapled anastomosis was performed with additional firing of BEBETO and closure of the enterotomy with a TL90. The TL90 staple line was reinforced with 3-0 Vicryl Lembert sutures.   The mesentery defect was closed with a series of figure-of-eight 3-0 Vicryl sutures. A total of 6 mL of Tisseel was sprayed over the anastomosis and in the pelvic sidewall and the area of the patient's prior surgery. The anastomosis was noted to be intact and complete. Final irrigation was noted to run clear. The wound protector was removed. Surgical gloves and instruments were exchanged. The posterior fascia was closed with 0 Vicryl, anterior fascia closed with Stratafix Symmetric, soft tissue approximated with 3-0 Vicryl. All skin incisions were closed with subcuticular Monocryl and a small blowhole type wick was placed in the lateral aspect of the extraction site and covered with some sterile gauze. The patient remained stable during my presence in the operating room.       Danae Delacruz MD      [id number]  D:  09/10/2019 12:52  T:  09/10/2019 22:12  JOB #:  [id number]

## 2019-09-11 NOTE — PROGRESS NOTES
SURGERY    Good night  +flatus  abd rogers tender, packing out    Adv to solid food  Home later today if tolerates

## 2019-09-11 NOTE — PROGRESS NOTES
Problem: Falls - Risk of  Goal: *Absence of Falls  Description  Document Fabiana Hensley Fall Risk and appropriate interventions in the flowsheet.   Outcome: Progressing Towards Goal  Note:   Fall Risk Interventions:  Mobility Interventions: Patient to call before getting OOB    Mentation Interventions: Adequate sleep, hydration, pain control    Medication Interventions: Patient to call before getting OOB, Teach patient to arise slowly    Elimination Interventions: Call light in reach    History of Falls Interventions: Door open when patient unattended

## 2019-09-11 NOTE — PROGRESS NOTES
Problem: Falls - Risk of  Goal: *Absence of Falls  Description  Document Noah Lewis Fall Risk and appropriate interventions in the flowsheet.   Outcome: Progressing Towards Goal  Note:   Fall Risk Interventions:  Mobility Interventions: Patient to call before getting OOB    Mentation Interventions: Adequate sleep, hydration, pain control    Medication Interventions: Teach patient to arise slowly    Elimination Interventions: Call light in reach    History of Falls Interventions: Door open when patient unattended         Problem: Patient Education: Go to Patient Education Activity  Goal: Patient/Family Education  Outcome: Progressing Towards Goal

## 2019-09-11 NOTE — DISCHARGE INSTRUCTIONS
Dr. Baumann More Discharge Instructions for:  Serjio Marroquin    MRN: [id number] : 1985    Admitted: 2019  Discharged: 2019     Take Home Medications     Tylenol and Gabapentin scheduled for pain. Oxycodone as needed. · It is important that you take the medication exactly as they are prescribed. · Keep your medication in the bottles provided by the pharmacist and keep a list of the medication names, dosages, and times to be taken in your wallet. What to do at Home    Recommended diet: Low Fiber diet for 1-2 days, then may resume pre-surgery diet. Recommended activity: activity as tolerated    Follow-up with Dr. Linda Gaines in 2-3 weeks. Call [phone] for an appointment. Bowel Blockage (Intestinal Obstruction): After Your Visit  Your Care Instructions  A bowel blockage, also called an intestinal obstruction, can prevent gas, fluids, or solids from moving through the intestines normally. It can cause constipation and, rarely, diarrhea. You may have pain, nausea, vomiting, and cramping. Most of the time, complete blockages require a stay in the hospital and possibly surgery. But if your bowel is only partly blocked, your doctor may tell you to wait for it to clear on its own. If so, there are things you can do at home to help make you feel better. If you have had surgery for a bowel blockage, there are things you can do at home to make sure you heal well. You can also make some changes to keep your bowel from becoming blocked again. Follow-up care is a key part of your treatment and safety. Be sure to make and go to all appointments, and call your doctor if you are having problems. Its also a good idea to know your test results and keep a list of the medicines you take. How can you care for yourself at home? If your doctor has told you to wait at home for a blockage to clear on its own:  · Follow your doctor's instructions.  These may include eating a liquid diet to avoid complete blockage. · Take your medicines exactly as prescribed. Call your doctor if you think you are having a problem with your medicine. · Put a heating pad set on low on your belly to relieve mild cramps and pain. · Watch for signs of complete blockage. This requires emergency treatment. Call your doctor right away. Signs of a complete blockage may include:  ¨ The return of belly pain. Severe, constant pain is a sign that the blockage may have cut off the bowel's blood supply. ¨ Bloating. ¨ Not being able to pass stools or gas. To prevent another blockage  · Try to eat smaller amounts of food more often. For example, have 5 or 6 small meals throughout the day instead of 2 or 3 large meals. · Chew your food very well. Try to chew each bite about 20 times or until it is liquid. · Avoid high-fiber foods and raw fruits and vegetables with skins, husks, strings, or seeds. These can form a ball of undigested material that can cause a blockage if a part of your bowel is scarred or narrowed. · Avoid whole-grain products and fiber supplement such as Citrucel or Metamucil for one month. · To help you have regular bowel movements, eat at regular times, do not strain during a bowel movement, and drink at least 8 to 10 glasses of water each day. If you have kidney, heart, or liver disease and have to limit fluids, talk with your doctor or before you increase the amount of fluids you drink. · Drink high-calorie liquid formulas if your doctor says to. Severe symptoms may make it hard for your body to take in vitamins and minerals. · Get regular exercise. It helps you digest your food better. Get at least 30 minutes of physical activity on most days of the week. Walking is a good choice.

## 2019-09-11 NOTE — PROGRESS NOTES
Surgery NP Progress Note    s/p ROBOTIC ASSISTED DIAGNOSTIC LAPAROSCOPY WITH LYSIS OF ADHESIONS, OPEN SMALL BOWEL RESECTION WITH REMOVAL OF PILLCAM IN SMALL BOWEL on 2019     Patient seen with Dr. Dex Casey. Assessment:   Active Problems:    Crohn disease ([facility] [address] (2019)      Bowel obstruction ([facility] [address] (2019)      Expected post-op progress. Expected post-op pain. Positive flatus this morning. Feels ready for solid food. Plan/Recommendations/Medical Decision Making:     - Mobilize with nursing and OOB to chair for meals  - Advance diet  - Pain management- Continue current pain control methods. - Home today if diet tolerated. Discharge Planning    Plan for patient to discharge to home no needs    Anticipated discharge date 19    Incision/Wound Care Needs:  Routine post-op, patient will self manage. Discharge plan discussed with:  Patient, attending    Subjective:     Patient has no new complaints. Expected post-op pain. Pain control adequate. Patient reports PO intake adequate. No nausea/vomiting. Positive flatus. Negative stool output. Voiding status: Patient is voiding in adequate amounts. Objective:     Blood pressure 125/86, pulse 82, temperature 98.1 °F (36.7 °C), resp. rate 18, height 5' 2\" (1.575 m), weight 69.9 kg (154 lb 1.6 oz), last menstrual period 10/08/2018, SpO2 98 %, not currently breastfeeding.     Temp (24hrs), Av.2 °F (36.8 °C), Min:97.9 °F (36.6 °C), Max:98.8 °F (37.1 °C)      Recent Results (from the past 48 hour(s))   CBC W/O DIFF    Collection Time: 19  9:23 AM   Result Value Ref Range    WBC 9.6 3.6 - 11.0 K/uL    RBC 5.19 3.80 - 5.20 M/uL    HGB 14.3 11.5 - 16.0 g/dL    HCT 43.6 35.0 - 47.0 %    MCV 84.0 80.0 - 99.0 FL    MCH 27.6 26.0 - 34.0 PG    MCHC 32.8 30.0 - 36.5 g/dL    RDW 14.5 11.5 - 14.5 %    PLATELET 822 726 - 013 K/uL    MPV 10.7 8.9 - 12.9 FL    NRBC 0.0 0  WBC    ABSOLUTE NRBC 0.00 0.00 - 6.01 K/uL   METABOLIC PANEL, COMPREHENSIVE    Collection Time: 09/09/19  9:23 AM   Result Value Ref Range    Sodium 138 136 - 145 mmol/L    Potassium 3.9 3.5 - 5.1 mmol/L    Chloride 108 97 - 108 mmol/L    CO2 24 21 - 32 mmol/L    Anion gap 6 5 - 15 mmol/L    Glucose 76 65 - 100 mg/dL    BUN 17 6 - 20 MG/DL    Creatinine 0.83 0.55 - 1.02 MG/DL    BUN/Creatinine ratio 20 12 - 20      GFR est AA >60 >60 ml/min/1.73m2    GFR est non-AA >60 >60 ml/min/1.73m2    Calcium 9.1 8.5 - 10.1 MG/DL    Bilirubin, total 0.7 0.2 - 1.0 MG/DL    ALT (SGPT) 21 12 - 78 U/L    AST (SGOT) 15 15 - 37 U/L    Alk. phosphatase 71 45 - 117 U/L    Protein, total 7.2 6.4 - 8.2 g/dL    Albumin 3.5 3.5 - 5.0 g/dL    Globulin 3.7 2.0 - 4.0 g/dL    A-G Ratio 0.9 (L) 1.1 - 2.2     URINALYSIS W/ REFLEX CULTURE    Collection Time: 09/09/19  9:24 AM   Result Value Ref Range    Color YELLOW/STRAW      Appearance CLEAR CLEAR      Specific gravity 1.024 1.003 - 1.030      pH (UA) 5.5 5.0 - 8.0      Protein 100 (A) NEG mg/dL    Glucose NEGATIVE  NEG mg/dL    Ketone 15 (A) NEG mg/dL    Blood LARGE (A) NEG      Urobilinogen 0.2 0.2 - 1.0 EU/dL    Nitrites NEGATIVE  NEG      Leukocyte Esterase NEGATIVE  NEG      WBC 0-4 0 - 4 /hpf    RBC 5-10 0 - 5 /hpf    Epithelial cells FEW FEW /lpf    Bacteria 1+ (A) NEG /hpf    UA:UC IF INDICATED URINE CULTURE ORDERED (A) CNI      Mucus TRACE (A) NEG /lpf    Hyaline cast 0-2 0 - 5 /lpf   BILIRUBIN, CONFIRM    Collection Time: 09/09/19  9:24 AM   Result Value Ref Range    Bilirubin UA, confirm NEGATIVE  NEG     CULTURE, URINE    Collection Time: 09/09/19  9:24 AM   Result Value Ref Range    Special Requests: NO SPECIAL REQUESTS  Reflexed from [id number]        [place] Count [id number]  COLONIES/mL        Culture result: (A)       STREPTOCOCCI, BETA HEMOLYTIC GROUP B Penicillin and ampicillin are drugs of choice for treatment of beta-hemolytic streptococcal infections.  Susceptibility testing of penicillins and beta-lactams approved by the FDA for treatment of beta-hemolytic streptococcal infections need not be performed routinely, because nonsusceptible isolates are extremely rare. CLSI 2012   PROTHROMBIN TIME + INR    Collection Time: 09/09/19 10:06 AM   Result Value Ref Range    INR 1.0 0.9 - 1.1      Prothrombin time 10.2 9.0 - 11.1 sec   PTT    Collection Time: 09/09/19 10:06 AM   Result Value Ref Range    aPTT 22.5 22.1 - 32.0 sec    aPTT, therapeutic range     58.0 - 77.0 SECS   TYPE & SCREEN    Collection Time: 09/09/19 10:10 AM   Result Value Ref Range    Crossmatch Expiration 09/12/2019     ABO/Rh(D) A POSITIVE     Antibody screen NEG    METABOLIC PANEL, BASIC    Collection Time: 09/10/19  2:58 AM   Result Value Ref Range    Sodium 137 136 - 145 mmol/L    Potassium 4.4 3.5 - 5.1 mmol/L    Chloride 108 97 - 108 mmol/L    CO2 23 21 - 32 mmol/L    Anion gap 6 5 - 15 mmol/L    Glucose 149 (H) 65 - 100 mg/dL    BUN 15 6 - 20 MG/DL    Creatinine 0.98 0.55 - 1.02 MG/DL    BUN/Creatinine ratio 15 12 - 20      GFR est AA >60 >60 ml/min/1.73m2    GFR est non-AA >60 >60 ml/min/1.73m2    Calcium 8.3 (L) 8.5 - 10.1 MG/DL   HGB & HCT    Collection Time: 09/10/19  2:58 AM   Result Value Ref Range    HGB 12.0 11.5 - 16.0 g/dL    HCT 36.2 35.0 - 47.0 %       Pt resting in chair. NAD   Incisions CDI. Packing completely removed last night by nursing. SCDs for mechanical DVT proph while in bed    Body mass index is 28.19 kg/m². Reference: BMI greater than 30 is classified as obesity and greater than 40 is classified as morbid obesity.      Last 3 Recorded Weights in this Encounter    09/06/19 1405 09/09/19 0918   Weight: 70.3 kg (155 lb) 69.9 kg (154 lb 1.6 oz)         Amy Petersen, KRISTOFER   MSN, APRN, FNP-C, CWOCN-AP    09/11/19

## 2019-09-11 NOTE — PROGRESS NOTES
.General Surgery End of Shift Nursing Note    Bedside shift change report given to Gautam Delgado RN (oncoming nurse) by Judi Lindsey RN (offgoing nurse). Report included the following information SBAR, OR Summary, Intake/Output, MAR and Recent Results. Shift worked:   7039-4392   Summary of shift:    Patient is ambulating without assistance. She is not having flatus or BM's. Voiding with no difficulty. She has nausea with no emesis. Issues for physician to address:  DC planning     Number times ambulated in hallway past shift: 4  Number of times OOB to chair past shift: 40% of day    Pain Management:  Current medication: roxicodone  Patient states pain is manageable on current pain medication: yes    GI:    Current diet:  DIET REGULAR Low Fiber    Tolerating current diet: yes  Passing flatus: no  Last Bowel Movement: 9/9/19  Respiratory:    Incentive Spirometer at bedside: yes  Patient instructed on use:yes    Patient Safety:    Falls Score: 1  Bed Alarm On?no  Sitter?  no    Zoie Barlow RN

## 2019-09-11 NOTE — PROGRESS NOTES
General Surgery End of Shift Nursing Note    Bedside shift change report given to Altaf Cleveland (oncoming nurse) . Report included the following information SBAR, Kardex, OR Summary, Intake/Output, MAR and Recent Results. Shift worked:   7094-1948   Summary of shift:    Uneventful. No pain presented   Issues for physician to address:        Number times ambulated in hallway past shift: x1    Number of times OOB to chair past shift: up ad val    Pain Management:  Current medication: Dilaudid  Patient states pain is manageable on current pain medication: YES    GI:    Current diet:  DIET CLEAR LIQUID    Tolerating current diet: YES  Passing flatus: NO  Last Bowel Movement: several days ago   Appearance:     Respiratory:    Incentive Spirometer at bedside: YES  Patient instructed on use: YES    Patient Safety:    Falls Score: 1  Bed Alarm On? No  Sitter?  No    Werner Hathc RN

## 2019-09-12 VITALS
BODY MASS INDEX: 28.36 KG/M2 | TEMPERATURE: 97 F | DIASTOLIC BLOOD PRESSURE: 103 MMHG | HEIGHT: 62 IN | HEART RATE: 97 BPM | WEIGHT: 154.1 LBS | RESPIRATION RATE: 18 BRPM | OXYGEN SATURATION: 97 % | SYSTOLIC BLOOD PRESSURE: 142 MMHG

## 2019-09-12 PROCEDURE — 74011250637 HC RX REV CODE- 250/637: Performed by: SURGERY

## 2019-09-12 RX ORDER — ONDANSETRON 4 MG/1
4 TABLET, ORALLY DISINTEGRATING ORAL
Qty: 15 TAB | Refills: 0 | Status: SHIPPED | OUTPATIENT
Start: 2019-09-12 | End: 2019-09-26 | Stop reason: SDUPTHER

## 2019-09-12 RX ORDER — OXYCODONE HYDROCHLORIDE 5 MG/1
5 TABLET ORAL
Qty: 25 TAB | Refills: 0 | Status: SHIPPED | OUTPATIENT
Start: 2019-09-12 | End: 2019-09-15

## 2019-09-12 RX ORDER — POLYETHYLENE GLYCOL 3350 17 G/17G
17 POWDER, FOR SOLUTION ORAL DAILY
Status: DISCONTINUED | OUTPATIENT
Start: 2019-09-12 | End: 2019-09-12 | Stop reason: HOSPADM

## 2019-09-12 RX ADMIN — MESALAMINE 1000 MG: 250 CAPSULE ORAL at 08:25

## 2019-09-12 RX ADMIN — DOCUSATE SODIUM 100 MG: 100 CAPSULE, LIQUID FILLED ORAL at 08:24

## 2019-09-12 RX ADMIN — BUDESONIDE 9 MG: 3 CAPSULE, GELATIN COATED ORAL at 08:23

## 2019-09-12 RX ADMIN — ACETAMINOPHEN 1000 MG: 500 TABLET ORAL at 08:24

## 2019-09-12 RX ADMIN — GABAPENTIN 300 MG: 300 CAPSULE ORAL at 08:24

## 2019-09-12 RX ADMIN — FAMOTIDINE 20 MG: 20 TABLET ORAL at 08:25

## 2019-09-12 RX ADMIN — OXYCODONE HYDROCHLORIDE 10 MG: 5 TABLET ORAL at 08:24

## 2019-09-12 NOTE — PROGRESS NOTES
2120  Patient refused to have vitals taken at [address] of vitals are stable and within limits  Pt does not want to be woken up until 0400  WCTM.

## 2019-09-12 NOTE — DISCHARGE SUMMARY
Post- Surgical Discharge Summary    Patient ID:  Sorin Garcia  [id number]  female  29 y.o.  1985    Admit date: 9/9/2019    Discharge date: 9/12/2019    Admitting Physician: Clem Dakins, MD     Consulting Physician(s):   Treatment Team: Attending Provider: Beulah Whitaker MD; Nurse Practitioner: Snow Roach NP; Utilization Review: Elda Greenfield RN; Care Manager: Bina Cooper    Date of Surgery:   9/9/2019     Preoperative Diagnosis:  CROHN'S DISEASE, FOREIGN BODY    Postoperative Diagnosis:   CROHN'S DISEASE, FOREIGN BODY    Procedure(s):  ROBOTIC ASSISTED DIAGNOSTIC LAPAROSCOPY WITH LYSIS OF ADHESIONS, OPEN SMALL BOWEL RESECTION WITH REMOVAL OF PILLCAM IN SMALL BOWEL     Anesthesia Type:   General     Surgeon: Beulah Whitaker MD                            HPI:  Pt is a 29 y.o. female who has a history of CROHN'S DISEASE, [address] who presents at this time for a diagnostic lap and open small bowel resection. Problem List:   Problem List as of 9/12/2019 Date Reviewed: 9/9/2019          Codes Class Noted - Resolved    Crohn disease ([facility] [address] ICD-10-CM: K50.90  ICD-9-CM: 555.9  9/9/2019 - Present        Bowel obstruction ([facility] [address] ICD-10-CM: G91.071  ICD-9-CM: 560.9  9/9/2019 - Present        Nausea with vomiting ICD-10-CM: R11.2  ICD-9-CM: 787.01  8/6/2019 - Present        Abdominal pain ICD-10-CM: R10.9  ICD-9-CM: 789.00  8/6/2019 - Present        Ovarian cyst rupture ICD-10-CM: N83.209  ICD-9-CM: 620.2  8/5/2019 - Present        S/P hysterectomy ICD-10-CM: Z90.710  ICD-9-CM: V88.01  10/15/2018 - Present        Arrested active phase of labor ICD-10-CM: O62.1  ICD-9-CM: 661.10  1/1/2012 - Present        Normal pregnancy ICD-10-CM: Z34.90  ICD-9-CM: V22.2  12/31/2011 - Present        SROM (spontaneous rupture of membranes) ICD-9-CM: Arminda Bee  12/31/2011 - Present               Hospital Course: The patient underwent surgery. Intra-operative complications: None; patient tolerated the procedure well.  Was taken to the PACU in stable condition and then transferred to the surgical floor. Perioperative Antibiotics:  Ancef    Postoperative Pain Management:  Oxycodone     Postoperative transfusions:    Number of units banked PRBCs =   none     Post Op complications: None     Incisions  - clean, dry and intact. No significant erythema or swelling. Wound(s) appear to be healing without any evidence of infection. Patient mobilized with nursing and was found to be safe and steady with ambulation. Discharged to: Home    Condition on Discharge: Stable    Discharge instructions:    - Take pain medications as prescribed  - Diet Low Fiber  - Discharge activity:    - Activity as tolerated    - Ambulate several times a day   - No heavy lifting for 4 weeks   - Do not drive for two weeks or while on opioid pain medications  - Wound Care: Keep wound(s) clean and dry. See discharge instruction sheet. Allergies: Allergies   Allergen Reactions    Avelox [Moxifloxacin] Swelling and Unknown (comments)    Macrolide Antibiotics Hives, Rash and Swelling     Per patient reported as a previous reaction to a \"mycin\" drug. Did not know which drug.  Nsaids (Non-Steroidal Anti-Inflammatory Drug) Other (comments)     D/t esophageal erosion and GERD              -DISCHARGE MEDICATION LIST     Current Discharge Medication List      START taking these medications    Details   acetaminophen (TYLENOL) 500 mg tablet Take 2 Tabs by mouth four (4) times daily for 5 days. Qty: 40 Tab, Refills: 0      gabapentin (NEURONTIN) 300 mg capsule Take 1 Cap by mouth two (2) times a day for 7 days. Max Daily Amount: 600 mg. Qty: 14 Cap, Refills: 0    Associated Diagnoses: Other partial intestinal obstruction (HCC)      oxyCODONE IR (ROXICODONE) 5 mg immediate release tablet Take 1 Tab by mouth every four (4) hours as needed for Pain for up to 3 days.  Max Daily Amount: 30 mg.  Qty: 15 Tab, Refills: 0    Associated Diagnoses: Other partial intestinal obstruction ([address]         CONTINUE these medications which have CHANGED    Details   dicyclomine (BENTYL) 20 mg tablet Take 1 Tab by mouth every six (6) hours as needed (abdominal cramps) for up to 20 doses. Hold until directed to resume by Dr. Lida Prasad. Qty: 20 Tab, Refills: 0         CONTINUE these medications which have NOT CHANGED    Details   BUDESONIDE PO Take 9 mg by mouth daily. mesalamine (PENTASA PO) Take 1,000 mg by mouth four (4) times daily. multivitamin (ONE A DAY) tablet Take 1 Tab by mouth daily. docusate sodium (COLACE) 100 mg capsule Take 1 Cap by mouth two (2) times a day. Qty: 60 Cap, Refills: 2      ondansetron (ZOFRAN ODT) 4 mg disintegrating tablet Take 1 Tab by mouth every eight (8) hours as needed for Nausea.   Qty: 10 Tab, Refills: 0         STOP taking these medications       erythromycin (FAUSTO-TAB) 250 mg tablet Comments:   Reason for Stopping:         traMADol (ULTRAM) 50 mg tablet Comments:   Reason for Stopping:            per medical continuation form      -Follow up in office in 2 weeks      Signed:  Marilynn Barahona  MSN, APRN, FNP-C, [facility]  Surgical Nurse Practitioner    9/12/2019  8:07 AM

## 2019-09-12 NOTE — PROGRESS NOTES
DEBI Plan:     *Home with family assistance    Patient is being discharged home today without any needs or concerns.  to transport pt home. Follow-up appointments are on the AVS.      Care Management Interventions  PCP Verified by CM: Yes(Dr Keven Halsted)  Mode of Transport at Discharge:  Other (see comment)()  Transition of Care Consult (CM Consult): Discharge Planning  Discharge Durable Medical Equipment: No(No DME use)  Physical Therapy Consult: No  Occupational Therapy Consult: No  Speech Therapy Consult: No  Current Support Network: Lives with Spouse, Family Lives Nearby, Own Home(Lives in a two story home with 5 or 6 steps to enter the home)  Confirm Follow Up Transport: Self  Plan discussed with Pt/Family/Caregiver: Yes  Discharge Location  Discharge Placement: Home with family assistance      Franki Sever  Ext [phone]

## 2019-09-12 NOTE — PROGRESS NOTES
.Discussed with the patient and all questioned fully answered. Patient has received all prescriptions. Her PIV was removed. She was taken via volunteer w/c to  vehicle with no s/s of distress.

## 2019-09-12 NOTE — PROGRESS NOTES
General Surgery End of Shift Nursing Note    Bedside shift change report given to Jon Short (oncoming nurse) by Sukhjinder Nickerson (offgoing nurse). Report included the following information SBAR, Intake/Output and MAR. Shift worked:   7438-3099   Summary of shift:    Patient had a BM this morning. Luci Bees to go home.     Issues for physician to address:   -         Chelita Westbrook

## 2019-09-12 NOTE — PROGRESS NOTES
Patient had a large formed BM. She then refused her new order for Miralax. She stated she does have some at home and will use if she needs it.

## 2019-09-13 ENCOUNTER — TELEPHONE (OUTPATIENT)
Dept: SURGERY | Age: 34
End: 2019-09-13

## 2019-09-13 NOTE — TELEPHONE ENCOUNTER
Small bowel, resection:   Endometriosis causing stricture of small bowel   Serosal adhesions   Foreign body consistent with PillCam   No evidence of inflammatory bowel disease     Reviewed path  No evidence of Crohn's  Will forward path to Dr. Chaitanya Echavarria    In retrospect, her bowel symptoms would cycle on a monthly basis. F/u 2 weeks.

## 2019-09-18 ENCOUNTER — TELEPHONE (OUTPATIENT)
Dept: SURGERY | Age: 34
End: 2019-09-18

## 2019-09-18 NOTE — TELEPHONE ENCOUNTER
endometriosis severe enough to cause a bowel obstruction is probably \"stage 4\", but her GYN would be the best one to ask. Will be happy to discuss with her when I see her back in the office.

## 2019-09-18 NOTE — TELEPHONE ENCOUNTER
Informed pt per Provider endometriosis severe enough to cause bowel obstruction, \"probably stage 4\", Gyn is best one to ask. he would be happy to discuss with you on FU appointment 9/26/19. All questions answered with clarification. She has notice a little redness @ op/site ,no drainage. She will keep a check on site and call the office back if there are any new changes.

## 2019-09-18 NOTE — TELEPHONE ENCOUNTER
Regarding: Non-Urgent Medical Question  ----- Message from Meka Norwood LPN sent at 7/89/2560 12:42 PM EDT -----       ----- Message from Yenifer Mcintyre to Mariola Molina MD sent at 9/18/2019 12:25 PM -----   I reviewed the path from surgery and had a question. With there being endometrial tissue found outside/inside the small bowel, is this termed as \"deep bowel endometriosis\" or \"stage 4 endometriosis? \"   Do you know how long it takes for this to happen? Or was this progressing at a faster rate? I talked with the nurse at my GYN office and she did state that when I had my hysterectomy last October it was noted in the op report there were some endometrial implants noted but never showed it in the actual path.

## 2019-09-26 ENCOUNTER — OFFICE VISIT (OUTPATIENT)
Dept: SURGERY | Age: 34
End: 2019-09-26

## 2019-09-26 VITALS
BODY MASS INDEX: 28.71 KG/M2 | OXYGEN SATURATION: 98 % | SYSTOLIC BLOOD PRESSURE: 130 MMHG | HEART RATE: 98 BPM | TEMPERATURE: 97.9 F | DIASTOLIC BLOOD PRESSURE: 82 MMHG | WEIGHT: 156 LBS | HEIGHT: 62 IN | RESPIRATION RATE: 16 BRPM

## 2019-09-26 DIAGNOSIS — Z09 POSTOPERATIVE EXAMINATION: Primary | ICD-10-CM

## 2019-09-26 NOTE — PROGRESS NOTES
Chief Complaint   Patient presents with    Surgical Follow-up     s/p Diagnostic laparoscopy with lysis of adhesions, Robitc Assist,Open small bowel resection (with removal of PillCam) 09/10/2019. Reviewed path: endometriosis, no IBDz    Drinking 2-3 boost a day  Solid food:   turkey sandwitch and pizza crust: cramping pain  Fine with eggs, saltines, broth    Cramping starts within an hour of eating  Taking occasional bentyl    Having formed stools. Tolerating PO  Pain controlled    complete resolution of preoperative RLQ cramping. Physical Exam:   Abdominal exam: soft  non-distended  appropriatly tender. Wound: clean, dry, no drainage    Doing well  weaning off the Pentasa as directed by Dr. Mason Corners  Stop the colace  Cont the yogurt  Consider adding fiber    Should not need any further treatment for the endometriosis unless the monthly cyclic symptoms deveop. I think some of her irregular bowels at this point is the lack of nay fiber in her diet. She is afraid to eat vegetables. I encouraged her to try to reintroduce veggies into her diet. Should hopefully have some improvement with resumption of a healthy high fiber diet. Continue unrestricted activity.   Follow-up: becca Calle MD FACS

## 2019-09-26 NOTE — PROGRESS NOTES
Chief Complaint   Patient presents with    Surgical Follow-up     s/p Diagnostic laparoscopy with lysis of adhesions, Robitc Assist,Open small bowel resection (with removal of PillCam) 09/10/2019.     1. Have you been to the ER, urgent care clinic since your last visit? Hospitalized since your last visit? No    2. Have you seen or consulted any other health care providers outside of the [address] [place] [address] since your last visit? Include any pap smears or colon screening.  No

## 2019-09-26 NOTE — LETTER
NOTIFICATION RETURN TO WORK / SCHOOL 
 
9/26/2019 2:25 PM 
 
Ms. Karlie Hoffman [address] [address] To Whom It May Concern: 
 
Karlie Hoffman is currently under the care of [address] She may return to work on 9/30/19 full duty with no restrictions. If there are questions or concerns please have the patient contact our office. Sincerely, Genna Knight MD

## 2019-09-26 NOTE — LETTER
NOTIFICATION RETURN TO WORK / SCHOOL 
 
9/26/2019 2:23 PM 
 
Ms. Kemal Adams [address] [address] To Whom It May Concern: 
 
Kemal Adams is currently under the care of [address] [place] [address]. She may return to work on 9/30/10 full duty with no restrictions. If there are questions or concerns please have the patient contact our office. Sincerely, Darinel Mike MD

## 2019-09-28 ENCOUNTER — PATIENT MESSAGE (OUTPATIENT)
Dept: SURGERY | Age: 34
End: 2019-09-28

## 2019-09-30 NOTE — TELEPHONE ENCOUNTER
Pt c/o of burning sensation @ op/site after taking 3 bags of groceries into the house. I recommend pt to avoid any heavy lifting for 2 more weeks, allow for healing process. I will amend her return to work letter. Otherwise pt doing well s/p Diagnostic lap with HAFSA and open SBR on 9/9/19. Opportunity for questions with clarification.

## 2019-10-07 ENCOUNTER — TELEPHONE (OUTPATIENT)
Dept: SURGERY | Age: 34
End: 2019-10-07

## 2019-10-07 NOTE — TELEPHONE ENCOUNTER
Received a call from [address] [place] [address]  she stated insurance only approved day of surgery and denied dates 09/09/2019-/09/12/2019. She requesting a peer to peer review [id number]. Spoke with Scarlet 10/04/2019  [phone] and scheduled peer to peer for 10/07/2019 between 2-4pm with Dr. Maikol Sylvester.

## 2019-10-08 ENCOUNTER — HOSPITAL ENCOUNTER (OUTPATIENT)
Dept: MRI IMAGING | Age: 34
Discharge: HOME | End: 2019-10-08
Attending: INTERNAL MEDICINE
Payer: COMMERCIAL

## 2019-10-08 VITALS — BODY MASS INDEX: 27.44 KG/M2 | WEIGHT: 150 LBS

## 2019-10-08 DIAGNOSIS — K50.112 CROHN'S COLITIS, WITH INTESTINAL OBSTRUCTION (HCC): ICD-10-CM

## 2019-10-08 PROCEDURE — 74011250636 HC RX REV CODE- 250/636: Performed by: INTERNAL MEDICINE

## 2019-10-08 PROCEDURE — 72197 MRI PELVIS W/O & W/DYE: CPT

## 2019-10-08 PROCEDURE — A9585 GADOBUTROL INJECTION: HCPCS | Performed by: INTERNAL MEDICINE

## 2019-10-08 RX ADMIN — GADOBUTROL 7 ML: 604.72 INJECTION INTRAVENOUS at 13:12

## 2019-10-08 NOTE — TELEPHONE ENCOUNTER
Spoke with Alpa. No one called for Peer to peer on 10/7 per [facility] LPN. I will try to reschedule asap.

## 2019-10-10 ENCOUNTER — TELEPHONE (OUTPATIENT)
Dept: SURGERY | Age: 34
End: 2019-10-10

## 2019-10-10 NOTE — TELEPHONE ENCOUNTER
Did peer to peer with Dr. Quan Ortiz. Insurance was not sent the op note. Once I told her that the patient had a bowel resection, hospitalization was approved.   Ref# [id number]

## 2019-11-08 ENCOUNTER — HOSPITAL ENCOUNTER (EMERGENCY)
Age: 34
Discharge: HOME | End: 2019-11-08
Attending: EMERGENCY MEDICINE
Payer: COMMERCIAL

## 2019-11-08 ENCOUNTER — APPOINTMENT (OUTPATIENT)
Dept: CT IMAGING | Age: 34
End: 2019-11-08
Attending: PHYSICIAN ASSISTANT
Payer: COMMERCIAL

## 2019-11-08 VITALS
SYSTOLIC BLOOD PRESSURE: 126 MMHG | OXYGEN SATURATION: 100 % | BODY MASS INDEX: 30.18 KG/M2 | DIASTOLIC BLOOD PRESSURE: 80 MMHG | RESPIRATION RATE: 20 BRPM | WEIGHT: 164.02 LBS | HEIGHT: 62 IN | HEART RATE: 95 BPM | TEMPERATURE: 98.1 F

## 2019-11-08 DIAGNOSIS — K50.10 CROHN'S DISEASE OF COLON WITHOUT COMPLICATION (HCC): Primary | ICD-10-CM

## 2019-11-08 LAB
ALBUMIN SERPL-MCNC: 3.4 G/DL (ref 3.5–5)
ALBUMIN/GLOB SERPL: 0.8 {RATIO} (ref 1.1–2.2)
ALP SERPL-CCNC: 109 U/L (ref 45–117)
ALT SERPL-CCNC: 27 U/L (ref 12–78)
ANION GAP SERPL CALC-SCNC: 8 MMOL/L (ref 5–15)
APPEARANCE UR: CLEAR
AST SERPL-CCNC: 21 U/L (ref 15–37)
BACTERIA URNS QL MICRO: NEGATIVE /HPF
BASOPHILS # BLD: 0.1 K/UL (ref 0–0.1)
BASOPHILS NFR BLD: 1 % (ref 0–1)
BILIRUB SERPL-MCNC: 0.5 MG/DL (ref 0.2–1)
BILIRUB UR QL: NEGATIVE
BUN SERPL-MCNC: 15 MG/DL (ref 6–20)
BUN/CREAT SERPL: 17 (ref 12–20)
CALCIUM SERPL-MCNC: 8.9 MG/DL (ref 8.5–10.1)
CHLORIDE SERPL-SCNC: 106 MMOL/L (ref 97–108)
CO2 SERPL-SCNC: 25 MMOL/L (ref 21–32)
COLOR UR: ABNORMAL
CREAT SERPL-MCNC: 0.86 MG/DL (ref 0.55–1.02)
DIFFERENTIAL METHOD BLD: ABNORMAL
EOSINOPHIL # BLD: 0.5 K/UL (ref 0–0.4)
EOSINOPHIL NFR BLD: 5 % (ref 0–7)
EPITH CASTS URNS QL MICRO: ABNORMAL /LPF
ERYTHROCYTE [DISTWIDTH] IN BLOOD BY AUTOMATED COUNT: 14.3 % (ref 11.5–14.5)
GLOBULIN SER CALC-MCNC: 4.1 G/DL (ref 2–4)
GLUCOSE SERPL-MCNC: 80 MG/DL (ref 65–100)
GLUCOSE UR STRIP.AUTO-MCNC: NEGATIVE MG/DL
HCT VFR BLD AUTO: 42.6 % (ref 35–47)
HGB BLD-MCNC: 13.4 G/DL (ref 11.5–16)
HGB UR QL STRIP: ABNORMAL
HYALINE CASTS URNS QL MICRO: ABNORMAL /LPF (ref 0–5)
IMM GRANULOCYTES # BLD AUTO: 0 K/UL (ref 0–0.04)
IMM GRANULOCYTES NFR BLD AUTO: 0 % (ref 0–0.5)
KETONES UR QL STRIP.AUTO: NEGATIVE MG/DL
LEUKOCYTE ESTERASE UR QL STRIP.AUTO: NEGATIVE
LIPASE SERPL-CCNC: 97 U/L (ref 73–393)
LYMPHOCYTES # BLD: 3.1 K/UL (ref 0.8–3.5)
LYMPHOCYTES NFR BLD: 30 % (ref 12–49)
MCH RBC QN AUTO: 27.7 PG (ref 26–34)
MCHC RBC AUTO-ENTMCNC: 31.5 G/DL (ref 30–36.5)
MCV RBC AUTO: 88 FL (ref 80–99)
MONOCYTES # BLD: 0.6 K/UL (ref 0–1)
MONOCYTES NFR BLD: 6 % (ref 5–13)
NEUTS SEG # BLD: 5.9 K/UL (ref 1.8–8)
NEUTS SEG NFR BLD: 58 % (ref 32–75)
NITRITE UR QL STRIP.AUTO: NEGATIVE
NRBC # BLD: 0 K/UL (ref 0–0.01)
NRBC BLD-RTO: 0 PER 100 WBC
PH UR STRIP: 6.5 [PH] (ref 5–8)
PLATELET # BLD AUTO: 440 K/UL (ref 150–400)
PMV BLD AUTO: 10.1 FL (ref 8.9–12.9)
POTASSIUM SERPL-SCNC: 4 MMOL/L (ref 3.5–5.1)
PROT SERPL-MCNC: 7.5 G/DL (ref 6.4–8.2)
PROT UR STRIP-MCNC: NEGATIVE MG/DL
RBC # BLD AUTO: 4.84 M/UL (ref 3.8–5.2)
RBC #/AREA URNS HPF: ABNORMAL /HPF (ref 0–5)
SODIUM SERPL-SCNC: 139 MMOL/L (ref 136–145)
SP GR UR REFRACTOMETRY: 1.02 (ref 1–1.03)
UA: UC IF INDICATED,UAUC: ABNORMAL
UROBILINOGEN UR QL STRIP.AUTO: 0.2 EU/DL (ref 0.2–1)
WBC # BLD AUTO: 10.2 K/UL (ref 3.6–11)
WBC URNS QL MICRO: ABNORMAL /HPF (ref 0–4)

## 2019-11-08 PROCEDURE — 83690 ASSAY OF LIPASE: CPT

## 2019-11-08 PROCEDURE — 81001 URINALYSIS AUTO W/SCOPE: CPT

## 2019-11-08 PROCEDURE — 85025 COMPLETE CBC W/AUTO DIFF WBC: CPT

## 2019-11-08 PROCEDURE — 36415 COLL VENOUS BLD VENIPUNCTURE: CPT

## 2019-11-08 PROCEDURE — 96374 THER/PROPH/DIAG INJ IV PUSH: CPT

## 2019-11-08 PROCEDURE — 74011250636 HC RX REV CODE- 250/636: Performed by: EMERGENCY MEDICINE

## 2019-11-08 PROCEDURE — 96375 TX/PRO/DX INJ NEW DRUG ADDON: CPT

## 2019-11-08 PROCEDURE — 74177 CT ABD & PELVIS W/CONTRAST: CPT

## 2019-11-08 PROCEDURE — 80053 COMPREHEN METABOLIC PANEL: CPT

## 2019-11-08 PROCEDURE — 99284 EMERGENCY DEPT VISIT MOD MDM: CPT

## 2019-11-08 PROCEDURE — 74011636320 HC RX REV CODE- 636/320: Performed by: EMERGENCY MEDICINE

## 2019-11-08 PROCEDURE — 74011636320 HC RX REV CODE- 636/320: Performed by: PHYSICIAN ASSISTANT

## 2019-11-08 PROCEDURE — 74011250636 HC RX REV CODE- 250/636: Performed by: PHYSICIAN ASSISTANT

## 2019-11-08 RX ORDER — ONDANSETRON 2 MG/ML
8 INJECTION INTRAMUSCULAR; INTRAVENOUS ONCE
Status: COMPLETED | OUTPATIENT
Start: 2019-11-08 | End: 2019-11-08

## 2019-11-08 RX ORDER — MORPHINE SULFATE 2 MG/ML
4 INJECTION, SOLUTION INTRAMUSCULAR; INTRAVENOUS
Status: DISCONTINUED | OUTPATIENT
Start: 2019-11-08 | End: 2019-11-08 | Stop reason: HOSPADM

## 2019-11-08 RX ORDER — BUDESONIDE 3 MG/1
9 CAPSULE, COATED PELLETS ORAL DAILY
Qty: 42 CAP | Refills: 0 | Status: SHIPPED | OUTPATIENT
Start: 2019-11-08 | End: 2019-11-22

## 2019-11-08 RX ORDER — TRAMADOL HYDROCHLORIDE 50 MG/1
50 TABLET ORAL
Qty: 18 TAB | Refills: 0 | Status: SHIPPED | OUTPATIENT
Start: 2019-11-08 | End: 2019-11-11

## 2019-11-08 RX ORDER — SODIUM CHLORIDE 0.9 % (FLUSH) 0.9 %
10 SYRINGE (ML) INJECTION
Status: COMPLETED | OUTPATIENT
Start: 2019-11-08 | End: 2019-11-08

## 2019-11-08 RX ADMIN — SODIUM CHLORIDE 1000 ML: 900 INJECTION, SOLUTION INTRAVENOUS at 13:11

## 2019-11-08 RX ADMIN — IOHEXOL 50 ML: 240 INJECTION, SOLUTION INTRATHECAL; INTRAVASCULAR; INTRAVENOUS; ORAL at 11:01

## 2019-11-08 RX ADMIN — ONDANSETRON 8 MG: 2 INJECTION INTRAMUSCULAR; INTRAVENOUS at 13:25

## 2019-11-08 RX ADMIN — Medication 10 ML: at 13:00

## 2019-11-08 RX ADMIN — IOPAMIDOL 100 ML: 755 INJECTION, SOLUTION INTRAVENOUS at 13:00

## 2019-11-08 RX ADMIN — MORPHINE SULFATE 4 MG: 2 INJECTION, SOLUTION INTRAMUSCULAR; INTRAVENOUS at 13:25

## 2019-11-08 NOTE — DISCHARGE INSTRUCTIONS
Patient Education        Crohn's Disease: Care Instructions  Your Care Instructions    Crohn's disease is a lifelong inflammatory bowel disease (IBD). Parts of the digestive tract get swollen and irritated and may develop deep sores called ulcers. Crohn's disease usually occurs in the last part of the small intestine and the first part of the large intestine. But it can develop anywhere from the mouth to the anus. The main symptoms of Crohn's disease are belly pain, diarrhea, fever, and weight loss. Some people may have constipation. Crohn's disease also sometimes causes problems with the joints, eyes, or skin. Your symptoms may be mild at some times and severe at others. The disease can also go into remission, which means that it is not active and you have no symptoms. Bad attacks of Crohn's disease often have to be treated in the hospital so that you can get medicines and liquids through a tube in your vein, called an IV. This gives your digestive system time to rest and recover. Talk with your doctor about the best treatments for you. You may need medicines that help prevent or treat flare-ups of the disease. You may need surgery to remove part of your bowel if you have an abnormal opening in the bowel (fistula), an abscess, or a bowel obstruction. In some cases, surgery is needed if medicines do not work. But symptoms often return to other areas of the intestines after surgery. Learning good self-care can help you reduce your symptoms and manage Crohn's disease. Follow-up care is a key part of your treatment and safety. Be sure to make and go to all appointments, and call your doctor if you are having problems. It's also a good idea to know your test results and keep a list of the medicines you take. How can you care for yourself at home? · Take your medicines exactly as prescribed. Call your doctor if you think you are having a problem with your medicine.  You will get more details on the specific medicines your doctor prescribes. · Do not take anti-inflammatory medicines, such as aspirin, ibuprofen (Advil, Motrin), or naproxen (Aleve). They may make your symptoms worse. Do not take any other medicines or herbal products without talking to your doctor first.  · Avoid foods that make your symptoms worse. These might include milk, alcohol, high-fiber foods, or spicy foods. · Eat a healthy diet. Make sure to get enough iron. Rectal bleeding may make you lose iron. Good sources of iron include beef, lentils, spinach, raisins, and iron-enriched breads and cereals. · Drink liquid meal replacements if your doctor recommends them. These are high in calories and contain vitamins and minerals. Severe symptoms may make it hard for your body to absorb vitamins and minerals. · Do not smoke. Smoking makes Crohn's disease worse. If you need help quitting, talk to your doctor about stop-smoking programs and medicines. These can increase your chances of quitting for good. · Seek support from friends and family to help cope with Crohn's disease. The illness can affect all parts of your life. Get counseling if you need it. When should you call for help? Call 911 anytime you think you may need emergency care. For example, call if:    · Your stools are maroon or very bloody.     · You passed out (lost consciousness).    Call your doctor now or seek immediate medical care if:    · You are vomiting.     · You have new or worse belly pain.     · You have a fever.     · You cannot pass stools or gas.     · You have new or more blood in your stools.    Watch closely for changes in your health, and be sure to contact your doctor if:    · You have new or worse symptoms.     · You are losing weight.     · You do not get better as expected. Where can you learn more? Go to http://jody-clara.info/. Enter 21 [id number] in the search box to learn more about \"Crohn's Disease: Care Instructions. \"  Current as of: November 7, 2018  Content Version: 12.2  © 3739-2135 [employer]. Care instructions adapted under license by [employer] (which disclaims liability or warranty for this information). If you have questions about a medical condition or this instruction, always ask your healthcare professional. [address] any warranty or liability for your use of this information. Patient Education        Diet for Inflammatory Bowel Disease: Care Instructions  Your Care Instructions    Crohn's disease and ulcerative colitis are types of inflammatory bowel disease. What you eat doesn't increase the inflammation that causes your disease. But some types of foods, such as high-fiber fruits and vegetables, may make your symptoms worse. No one diet is right for everyone with an inflammatory bowel disease. Foods that bother one person may not bother another. Your diet has to be tailored for you. Follow-up care is a key part of your treatment and safety. Be sure to make and go to all appointments, and call your doctor if you are having problems. It's also a good idea to know your test results and keep a list of the medicines you take. How can you care for yourself at home? · Keep a food diary. As soon as you know what foods make your symptoms worse, your doctor or dietitian can help you plan the right diet for you. · During a flare-up, avoid or reduce foods that make symptoms worse. ? Choose dairy products that are low in lactose, such as yogurt, lactose-reduced milk, and hard cheeses like cheddar. ? If you have fat in your stools, choose low-fat foods instead of high-fat ones. For instance, some cuts of red meat have a lot of fat. A low-fat choice would be lean beef (such as sirloin, top and bottom round, richelle, or diet lean hamburger), poultry, or fish, such as cod.  ? Instead of frying foods, try baking or broiling them. ? Cook fruits and vegetables without hulls, skins, or seeds.   ? Try different ways of preparing fruits and vegetables, such as steaming, stewing, or baking. ? Peel and seed fresh fruits and vegetables if these bother you, or choose canned varieties. · Get the calories and nutrients you need. ? Eat a varied, nutritious diet that is high in calories and protein. ? Try eating 3 meals plus 2 or 3 snacks a day. It may be easier to get more calories if you spread your food intake throughout the day. ? Take vitamin and mineral supplements if your doctor recommends them. ? Try adding high-calorie liquid supplements, such as Ensure Plus or Boost Plus, if you have trouble keeping your weight up.  ? See your doctor or dietitian if your diet feels too limited or you are losing weight. · Make sure to get enough iron. Rectal bleeding may make you lose iron. Good sources of iron include:  ? Beef. ? Lentils. ? Spinach. ? Raisins. ? Iron-enriched breads and cereals. When should you call for help? Watch closely for changes in your health, and be sure to contact your doctor if you have any problems. Where can you learn more? Go to http://jody-clara.info/. Enter V643 in the search box to learn more about \"Diet for Inflammatory Bowel Disease: Care Instructions. \"  Current as of: November 7, 2018  Content Version: 12.2  © 6314-2349 [employer]. Care instructions adapted under license by [employer] (which disclaims liability or warranty for this information). If you have questions about a medical condition or this instruction, always ask your healthcare professional. [place] [address] any warranty or liability for your use of this information.

## 2019-11-13 NOTE — ED PROVIDER NOTES
EMERGENCY DEPARTMENT HISTORY AND PHYSICAL EXAM      Date: 2019  Patient Name: Jhonathan Lopez  Patient Age and Sex: 29 y.o. female    History of Presenting Illness     Chief Complaint   Patient presents with    Abdominal Pain     pt with c/o right lower abd pain that started yesterday. N/V/D and decreased appetite. pt was diagnosed recently with crohns. pt had bowel resection in sept. after having an obstruction and stricture and was diagnosed with stage 4 endometriosis. pt sees Dr. Kaity Garduno with GI       History Provided By: Patient    HPI: Jhonathan Lopez, is a 29 y.o. female with a h/o endometriosis and crohn's disease presents with right-sided abdominal pain that began yesterday. Cramping in nature. Associated with nausea, no vomiting. Last BM was yesterday, normal. No urinary symptoms. No fevers or chills. Pt denies any other alleviating or exacerbating factors. There are no other complaints, changes or physical findings at this time. Past Medical History:   Diagnosis Date    Abnormal Pap smear     Biopsy done last year and came back ok    Asthma     flares with bronchitis has been over a year as stated 10/5/2018    Autoimmune disease ([facility] [address]     Crohns    Crohn's disease ([facility] [address] 2019    Difficult intravenous access     GERD (gastroesophageal reflux disease)     IBS (irritable bowel syndrome)     Kidney stones     Nausea & vomiting     PONV-nausea    Psychiatric disorder     anxiety & depression    PUD (peptic ulcer disease)      Past Surgical History:   Procedure Laterality Date    COLONOSCOPY N/A 2019    COLONOSCOPY performed by [facility]os [facility] at [facility] ENDOSCOPY    HX APPENDECTOMY      7th grade.     HX COLONOSCOPY      HX ENDOSCOPY      HX GYN          HX GYN  2004        HX GYN      hysterectomy    HX HEENT      dental surgery    HX OTHER SURGICAL  09/10/2019    s/p Diagnostic laparoscopy with lysis of adhesions, Robitc Assist,Open small bowel resection (with removal of PillCam)        PCP: Cleve Villarreal MD    Past History   Past Medical History:  Past Medical History:   Diagnosis Date    Abnormal Pap smear     Biopsy done last year and came back ok    Asthma     flares with bronchitis has been over a year as stated 10/5/2018    Autoimmune disease ([facility] [address]     Crohns    Crohn's disease ([facility] [address] 2019    Difficult intravenous access     GERD (gastroesophageal reflux disease)     IBS (irritable bowel syndrome)     Kidney stones     Nausea & vomiting     PONV-nausea    Psychiatric disorder     anxiety & depression    PUD (peptic ulcer disease)        Past Surgical History:  Past Surgical History:   Procedure Laterality Date    COLONOSCOPY N/A 2019    COLONOSCOPY performed by Abril Longoria MD at [facility] ENDOSCOPY    HX APPENDECTOMY      7th grade.  HX COLONOSCOPY      HX ENDOSCOPY      HX GYN          HX GYN  2004        HX GYN      hysterectomy    HX HEENT      dental surgery    HX OTHER SURGICAL  09/10/2019    s/p Diagnostic laparoscopy with lysis of adhesions, Robitc Assist,Open small bowel resection (with removal of PillCam)        Family History:  Family History   Problem Relation Age of Onset    Heart Disease Mother     Diabetes Mother     Cancer Maternal Aunt         colon cancer       Social History:  Social History     Tobacco Use    Smoking status: Former Smoker     Packs/day: 0.25     Last attempt to quit: 2017     Years since quittin.1    Smokeless tobacco: Never Used   Substance Use Topics    Alcohol use: Yes     Comment: occassionally    Drug use: No       Allergies: Allergies   Allergen Reactions    Avelox [Moxifloxacin] Swelling and Unknown (comments)    Macrolide Antibiotics Hives, Rash and Swelling     Per patient reported as a previous reaction to a \"mycin\" drug. Did not know which drug.     Nsaids (Non-Steroidal Anti-Inflammatory Drug) Other (comments)     D/t esophageal erosion and GERD       Current Medications:  No current facility-administered medications on file prior to encounter. Current Outpatient Medications on File Prior to Encounter   Medication Sig Dispense Refill    dicyclomine (BENTYL) 20 mg tablet Take 1 Tab by mouth every six (6) hours as needed (abdominal cramps) for up to 20 doses. Hold until directed to resume by Dr. Rosa Avalos. 20 Tab 0    mesalamine (PENTASA PO) Take 1,000 mg by mouth four (4) times daily.  multivitamin (ONE A DAY) tablet Take 1 Tab by mouth daily.  ondansetron (ZOFRAN ODT) 4 mg disintegrating tablet Take 1 Tab by mouth every eight (8) hours as needed for Nausea. 10 Tab 0       Review of Systems   Review of Systems   Constitutional: Negative. Negative for appetite change, chills and fever. HENT: Negative for congestion, ear pain, rhinorrhea, sinus pain, trouble swallowing and voice change. Respiratory: Negative for cough, chest tightness, shortness of breath, wheezing and stridor. Cardiovascular: Negative for chest pain, palpitations and leg swelling. Gastrointestinal: Positive for abdominal pain and nausea. Negative for blood in stool, constipation, diarrhea and vomiting. Genitourinary: Negative for difficulty urinating, dysuria, flank pain, frequency and hematuria. Musculoskeletal: Negative for arthralgias and joint swelling. Skin: Negative. Neurological: Negative for dizziness, syncope, weakness, numbness and headaches. All other systems reviewed and are negative. Physical Exam   Physical Exam   Constitutional: She is oriented to person, place, and time. She appears well-developed and well-nourished. HENT:   Head: Atraumatic. Mouth/Throat: Oropharynx is clear and moist.   Eyes: Pupils are equal, round, and reactive to light. Conjunctivae and EOM are normal. No scleral icterus. Neck: Normal range of motion. Neck supple. No JVD present.    Cardiovascular: Normal rate, regular rhythm, normal heart sounds and intact distal pulses. Pulmonary/Chest: Effort normal and breath sounds normal. She exhibits no tenderness. Abdominal: Soft. Bowel sounds are normal. She exhibits no distension. There is tenderness. There is no rigidity, no rebound, no guarding, no CVA tenderness, no tenderness at McBurney's point and negative Leon's sign. No hernia. Musculoskeletal: Normal range of motion. She exhibits no edema. Neurological: She is alert and oriented to person, place, and time. No cranial nerve deficit. Skin: Skin is warm and dry. She is not diaphoretic. Nursing note and vitals reviewed. Diagnostic Study Results     Labs -  Recent Results (from the past 200 hour(s))   CBC WITH AUTOMATED DIFF    Collection Time: 11/08/19 10:48 AM   Result Value Ref Range    WBC 10.2 3.6 - 11.0 K/uL    RBC 4.84 3.80 - 5.20 M/uL    HGB 13.4 11.5 - 16.0 g/dL    HCT 42.6 35.0 - 47.0 %    MCV 88.0 80.0 - 99.0 FL    MCH 27.7 26.0 - 34.0 PG    MCHC 31.5 30.0 - 36.5 g/dL    RDW 14.3 11.5 - 14.5 %    PLATELET 236 (H) 773 - 400 K/uL    MPV 10.1 8.9 - 12.9 FL    NRBC 0.0 0  WBC    ABSOLUTE NRBC 0.00 0.00 - 0.01 K/uL    NEUTROPHILS 58 32 - 75 %    LYMPHOCYTES 30 12 - 49 %    MONOCYTES 6 5 - 13 %    EOSINOPHILS 5 0 - 7 %    BASOPHILS 1 0 - 1 %    IMMATURE GRANULOCYTES 0 0.0 - 0.5 %    ABS. NEUTROPHILS 5.9 1.8 - 8.0 K/UL    ABS. LYMPHOCYTES 3.1 0.8 - 3.5 K/UL    ABS. MONOCYTES 0.6 0.0 - 1.0 K/UL    ABS. EOSINOPHILS 0.5 (H) 0.0 - 0.4 K/UL    ABS. BASOPHILS 0.1 0.0 - 0.1 K/UL    ABS. IMM.  GRANS. 0.0 0.00 - 0.04 K/UL    DF AUTOMATED     METABOLIC PANEL, COMPREHENSIVE    Collection Time: 11/08/19 10:48 AM   Result Value Ref Range    Sodium 139 136 - 145 mmol/L    Potassium 4.0 3.5 - 5.1 mmol/L    Chloride 106 97 - 108 mmol/L    CO2 25 21 - 32 mmol/L    Anion gap 8 5 - 15 mmol/L    Glucose 80 65 - 100 mg/dL    BUN 15 6 - 20 MG/DL    Creatinine 0.86 0.55 - 1.02 MG/DL    BUN/Creatinine ratio 17 12 - 20      GFR est AA >60 >60 ml/min/1.73m2 GFR est non-AA >60 >60 ml/min/1.73m2    Calcium 8.9 8.5 - 10.1 MG/DL    Bilirubin, total 0.5 0.2 - 1.0 MG/DL    ALT (SGPT) 27 12 - 78 U/L    AST (SGOT) 21 15 - 37 U/L    Alk. phosphatase 109 45 - 117 U/L    Protein, total 7.5 6.4 - 8.2 g/dL    Albumin 3.4 (L) 3.5 - 5.0 g/dL    Globulin 4.1 (H) 2.0 - 4.0 g/dL    A-G Ratio 0.8 (L) 1.1 - 2.2     LIPASE    Collection Time: 11/08/19 10:48 AM   Result Value Ref Range    Lipase 97 73 - 393 U/L   URINALYSIS W/ REFLEX CULTURE    Collection Time: 11/08/19  1:16 PM   Result Value Ref Range    Color YELLOW/STRAW      Appearance CLEAR CLEAR      Specific gravity 1.019 1.003 - 1.030      pH (UA) 6.5 5.0 - 8.0      Protein NEGATIVE  NEG mg/dL    Glucose NEGATIVE  NEG mg/dL    Ketone NEGATIVE  NEG mg/dL    Bilirubin NEGATIVE  NEG      Blood SMALL (A) NEG      Urobilinogen 0.2 0.2 - 1.0 EU/dL    Nitrites NEGATIVE  NEG      Leukocyte Esterase NEGATIVE  NEG      WBC 0-4 0 - 4 /hpf    RBC 0-5 0 - 5 /hpf    Epithelial cells FEW FEW /lpf    Bacteria NEGATIVE  NEG /hpf    UA:UC IF INDICATED CULTURE NOT INDICATED BY UA RESULT CNI      Hyaline cast 0-2 0 - 5 /lpf       Radiologic Studies -   CT ABD PELV W CONT   Final Result   IMPRESSION:   Enteroenteric anastomosis in the right lower quadrant with small bowel wall   thickening and adjacent inflammation. Given the differences in technique when   compared to the prior MRI, this demonstrates a similar extent and distribution. No free fluid or small bowel obstruction is identified. Medical Decision Making   I am the first provider for this patient. Records Reviewed: I reviewed our electronic medical record system for any past medical records that were available that may contribute to the patient's current condition, including their PMH, surgical history, social and family history. Reviewed the nursing notes and vital signs from today's visit. Vital Signs-Reviewed the patient's vital signs.       Provider Notes (Medical Decision Making):   Pt presents with acute abdominal pain; vital signs stable with currently a non-peritoneal exam; DDx includes: Gastroenteritis, pancreatitis, obstruction, appendicitis, crohn's flair. Will get labs, treat symptomatically and obtain serial abdominal exams to determine if additional imaging is indicated. Will reassess and monitor closely. Reassessment: due to persistent pain, CT abd/pelv was done, which does not show any acute abnormalities. Labs also unremarkable. Patient  but to deep palp only, overall feeling better. Called dr. Preethi Diana group and spoke with on call provider. He recommends outpatient follow up. ED Course:   Initial assessment performed. The patients presenting problems have been discussed, and they are in agreement with the care plan formulated and outlined with them. I have encouraged them to ask questions as they arise throughout their visit. Progress note:  Patient has been reassessed and reports feeling considerably better, has normal vital signs and feels comfortable going home. I think this is reasonable as no findings today suggest a life-threatening condition. DISPOSITION: DISCHARGE  The patient's results have been reviewed with patient and available family and/or caregiver. They verbally convey their understanding and agreement of the patient's signs, symptoms, diagnosis, treatment and prognosis and additionally agree to follow up as recommended in the discharge instructions or to return to the Emergency Department should the patient's condition change prior to their follow-up appointment. The patient and available family and/or caregiver verbally agree with the care plan and all of their questions have been answered.  The discharge instructions have also been provided to the them with educational information regarding the patient's diagnosis as well a list of reasons why the patient would want to return to the ER prior to their follow-up appointment should any concerns arise, the patient's condition change or symptoms worsen. Carolina Palomino MD, MSc    Diagnosis     Clinical Impression:   1. Crohn's disease of colon without complication ([facility] [address]        Attestation:  I personally performed the services described in this documentation on this date 11/8/2019 for patient Rebekah Cox. Carolina Palomino MD    Please note that this dictation was completed with [employer], the computer voice recognition software. Quite often unanticipated grammatical, syntax, homophones, and other interpretive errors are inadvertently transcribed by the computer software. Please disregard these errors. Please excuse any errors that have escaped final proofreading.

## 2019-11-20 ENCOUNTER — HOSPITAL ENCOUNTER (OUTPATIENT)
Dept: GENERAL RADIOLOGY | Age: 34
Discharge: HOME | End: 2019-11-20
Payer: COMMERCIAL

## 2019-11-20 DIAGNOSIS — R11.2 NAUSEA WITH VOMITING: ICD-10-CM

## 2019-11-20 PROCEDURE — 74022 RADEX COMPL AQT ABD SERIES: CPT

## 2019-11-21 ENCOUNTER — APPOINTMENT (OUTPATIENT)
Dept: CT IMAGING | Age: 34
End: 2019-11-21
Attending: EMERGENCY MEDICINE
Payer: COMMERCIAL

## 2019-11-21 ENCOUNTER — HOSPITAL ENCOUNTER (EMERGENCY)
Age: 34
Discharge: HOME | End: 2019-11-21
Attending: EMERGENCY MEDICINE
Payer: COMMERCIAL

## 2019-11-21 VITALS
RESPIRATION RATE: 16 BRPM | SYSTOLIC BLOOD PRESSURE: 128 MMHG | OXYGEN SATURATION: 98 % | WEIGHT: 168 LBS | TEMPERATURE: 98.7 F | DIASTOLIC BLOOD PRESSURE: 84 MMHG | BODY MASS INDEX: 30.73 KG/M2 | HEART RATE: 81 BPM

## 2019-11-21 DIAGNOSIS — K50.00 CROHN'S DISEASE OF SMALL INTESTINE WITHOUT COMPLICATION (HCC): Primary | ICD-10-CM

## 2019-11-21 LAB
ALBUMIN SERPL-MCNC: 3.2 G/DL (ref 3.5–5)
ALBUMIN/GLOB SERPL: 0.8 {RATIO} (ref 1.1–2.2)
ALP SERPL-CCNC: 76 U/L (ref 45–117)
ALT SERPL-CCNC: 28 U/L (ref 12–78)
ANION GAP SERPL CALC-SCNC: 5 MMOL/L (ref 5–15)
APPEARANCE UR: ABNORMAL
AST SERPL-CCNC: 16 U/L (ref 15–37)
BACTERIA URNS QL MICRO: NEGATIVE /HPF
BASOPHILS # BLD: 0 K/UL (ref 0–0.1)
BASOPHILS NFR BLD: 0 % (ref 0–1)
BILIRUB SERPL-MCNC: 0.3 MG/DL (ref 0.2–1)
BILIRUB UR QL: NEGATIVE
BUN SERPL-MCNC: 13 MG/DL (ref 6–20)
BUN/CREAT SERPL: 15 (ref 12–20)
CALCIUM SERPL-MCNC: 8.4 MG/DL (ref 8.5–10.1)
CHLORIDE SERPL-SCNC: 103 MMOL/L (ref 97–108)
CO2 SERPL-SCNC: 28 MMOL/L (ref 21–32)
COLOR UR: ABNORMAL
CREAT SERPL-MCNC: 0.87 MG/DL (ref 0.55–1.02)
CRP SERPL-MCNC: 2.22 MG/DL (ref 0–0.6)
DIFFERENTIAL METHOD BLD: ABNORMAL
EOSINOPHIL # BLD: 0 K/UL (ref 0–0.4)
EOSINOPHIL NFR BLD: 0 % (ref 0–7)
EPITH CASTS URNS QL MICRO: ABNORMAL /LPF
ERYTHROCYTE [DISTWIDTH] IN BLOOD BY AUTOMATED COUNT: 13.3 % (ref 11.5–14.5)
ERYTHROCYTE [SEDIMENTATION RATE] IN BLOOD: 3 MM/HR (ref 0–20)
GLOBULIN SER CALC-MCNC: 3.9 G/DL (ref 2–4)
GLUCOSE SERPL-MCNC: 109 MG/DL (ref 65–100)
GLUCOSE UR STRIP.AUTO-MCNC: NEGATIVE MG/DL
HCG SERPL-ACNC: <1 MIU/ML (ref 0–6)
HCT VFR BLD AUTO: 47.3 % (ref 35–47)
HGB BLD-MCNC: 15 G/DL (ref 11.5–16)
HGB UR QL STRIP: ABNORMAL
HYALINE CASTS URNS QL MICRO: ABNORMAL /LPF (ref 0–5)
IMM GRANULOCYTES # BLD AUTO: 0 K/UL (ref 0–0.04)
IMM GRANULOCYTES NFR BLD AUTO: 0 % (ref 0–0.5)
KETONES UR QL STRIP.AUTO: 40 MG/DL
LEUKOCYTE ESTERASE UR QL STRIP.AUTO: NEGATIVE
LYMPHOCYTES # BLD: 1.5 K/UL (ref 0.8–3.5)
LYMPHOCYTES NFR BLD: 14 % (ref 12–49)
MCH RBC QN AUTO: 27.4 PG (ref 26–34)
MCHC RBC AUTO-ENTMCNC: 31.7 G/DL (ref 30–36.5)
MCV RBC AUTO: 86.5 FL (ref 80–99)
MONOCYTES # BLD: 0.6 K/UL (ref 0–1)
MONOCYTES NFR BLD: 6 % (ref 5–13)
NEUTS SEG # BLD: 8.6 K/UL (ref 1.8–8)
NEUTS SEG NFR BLD: 80 % (ref 32–75)
NITRITE UR QL STRIP.AUTO: NEGATIVE
NRBC # BLD: 0 K/UL (ref 0–0.01)
NRBC BLD-RTO: 0 PER 100 WBC
PH UR STRIP: 6.5 [PH] (ref 5–8)
PLATELET # BLD AUTO: 400 K/UL (ref 150–400)
PMV BLD AUTO: 10 FL (ref 8.9–12.9)
POTASSIUM SERPL-SCNC: 4 MMOL/L (ref 3.5–5.1)
PROT SERPL-MCNC: 7.1 G/DL (ref 6.4–8.2)
PROT UR STRIP-MCNC: 30 MG/DL
RBC # BLD AUTO: 5.47 M/UL (ref 3.8–5.2)
RBC #/AREA URNS HPF: >100 /HPF (ref 0–5)
SODIUM SERPL-SCNC: 136 MMOL/L (ref 136–145)
SP GR UR REFRACTOMETRY: 1.01 (ref 1–1.03)
UROBILINOGEN UR QL STRIP.AUTO: 0.2 EU/DL (ref 0.2–1)
WBC # BLD AUTO: 10.8 K/UL (ref 3.6–11)
WBC URNS QL MICRO: ABNORMAL /HPF (ref 0–4)

## 2019-11-21 PROCEDURE — 74177 CT ABD & PELVIS W/CONTRAST: CPT

## 2019-11-21 PROCEDURE — 96376 TX/PRO/DX INJ SAME DRUG ADON: CPT

## 2019-11-21 PROCEDURE — 80053 COMPREHEN METABOLIC PANEL: CPT

## 2019-11-21 PROCEDURE — 99284 EMERGENCY DEPT VISIT MOD MDM: CPT

## 2019-11-21 PROCEDURE — 96375 TX/PRO/DX INJ NEW DRUG ADDON: CPT

## 2019-11-21 PROCEDURE — 85025 COMPLETE CBC W/AUTO DIFF WBC: CPT

## 2019-11-21 PROCEDURE — 74011636320 HC RX REV CODE- 636/320: Performed by: EMERGENCY MEDICINE

## 2019-11-21 PROCEDURE — 96361 HYDRATE IV INFUSION ADD-ON: CPT

## 2019-11-21 PROCEDURE — 85652 RBC SED RATE AUTOMATED: CPT

## 2019-11-21 PROCEDURE — 96374 THER/PROPH/DIAG INJ IV PUSH: CPT

## 2019-11-21 PROCEDURE — 81001 URINALYSIS AUTO W/SCOPE: CPT

## 2019-11-21 PROCEDURE — 99285 EMERGENCY DEPT VISIT HI MDM: CPT

## 2019-11-21 PROCEDURE — 86140 C-REACTIVE PROTEIN: CPT

## 2019-11-21 PROCEDURE — 84702 CHORIONIC GONADOTROPIN TEST: CPT

## 2019-11-21 PROCEDURE — 74011250636 HC RX REV CODE- 250/636: Performed by: EMERGENCY MEDICINE

## 2019-11-21 PROCEDURE — 36415 COLL VENOUS BLD VENIPUNCTURE: CPT

## 2019-11-21 RX ORDER — PREDNISONE 20 MG/1
40 TABLET ORAL DAILY
Qty: 14 TAB | Refills: 0 | Status: SHIPPED | OUTPATIENT
Start: 2019-11-21 | End: 2019-11-28

## 2019-11-21 RX ORDER — MORPHINE SULFATE 2 MG/ML
6 INJECTION, SOLUTION INTRAMUSCULAR; INTRAVENOUS ONCE
Status: COMPLETED | OUTPATIENT
Start: 2019-11-21 | End: 2019-11-21

## 2019-11-21 RX ORDER — SODIUM CHLORIDE 0.9 % (FLUSH) 0.9 %
10 SYRINGE (ML) INJECTION
Status: COMPLETED | OUTPATIENT
Start: 2019-11-21 | End: 2019-11-21

## 2019-11-21 RX ORDER — HYDROCODONE BITARTRATE AND ACETAMINOPHEN 5; 325 MG/1; MG/1
1 TABLET ORAL
Qty: 15 TAB | Refills: 0 | Status: SHIPPED | OUTPATIENT
Start: 2019-11-21 | End: 2019-11-28

## 2019-11-21 RX ORDER — ONDANSETRON 2 MG/ML
4 INJECTION INTRAMUSCULAR; INTRAVENOUS
Status: COMPLETED | OUTPATIENT
Start: 2019-11-21 | End: 2019-11-21

## 2019-11-21 RX ORDER — MORPHINE SULFATE 2 MG/ML
6 INJECTION, SOLUTION INTRAMUSCULAR; INTRAVENOUS
Status: COMPLETED | OUTPATIENT
Start: 2019-11-21 | End: 2019-11-21

## 2019-11-21 RX ADMIN — IOPAMIDOL 100 ML: 755 INJECTION, SOLUTION INTRAVENOUS at 11:01

## 2019-11-21 RX ADMIN — IOHEXOL 50 ML: 240 INJECTION, SOLUTION INTRATHECAL; INTRAVASCULAR; INTRAVENOUS; ORAL at 10:52

## 2019-11-21 RX ADMIN — SODIUM CHLORIDE 1000 ML: 900 INJECTION, SOLUTION INTRAVENOUS at 13:47

## 2019-11-21 RX ADMIN — SODIUM CHLORIDE 1000 ML: 900 INJECTION, SOLUTION INTRAVENOUS at 10:48

## 2019-11-21 RX ADMIN — MORPHINE SULFATE 6 MG: 2 INJECTION, SOLUTION INTRAMUSCULAR; INTRAVENOUS at 10:51

## 2019-11-21 RX ADMIN — ONDANSETRON 4 MG: 2 INJECTION INTRAMUSCULAR; INTRAVENOUS at 10:49

## 2019-11-21 RX ADMIN — MORPHINE SULFATE 6 MG: 2 INJECTION, SOLUTION INTRAMUSCULAR; INTRAVENOUS at 13:48

## 2019-11-21 RX ADMIN — Medication 10 ML: at 11:01

## 2019-11-21 RX ADMIN — METHYLPREDNISOLONE SODIUM SUCCINATE 125 MG: 125 INJECTION, POWDER, FOR SOLUTION INTRAMUSCULAR; INTRAVENOUS at 13:47

## 2019-11-21 NOTE — ED NOTES
Pt resting on side in stretcher, answering questions appropriately. Ongoing diffuse abdominal pain, vomiting, last BM multiple days ago. Hypoactive bowel sounds. Abdomen soft. Skin PWD. Respirations even and unlabored. Medicated per MAR. Instructed to drink oral contrast when able. Family at bedside. Pt states she is unable to urinate at this time.

## 2019-11-21 NOTE — ED NOTES
Pt reports improvement in abdominal pain, but does reports pain when drinking contrast. Up to bedside commode to provide urine sample.

## 2019-11-21 NOTE — ED PROVIDER NOTES
EMERGENCY DEPARTMENT HISTORY AND PHYSICAL EXAM      Date: 11/21/2019  Patient Name: Shanique Regalado  Patient Age and Sex: 29 y.o. female    History of Presenting Illness     Chief Complaint   Patient presents with    Abdominal Pain     pt has hx of crohns and SBO. having pain and no BM. decreased po intake. had outpatient xray done yesterday and  states possible obstruction       History Provided By: Patient    Ability to gather history was limited by: none    HPI: Shanique Regalado, 29 y.o. female with recent history of Crohn's disease, and small bowel resection 2 months ago in the setting of bowel obstruction and immobile PillCam, complains of generalized abdominal pain. Symptoms started a couple days ago, gradually worsening. Also with nausea, vomiting, constipation. States she has not had a bowel movement in 4 days. She had an outpatient abdominal x-ray yesterday, sounds as though she was told she had constipation without evidence of obstruction, although the report is not available to me. Pain is described as cramping and stabbing in nature, 9 out of 10 severity, generalized abdomen. Not relieved with MiraLAX or fleets enema or Phenergan. Pt denies any other alleviating or exacerbating factors. There are no other complaints, changes or physical findings at this time.      Past Medical History:   Diagnosis Date    Abnormal Pap smear     Biopsy done last year and came back ok    Asthma     flares with bronchitis has been over a year as stated 10/5/2018    Autoimmune disease ([facility] [address]     Crohns    Crohn's disease ([facility] [address] 08/2019    Difficult intravenous access     GERD (gastroesophageal reflux disease)     IBS (irritable bowel syndrome)     Kidney stones     Nausea & vomiting     PONV-nausea    Psychiatric disorder     anxiety & depression    PUD (peptic ulcer disease)      Past Surgical History:   Procedure Laterality Date    COLONOSCOPY N/A 8/7/2019    COLONOSCOPY performed by Noy Ravi MD [facility] at [facility] ENDOSCOPY    HX APPENDECTOMY      7th grade.  HX COLONOSCOPY      HX ENDOSCOPY      HX GYN          HX GYN  2004        HX GYN      hysterectomy    HX HEENT      dental surgery    HX OTHER SURGICAL  09/10/2019    s/p Diagnostic laparoscopy with lysis of adhesions, Robitc Assist,Open small bowel resection (with removal of PillCam)        PCP: Farzad Lynn MD    Past History     Past Medical History:  Past Medical History:   Diagnosis Date    Abnormal Pap smear     Biopsy done last year and came back ok    Asthma     flares with bronchitis has been over a year as stated 10/5/2018    Autoimmune disease ([facility] [address]     Crohns    Crohn's disease ([address] 2019    Difficult intravenous access     GERD (gastroesophageal reflux disease)     IBS (irritable bowel syndrome)     Kidney stones     Nausea & vomiting     PONV-nausea    Psychiatric disorder     anxiety & depression    PUD (peptic ulcer disease)        Past Surgical History:  Past Surgical History:   Procedure Laterality Date    COLONOSCOPY N/A 2019    COLONOSCOPY performed by Roger Fernández MD at [facility] ENDOSCOPY    HX APPENDECTOMY      7th grade.  HX COLONOSCOPY      HX ENDOSCOPY      HX GYN          HX GYN  2004        HX GYN      hysterectomy    HX HEENT      dental surgery    HX OTHER SURGICAL  09/10/2019    s/p Diagnostic laparoscopy with lysis of adhesions, Robitc Assist,Open small bowel resection (with removal of PillCam)        Family History:  Family History   Problem Relation Age of Onset    Heart Disease Mother     Diabetes Mother     Cancer Maternal Aunt         colon cancer       Social History:  Social History     Tobacco Use    Smoking status: Former Smoker     Packs/day: 0.25     Last attempt to quit: 2017     Years since quittin.2    Smokeless tobacco: Never Used   Substance Use Topics    Alcohol use: Yes     Comment: occassionally    Drug use:  No Allergies: Allergies   Allergen Reactions    Avelox [Moxifloxacin] Swelling and Unknown (comments)    Macrolide Antibiotics Hives, Rash and Swelling     Per patient reported as a previous reaction to a \"mycin\" drug. Did not know which drug.  Nsaids (Non-Steroidal Anti-Inflammatory Drug) Other (comments)     D/t esophageal erosion and GERD       Current Medications:  No current facility-administered medications on file prior to encounter. Current Outpatient Medications on File Prior to Encounter   Medication Sig Dispense Refill    budesonide (ENTOCORT EC) 3 mg capsule Take 3 Caps by mouth daily for 14 days. 42 Cap 0    dicyclomine (BENTYL) 20 mg tablet Take 1 Tab by mouth every six (6) hours as needed (abdominal cramps) for up to 20 doses. Hold until directed to resume by Dr. Van Campuzano. 20 Tab 0    mesalamine (PENTASA PO) Take 1,000 mg by mouth four (4) times daily.  multivitamin (ONE A DAY) tablet Take 1 Tab by mouth daily.  ondansetron (ZOFRAN ODT) 4 mg disintegrating tablet Take 1 Tab by mouth every eight (8) hours as needed for Nausea. 10 Tab 0       Review of Systems   Review of Systems   Constitutional: Negative for fever. Gastrointestinal: Positive for abdominal pain, constipation, nausea and vomiting. All other systems reviewed and are negative. Physical Exam   Vital Signs  Patient Vitals for the past 24 hrs:   Temp Pulse Resp BP SpO2   11/21/19 1445  81 16 128/84 98 %   11/21/19 1430    115/77 96 %   11/21/19 1400    138/90 98 %   11/21/19 1347  82 16 (!) 150/95 100 %   11/21/19 1228     100 %   11/21/19 1100    (!) 139/96 97 %   11/21/19 1055     97 %   11/21/19 1053    (!) 155/104    11/21/19 0939 98.3 °F (36.8 °C) 100 18 (!) 133/101 100 %       Physical Exam  Vitals signs and nursing note reviewed. Constitutional:       General: She is in acute distress ( Appears uncomfortable). Appearance: She is well-developed.    HENT:      Head: Normocephalic and atraumatic. Mouth/Throat:      Mouth: Mucous membranes are moist.   Eyes:      General:         Right eye: No discharge. Left eye: No discharge. Conjunctiva/sclera: Conjunctivae normal.   Neck:      Musculoskeletal: Normal range of motion and neck supple. Cardiovascular:      Rate and Rhythm: Normal rate and regular rhythm. Heart sounds: Normal heart sounds. No murmur. Pulmonary:      Effort: Pulmonary effort is normal. No respiratory distress. Breath sounds: Normal breath sounds. No wheezing. Abdominal:      General: There is no distension. Palpations: Abdomen is soft. Tenderness: There is generalized tenderness. Musculoskeletal: Normal range of motion. General: No deformity. Skin:     General: Skin is warm and dry. Findings: No rash. Neurological:      Mental Status: She is alert and oriented to person, place, and time. Psychiatric:         Behavior: Behavior normal.         Thought Content: Thought content normal.         Diagnostic Study Results   Labs  Recent Results (from the past 24 hour(s))   CBC WITH AUTOMATED DIFF    Collection Time: 11/21/19 10:09 AM   Result Value Ref Range    WBC 10.8 3.6 - 11.0 K/uL    RBC 5.47 (H) 3.80 - 5.20 M/uL    HGB 15.0 11.5 - 16.0 g/dL    HCT 47.3 (H) 35.0 - 47.0 %    MCV 86.5 80.0 - 99.0 FL    MCH 27.4 26.0 - 34.0 PG    MCHC 31.7 30.0 - 36.5 g/dL    RDW 13.3 11.5 - 14.5 %    PLATELET 129 702 - 526 K/uL    MPV 10.0 8.9 - 12.9 FL    NRBC 0.0 0  WBC    ABSOLUTE NRBC 0.00 0.00 - 0.01 K/uL    NEUTROPHILS 80 (H) 32 - 75 %    LYMPHOCYTES 14 12 - 49 %    MONOCYTES 6 5 - 13 %    EOSINOPHILS 0 0 - 7 %    BASOPHILS 0 0 - 1 %    IMMATURE GRANULOCYTES 0 0.0 - 0.5 %    ABS. NEUTROPHILS 8.6 (H) 1.8 - 8.0 K/UL    ABS. LYMPHOCYTES 1.5 0.8 - 3.5 K/UL    ABS. MONOCYTES 0.6 0.0 - 1.0 K/UL    ABS. EOSINOPHILS 0.0 0.0 - 0.4 K/UL    ABS. BASOPHILS 0.0 0.0 - 0.1 K/UL    ABS. IMM.  GRANS. 0.0 0.00 - 0.04 K/UL DF AUTOMATED     METABOLIC PANEL, COMPREHENSIVE    Collection Time: 11/21/19 10:09 AM   Result Value Ref Range    Sodium 136 136 - 145 mmol/L    Potassium 4.0 3.5 - 5.1 mmol/L    Chloride 103 97 - 108 mmol/L    CO2 28 21 - 32 mmol/L    Anion gap 5 5 - 15 mmol/L    Glucose 109 (H) 65 - 100 mg/dL    BUN 13 6 - 20 MG/DL    Creatinine 0.87 0.55 - 1.02 MG/DL    BUN/Creatinine ratio 15 12 - 20      GFR est AA >60 >60 ml/min/1.73m2    GFR est non-AA >60 >60 ml/min/1.73m2    Calcium 8.4 (L) 8.5 - 10.1 MG/DL    Bilirubin, total 0.3 0.2 - 1.0 MG/DL    ALT (SGPT) 28 12 - 78 U/L    AST (SGOT) 16 15 - 37 U/L    Alk. phosphatase 76 45 - 117 U/L    Protein, total 7.1 6.4 - 8.2 g/dL    Albumin 3.2 (L) 3.5 - 5.0 g/dL    Globulin 3.9 2.0 - 4.0 g/dL    A-G Ratio 0.8 (L) 1.1 - 2.2     BETA HCG, QT    Collection Time: 11/21/19 10:09 AM   Result Value Ref Range    Beta HCG, QT <1 0 - 6 MIU/ML   URINALYSIS W/ RFLX MICROSCOPIC    Collection Time: 11/21/19 12:15 PM   Result Value Ref Range    Color YELLOW/STRAW      Appearance CLOUDY (A) CLEAR      Specific gravity 1.006 1.003 - 1.030      pH (UA) 6.5 5.0 - 8.0      Protein 30 (A) NEG mg/dL    Glucose NEGATIVE  NEG mg/dL    Ketone 40 (A) NEG mg/dL    Bilirubin NEGATIVE  NEG      Blood LARGE (A) NEG      Urobilinogen 0.2 0.2 - 1.0 EU/dL    Nitrites NEGATIVE  NEG      Leukocyte Esterase NEGATIVE  NEG      WBC 0-4 0 - 4 /hpf    RBC >100 (H) 0 - 5 /hpf    Epithelial cells FEW FEW /lpf    Bacteria NEGATIVE  NEG /hpf    Hyaline cast 2-5 0 - 5 /lpf       Radiologic Studies  CT ABD PELV W CONT   Final Result   IMPRESSION:   New diffuse wall thickening throughout the mid to distal small bowel, compatible   with enteritis; given the history of Crohn disease, this likely represents   active Crohn disease. No evidence of bowel obstruction. Small ascites.         CT Results  (Last 48 hours)               11/21/19 1218  CT ABD PELV W CONT Final result    Impression:  IMPRESSION:   New diffuse wall thickening throughout the mid to distal small bowel, compatible   with enteritis; given the history of Crohn disease, this likely represents   active Crohn disease. No evidence of bowel obstruction. Small ascites. Narrative:  EXAM: CT ABD PELV W CONT       INDICATION: Obstruction - intestinal; Crohn's disease, recent bowel resection   for obstruction. Generalized abd pain, vomiting, constipation, possible   obstruction        COMPARISON: November 8, 2019        CONTRAST: 100 mL of Isovue-370. TECHNIQUE:    Following the uneventful intravenous administration of contrast, thin axial   images were obtained through the abdomen and pelvis. Coronal and sagittal   reconstructions were generated. Oral contrast was not administered. CT dose   reduction was achieved through use of a standardized protocol tailored for this   examination and automatic exposure control for dose modulation. FINDINGS:    LUNG BASES: Clear. INCIDENTALLY IMAGED HEART AND MEDIASTINUM: Unremarkable. LIVER: No mass or biliary dilatation. GALLBLADDER: Unremarkable. SPLEEN: No mass. PANCREAS: No mass or ductal dilatation. ADRENALS: Unremarkable. KIDNEYS: No mass, calculus, or hydronephrosis. STOMACH: Unremarkable. SMALL BOWEL: Diffuse wall thickening throughout the mid to distal small bowel. COLON: No dilatation or wall thickening. APPENDIX: Not visualized. PERITONEUM: Small ascites. RETROPERITONEUM: No lymphadenopathy or aortic aneurysm. REPRODUCTIVE ORGANS: Status post hysterectomy. Cora Frizzle URINARY BLADDER: No mass or calculus. BONES: No destructive bone lesion. ADDITIONAL COMMENTS: N/A               CXR Results  (Last 48 hours)               11/20/19 1353  XR ABD ACUTE W 1 V CHEST Final result    Impression:  IMPRESSION: No acute findings. Narrative:  EXAM: XR ABD ACUTE W 1 V CHEST       INDICATION: vomiting       COMPARISON: Abdomen radiograph 9/9/2019, chest x-ray 8/4/2018. FINDINGS: The upright chest radiograph demonstrates clear lungs and normal   cardiac and mediastinal contours. There is no hilar enlargement. There is no   pleural effusion or free air under the diaphragm. Supine and upright views of the abdomen demonstrate a nonobstructive bowel gas   pattern. There is no free intraperitoneal air. No soft tissue masses or   pathologic calcifications are identified. The bones are within normal limits. Procedures   Procedures    Medical Decision Making     Provider Notes (Medical Decision Making):   79-year-old female with Crohn's disease and recent bowel obstruction necessitating small bowel resection, complaining of nausea, vomiting, generalized abdominal pain, and constipation over the last 4 days or so. Generalized abdominal tenderness by exam.  Non-peritoneal.    We will check laboratories, CT abdomen pelvis today. Fluids, morphine, Zofran, reevaluate. Differential diagnosis includes small bowel obstruction, large bowel obstruction, volvulus, constipation, obstipation, intra-abdominal infection such as abscess. Demarcus Brown MD  10:31 AM    Labs reassuring. Focal enteritis on CT scan. No abscess or obstructions. Spoke with Dr. John De Luna, plan for outpatient management with prednisone. Sparing use of Norco for intractable pain. No ABX at this time. Close GI follow up. Consult required? Yes, Dr. John De Luna, agrees outpatient management with prednisone.       Medications Administered During ED Course:  Medications   sodium chloride 0.9 % bolus infusion 1,000 mL (0 mL IntraVENous IV Completed 11/21/19 1323)   morphine injection 6 mg (6 mg IntraVENous Given 11/21/19 1051)   ondansetron (ZOFRAN) injection 4 mg (4 mg IntraVENous Given 11/21/19 1049)   iohexol (OMNIPAQUE) ORAL mixture (50 mL Oral Given 11/21/19 1052)   iopamidol (ISOVUE-370) 76 % injection 100 mL (100 mL IntraVENous Given 11/21/19 1101)   sodium chloride (NS) flush 10 mL (10 mL IntraVENous Given 11/21/19 1101)   methylPREDNISolone (PF) (Solu-MEDROL) injection 125 mg (125 mg IntraVENous Given 11/21/19 1347)   sodium chloride 0.9 % bolus infusion 1,000 mL (1,000 mL IntraVENous New Bag 11/21/19 1347)   morphine injection 6 mg (6 mg IntraVENous Given 11/21/19 1348)          Diagnosis and Disposition     Disposition:      Clinical Impression:   1. Crohn's disease of small intestine without complication ([facility] [address]        Attestation:  I personally performed the services described in this documentation on this date 11/21/2019 for patient [place] Toussaint. Narcisa Mendez MD        I was the first provider for this patient on this visit. To the best of my ability I reviewed relevant prior medical records, electrocardiograms, laboratories, and radiologic studies. The patient's presenting problems were discussed, and the patient was in agreement with the care plan formulated and outlined with them. Narcisa Mendez MD    Please note that this dictation was completed with Dragon voice recognition software. Quite often unanticipated grammatical, syntax, homophones, and other interpretive errors are inadvertently transcribed by the computer software. Please disregard these errors and excuse any errors that have escaped final proofreading.

## 2019-11-21 NOTE — ED NOTES
I have reviewed discharge instructions with the patient. The patient verbalized understanding. Male  to drive patient home.

## 2020-01-06 ENCOUNTER — APPOINTMENT (OUTPATIENT)
Dept: CT IMAGING | Age: 35
DRG: 385 | End: 2020-01-06
Attending: INTERNAL MEDICINE
Payer: COMMERCIAL

## 2020-01-06 ENCOUNTER — HOSPITAL ENCOUNTER (INPATIENT)
Age: 35
LOS: 7 days | Discharge: HOME | DRG: 385 | End: 2020-01-13
Attending: EMERGENCY MEDICINE | Admitting: HOSPITALIST
Payer: COMMERCIAL

## 2020-01-06 DIAGNOSIS — R10.84 ABDOMINAL PAIN, GENERALIZED: ICD-10-CM

## 2020-01-06 DIAGNOSIS — R11.2 NAUSEA AND VOMITING, INTRACTABILITY OF VOMITING NOT SPECIFIED, UNSPECIFIED VOMITING TYPE: ICD-10-CM

## 2020-01-06 DIAGNOSIS — K50.014 CROHN'S DISEASE OF SMALL INTESTINE WITH ABSCESS (HCC): ICD-10-CM

## 2020-01-06 DIAGNOSIS — K50.918 CROHN'S DISEASE WITH OTHER COMPLICATION, UNSPECIFIED GASTROINTESTINAL TRACT LOCATION (HCC): Primary | ICD-10-CM

## 2020-01-06 PROBLEM — K50.90 CROHN'S DISEASE (HCC): Status: ACTIVE | Noted: 2020-01-06

## 2020-01-06 PROBLEM — A41.9 SEPSIS (HCC): Status: ACTIVE | Noted: 2020-01-06

## 2020-01-06 LAB
ALBUMIN SERPL-MCNC: 3.1 G/DL (ref 3.5–5)
ALBUMIN/GLOB SERPL: 0.9 {RATIO} (ref 1.1–2.2)
ALP SERPL-CCNC: 65 U/L (ref 45–117)
ALT SERPL-CCNC: 21 U/L (ref 12–78)
ANION GAP SERPL CALC-SCNC: 9 MMOL/L (ref 5–15)
APPEARANCE UR: CLEAR
AST SERPL-CCNC: 6 U/L (ref 15–37)
BACTERIA URNS QL MICRO: NEGATIVE /HPF
BASOPHILS # BLD: 0.1 K/UL (ref 0–0.1)
BASOPHILS NFR BLD: 0 % (ref 0–1)
BILIRUB SERPL-MCNC: 0.4 MG/DL (ref 0.2–1)
BILIRUB UR QL: NEGATIVE
BUN SERPL-MCNC: 19 MG/DL (ref 6–20)
BUN/CREAT SERPL: 19 (ref 12–20)
CALCIUM SERPL-MCNC: 8.6 MG/DL (ref 8.5–10.1)
CHLORIDE SERPL-SCNC: 106 MMOL/L (ref 97–108)
CO2 SERPL-SCNC: 25 MMOL/L (ref 21–32)
COLOR UR: ABNORMAL
CREAT SERPL-MCNC: 0.98 MG/DL (ref 0.55–1.02)
CRP SERPL-MCNC: 1.28 MG/DL (ref 0–0.6)
DIFFERENTIAL METHOD BLD: ABNORMAL
EOSINOPHIL # BLD: 0 K/UL (ref 0–0.4)
EOSINOPHIL NFR BLD: 0 % (ref 0–7)
EPITH CASTS URNS QL MICRO: ABNORMAL /LPF
ERYTHROCYTE [DISTWIDTH] IN BLOOD BY AUTOMATED COUNT: 14.3 % (ref 11.5–14.5)
GLOBULIN SER CALC-MCNC: 3.4 G/DL (ref 2–4)
GLUCOSE SERPL-MCNC: 95 MG/DL (ref 65–100)
GLUCOSE UR STRIP.AUTO-MCNC: NEGATIVE MG/DL
HCT VFR BLD AUTO: 45 % (ref 35–47)
HGB BLD-MCNC: 14.3 G/DL (ref 11.5–16)
HGB UR QL STRIP: ABNORMAL
HYALINE CASTS URNS QL MICRO: ABNORMAL /LPF (ref 0–5)
IMM GRANULOCYTES # BLD AUTO: 0.2 K/UL (ref 0–0.04)
IMM GRANULOCYTES NFR BLD AUTO: 1 % (ref 0–0.5)
KETONES UR QL STRIP.AUTO: NEGATIVE MG/DL
LACTATE SERPL-SCNC: 1.7 MMOL/L (ref 0.4–2)
LEUKOCYTE ESTERASE UR QL STRIP.AUTO: NEGATIVE
LIPASE SERPL-CCNC: 69 U/L (ref 73–393)
LYMPHOCYTES # BLD: 2.1 K/UL (ref 0.8–3.5)
LYMPHOCYTES NFR BLD: 11 % (ref 12–49)
MCH RBC QN AUTO: 27.3 PG (ref 26–34)
MCHC RBC AUTO-ENTMCNC: 31.8 G/DL (ref 30–36.5)
MCV RBC AUTO: 85.9 FL (ref 80–99)
MONOCYTES # BLD: 1 K/UL (ref 0–1)
MONOCYTES NFR BLD: 5 % (ref 5–13)
NEUTS SEG # BLD: 16.9 K/UL (ref 1.8–8)
NEUTS SEG NFR BLD: 83 % (ref 32–75)
NITRITE UR QL STRIP.AUTO: NEGATIVE
NRBC # BLD: 0 K/UL (ref 0–0.01)
NRBC BLD-RTO: 0 PER 100 WBC
PH UR STRIP: 5.5 [PH] (ref 5–8)
PLATELET # BLD AUTO: 372 K/UL (ref 150–400)
PMV BLD AUTO: 9.6 FL (ref 8.9–12.9)
POTASSIUM SERPL-SCNC: 3.7 MMOL/L (ref 3.5–5.1)
PROT SERPL-MCNC: 6.5 G/DL (ref 6.4–8.2)
PROT UR STRIP-MCNC: 30 MG/DL
RBC # BLD AUTO: 5.24 M/UL (ref 3.8–5.2)
RBC #/AREA URNS HPF: ABNORMAL /HPF (ref 0–5)
SODIUM SERPL-SCNC: 140 MMOL/L (ref 136–145)
SP GR UR REFRACTOMETRY: 1.02 (ref 1–1.03)
UROBILINOGEN UR QL STRIP.AUTO: 0.2 EU/DL (ref 0.2–1)
WBC # BLD AUTO: 20.3 K/UL (ref 3.6–11)
WBC URNS QL MICRO: ABNORMAL /HPF (ref 0–4)

## 2020-01-06 PROCEDURE — 81001 URINALYSIS AUTO W/SCOPE: CPT

## 2020-01-06 PROCEDURE — 74177 CT ABD & PELVIS W/CONTRAST: CPT

## 2020-01-06 PROCEDURE — 96376 TX/PRO/DX INJ SAME DRUG ADON: CPT

## 2020-01-06 PROCEDURE — 87040 BLOOD CULTURE FOR BACTERIA: CPT

## 2020-01-06 PROCEDURE — 74011000258 HC RX REV CODE- 258: Performed by: INTERNAL MEDICINE

## 2020-01-06 PROCEDURE — 83605 ASSAY OF LACTIC ACID: CPT

## 2020-01-06 PROCEDURE — 74011250636 HC RX REV CODE- 250/636: Performed by: INTERNAL MEDICINE

## 2020-01-06 PROCEDURE — 36415 COLL VENOUS BLD VENIPUNCTURE: CPT

## 2020-01-06 PROCEDURE — 80053 COMPREHEN METABOLIC PANEL: CPT

## 2020-01-06 PROCEDURE — 65660000000 HC RM CCU STEPDOWN

## 2020-01-06 PROCEDURE — 99284 EMERGENCY DEPT VISIT MOD MDM: CPT

## 2020-01-06 PROCEDURE — 74011636320 HC RX REV CODE- 636/320: Performed by: EMERGENCY MEDICINE

## 2020-01-06 PROCEDURE — 74011000258 HC RX REV CODE- 258: Performed by: EMERGENCY MEDICINE

## 2020-01-06 PROCEDURE — 86140 C-REACTIVE PROTEIN: CPT

## 2020-01-06 PROCEDURE — 83690 ASSAY OF LIPASE: CPT

## 2020-01-06 PROCEDURE — 96374 THER/PROPH/DIAG INJ IV PUSH: CPT

## 2020-01-06 PROCEDURE — 96375 TX/PRO/DX INJ NEW DRUG ADDON: CPT

## 2020-01-06 PROCEDURE — 74011250636 HC RX REV CODE- 250/636: Performed by: EMERGENCY MEDICINE

## 2020-01-06 PROCEDURE — 74011000250 HC RX REV CODE- 250: Performed by: INTERNAL MEDICINE

## 2020-01-06 PROCEDURE — 85025 COMPLETE CBC W/AUTO DIFF WBC: CPT

## 2020-01-06 PROCEDURE — 74011250637 HC RX REV CODE- 250/637: Performed by: EMERGENCY MEDICINE

## 2020-01-06 RX ORDER — SIMETHICONE 80 MG
80 TABLET,CHEWABLE ORAL
COMMUNITY
End: 2020-12-30

## 2020-01-06 RX ORDER — HYDROMORPHONE HYDROCHLORIDE 1 MG/ML
0.5 INJECTION, SOLUTION INTRAMUSCULAR; INTRAVENOUS; SUBCUTANEOUS
Status: DISCONTINUED | OUTPATIENT
Start: 2020-01-06 | End: 2020-01-06

## 2020-01-06 RX ORDER — HYDROMORPHONE HYDROCHLORIDE 1 MG/ML
0.5 INJECTION, SOLUTION INTRAMUSCULAR; INTRAVENOUS; SUBCUTANEOUS ONCE
Status: COMPLETED | OUTPATIENT
Start: 2020-01-06 | End: 2020-01-06

## 2020-01-06 RX ORDER — PROMETHAZINE HYDROCHLORIDE 25 MG/1
25 SUPPOSITORY RECTAL
COMMUNITY

## 2020-01-06 RX ORDER — ACETAMINOPHEN 500 MG
1000 TABLET ORAL
COMMUNITY

## 2020-01-06 RX ORDER — ACETAMINOPHEN 325 MG/1
650 TABLET ORAL
Status: DISCONTINUED | OUTPATIENT
Start: 2020-01-06 | End: 2020-01-08

## 2020-01-06 RX ORDER — MORPHINE SULFATE 2 MG/ML
6 INJECTION, SOLUTION INTRAMUSCULAR; INTRAVENOUS ONCE
Status: COMPLETED | OUTPATIENT
Start: 2020-01-06 | End: 2020-01-06

## 2020-01-06 RX ORDER — SODIUM CHLORIDE 9 MG/ML
125 INJECTION, SOLUTION INTRAVENOUS ONCE
Status: COMPLETED | OUTPATIENT
Start: 2020-01-06 | End: 2020-01-06

## 2020-01-06 RX ORDER — METRONIDAZOLE 500 MG/100ML
500 INJECTION, SOLUTION INTRAVENOUS
Status: COMPLETED | OUTPATIENT
Start: 2020-01-06 | End: 2020-01-06

## 2020-01-06 RX ORDER — METRONIDAZOLE 500 MG/100ML
500 INJECTION, SOLUTION INTRAVENOUS EVERY 12 HOURS
Status: DISCONTINUED | OUTPATIENT
Start: 2020-01-06 | End: 2020-01-13 | Stop reason: HOSPADM

## 2020-01-06 RX ORDER — SODIUM CHLORIDE 0.9 % (FLUSH) 0.9 %
10 SYRINGE (ML) INJECTION
Status: COMPLETED | OUTPATIENT
Start: 2020-01-06 | End: 2020-01-06

## 2020-01-06 RX ORDER — ONDANSETRON 2 MG/ML
4 INJECTION INTRAMUSCULAR; INTRAVENOUS
Status: COMPLETED | OUTPATIENT
Start: 2020-01-06 | End: 2020-01-06

## 2020-01-06 RX ORDER — THERA TABS 400 MCG
1 TAB ORAL DAILY
Status: DISCONTINUED | OUTPATIENT
Start: 2020-01-07 | End: 2020-01-13 | Stop reason: HOSPADM

## 2020-01-06 RX ORDER — HYDROMORPHONE HYDROCHLORIDE 1 MG/ML
1 INJECTION, SOLUTION INTRAMUSCULAR; INTRAVENOUS; SUBCUTANEOUS ONCE
Status: COMPLETED | OUTPATIENT
Start: 2020-01-06 | End: 2020-01-06

## 2020-01-06 RX ORDER — HYDROMORPHONE HYDROCHLORIDE 1 MG/ML
1 INJECTION, SOLUTION INTRAMUSCULAR; INTRAVENOUS; SUBCUTANEOUS
Status: DISCONTINUED | OUTPATIENT
Start: 2020-01-06 | End: 2020-01-09

## 2020-01-06 RX ORDER — SIMETHICONE 80 MG
80 TABLET,CHEWABLE ORAL
Status: DISCONTINUED | OUTPATIENT
Start: 2020-01-06 | End: 2020-01-13 | Stop reason: HOSPADM

## 2020-01-06 RX ORDER — SODIUM CHLORIDE 9 MG/ML
150 INJECTION, SOLUTION INTRAVENOUS CONTINUOUS
Status: DISCONTINUED | OUTPATIENT
Start: 2020-01-06 | End: 2020-01-09

## 2020-01-06 RX ORDER — DOCUSATE SODIUM 100 MG/1
100 CAPSULE, LIQUID FILLED ORAL DAILY
Status: DISCONTINUED | OUTPATIENT
Start: 2020-01-07 | End: 2020-01-13 | Stop reason: HOSPADM

## 2020-01-06 RX ORDER — PREDNISONE 10 MG/1
10-40 TABLET ORAL
COMMUNITY
End: 2020-01-13

## 2020-01-06 RX ORDER — ONDANSETRON 2 MG/ML
4 INJECTION INTRAMUSCULAR; INTRAVENOUS
Status: DISCONTINUED | OUTPATIENT
Start: 2020-01-06 | End: 2020-01-06

## 2020-01-06 RX ORDER — DOCUSATE SODIUM 100 MG/1
100 CAPSULE, LIQUID FILLED ORAL DAILY
COMMUNITY
End: 2020-12-30

## 2020-01-06 RX ORDER — METRONIDAZOLE 500 MG/100ML
500 INJECTION, SOLUTION INTRAVENOUS EVERY 8 HOURS
Status: DISCONTINUED | OUTPATIENT
Start: 2020-01-06 | End: 2020-01-06

## 2020-01-06 RX ORDER — HYDROMORPHONE HYDROCHLORIDE 1 MG/ML
1 INJECTION, SOLUTION INTRAMUSCULAR; INTRAVENOUS; SUBCUTANEOUS
Status: COMPLETED | OUTPATIENT
Start: 2020-01-06 | End: 2020-01-06

## 2020-01-06 RX ADMIN — METHYLPREDNISOLONE SODIUM SUCCINATE 40 MG: 40 INJECTION, POWDER, FOR SOLUTION INTRAMUSCULAR; INTRAVENOUS at 17:42

## 2020-01-06 RX ADMIN — MORPHINE SULFATE 6 MG: 2 INJECTION, SOLUTION INTRAMUSCULAR; INTRAVENOUS at 08:28

## 2020-01-06 RX ADMIN — SODIUM CHLORIDE 5 MG: 9 INJECTION INTRAMUSCULAR; INTRAVENOUS; SUBCUTANEOUS at 17:49

## 2020-01-06 RX ADMIN — METHYLPREDNISOLONE SODIUM SUCCINATE 40 MG: 40 INJECTION, POWDER, FOR SOLUTION INTRAMUSCULAR; INTRAVENOUS at 22:03

## 2020-01-06 RX ADMIN — HYDROMORPHONE HYDROCHLORIDE 1 MG: 1 INJECTION, SOLUTION INTRAMUSCULAR; INTRAVENOUS; SUBCUTANEOUS at 17:49

## 2020-01-06 RX ADMIN — CEFEPIME HYDROCHLORIDE 1 G: 1 INJECTION, POWDER, FOR SOLUTION INTRAMUSCULAR; INTRAVENOUS at 15:45

## 2020-01-06 RX ADMIN — ONDANSETRON 4 MG: 2 INJECTION INTRAMUSCULAR; INTRAVENOUS at 12:57

## 2020-01-06 RX ADMIN — ONDANSETRON 4 MG: 2 INJECTION INTRAMUSCULAR; INTRAVENOUS at 08:27

## 2020-01-06 RX ADMIN — SODIUM CHLORIDE 1000 ML: 900 INJECTION, SOLUTION INTRAVENOUS at 10:42

## 2020-01-06 RX ADMIN — METRONIDAZOLE 500 MG: 500 INJECTION, SOLUTION INTRAVENOUS at 13:00

## 2020-01-06 RX ADMIN — ACETAMINOPHEN 650 MG: 325 TABLET ORAL at 13:29

## 2020-01-06 RX ADMIN — HYDROMORPHONE HYDROCHLORIDE 1 MG: 1 INJECTION, SOLUTION INTRAMUSCULAR; INTRAVENOUS; SUBCUTANEOUS at 08:58

## 2020-01-06 RX ADMIN — HYDROMORPHONE HYDROCHLORIDE 0.5 MG: 1 INJECTION, SOLUTION INTRAMUSCULAR; INTRAVENOUS; SUBCUTANEOUS at 10:43

## 2020-01-06 RX ADMIN — SODIUM CHLORIDE 125 ML/HR: 900 INJECTION, SOLUTION INTRAVENOUS at 12:56

## 2020-01-06 RX ADMIN — HYDROMORPHONE HYDROCHLORIDE 0.5 MG: 1 INJECTION, SOLUTION INTRAMUSCULAR; INTRAVENOUS; SUBCUTANEOUS at 12:59

## 2020-01-06 RX ADMIN — SODIUM CHLORIDE 150 ML/HR: 900 INJECTION, SOLUTION INTRAVENOUS at 22:06

## 2020-01-06 RX ADMIN — SODIUM CHLORIDE 1000 ML: 900 INJECTION, SOLUTION INTRAVENOUS at 08:27

## 2020-01-06 RX ADMIN — IOPAMIDOL 100 ML: 755 INJECTION, SOLUTION INTRAVENOUS at 21:19

## 2020-01-06 RX ADMIN — Medication 10 ML: at 21:19

## 2020-01-06 RX ADMIN — HYDROMORPHONE HYDROCHLORIDE 1 MG: 1 INJECTION, SOLUTION INTRAMUSCULAR; INTRAVENOUS; SUBCUTANEOUS at 22:46

## 2020-01-06 RX ADMIN — ONDANSETRON 4 MG: 2 INJECTION INTRAMUSCULAR; INTRAVENOUS at 10:43

## 2020-01-06 RX ADMIN — CEFEPIME HYDROCHLORIDE 2 G: 2 INJECTION, POWDER, FOR SOLUTION INTRAVENOUS at 22:06

## 2020-01-06 NOTE — H&P
Hospitalist Admission Note    NAME: Shari Wilde   :  1985   MRN:  [id number]     Date/Time:  2020 1:57 PM    Patient PCP: Melinda Wood MD  ______________________________________________________________________  Given the patient's current clinical presentation, I have a high level of concern for decompensation if discharged from the emergency department. Complex decision making was performed, which includes reviewing the patient's available past medical records, laboratory results, and x-ray films. My assessment of this patient's clinical condition and my plan of care is as follows.     Assessment / Plan:  Abdominal pain, presumed Crohn's flare  -Admit to med/surg bed and continue with empiric IV antibiotics  -Discussed with Dr. Fausto Conde, will start IV Solumedrol and obtain abdominal CT to rule out abscess in light of patient's fever  -GI to see patient in consultation  -NPO pending CT scan -- start diet (clear liquids) thereafter if no surgical findings  -Currently pending approval for Humira  -IV Dilaudid and IV compazine as needed for pain and nausea -- may end up needing PCA with her level of discomfort    SIRS (leukocytosis, HR 130s, fever 101.5F), possible sepsis versus noninfectious SIRS due to Crohn's flare  -Blood cultures drawn  -Empiric antibiotics as above  -Await CT result    Anxiety and depression  -Current health challenges are currently exacerbating this  -Patient with no active suicidal thoughts at this time    GERD  IBS  Asthma, not currently in exacerbation    Code Status: Full  Surrogate Decision Maker: , Carmen Hightower    DVT Prophylaxis: SCDs for now pending CT, then heparin or Lovenox if no intervention is indicated  GI Prophylaxis: not indicated    Baseline: , working, but has been missing a lot of time from work and has had multiple hospitalizations recently due to her ongoing health issues      Subjective:   CHIEF COMPLAINT: Abdominal pain    HISTORY OF PRESENT ILLNESS:     Maxi Howard is a 29 y.o.  female who presents with severe abdominal pain and dry heaving. She has been having significant issues with abdominal pain for months now and there has been some diagnostic uncertainty prompting multiple GYN and GI evaluations (including PillCam that was retained, requiring diagnostic laparoscopy with lysis of adhesions and open small bowel resection with removal of PillCam at this hospital in September of 2019), and at this point she is said to have Crohn's disease. She has had episodic flares of her abdominal pain and was recently placed on a prednisone taper by Dr. Latisha Luque, which initially seemed to result in significant improvement of symptoms. She is currently on 30 mg of prednisone, last dose yesterday, and has been tapering by 10 mg every 5 days. Her present symptoms started last night with right sided abdominal pain that radiates to the umbilicus, associated with nausea and dry heaving. She hasn't had gila diarrhea per se and no recent bloody stools. Her symptoms escalated significantly over the course of the night, prompting her to come to the emergency department. The symptoms are stereotypical of her prior flare episodes, with the exception that today she has developed fever in the emergency department, and her pain and nausea have been totally unrelieved by the morphine and zofran she received in the ER. She is a bit emotional in giving her history, states she has accrued a lot of debt over the course of this illness and has missed a lot of work and is struggling to cope. Outpatient plan has been to get her approved for [facility]. We were asked to admit for work up and evaluation of the above problems.      Past Medical History:   Diagnosis Date    Abnormal Pap smear     Biopsy done last year and came back ok    Asthma     flares with bronchitis has been over a year as stated 10/5/2018    Autoimmune disease ([address]     Crohns  Crohn's disease ([facility] [address] 2019    Difficult intravenous access     GERD (gastroesophageal reflux disease)     IBS (irritable bowel syndrome)     Kidney stones     Nausea & vomiting     PONV-nausea    Psychiatric disorder     anxiety & depression    PUD (peptic ulcer disease)         Past Surgical History:   Procedure Laterality Date    COLONOSCOPY N/A 2019    COLONOS[facility] performed [facility] at [facility]COPY    HX APPENDECTOMY      7th grade.  HX COLONOSCOPY      HX ENDOSCOPY      HX GYN          HX GYN  2004        HX GYN      hysterectomy    HX HEENT      dental surgery    HX OTHER SURGICAL  09/10/2019    s/p Diagnostic laparoscopy with lysis of adhesions, Robitc Assist,Open small bowel resection (with removal of PillCam)        Social History     Tobacco Use    Smoking status: Former Smoker     Packs/day: 0.25     Last attempt to quit: 2017     Years since quittin.3    Smokeless tobacco: Never Used   Substance Use Topics    Alcohol use: Yes     Comment: occassionally        Family History   Problem Relation Age of Onset    Heart Disease Mother     Diabetes Mother     Cancer Maternal Aunt         colon cancer     Allergies   Allergen Reactions    Avelox [Moxifloxacin] Swelling and Unknown (comments)    Macrolide Antibiotics Hives, Rash and Swelling     Per patient reported as a previous reaction to a \"mycin\" drug. Did not know which drug.  Nsaids (Non-Steroidal Anti-Inflammatory Drug) Other (comments)     D/t esophageal erosion and GERD        Prior to Admission medications    Medication Sig Start Date End Date Taking? Authorizing Provider   dicyclomine (BENTYL) 20 mg tablet Take 1 Tab by mouth every six (6) hours as needed (abdominal cramps) for up to 20 doses. Hold until directed to resume by Dr. Carol Figueroa. 19  Yes Marino Eller NP   mesalamine (PENTASA PO) Take 1,000 mg by mouth four (4) times daily.    Yes Provider, Historical multivitamin (ONE A DAY) tablet Take 1 Tab by mouth daily. Yes Provider, Historical   ondansetron (ZOFRAN ODT) 4 mg disintegrating tablet Take 1 Tab by mouth every eight (8) hours as needed for Nausea. 8/5/19  Yes Ruth Ann Quijano MD       REVIEW OF SYSTEMS:     I am not able to complete the review of systems because:    The patient is intubated and sedated    The patient has altered mental status due to his acute medical problems    The patient has baseline aphasia from prior stroke(s)    The patient has baseline dementia and is not reliable historian    The patient is in acute medical distress and unable to provide information           Total of 12 systems reviewed as follows:       POSITIVE= underlined text  Negative = text not underlined  General:  fever, chills, sweats, generalized weakness, weight loss/gain,      loss of appetite   Eyes:    blurred vision, eye pain, loss of vision, double vision  ENT:    rhinorrhea, pharyngitis   Respiratory:   cough, sputum production, SOB, BENAVIDES, wheezing, pleuritic pain   Cardiology:   chest pain, palpitations, orthopnea, PND, edema, syncope   Gastrointestinal:  abdominal pain , N/V, diarrhea, dysphagia, constipation, bleeding   Genitourinary:  frequency, urgency, dysuria, hematuria, incontinence   Muskuloskeletal :  arthralgia, myalgia, back pain  Hematology:  easy bruising, nose or gum bleeding, lymphadenopathy   Dermatological: rash, ulceration, pruritis, color change / jaundice  Endocrine:   hot flashes or polydipsia   Neurological:  headache, dizziness, confusion, focal weakness, paresthesia,     Speech difficulties, memory loss, gait difficulty  Psychological: Feelings of anxiety, depression, agitation    Objective:   VITALS:    Visit Vitals  /75   Pulse 93   Temp 100.1 °F (37.8 °C)   Resp 18   Ht 5' 2\" (1.575 m)   Wt 77.6 kg (171 lb)   SpO2 97%   BMI 31.28 kg/m²       PHYSICAL EXAM:    General:    Alert, cooperative, obviously uncomfortable and appears ill, appears stated age. HEENT: Atraumatic, anicteric sclerae, pink conjunctivae     Resolving oral ulcers (hard palate), mucosa moist, throat clear, dentition fair  Neck:  Supple, symmetrical,  thyroid: non tender  Lungs:   Clear to auscultation bilaterally. No Wheezing or Rhonchi. No rales. Chest wall:  No tenderness  No Accessory muscle use. Heart:   Regular  rhythm,  No  murmur   No edema  Abdomen:   Soft, diffusely tender, not distended. Bowel sounds normal  Extremities: No cyanosis. No clubbing,      Skin turgor normal, Capillary refill normal, Radial distal pulse 2+  Skin:     Flushed and skin feels warm/hot to touch. Not Jaundiced  No rashes   Psych:  Good insight. +depressed. +anxious or agitated. Neurologic: EOMs intact. No facial asymmetry. No aphasia or slurred speech. Symmetrical strength, Sensation grossly intact. Alert and oriented X 4.     _______________________________________________________________________  Care Plan discussed with:    Comments   Patient x    Family  x    RN     Care Manager                    Consultant:  stanley Arce   _______________________________________________________________________  Expected  Disposition:   Home with Family x   HH/PT/OT/RN    SNF/LTC    CALE    ________________________________________________________________________  TOTAL TIME:  39 Minutes    Critical Care Provided     Minutes non procedure based      Comments    x Reviewed previous records   >50% of visit spent in counseling and coordination of care x Discussion with patient and/or family and questions answered       ________________________________________________________________________  Signed: Oneil Bui MD    Procedures: see electronic medical records for all procedures/Xrays and details which were not copied into this note but were reviewed prior to creation of Plan.     LAB DATA REVIEWED:    Recent Results (from the past 24 hour(s))   CBC WITH AUTOMATED DIFF    Collection Time: 01/06/20  8:23 AM   Result Value Ref Range    WBC 20.3 (H) 3.6 - 11.0 K/uL    RBC 5.24 (H) 3.80 - 5.20 M/uL    HGB 14.3 11.5 - 16.0 g/dL    HCT 45.0 35.0 - 47.0 %    MCV 85.9 80.0 - 99.0 FL    MCH 27.3 26.0 - 34.0 PG    MCHC 31.8 30.0 - 36.5 g/dL    RDW 14.3 11.5 - 14.5 %    PLATELET 410 538 - 085 K/uL    MPV 9.6 8.9 - 12.9 FL    NRBC 0.0 0  WBC    ABSOLUTE NRBC 0.00 0.00 - 0.01 K/uL    NEUTROPHILS 83 (H) 32 - 75 %    LYMPHOCYTES 11 (L) 12 - 49 %    MONOCYTES 5 5 - 13 %    EOSINOPHILS 0 0 - 7 %    BASOPHILS 0 0 - 1 %    IMMATURE GRANULOCYTES 1 (H) 0.0 - 0.5 %    ABS. NEUTROPHILS 16.9 (H) 1.8 - 8.0 K/UL    ABS. LYMPHOCYTES 2.1 0.8 - 3.5 K/UL    ABS. MONOCYTES 1.0 0.0 - 1.0 K/UL    ABS. EOSINOPHILS 0.0 0.0 - 0.4 K/UL    ABS. BASOPHILS 0.1 0.0 - 0.1 K/UL    ABS. IMM. GRANS. 0.2 (H) 0.00 - 0.04 K/UL    DF AUTOMATED     METABOLIC PANEL, COMPREHENSIVE    Collection Time: 01/06/20  8:23 AM   Result Value Ref Range    Sodium 140 136 - 145 mmol/L    Potassium 3.7 3.5 - 5.1 mmol/L    Chloride 106 97 - 108 mmol/L    CO2 25 21 - 32 mmol/L    Anion gap 9 5 - 15 mmol/L    Glucose 95 65 - 100 mg/dL    BUN 19 6 - 20 MG/DL    Creatinine 0.98 0.55 - 1.02 MG/DL    BUN/Creatinine ratio 19 12 - 20      GFR est AA >60 >60 ml/min/1.73m2    GFR est non-AA >60 >60 ml/min/1.73m2    Calcium 8.6 8.5 - 10.1 MG/DL    Bilirubin, total 0.4 0.2 - 1.0 MG/DL    ALT (SGPT) 21 12 - 78 U/L    AST (SGOT) 6 (L) 15 - 37 U/L    Alk.  phosphatase 65 45 - 117 U/L    Protein, total 6.5 6.4 - 8.2 g/dL    Albumin 3.1 (L) 3.5 - 5.0 g/dL    Globulin 3.4 2.0 - 4.0 g/dL    A-G Ratio 0.9 (L) 1.1 - 2.2     LIPASE    Collection Time: 01/06/20  8:23 AM   Result Value Ref Range    Lipase 69 (L) 73 - 393 U/L   URINALYSIS W/ RFLX MICROSCOPIC    Collection Time: 01/06/20  8:23 AM   Result Value Ref Range    Color YELLOW/STRAW      Appearance CLEAR CLEAR      Specific gravity 1.022 1.003 - 1.030      pH (UA) 5.5 5.0 - 8.0      Protein 30 (A) NEG mg/dL    Glucose NEGATIVE  NEG mg/dL    Ketone NEGATIVE  NEG mg/dL    Bilirubin NEGATIVE  NEG      Blood TRACE (A) NEG      Urobilinogen 0.2 0.2 - 1.0 EU/dL    Nitrites NEGATIVE  NEG      Leukocyte Esterase NEGATIVE  NEG      WBC 0-4 0 - 4 /hpf    RBC 5-10 0 - 5 /hpf    Epithelial cells FEW FEW /lpf    Bacteria NEGATIVE  NEG /hpf    Hyaline cast 2-5 0 - 5 /lpf   C REACTIVE PROTEIN, QT    Collection Time: 01/06/20  8:23 AM   Result Value Ref Range    C-Reactive protein 1.28 (H) 0.00 - 0.60 mg/dL

## 2020-01-06 NOTE — ED NOTES
TRANSFER - OUT REPORT:    Verbal report given to [id number] Rn(name) on Danya Orts  being transferred to Tele(unit) for routine progression of care       Report consisted of patients Situation, Background, Assessment and   Recommendations(SBAR). Information from the following report(s) SBAR, Kardex, ED Summary, Procedure Summary, MAR and Recent Results was reviewed with the receiving nurse. Lines:   Peripheral IV 01/06/20 Right Antecubital (Active)   Site Assessment Clean, dry, & intact 1/6/2020  8:25 AM   Phlebitis Assessment 0 1/6/2020  8:25 AM   Infiltration Assessment 0 1/6/2020  8:25 AM   Dressing Status Clean, dry, & intact 1/6/2020  8:25 AM   Hub Color/Line Status Pink 1/6/2020  8:25 AM   Action Taken Catheter retaped 1/6/2020  8:25 AM        Opportunity for questions and clarification was provided.       Patient transported with:

## 2020-01-06 NOTE — ED PROVIDER NOTES
EMERGENCY DEPARTMENT HISTORY AND PHYSICAL EXAM      Date: 1/6/2020  Patient Name: Francis Starr    History of Presenting Illness     Chief Complaint   Patient presents with    Abdominal Pain     Patient c/o right sided abdominal pain x two days with nausea or vomiting; denies diarrhea or fever. She reports hx of Crohns and bowel resection       History Provided By: Patient    HPI: Francis Starr, 29 y.o. female  presents to the ED with cc of severe abdominal pain. Patient states she has had right-sided abdominal pain for 2 days. This has been associated with nausea vomiting and diarrhea. She does have a history of Crohn's disease. She does not describe any large amount of blood in her stool. She has had a history of bowel resection this past fall. She denies fevers or chills. Chest pain or shortness of breath. She is currently on another taper of prednisone. She is pending approval for Humira. There are no other complaints, changes, or physical findings at this time. PCP: Rain Damico MD    No current facility-administered medications on file prior to encounter. Current Outpatient Medications on File Prior to Encounter   Medication Sig Dispense Refill    dicyclomine (BENTYL) 20 mg tablet Take 1 Tab by mouth every six (6) hours as needed (abdominal cramps) for up to 20 doses. Hold until directed to resume by Dr. New Cho. 20 Tab 0    mesalamine (PENTASA PO) Take 1,000 mg by mouth four (4) times daily.  multivitamin (ONE A DAY) tablet Take 1 Tab by mouth daily.  ondansetron (ZOFRAN ODT) 4 mg disintegrating tablet Take 1 Tab by mouth every eight (8) hours as needed for Nausea.  10 Tab 0       Past History     Past Medical History:  Past Medical History:   Diagnosis Date    Abnormal Pap smear     Biopsy done last year and came back ok    Asthma     flares with bronchitis has been over a year as stated 10/5/2018    Autoimmune disease ([facility] [address]     Crohns    Crohn's disease ([facility] [address] 08/2019  Difficult intravenous access     GERD (gastroesophageal reflux disease)     IBS (irritable bowel syndrome)     Kidney stones     Nausea & vomiting     PONV-nausea    Psychiatric disorder     anxiety & depression    PUD (peptic ulcer disease)        Past Surgical History:  Past Surgical History:   Procedure Laterality Date    COLONOSCOPY N/A 2019    COLONOSCOPY performed by Nicolette Gonzalez [facility] at [facility] ENDOSCOPY    HX APPENDECTOMY      7th grade.  HX COLONOSCOPY      HX ENDOSCOPY      HX GYN          HX GYN  2004        HX GYN      hysterectomy    HX HEENT      dental surgery    HX OTHER SURGICAL  09/10/2019    s/p Diagnostic laparoscopy with lysis of adhesions, Robitc Assist,Open small bowel resection (with removal of PillCam)        Family History:  Family History   Problem Relation Age of Onset    Heart Disease Mother     Diabetes Mother     Cancer Maternal Aunt         colon cancer       Social History:  Social History     Tobacco Use    Smoking status: Former Smoker     Packs/day: 0.25     Last attempt to quit: 2017     Years since quittin.3    Smokeless tobacco: Never Used   Substance Use Topics    Alcohol use: Yes     Comment: occassionally    Drug use: No       Allergies: Allergies   Allergen Reactions    Avelox [Moxifloxacin] Swelling and Unknown (comments)    Macrolide Antibiotics Hives, Rash and Swelling     Per patient reported as a previous reaction to a \"mycin\" drug. Did not know which drug.  Nsaids (Non-Steroidal Anti-Inflammatory Drug) Other (comments)     D/t esophageal erosion and GERD         Review of Systems   Review of Systems   Constitutional: Negative for chills and fever. HENT: Negative for congestion, ear pain, rhinorrhea, sore throat and trouble swallowing. Eyes: Negative for visual disturbance. Respiratory: Negative for cough, chest tightness and shortness of breath.     Cardiovascular: Negative for chest pain and palpitations. Gastrointestinal: Positive for abdominal pain, diarrhea, nausea and vomiting. Negative for blood in stool and constipation. Genitourinary: Negative for decreased urine volume, difficulty urinating, dysuria and frequency. Musculoskeletal: Negative for back pain and neck pain. Skin: Negative for color change and rash. Neurological: Negative for dizziness, weakness, light-headedness and headaches. Physical Exam   Physical Exam  Vitals signs and nursing note reviewed. Constitutional:       General: She is not in acute distress. Appearance: She is well-developed. She is not ill-appearing. Eyes:      Conjunctiva/sclera: Conjunctivae normal.   Neck:      Musculoskeletal: Neck supple. Cardiovascular:      Rate and Rhythm: Normal rate and regular rhythm. Pulmonary:      Effort: Pulmonary effort is normal. No accessory muscle usage or respiratory distress. Breath sounds: Normal breath sounds. Abdominal:      General: There is no distension. Palpations: Abdomen is soft. Tenderness: There is generalized tenderness. There is guarding. There is no rebound. Lymphadenopathy:      Cervical: No cervical adenopathy. Skin:     General: Skin is warm and dry. Neurological:      Mental Status: She is alert and oriented to person, place, and time. Cranial Nerves: No cranial nerve deficit. Sensory: No sensory deficit.          Diagnostic Study Results     Labs -     Recent Results (from the past 24 hour(s))   CBC WITH AUTOMATED DIFF    Collection Time: 01/06/20  8:23 AM   Result Value Ref Range    WBC 20.3 (H) 3.6 - 11.0 K/uL    RBC 5.24 (H) 3.80 - 5.20 M/uL    HGB 14.3 11.5 - 16.0 g/dL    HCT 45.0 35.0 - 47.0 %    MCV 85.9 80.0 - 99.0 FL    MCH 27.3 26.0 - 34.0 PG    MCHC 31.8 30.0 - 36.5 g/dL    RDW 14.3 11.5 - 14.5 %    PLATELET 279 039 - 854 K/uL    MPV 9.6 8.9 - 12.9 FL    NRBC 0.0 0  WBC    ABSOLUTE NRBC 0.00 0.00 - 0.01 K/uL    NEUTROPHILS 83 (H) 32 - 75 %    LYMPHOCYTES 11 (L) 12 - 49 %    MONOCYTES 5 5 - 13 %    EOSINOPHILS 0 0 - 7 %    BASOPHILS 0 0 - 1 %    IMMATURE GRANULOCYTES 1 (H) 0.0 - 0.5 %    ABS. NEUTROPHILS 16.9 (H) 1.8 - 8.0 K/UL    ABS. LYMPHOCYTES 2.1 0.8 - 3.5 K/UL    ABS. MONOCYTES 1.0 0.0 - 1.0 K/UL    ABS. EOSINOPHILS 0.0 0.0 - 0.4 K/UL    ABS. BASOPHILS 0.1 0.0 - 0.1 K/UL    ABS. IMM. GRANS. 0.2 (H) 0.00 - 0.04 K/UL    DF AUTOMATED     METABOLIC PANEL, COMPREHENSIVE    Collection Time: 01/06/20  8:23 AM   Result Value Ref Range    Sodium 140 136 - 145 mmol/L    Potassium 3.7 3.5 - 5.1 mmol/L    Chloride 106 97 - 108 mmol/L    CO2 25 21 - 32 mmol/L    Anion gap 9 5 - 15 mmol/L    Glucose 95 65 - 100 mg/dL    BUN 19 6 - 20 MG/DL    Creatinine 0.98 0.55 - 1.02 MG/DL    BUN/Creatinine ratio 19 12 - 20      GFR est AA >60 >60 ml/min/1.73m2    GFR est non-AA >60 >60 ml/min/1.73m2    Calcium 8.6 8.5 - 10.1 MG/DL    Bilirubin, total 0.4 0.2 - 1.0 MG/DL    ALT (SGPT) 21 12 - 78 U/L    AST (SGOT) 6 (L) 15 - 37 U/L    Alk.  phosphatase 65 45 - 117 U/L    Protein, total 6.5 6.4 - 8.2 g/dL    Albumin 3.1 (L) 3.5 - 5.0 g/dL    Globulin 3.4 2.0 - 4.0 g/dL    A-G Ratio 0.9 (L) 1.1 - 2.2     LIPASE    Collection Time: 01/06/20  8:23 AM   Result Value Ref Range    Lipase 69 (L) 73 - 393 U/L   URINALYSIS W/ RFLX MICROSCOPIC    Collection Time: 01/06/20  8:23 AM   Result Value Ref Range    Color YELLOW/STRAW      Appearance CLEAR CLEAR      Specific gravity 1.022 1.003 - 1.030      pH (UA) 5.5 5.0 - 8.0      Protein 30 (A) NEG mg/dL    Glucose NEGATIVE  NEG mg/dL    Ketone NEGATIVE  NEG mg/dL    Bilirubin NEGATIVE  NEG      Blood TRACE (A) NEG      Urobilinogen 0.2 0.2 - 1.0 EU/dL    Nitrites NEGATIVE  NEG      Leukocyte Esterase NEGATIVE  NEG      WBC 0-4 0 - 4 /hpf    RBC 5-10 0 - 5 /hpf    Epithelial cells FEW FEW /lpf    Bacteria NEGATIVE  NEG /hpf    Hyaline cast 2-5 0 - 5 /lpf   C REACTIVE PROTEIN, QT    Collection Time: 01/06/20  8:23 AM   Result Value Ref Range    C-Reactive protein 1.28 (H) 0.00 - 0.60 mg/dL       Radiologic Studies -   No orders to display     CT Results  (Last 48 hours)    None        CXR Results  (Last 48 hours)    None            Medical Decision Making   I am the first provider for this patient. I reviewed the vital signs, available nursing notes, past medical history, past surgical history, family history and social history. Vital Signs-Reviewed the patient's vital signs. Patient Vitals for the past 24 hrs:   Temp Pulse Resp BP SpO2   01/06/20 1212 100.1 °F (37.8 °C)       01/06/20 0749 99.1 °F (37.3 °C) 93 18 97/62 99 %         Records Reviewed: Nursing Notes and Old Medical Records    Provider Notes (Medical Decision Making): This patient with a history of Crohn's disease presents with severe abdominal pain associated with vomiting. Patient's labs do note a leukocytosis but she is also on steroids. She is requiring multiple doses of opioids. Nausea is severe but she is not actively vomiting here in the ER after antiemetics. Spoke with gastroenterology who does not want to repeat her CT scan today. They recommend fluid hydration pain control and medicines for vomiting. If unable to make her comfortable would recommend admission to the hospital.  They also recommend antibiotics such as a cephalosporin and metronidazole. We were unable to get her comfortable. Will admit to hospitalist.    ED Course:   Initial assessment performed. The patients presenting problems have been discussed, and they are in agreement with the care plan formulated and outlined with them. I have encouraged them to ask questions as they arise throughout their visit.          Orders Placed This Encounter    CBC WITH AUTOMATED DIFF    COMPREHENSIVE METABOLIC PANEL    LIPASE    URINALYSIS W/ RFLX MICROSCOPIC    C REACTIVE PROTEIN, QT    DIET CLEAR LIQUID    POC URINE PREGNANCY TEST    NOTIFY PROVIDER: SPECIFY Notify provider on pt's arrival to floor ONE TIME STAT    OUT OF BED WITH ASSISTANCE    VITAL SIGNS PER UNIT ROUTINE    FULL CODE    IP CONSULT TO GASTROENTEROLOGY    SALINE LOCK IV ONE TIME STAT    sodium chloride 0.9 % bolus infusion 1,000 mL    morphine injection 6 mg    ondansetron (ZOFRAN) injection 4 mg    HYDROmorphone (PF) (DILAUDID) injection 1 mg    sodium chloride 0.9 % bolus infusion 1,000 mL    ondansetron (ZOFRAN) injection 4 mg    HYDROmorphone (PF) (DILAUDID) injection 0.5 mg    DISCONTD: cefTRIAXone (ROCEPHIN) 1 g in 0.9% sodium chloride (MBP/ADV) 50 mL    metroNIDAZOLE (FLAGYL) IVPB premix 500 mg    0.9% sodium chloride infusion    HYDROmorphone (PF) (DILAUDID) injection 0.5 mg    acetaminophen (TYLENOL) tablet 650 mg    ondansetron (ZOFRAN) injection 4 mg    HYDROmorphone (PF) (DILAUDID) injection 0.5 mg    0.9% sodium chloride infusion    cefepime (MAXIPIME) 1 g in 0.9% sodium chloride (MBP/ADV) 50 mL    IP CONSULT TO HOSPITALIST       Procedures      Critical Care Time:   0    Disposition:  Admit        Diagnosis     Clinical Impression:   1. Crohn's disease with other complication, unspecified gastrointestinal tract location ([address]    2. Abdominal pain, generalized    3. Nausea and vomiting, intractability of vomiting not specified, unspecified vomiting type            This note will not be viewable in MyChart.

## 2020-01-06 NOTE — ED NOTES
Writer called into patient room at this time. Pt. With increased pain and nasea. Spoke with primary RN Drew Bueno who is aware.

## 2020-01-06 NOTE — PROGRESS NOTES
Pharmacy Clarification of Prior to Admission Medication Regimen     The patient was interviewed regarding clarification of the prior to admission medication regimen. Mom was present in room and obtained permission from patient to discuss drug regimen with visitor(s) present. Patient was questioned regarding use of any other inhalers, topical products, over the counter medications, herbal medications, vitamin products or ophthalmic/nasal/otic medication use. Information Obtained From: Patient, RX Query    Pertinent Pharmacy Findings:  predniSONE (DELTASONE) 10 mg tablet: Patient started a 20 day regimen on 12/28/19. Patient has completed 9 days of therapy as of 1/5/20. Patient also stated she was to start Humira, but has not received the prescription for ju doctor. PTA medication list was corrected to the following:     Prior to Admission Medications   Prescriptions Last Dose Informant Taking?   acetaminophen (TYLENOL) 500 mg tablet 1/5/2020 at Unknown time Self Yes   Sig: Take 1,000 mg by mouth every six (6) hours as needed for Pain. dicyclomine (BENTYL) 20 mg tablet 12/30/2019 at Unknown time Self Yes   Sig: Take 1 Tab by mouth every six (6) hours as needed (abdominal cramps) for up to 20 doses. Hold until directed to resume by Dr. Bright Ribeiro. docusate sodium (COLACE) 100 mg capsule 1/5/2020 at Unknown time Self Yes   Sig: Take 100 mg by mouth daily. multivitamin (ONE A DAY) tablet 1/5/2020 at Unknown time Self Yes   Sig: Take 1 Tab by mouth daily. ondansetron (ZOFRAN ODT) 4 mg disintegrating tablet 1/4/2020 at Unknown time Self Yes   Sig: Take 1 Tab by mouth every eight (8) hours as needed for Nausea. predniSONE (DELTASONE) 10 mg tablet 1/5/2020 at Unknown time Self Yes   Sig: Take 10-40 mg by mouth Follow dosing instructions.  Take 40 mg for 5 days, then 30 mg for 5 days, then 20 mg for 5 days, then 10 mg for 5 days   promethazine (PHENERGAN) 25 mg suppository 12/30/2019 at Unknown time Self Yes   Sig: Insert 25 mg into rectum every six (6) hours as needed for Nausea. simethicone (MYLICON) 80 mg chewable tablet 1/5/2020 at Unknown time Self Yes   Sig: Take 80 mg by mouth every six (6) hours as needed for Flatulence.       Facility-Administered Medications: None          Thank you,  Dino Flannery CPhT  Medication History Pharmacy Technician

## 2020-01-06 NOTE — CONSULTS
GI Consultation Note Melani Tee)    NAME: Salome Rodrigez : 1985 MRN: [id number]   ATTG: Dr. Elisa Bower: Ramo Sarmiento MD  Date/Time:  2020 5:54 PM  Subjective:   REASON FOR CONSULT:        Stalin Ponce is a 29 y.o.  female who I was asked to see for abd pain, nausea/ vomiting, leukocytosis in the setting of suspected Crohn's disease. Her clinical course is complicated by previous TI obstruction/stricture, picked up by impacted pillcam, then resected surgically by Dr. Arian Munguia. This pathology showed endometriosis actually. She has had a possible history of crohn's diagnosed previously, but biopsies had never confirmed it. She has now required several ER visits with worsening TI thickening and disease, and all signs point to Crohn's now. She is slated to start humira, but it's not yet been approved and she's been on prednisone intermittently to treat symptoms. Came in for worsening pain, now has a fever. Had vomiting but feels better now. Pain worse than usual, not responding to morphine like it usually dose (or has the past few times she's been here). Past Medical History:   Diagnosis Date    Abnormal Pap smear     Biopsy done last year and came back ok    Asthma     flares with bronchitis has been over a year as stated 10/5/2018    Autoimmune disease ([facility]     Crohns    Crohn's disease ([facility] [facility] 2019    Difficult intravenous access     GERD (gastroesophageal reflux disease)     IBS (irritable bowel syndrome)     Kidney stones     Nausea & vomiting     PONV-nausea    Psychiatric disorder     anxiety & depression    PUD (peptic ulcer disease)       Past Surgical History:   Procedure Laterality Date    COLONOSCOPY N/A 2019    COLONOSCOPY performed by [facility]aja [facility]with [facility] at [facility] ENDOSCOPY    HX APPENDECTOMY      7th grade.     HX COLONOSCOPY      HX ENDOSCOPY      HX GYN          HX GYN  2004        HX GYN      hysterectomy    HX HEENT      dental surgery    HX OTHER SURGICAL  09/10/2019    s/p Diagnostic laparoscopy with lysis of adhesions, Robitc Assist,Open small bowel resection (with removal of PillCam)      Social History     Tobacco Use    Smoking status: Former Smoker     Packs/day: 0.25     Last attempt to quit: 2017     Years since quittin.3    Smokeless tobacco: Never Used   Substance Use Topics    Alcohol use: Yes     Comment: occassionally      Family History   Problem Relation Age of Onset    Heart Disease Mother     Diabetes Mother     Cancer Maternal Aunt         colon cancer      Allergies   Allergen Reactions    Avelox [Moxifloxacin] Swelling and Unknown (comments)    Macrolide Antibiotics Hives, Rash and Swelling     Per patient reported as a previous reaction to a \"mycin\" drug. Did not know which drug.  Nsaids (Non-Steroidal Anti-Inflammatory Drug) Other (comments)     D/t esophageal erosion and GERD      Home Medications:  Prior to Admission Medications   Prescriptions Last Dose Informant Patient Reported? Taking?   acetaminophen (TYLENOL) 500 mg tablet 2020 at Unknown time Self Yes Yes   Sig: Take 1,000 mg by mouth every six (6) hours as needed for Pain. dicyclomine (BENTYL) 20 mg tablet 2019 at Unknown time Self No Yes   Sig: Take 1 Tab by mouth every six (6) hours as needed (abdominal cramps) for up to 20 doses. Hold until directed to resume by Dr. Elisa Lew. docusate sodium (COLACE) 100 mg capsule 2020 at Unknown time Self Yes Yes   Sig: Take 100 mg by mouth daily. multivitamin (ONE A DAY) tablet 2020 at Unknown time Self Yes Yes   Sig: Take 1 Tab by mouth daily. ondansetron (ZOFRAN ODT) 4 mg disintegrating tablet 2020 at Unknown time Self No Yes   Sig: Take 1 Tab by mouth every eight (8) hours as needed for Nausea. predniSONE (DELTASONE) 10 mg tablet 2020 at Unknown time Self Yes Yes   Sig: Take 10-40 mg by mouth Follow dosing instructions.  Take 40 mg for 5 days, then 30 mg for 5 days, then 20 mg for 5 days, then 10 mg for 5 days   promethazine (PHENERGAN) 25 mg suppository 12/30/2019 at Unknown time Self Yes Yes   Sig: Insert 25 mg into rectum every six (6) hours as needed for Nausea. simethicone (MYLICON) 80 mg chewable tablet 1/5/2020 at Unknown time Self Yes Yes   Sig: Take 80 mg by mouth every six (6) hours as needed for Flatulence. Facility-Administered Medications: None     Hospital medications:  Current Facility-Administered Medications   Medication Dose Route Frequency    acetaminophen (TYLENOL) tablet 650 mg  650 mg Oral Q6H PRN    0.9% sodium chloride infusion  150 mL/hr IntraVENous CONTINUOUS    HYDROmorphone (PF) (DILAUDID) injection 1 mg  1 mg IntraVENous Q4H PRN    prochlorperazine (COMPAZINE) with saline injection 5 mg  5 mg IntraVENous Q6H PRN    [START ON 1/7/2020] docusate sodium (COLACE) capsule 100 mg  100 mg Oral DAILY    [START ON 1/7/2020] therapeutic multivitamin (THERAGRAN) tablet 1 Tab  1 Tab Oral DAILY    simethicone (MYLICON) tablet 80 mg  80 mg Oral QID PRN    methylPREDNISolone (PF) (SOLU-MEDROL) injection 40 mg  40 mg IntraVENous Q8H    metroNIDAZOLE (FLAGYL) IVPB premix 500 mg  500 mg IntraVENous Q12H    cefepime (MAXIPIME) 2 g in 0.9% sodium chloride (MBP/ADV) 100 mL  2 g IntraVENous Q12H    iopamidol (ISOVUE-370) 76 % injection 100 mL  100 mL IntraVENous RAD ONCE    sodium chloride (NS) flush 10 mL  10 mL IntraVENous RAD ONCE     Current Outpatient Medications   Medication Sig    acetaminophen (TYLENOL) 500 mg tablet Take 1,000 mg by mouth every six (6) hours as needed for Pain.  promethazine (PHENERGAN) 25 mg suppository Insert 25 mg into rectum every six (6) hours as needed for Nausea.  docusate sodium (COLACE) 100 mg capsule Take 100 mg by mouth daily.  simethicone (MYLICON) 80 mg chewable tablet Take 80 mg by mouth every six (6) hours as needed for Flatulence.     predniSONE (DELTASONE) 10 mg tablet Take 10-40 mg by mouth Follow dosing instructions. Take 40 mg for 5 days, then 30 mg for 5 days, then 20 mg for 5 days, then 10 mg for 5 days    dicyclomine (BENTYL) 20 mg tablet Take 1 Tab by mouth every six (6) hours as needed (abdominal cramps) for up to 20 doses. Hold until directed to resume by Dr. Elisa Lew.  multivitamin (ONE A DAY) tablet Take 1 Tab by mouth daily.  ondansetron (ZOFRAN ODT) 4 mg disintegrating tablet Take 1 Tab by mouth every eight (8) hours as needed for Nausea. REVIEW OF SYSTEMS:     []     Unable to obtain  ROS due to  []    mental status change  []    sedated   []    intubated   [x]    Total of 11 systems reviewed as follows:  Const:  negative fever, negative chills, negative weight loss  Eyes:   negative diplopia or visual changes, negative eye pain  ENT:   negative coryza, negative sore throat  Resp:   negative cough, hemoptysis, dyspnea  Cards:  negative for chest pain, palpitations, lower extremity edema  :  negative for frequency, dysuria and hematuria  Skin:   negative for rash and pruritus  Heme:  negative for easy bruising and gum/nose bleeding  MS:  negative for myalgias, arthralgias, back pain and muscle weakness  Neurolo:  negative for headaches, dizziness, vertigo, memory problems   Psych:  negative for feelings of anxiety, depression     Pertinent Positives include :    Objective:   VITALS:    Visit Vitals  /76   Pulse (!) 110   Temp 100.2 °F (37.9 °C)   Resp 24   Ht 5' 2\" (1.575 m)   Wt 77.6 kg (171 lb)   LMP 10/08/2018   SpO2 97%   BMI 31.28 kg/m²     Temp (24hrs), Av.2 °F (37.9 °C), Min:99.1 °F (37.3 °C), Max:101.5 °F (38.6 °C)    PHYSICAL EXAM:   General:    Alert, cooperative, no distress, appears stated age. Head:   Normocephalic, without obvious abnormality, atraumatic. Eyes:   Conjunctivae clear, anicteric sclerae. Pupils are equal  Nose:  Nares normal. No drainage or sinus tenderness.   Throat:    Lips, mucosa, and tongue normal.  No Thrush  Neck:  Supple, symmetrical,  no adenopathy, thyroid: non tender  Back:    Symmetric,  No CVA tenderness. Lungs:   CTA bilaterally. No wheezing/rhonchi/rales. Chest wall:  No tenderness or deformity. No Accessory muscle use. Heart:   Regular rate and rhythm,  no murmur, rub or gallop. Abdomen:   Soft,tener in R entire side. Not distended. Bowel sounds normal. No masses  Extremities: Atraumatic, No cyanosis. No edema. No clubbing  Skin:     Texture, turgor normal. No rashes/lesions/jaundice  Lymph: Cervical, supraclavicular normal.  Psych:  Good insight. Not depressed. Not anxious or agitated. Neurologic: EOMs intact. No facial asymmetry. No aphasia or slurred speech. Normal   strength, A/O X 3. LAB DATA REVIEWED:    Recent Results (from the past 48 hour(s))   CBC WITH AUTOMATED DIFF    Collection Time: 01/06/20  8:23 AM   Result Value Ref Range    WBC 20.3 (H) 3.6 - 11.0 K/uL    RBC 5.24 (H) 3.80 - 5.20 M/uL    HGB 14.3 11.5 - 16.0 g/dL    HCT 45.0 35.0 - 47.0 %    MCV 85.9 80.0 - 99.0 FL    MCH 27.3 26.0 - 34.0 PG    MCHC 31.8 30.0 - 36.5 g/dL    RDW 14.3 11.5 - 14.5 %    PLATELET 756 538 - 727 K/uL    MPV 9.6 8.9 - 12.9 FL    NRBC 0.0 0  WBC    ABSOLUTE NRBC 0.00 0.00 - 0.01 K/uL    NEUTROPHILS 83 (H) 32 - 75 %    LYMPHOCYTES 11 (L) 12 - 49 %    MONOCYTES 5 5 - 13 %    EOSINOPHILS 0 0 - 7 %    BASOPHILS 0 0 - 1 %    IMMATURE GRANULOCYTES 1 (H) 0.0 - 0.5 %    ABS. NEUTROPHILS 16.9 (H) 1.8 - 8.0 K/UL    ABS. LYMPHOCYTES 2.1 0.8 - 3.5 K/UL    ABS. MONOCYTES 1.0 0.0 - 1.0 K/UL    ABS. EOSINOPHILS 0.0 0.0 - 0.4 K/UL    ABS. BASOPHILS 0.1 0.0 - 0.1 K/UL    ABS. IMM.  GRANS. 0.2 (H) 0.00 - 0.04 K/UL    DF AUTOMATED     METABOLIC PANEL, COMPREHENSIVE    Collection Time: 01/06/20  8:23 AM   Result Value Ref Range    Sodium 140 136 - 145 mmol/L    Potassium 3.7 3.5 - 5.1 mmol/L    Chloride 106 97 - 108 mmol/L    CO2 25 21 - 32 mmol/L    Anion gap 9 5 - 15 mmol/L    Glucose 95 65 - 100 mg/dL    BUN 19 6 - 20 MG/DL    Creatinine 0.98 0.55 - 1.02 MG/DL    BUN/Creatinine ratio 19 12 - 20      GFR est AA >60 >60 ml/min/1.73m2    GFR est non-AA >60 >60 ml/min/1.73m2    Calcium 8.6 8.5 - 10.1 MG/DL    Bilirubin, total 0.4 0.2 - 1.0 MG/DL    ALT (SGPT) 21 12 - 78 U/L    AST (SGOT) 6 (L) 15 - 37 U/L    Alk. phosphatase 65 45 - 117 U/L    Protein, total 6.5 6.4 - 8.2 g/dL    Albumin 3.1 (L) 3.5 - 5.0 g/dL    Globulin 3.4 2.0 - 4.0 g/dL    A-G Ratio 0.9 (L) 1.1 - 2.2     LIPASE    Collection Time: 01/06/20  8:23 AM   Result Value Ref Range    Lipase 69 (L) 73 - 393 U/L   URINALYSIS W/ RFLX MICROSCOPIC    Collection Time: 01/06/20  8:23 AM   Result Value Ref Range    Color YELLOW/STRAW      Appearance CLEAR CLEAR      Specific gravity 1.022 1.003 - 1.030      pH (UA) 5.5 5.0 - 8.0      Protein 30 (A) NEG mg/dL    Glucose NEGATIVE  NEG mg/dL    Ketone NEGATIVE  NEG mg/dL    Bilirubin NEGATIVE  NEG      Blood TRACE (A) NEG      Urobilinogen 0.2 0.2 - 1.0 EU/dL    Nitrites NEGATIVE  NEG      Leukocyte Esterase NEGATIVE  NEG      WBC 0-4 0 - 4 /hpf    RBC 5-10 0 - 5 /hpf    Epithelial cells FEW FEW /lpf    Bacteria NEGATIVE  NEG /hpf    Hyaline cast 2-5 0 - 5 /lpf   C REACTIVE PROTEIN, QT    Collection Time: 01/06/20  8:23 AM   Result Value Ref Range    C-Reactive protein 1.28 (H) 0.00 - 0.60 mg/dL   LACTIC ACID    Collection Time: 01/06/20  1:12 PM   Result Value Ref Range    Lactic acid 1.7 0.4 - 2.0 MMOL/L     IMAGING RESULTS:  CT scan pending    Assessment/Plan:      Active Problems:    Abdominal pain (8/6/2019)      Crohn's disease ([address] (1/6/2020)      Nausea and vomiting (1/6/2020)      Sepsis ([facility] [address] (1/6/2020)    30 yo F with suspected Crohn's, nausea/vomiting and obstructive symptoms, despite prednisone in outpatient setting.  Also, has now developed a fever, so CT is pending.    ___________________________________________________  RECOMMENDATIONS:      -- IV Abx  -- IV solumedrol  -- await CT  -- anticipate biologic like humira, but depending on insurance approval and CT results  -- will follow closely in house    Discussed Code Status:    [x]    Full Code      []    DNR    ___________________________________________________  Care Plan discussed with:    [x]    Patient   []    Family   []    Nursing   [x]    Attending  Total Time :  45   minutes   ___________________________________________________  GI: Kyle Mares MD

## 2020-01-06 NOTE — PROGRESS NOTES
Reason for Admission:   Crohn's complication                   RRAT Score:          9 low risk           Plan for utilizing home health:      Has not used home health in the past                    Current Advanced Directive/Advance Care Plan: none                         Transition of Care Plan:                      1.  Patient in ED bed waiting for inpatient admission  2. Patient prefers to discharge home with family assistance and follow up appointments. Patient is a 29year old female admitted 1/6 for Crohn's complication. Patient alert and oriented, resting in bed, no visitors present in room. Demographic information verified and correct. Insurance information verified and correct. Patient lives with her son, no home oxygen, no DME, has not used home health in the past.  Patient uses [facility] pharmacy in [place]. Patient is independent with all ADLs and can drive. Patient's mother can transport at discharge. Care Management Interventions  PCP Verified by CM: Karina Melton MD)  Mode of Transport at Discharge:  Other (see comment)(pt's mother can transport at [employer])  Transition of Care Consult (CM Consult): Discharge Planning  Discharge Durable Medical Equipment: No  Physical Therapy Consult: No  Occupational Therapy Consult: No  Speech Therapy Consult: No  Current Support Network: Own Home(lives with son, 2 story house, 6 steps to enter)  Confirm Follow Up Transport: Family  Discharge Location  Discharge Placement: [address]anner, RN, [facility]  [phone]

## 2020-01-07 LAB
ALBUMIN SERPL-MCNC: 2.7 G/DL (ref 3.5–5)
ALBUMIN/GLOB SERPL: 0.7 {RATIO} (ref 1.1–2.2)
ALP SERPL-CCNC: 61 U/L (ref 45–117)
ALT SERPL-CCNC: 16 U/L (ref 12–78)
ANION GAP SERPL CALC-SCNC: 7 MMOL/L (ref 5–15)
AST SERPL-CCNC: 10 U/L (ref 15–37)
BASOPHILS # BLD: 0 K/UL (ref 0–0.1)
BASOPHILS NFR BLD: 0 % (ref 0–1)
BILIRUB SERPL-MCNC: 0.7 MG/DL (ref 0.2–1)
BUN SERPL-MCNC: 12 MG/DL (ref 6–20)
BUN/CREAT SERPL: 14 (ref 12–20)
CALCIUM SERPL-MCNC: 8.5 MG/DL (ref 8.5–10.1)
CHLORIDE SERPL-SCNC: 110 MMOL/L (ref 97–108)
CO2 SERPL-SCNC: 23 MMOL/L (ref 21–32)
CREAT SERPL-MCNC: 0.83 MG/DL (ref 0.55–1.02)
DIFFERENTIAL METHOD BLD: ABNORMAL
EOSINOPHIL # BLD: 0 K/UL (ref 0–0.4)
EOSINOPHIL NFR BLD: 0 % (ref 0–7)
ERYTHROCYTE [DISTWIDTH] IN BLOOD BY AUTOMATED COUNT: 14.6 % (ref 11.5–14.5)
GLOBULIN SER CALC-MCNC: 3.9 G/DL (ref 2–4)
GLUCOSE SERPL-MCNC: 134 MG/DL (ref 65–100)
HCT VFR BLD AUTO: 41.2 % (ref 35–47)
HGB BLD-MCNC: 13 G/DL (ref 11.5–16)
IMM GRANULOCYTES # BLD AUTO: 0.2 K/UL (ref 0–0.04)
IMM GRANULOCYTES NFR BLD AUTO: 1 % (ref 0–0.5)
LYMPHOCYTES # BLD: 1 K/UL (ref 0.8–3.5)
LYMPHOCYTES NFR BLD: 4 % (ref 12–49)
MAGNESIUM SERPL-MCNC: 2.1 MG/DL (ref 1.6–2.4)
MCH RBC QN AUTO: 27.1 PG (ref 26–34)
MCHC RBC AUTO-ENTMCNC: 31.6 G/DL (ref 30–36.5)
MCV RBC AUTO: 86 FL (ref 80–99)
MONOCYTES # BLD: 0.2 K/UL (ref 0–1)
MONOCYTES NFR BLD: 1 % (ref 5–13)
NEUTS SEG # BLD: 22.9 K/UL (ref 1.8–8)
NEUTS SEG NFR BLD: 94 % (ref 32–75)
NRBC # BLD: 0 K/UL (ref 0–0.01)
NRBC BLD-RTO: 0 PER 100 WBC
PLATELET # BLD AUTO: 343 K/UL (ref 150–400)
PMV BLD AUTO: 9.5 FL (ref 8.9–12.9)
POTASSIUM SERPL-SCNC: 3.9 MMOL/L (ref 3.5–5.1)
PROT SERPL-MCNC: 6.6 G/DL (ref 6.4–8.2)
RBC # BLD AUTO: 4.79 M/UL (ref 3.8–5.2)
RBC MORPH BLD: ABNORMAL
SODIUM SERPL-SCNC: 140 MMOL/L (ref 136–145)
WBC # BLD AUTO: 24.3 K/UL (ref 3.6–11)

## 2020-01-07 PROCEDURE — 74011250636 HC RX REV CODE- 250/636: Performed by: NURSE PRACTITIONER

## 2020-01-07 PROCEDURE — 74011250636 HC RX REV CODE- 250/636: Performed by: INTERNAL MEDICINE

## 2020-01-07 PROCEDURE — 74011000258 HC RX REV CODE- 258: Performed by: INTERNAL MEDICINE

## 2020-01-07 PROCEDURE — 36415 COLL VENOUS BLD VENIPUNCTURE: CPT

## 2020-01-07 PROCEDURE — 74011250637 HC RX REV CODE- 250/637: Performed by: INTERNAL MEDICINE

## 2020-01-07 PROCEDURE — 65660000000 HC RM CCU STEPDOWN

## 2020-01-07 PROCEDURE — 80053 COMPREHEN METABOLIC PANEL: CPT

## 2020-01-07 PROCEDURE — 83735 ASSAY OF MAGNESIUM: CPT

## 2020-01-07 PROCEDURE — 85025 COMPLETE CBC W/AUTO DIFF WBC: CPT

## 2020-01-07 PROCEDURE — 74011000250 HC RX REV CODE- 250: Performed by: INTERNAL MEDICINE

## 2020-01-07 PROCEDURE — 99252 IP/OBS CONSLTJ NEW/EST SF 35: CPT | Performed by: SURGERY

## 2020-01-07 PROCEDURE — 74011250636 HC RX REV CODE- 250/636: Performed by: HOSPITALIST

## 2020-01-07 RX ORDER — FAMOTIDINE 10 MG/ML
20 INJECTION INTRAVENOUS 2 TIMES DAILY
Status: DISCONTINUED | OUTPATIENT
Start: 2020-01-07 | End: 2020-01-10

## 2020-01-07 RX ADMIN — CEFEPIME HYDROCHLORIDE 2 G: 2 INJECTION, POWDER, FOR SOLUTION INTRAVENOUS at 08:39

## 2020-01-07 RX ADMIN — METHYLPREDNISOLONE SODIUM SUCCINATE 40 MG: 40 INJECTION, POWDER, FOR SOLUTION INTRAMUSCULAR; INTRAVENOUS at 21:03

## 2020-01-07 RX ADMIN — SODIUM CHLORIDE 5 MG: 9 INJECTION INTRAMUSCULAR; INTRAVENOUS; SUBCUTANEOUS at 17:26

## 2020-01-07 RX ADMIN — METRONIDAZOLE 500 MG: 500 INJECTION, SOLUTION INTRAVENOUS at 21:44

## 2020-01-07 RX ADMIN — FAMOTIDINE 20 MG: 10 INJECTION, SOLUTION INTRAVENOUS at 16:18

## 2020-01-07 RX ADMIN — METRONIDAZOLE 500 MG: 500 INJECTION, SOLUTION INTRAVENOUS at 10:02

## 2020-01-07 RX ADMIN — HYDROMORPHONE HYDROCHLORIDE 1 MG: 1 INJECTION, SOLUTION INTRAMUSCULAR; INTRAVENOUS; SUBCUTANEOUS at 03:00

## 2020-01-07 RX ADMIN — HYDROMORPHONE HYDROCHLORIDE 1 MG: 1 INJECTION, SOLUTION INTRAMUSCULAR; INTRAVENOUS; SUBCUTANEOUS at 17:24

## 2020-01-07 RX ADMIN — HYDROMORPHONE HYDROCHLORIDE 1 MG: 1 INJECTION, SOLUTION INTRAMUSCULAR; INTRAVENOUS; SUBCUTANEOUS at 12:48

## 2020-01-07 RX ADMIN — THERA TABS 1 TABLET: TAB at 08:39

## 2020-01-07 RX ADMIN — SODIUM CHLORIDE 5 MG: 9 INJECTION INTRAMUSCULAR; INTRAVENOUS; SUBCUTANEOUS at 07:03

## 2020-01-07 RX ADMIN — SODIUM CHLORIDE 150 ML/HR: 900 INJECTION, SOLUTION INTRAVENOUS at 19:07

## 2020-01-07 RX ADMIN — CEFEPIME HYDROCHLORIDE 2 G: 2 INJECTION, POWDER, FOR SOLUTION INTRAVENOUS at 21:02

## 2020-01-07 RX ADMIN — HYDROMORPHONE HYDROCHLORIDE 1 MG: 1 INJECTION, SOLUTION INTRAMUSCULAR; INTRAVENOUS; SUBCUTANEOUS at 07:07

## 2020-01-07 RX ADMIN — DOCUSATE SODIUM 100 MG: 100 CAPSULE, LIQUID FILLED ORAL at 08:40

## 2020-01-07 RX ADMIN — METHYLPREDNISOLONE SODIUM SUCCINATE 40 MG: 40 INJECTION, POWDER, FOR SOLUTION INTRAMUSCULAR; INTRAVENOUS at 06:23

## 2020-01-07 RX ADMIN — SODIUM CHLORIDE 150 ML/HR: 900 INJECTION, SOLUTION INTRAVENOUS at 07:09

## 2020-01-07 RX ADMIN — HYDROMORPHONE HYDROCHLORIDE 1 MG: 1 INJECTION, SOLUTION INTRAMUSCULAR; INTRAVENOUS; SUBCUTANEOUS at 23:50

## 2020-01-07 NOTE — PROGRESS NOTES
Verbal shift change report given to Manjula (oncoming nurse) by Alejandro Hightower (offgoing nurse). Report included the following information SBAR, Kardex, Intake/Output, MAR and Recent Results.

## 2020-01-07 NOTE — PROGRESS NOTES
RAPID RESPONSE TEAM    2250  Received call from [facility] regarding patient's CT scan results indicating bowel perforation. MD states that surgeon requests the hospitalist to evaluate patient. Spoke with Ira Cohen and updated on MD request.  Ira Cohen to come evaluate patient.     [facility]  Rapid Response ORIN Toussaint

## 2020-01-07 NOTE — PROGRESS NOTES
[address] MRN [id number]  Consult ID [id number]  Facility Time Zone ET  Date and Time of Request 01/06/2020 09:51:14 PM  Requesting Clinician Marbin Vuong  Patient Name Geri Donald  Date of Birth [id number]  Gender Female  Clinical Note  Clinical Note Patient is a 29 yr old admitted with Crohns flare, CT scan showing free air, bowel  Perforation. Reviewed chart. Stable vitals. Consulted General Surgery, , discussed case. Called Rapid Response RN, to have in house physician exam patient for possible  surgical emergency.

## 2020-01-07 NOTE — PROGRESS NOTES
GI PROGRESS NOTE  Maryann Shook, KRISTOFER  [phone] NP in-hospital cell phone M-F until 4:30  After 5pm or on weekends, please call  for physician on call    Esequiel Davis :1985 [id number]   ATTG: Dr Nena Montes  PCP: Kassandra Zeng MD  Date/Time:  2020 10:15 AM     Primary GI: Dr. Kelly Cornejo    Reason for following: Crohns Diseas    Assessment:   Crohn's disease with microperforation on CT scan - febrile on presentation  Abdominal pain for month but recently getting worse again  - Complaining of painful urination immediately prior to using the bathroom. - Nausea and vomiting are improving  - Leukocytosis of 24K today - ? Steroid effect vs infectious? - Elevated CRP  - CT: 1. Marked inflammation of the terminal ileum with stranding in the adjacent soft  tissues but no drainable abscess or obstruction. There is pneumoperitoneum  indicating bowel perforation and a small amount of free fluid.  - C.diff pending     Plan:   -- IV Antibiotics- cefepime and flagyl  -- IV solumedrol can decrease to 60mg daily tomorrow AM  -- Maintain NPO diet  -- Pt will need biologic therapy - she is eager to do humira but currently that will be postponed start date due to current findings on CT scan. Plan discussed with Dr Kelly Cornejo. Subjective:   Discussed with RN events overnight. Feeling better this morning especially at rest. Worse when getting up and moving around. Complaint Y/N Description   Abdominal Pain y With movement   Hematemesis     Hematochezia     Melena     Constipation     Diarrhea y Less than yesterday   Dyspepsia     Dysphagia     Jaundiced     Nausea/vomiting n      Review of Systems:  Symptom Y/N Comments  Symptom Y/N Comments   Fever/Chills    Chest Pain     Cough    Headaches     Sputum    Joint Pain     SOB/BENAVIDES    Pruritis/Rash     Tolerating Diet  npo  Other       Could NOT obtain due to:      Objective:   VITALS:   Last 24hrs VS reviewed since prior progress note.  Most recent are:  Visit Vitals  /79   Pulse 87   Temp 98.8 °F (37.1 °C)   Resp 18   Ht 5' 2\" (1.575 m)   Wt 77.6 kg (171 lb)   SpO2 93%   Breastfeeding No   BMI 31.28 kg/m²       Intake/Output Summary (Last 24 hours) at 1/7/2020 1015  Last data filed at 1/7/2020 0646  Gross per 24 hour   Intake 1200 ml   Output    Net 1200 ml     PHYSICAL EXAM:  General: WD, WN. Alert, cooperative, no acute distress    HEENT: NC, Atraumatic. Anicteric sclerae. Lungs:  CTA Bilaterally. No Wheezing/Rhonchi/Rales. Heart:  Regular  rhythm,  No murmur (), No Rubs, No Gallops  Abdomen: Soft, Non distended, RLQ tender.  +Bowel sounds, no HSM  Extremities: No c/c/e  Neurologic:  Alert and oriented X 3. No acute neurological distress   Psych:   Good insight. Not anxious nor agitated. Lab and Radiology Data Reviewed: (see below)    Medications Reviewed: (see below)  PMH/SH reviewed - no change compared to H&P  ________________________________________________________________________  Total time spent with patient: 20 minutes ________________________________________________________________________  Care Plan discussed with:  Patient y   Family  y-spouse   RN y              Consultant: Alen Schwartz NP     Procedures: see electronic medical records for all procedures/Xrays and details which were not copied into this note but were reviewed prior to creation of Plan. LABS:  Recent Labs     01/07/20  0322 01/06/20  0823   WBC 24.3* 20.3*   HGB 13.0 14.3   HCT 41.2 45.0    372     Recent Labs     01/07/20 0322 01/06/20  0823    140   K 3.9 3.7   * 106   CO2 23 25   BUN 12 19   CREA 0.83 0.98   * 95   CA 8.5 8.6   MG 2.1  --      Recent Labs     01/07/20 0322 01/06/20  0823   SGOT 10* 6*   AP 61 65   TP 6.6 6.5   ALB 2.7* 3.1*   GLOB 3.9 3.4   LPSE  --  69*     No results for input(s): INR, PTP, APTT, INREXT in the last 72 hours. No results for input(s): FE, TIBC, PSAT, FERR in the last 72 hours. No results found for: FOL, RBCF  No results for input(s): PH, PCO2, PO2 in the last 72 hours. No results for input(s): CPK, CKMB in the last 72 hours.     No lab exists for component: TROPONINI  Lab Results   Component Value Date/Time    Color YELLOW/STRAW 01/06/2020 08:23 AM    Appearance CLEAR 01/06/2020 08:23 AM    Specific gravity 1.022 01/06/2020 08:23 AM    Specific gravity 1.020 08/06/2019 08:15 AM    pH (UA) 5.5 01/06/2020 08:23 AM    Protein 30 (A) 01/06/2020 08:23 AM    Glucose NEGATIVE  01/06/2020 08:23 AM    Ketone NEGATIVE  01/06/2020 08:23 AM    Bilirubin NEGATIVE  01/06/2020 08:23 AM    Urobilinogen 0.2 01/06/2020 08:23 AM    Nitrites NEGATIVE  01/06/2020 08:23 AM    Leukocyte Esterase NEGATIVE  01/06/2020 08:23 AM    Epithelial cells FEW 01/06/2020 08:23 AM    Bacteria NEGATIVE  01/06/2020 08:23 AM    WBC 0-4 01/06/2020 08:23 AM    RBC 5-10 01/06/2020 08:23 AM       MEDICATIONS:  Current Facility-Administered Medications   Medication Dose Route Frequency    acetaminophen (TYLENOL) tablet 650 mg  650 mg Oral Q6H PRN    0.9% sodium chloride infusion  150 mL/hr IntraVENous CONTINUOUS    prochlorperazine (COMPAZINE) with saline injection 5 mg  5 mg IntraVENous Q6H PRN    docusate sodium (COLACE) capsule 100 mg  100 mg Oral DAILY    therapeutic multivitamin (THERAGRAN) tablet 1 Tab  1 Tab Oral DAILY    simethicone (MYLICON) tablet 80 mg  80 mg Oral QID PRN    methylPREDNISolone (PF) (SOLU-MEDROL) injection 40 mg  40 mg IntraVENous Q8H    metroNIDAZOLE (FLAGYL) IVPB premix 500 mg  500 mg IntraVENous Q12H    cefepime (MAXIPIME) 2 g in 0.9% sodium chloride (MBP/ADV) 100 mL  2 g IntraVENous Q12H    HYDROmorphone (PF) (DILAUDID) injection 1 mg  1 mg IntraVENous Q4H PRN

## 2020-01-07 NOTE — CONSULTS
Surgery Consult    Subjective:      Javi Ibanez is a 29 y.o. female admitted to the medical service today with fever, chills, leukocytosis and severe abdominal pain. She has a complex history and has been worked up for presumed Crohn's disease and is due to start Humira as soon as it is approved by her insurance. She does not have a histologic diagnosis but all lab and imaging testing as well as directed history is consistent with Crohn's. She had a retained pill cam requiring surgical resection in September with Dr. Jonn Armendariz and this pathology showed a stricture secondary to endometriosis but no inflammatory bowel disease, but Dr. Donna Roach believes she has both processes. Stat surgical consult is called tonight because her intake CT shows diffuse distal small bowel inflammation around the resection site with a few scattered foci of free air. There is no drainable collection or evidence of fistula. Past Medical History:   Diagnosis Date    Abnormal Pap smear     Biopsy done last year and came back ok    Asthma     flares with bronchitis has been over a year as stated 10/5/2018    Autoimmune disease ([facility] [address]     Crohns    Crohn's disease ([facility] [address] 2019    Difficult intravenous access     GERD (gastroesophageal reflux disease)     IBS (irritable bowel syndrome)     Kidney stones     Nausea & vomiting     PONV-nausea    Psychiatric disorder     anxiety & depression    PUD (peptic ulcer disease)      Past Surgical History:   Procedure Laterality Date    COLONOSCOPY N/A 2019    COLONOSCOPY performed by Carly Marcelo MD at [facility] ENDOSCOPY    HX APPENDECTOMY      7th grade.     HX COLONOSCOPY      HX ENDOSCOPY      HX GYN          HX GYN  2004        HX GYN      hysterectomy    HX HEENT      dental surgery    HX OTHER SURGICAL  09/10/2019    s/p Diagnostic laparoscopy with lysis of adhesions, Robitc Assist,Open small bowel resection (with removal of PillCam)       Family History Problem Relation Age of Onset    Heart Disease Mother     Diabetes Mother     Cancer Maternal Aunt         colon cancer     Social History     Tobacco Use    Smoking status: Former Smoker     Packs/day: 0.25     Last attempt to quit: 2017     Years since quittin.3    Smokeless tobacco: Never Used   Substance Use Topics    Alcohol use: Yes     Comment: occassionally      Prior to Admission medications    Medication Sig Start Date End Date Taking? Authorizing Provider   acetaminophen (TYLENOL) 500 mg tablet Take 1,000 mg by mouth every six (6) hours as needed for Pain. Yes Provider, Historical   promethazine (PHENERGAN) 25 mg suppository Insert 25 mg into rectum every six (6) hours as needed for Nausea. Yes Provider, Historical   docusate sodium (COLACE) 100 mg capsule Take 100 mg by mouth daily. Yes Provider, Historical   simethicone (MYLICON) 80 mg chewable tablet Take 80 mg by mouth every six (6) hours as needed for Flatulence. Yes Provider, Historical   predniSONE (DELTASONE) 10 mg tablet Take 10-40 mg by mouth Follow dosing instructions. Take 40 mg for 5 days, then 30 mg for 5 days, then 20 mg for 5 days, then 10 mg for 5 days   Yes Provider, Historical   dicyclomine (BENTYL) 20 mg tablet Take 1 Tab by mouth every six (6) hours as needed (abdominal cramps) for up to 20 doses. Hold until directed to resume by Dr. Purvi Milligan. 19  Yes Denita Kong NP   multivitamin (ONE A DAY) tablet Take 1 Tab by mouth daily. Yes Provider, Historical   ondansetron (ZOFRAN ODT) 4 mg disintegrating tablet Take 1 Tab by mouth every eight (8) hours as needed for Nausea. 19  Yes Ruth Ann Quijano MD      Allergies   Allergen Reactions    Avelox [Moxifloxacin] Swelling and Unknown (comments)    Macrolide Antibiotics Hives, Rash and Swelling     Per patient reported as a previous reaction to a \"mycin\" drug. Did not know which drug.     Nsaids (Non-Steroidal Anti-Inflammatory Drug) Other (comments) D/t esophageal erosion and GERD       Review of Systems   Constitutional: Positive for appetite change, chills and fever. Negative for diaphoresis. Respiratory: Negative for shortness of breath and wheezing. Cardiovascular: Negative for chest pain and palpitations. Gastrointestinal: Positive for abdominal pain, nausea and vomiting. Negative for diarrhea. Musculoskeletal: Negative for myalgias. Hematological: Does not bruise/bleed easily. Objective:     Patient Vitals for the past 8 hrs:   BP Temp Pulse Resp SpO2   20 2321 126/77 98.9 °F (37.2 °C) 85 18 94 %   20 2025 126/80 98.7 °F (37.1 °C) 93 18 93 %   20 1909 132/81 99.3 °F (37.4 °C) (!) 109 16 96 %   20 1750  100.2 °F (37.9 °C)      20 1749   (!) 110 24 97 %       Temp (24hrs), Av.7 °F (37.6 °C), Min:98.7 °F (37.1 °C), Max:101.5 °F (38.6 °C)      Physical Exam  Constitutional:       General: She is not in acute distress. Appearance: She is well-developed. HENT:      Head: Normocephalic and atraumatic. Cardiovascular:      Rate and Rhythm: Normal rate and regular rhythm. Heart sounds: Normal heart sounds. Pulmonary:      Breath sounds: Normal breath sounds. No wheezing or rales. Abdominal:      General: Bowel sounds are normal. There is no distension. Palpations: Abdomen is soft. There is no mass. Tenderness: There is generalized tenderness. There is guarding and rebound. Hernia: No hernia is present. Musculoskeletal: Normal range of motion. Lymphadenopathy:      Cervical: No cervical adenopathy. Assessment:     28 yo woman with complex history with apparent Crohn's flare with scattered small locules of free air and peritoneal signs on exam, with leukocytosis but on steroids. Discussed with Dr. Ezekiel Daly who is familiar with the patient. Plan:     Continue iv antibiotics and steroids for now, with low threshold for surgical exploration if she deteriorates.   It is not unusual for Crohn's pts to have some microperforation secondary to fistulas and this is not necessarily a strict indication for surgical exploration. We will follow along closely and reserve surgical resection for progression or failure to respond to medical therapy. NPO for now and would hold off on anticoagulation until we are sure surgery is not required.

## 2020-01-07 NOTE — PROGRESS NOTES
General Surgery End of Shift Nursing Note    Bedside shift change report given to Jose Luis Zapata (oncoming nurse) by Berta Torre RN (offgoing nurse). Report included the following information SBAR, Kardex, Intake/Output, MAR and Recent Results.         Rosa Francisco

## 2020-01-07 NOTE — PROGRESS NOTES
Nutrition Assessment:    RECOMMENDATIONS:   Advance diet as medically able per Gi/surgeon    DIETITIANS INTERVENTIONS/PLAN:   Monitor plan of care    ASSESSMENT:   Pt admitted with Crohn's flareup. PMH: Crohn's disease, PUD, GERD. MST triggered for wt loss and poor appetite. Noted microperf per imaging, GI/surgeon following. Per EMR, pt is actually up 5-10lb over the past 6 months. She is currently NPO. Labs reviewed, WNL. Being checked for C diff. Will monitor plan of care. SUBJECTIVE/OBJECTIVE:     Diet Order: NPO  % Eaten:  No data found. Pertinent Medications:cefepime, flagyl, colace, solumedrol, flagyl, MVI; [email or web address])      Chemistries:  Lab Results   Component Value Date/Time    Sodium 140 01/07/2020 03:22 AM    Potassium 3.9 01/07/2020 03:22 AM    Chloride 110 (H) 01/07/2020 03:22 AM    CO2 23 01/07/2020 03:22 AM    Anion gap 7 01/07/2020 03:22 AM    Glucose 134 (H) 01/07/2020 03:22 AM    BUN 12 01/07/2020 03:22 AM    Creatinine 0.83 01/07/2020 03:22 AM    BUN/Creatinine ratio 14 01/07/2020 03:22 AM    GFR est AA >60 01/07/2020 03:22 AM    GFR est non-AA >60 01/07/2020 03:22 AM    Calcium 8.5 01/07/2020 03:22 AM    AST (SGOT) 10 (L) 01/07/2020 03:22 AM    Alk. phosphatase 61 01/07/2020 03:22 AM    Protein, total 6.6 01/07/2020 03:22 AM    Albumin 2.7 (L) 01/07/2020 03:22 AM    Globulin 3.9 01/07/2020 03:22 AM    A-G Ratio 0.7 (L) 01/07/2020 03:22 AM    ALT (SGPT) 16 01/07/2020 03:22 AM      Anthropometrics: Height: 5' 2\" (157.5 cm) Weight: 77.6 kg (171 lb)  [] standing scale     []bed scale    [x]stated   []unknown    IBW (%IBW):   ( ) UBW (%UBW):   (  %)    BMI: Body mass index is 31.28 kg/m². This BMI is indicative of:  []Underweight   []Normal   []Overweight   [x] Obesity   [] Extreme Obesity (BMI>40)  Estimated Nutrition Needs (Based on): 1860 Kcals/day(MSJ 1430 x 1.3) , 77 g(-93 (1-1.2gPro/kg) ) Protein  Carbohydrate:  At Least 130 g/day  Fluids: 1900 mL/day    Last BM: 1/6   []Active []Hyperactive  [x]Hypoactive       [] Absent   BS  Skin:    [x] Intact   [] Incision  [] Breakdown   [] DTI   [] Tears/Excoriation/Abrasion  []Edema [] Other: Wt Readings from Last 30 Encounters:   01/06/20 77.6 kg (171 lb)   11/21/19 76.2 kg (168 lb)   11/08/19 74.4 kg (164 lb 0.4 oz)   10/08/19 68 kg (150 lb)   09/26/19 70.8 kg (156 lb)   09/09/19 69.9 kg (154 lb 1.6 oz)   09/06/19 70.7 kg (155 lb 14.4 oz)   08/05/19 74.9 kg (165 lb 2 oz)   08/04/19 75.5 kg (166 lb 7.2 oz)   07/13/19 75.6 kg (166 lb 11.2 oz)   10/15/18 84.6 kg (186 lb 8 oz)   10/05/18 84.4 kg (186 lb)   08/04/18 85 kg (187 lb 6.3 oz)   09/15/16 78.4 kg (172 lb 13.5 oz)   12/07/15 84.8 kg (186 lb 15.2 oz)   12/31/11 78.9 kg (174 lb)   12/16/11 79.8 kg (176 lb)      NUTRITION DIAGNOSES:   Problem:  Altered GI function      Etiology: related to crohn's flareup     Signs/Symptoms: as evidenced by pt NPO, imaging. NUTRITION INTERVENTIONS:                     GOAL:   Plan of care will be determined re: nutrition in 2-4 days.      Cultural, Anabaptism, or Ethnic Dietary Needs: None     LEARNING NEEDS (Diet, Food/Nutrient-Drug Interaction):    [x] None Identified   [] Identified and Education Provided/Documented   [] Identified and Pt declined/was not appropriate      [x] Interdisciplinary Care Plan Reviewed/Documented    [x] Participated in Discharge Planning: TBD   [] Interdisciplinary Rounds     NUTRITION RISK:    [x] High              [] Moderate           []  Low  []  Minimal/Uncompromised    PT SEEN FOR:    []  MD Consult: []Calorie Count      []Diabetic Diet Education        []Diet Education     []Electrolyte Management     []General Nutrition Management and Supplements     []Management of Tube Feeding     []TPN Recommendations    [x]  RN Referral:  [x]MST score >=2     []Enteral/Parenteral Nutrition PTA     []Pregnant: Gestational DM or Multigestation   []  Low BMI  []  Re-Screen   []  LOS   []  NPO/clears x 5 days   []  [place]/JEFFERY Adhikari [address], [address]   Pager [phone]  Weekend Pager [phone]

## 2020-01-07 NOTE — PROGRESS NOTES
Surgical Note    Date/Time:  1/7/2020 8:15 AM        Assessment :    Plan:  Patient Active Problem List   Diagnosis Code    Normal pregnancy Z34.90    SROM (spontaneous rupture of membranes)     Arrested active phase of labor O62.1    S/P hysterectomy Z90.710    Ovarian cyst rupture N83.209    Nausea with vomiting R11.2    Abdominal pain R10.9    Crohn disease ([facility] [address] K50.90    Bowel obstruction ([facility] [address] K56.609    Crohn's disease ([facility] [address] K50.90    Nausea and vomiting R11.2    Sepsis ([facility] [address] A41.9      Last saw patient in September. Recently events reviewed  Currently followed by Dr. Juancho Carias for Crohn's  CT last night with improvement of the TI inflammation, but +bubbles of free air under the diaphragm and small amount of fluid. No drainable collection. Feeing better today. No pain unless she moves or coughs. Afebrile overnight and tachycardia resolved. Leucocytosis maybe due to steroids. Appreciate evaluation by Dr. Silvia Ramirez and discussed with Dr. Quoc Mccallum last night    Agree with Continuing iv antibiotics and steroids for now, with low threshold for surgical exploration if she deteriorates. Keep NPO another day. Adv diet tomorrow if continues to improve.          Subjective:     Chief Complaint:      Review of Systems:  Y  N       Y  N  [] [x]  Fever/chills                                               [] [x]  Chest Pain  [] [x]  Cough                                                       [] [x]  Diarrhea   [] [x]  Sputum                                                     [] [x]  Constipation  [] [x]  SOB/BENAVIDES                                                [] [x]  Nausea/Vomit  [x] []  Abd Pain                                                    [x] []  Tolerating diet  [] [x]  Dysuria                                                           []Unable to obtain  ROS due to  []mental status change  []sedated   []intubated    Objective:     VITALS:   Last 24hrs VS reviewed since prior hospitalist progress note. Most recent are:  Visit Vitals  /79   Pulse 87   Temp 98.8 °F (37.1 °C)   Resp 18   Ht 5' 2\" (1.575 m)   Wt 77.6 kg (171 lb)   SpO2 93%   Breastfeeding No   BMI 31.28 kg/m²     Temp (24hrs), Av.5 °F (37.5 °C), Min:98.2 °F (36.8 °C), Max:101.5 °F (38.6 °C)      Intake/Output Summary (Last 24 hours) at 2020 0815  Last data filed at 2020 0646  Gross per 24 hour   Intake 2200 ml   Output    Net 2200 ml         []Arcos []NGT  []Intubated on vent    PHYSICAL EXAM:  Gen:  Appears comfortable lying in bed. HEENT:   [x]NC/AT/PERRLA/EOMI    []pink conjunctivae      []pale conjunctivae                  PERRL  []yes  []no      []moist mucosa    []dry mucosa    hearing intact to voice []yes  []No    RESP:   [x]CTA bialterally/no wheezing/rhonchi/rales/crackles    []clear bilaterally  []wheezing   []rhonchi   []crackles     use of accessory muscles []yes []no    CARD:   [x] regular rate and rhythm/No murmurs/rubs/gallops    murmur  []yes ()  []no      Rubs  []yes  []no       Gallops []yes  []no    Rate [] regular  [] irregular        carotid bruits  []Right  [] Left                 LE edema []yes  []no           JVP  [] yes   [] no    ABD:    [x]soft  [x]non distended  [x] tender diffuse. R>L.    [] NABS    SKIN:   Rashes [] yes   [x] no    Ulcers [] yes   [x] no               [x]normal   []tight to palpitation    skin turgor [] good  []poor  []decreased               Cyanosis/clubbing [] yes [x] no    NEUR:   [x]cranial nerves II-XII grossly intact       []Cranial nerves deficit                 [] facial droop    [] slurred speech   []aphasic     []Strength normal     [] weakness  [] LUE  []  RUE/ [] LLE  []  RLE    follows commands  [] yes [] no           PSYCH:   insight []poor [x]good   Alert and Oriented to  [x]person  [x]place  [x] time                    []depressed []anxious []agitated  []lethargic []stuporous  []sedated           Lab Data Reviewed: (see below)    Medications Reviewed: (see below)    PMH/SH reviewed - no change compared to H&P    Care Plan discussed with:  [x]Patient   []Family    []Care Manager   []RN    []Consultant/Specialist :    Prophylaxis:  []Lovenox  []Coumadin  []Hep SQ  []SCDs  []H2B/PPI    Disposition:  []Home w/ Family   [] PT,OT,RN   []SNF/LTC   []SAH/Rehab    Ancillary Serices:   [] PT     []OT      []SW      []Nut      []HH ________________________________________________________________________  LABS:  Recent Labs     01/07/20  0322 01/06/20  0823   WBC 24.3* 20.3*   HGB 13.0 14.3   HCT 41.2 45.0    372     Recent Labs     01/07/20 0322 01/06/20  0823    140   K 3.9 3.7   * 106   CO2 23 25   BUN 12 19   CREA 0.83 0.98   * 95   CA 8.5 8.6   MG 2.1  --      Recent Labs     01/07/20  0322 01/06/20  0823   SGOT 10* 6*   ALT 16 21   AP 61 65   TBILI 0.7 0.4   TP 6.6 6.5   ALB 2.7* 3.1*   GLOB 3.9 3.4   LPSE  --  69*     No results for input(s): INR, PTP, APTT, INREXT in the last 72 hours. No results for input(s): FE, TIBC, PSAT, FERR in the last 72 hours. No results for input(s): PH, PCO2, PO2 in the last 72 hours. No results for input(s): CPK, CKMB in the last 72 hours.     No lab exists for component: TROPONINI  No results found for: GLUCPOC    MEDICATIONS:  Current Facility-Administered Medications   Medication Dose Route Frequency    acetaminophen (TYLENOL) tablet 650 mg  650 mg Oral Q6H PRN    0.9% sodium chloride infusion  150 mL/hr IntraVENous CONTINUOUS    prochlorperazine (COMPAZINE) with saline injection 5 mg  5 mg IntraVENous Q6H PRN    docusate sodium (COLACE) capsule 100 mg  100 mg Oral DAILY    therapeutic multivitamin (THERAGRAN) tablet 1 Tab  1 Tab Oral DAILY    simethicone (MYLICON) tablet 80 mg  80 mg Oral QID PRN    methylPREDNISolone (PF) (SOLU-MEDROL) injection 40 mg  40 mg IntraVENous Q8H    metroNIDAZOLE (FLAGYL) IVPB premix 500 mg  500 mg IntraVENous Q12H    cefepime (MAXIPIME) 2 g in 0.9% sodium chloride (MBP/ADV) 100 mL  2 g IntraVENous Q12H    HYDROmorphone (PF) (DILAUDID) injection 1 mg  1 mg IntraVENous Q4H PRN

## 2020-01-08 LAB
BASOPHILS # BLD: 0 K/UL (ref 0–0.1)
BASOPHILS NFR BLD: 0 % (ref 0–1)
DIFFERENTIAL METHOD BLD: ABNORMAL
EOSINOPHIL # BLD: 0 K/UL (ref 0–0.4)
EOSINOPHIL NFR BLD: 0 % (ref 0–7)
ERYTHROCYTE [DISTWIDTH] IN BLOOD BY AUTOMATED COUNT: 14.5 % (ref 11.5–14.5)
HCT VFR BLD AUTO: 37.3 % (ref 35–47)
HGB BLD-MCNC: 11.9 G/DL (ref 11.5–16)
IMM GRANULOCYTES # BLD AUTO: 0.2 K/UL (ref 0–0.04)
IMM GRANULOCYTES NFR BLD AUTO: 1 % (ref 0–0.5)
LYMPHOCYTES # BLD: 0.9 K/UL (ref 0.8–3.5)
LYMPHOCYTES NFR BLD: 5 % (ref 12–49)
MCH RBC QN AUTO: 27.9 PG (ref 26–34)
MCHC RBC AUTO-ENTMCNC: 31.9 G/DL (ref 30–36.5)
MCV RBC AUTO: 87.4 FL (ref 80–99)
MONOCYTES # BLD: 0.4 K/UL (ref 0–1)
MONOCYTES NFR BLD: 2 % (ref 5–13)
NEUTS SEG # BLD: 17.1 K/UL (ref 1.8–8)
NEUTS SEG NFR BLD: 92 % (ref 32–75)
NRBC # BLD: 0 K/UL (ref 0–0.01)
NRBC BLD-RTO: 0 PER 100 WBC
PLATELET # BLD AUTO: 308 K/UL (ref 150–400)
PMV BLD AUTO: 10 FL (ref 8.9–12.9)
RBC # BLD AUTO: 4.27 M/UL (ref 3.8–5.2)
RBC MORPH BLD: ABNORMAL
WBC # BLD AUTO: 18.6 K/UL (ref 3.6–11)

## 2020-01-08 PROCEDURE — 74011250636 HC RX REV CODE- 250/636: Performed by: INTERNAL MEDICINE

## 2020-01-08 PROCEDURE — 74011250637 HC RX REV CODE- 250/637: Performed by: NURSE PRACTITIONER

## 2020-01-08 PROCEDURE — 74011250636 HC RX REV CODE- 250/636: Performed by: HOSPITALIST

## 2020-01-08 PROCEDURE — 74011250636 HC RX REV CODE- 250/636: Performed by: NURSE PRACTITIONER

## 2020-01-08 PROCEDURE — 74011250637 HC RX REV CODE- 250/637: Performed by: EMERGENCY MEDICINE

## 2020-01-08 PROCEDURE — 65660000000 HC RM CCU STEPDOWN

## 2020-01-08 PROCEDURE — 74011000258 HC RX REV CODE- 258: Performed by: INTERNAL MEDICINE

## 2020-01-08 PROCEDURE — 85025 COMPLETE CBC W/AUTO DIFF WBC: CPT

## 2020-01-08 PROCEDURE — 74011000250 HC RX REV CODE- 250: Performed by: INTERNAL MEDICINE

## 2020-01-08 PROCEDURE — 36415 COLL VENOUS BLD VENIPUNCTURE: CPT

## 2020-01-08 PROCEDURE — 74011250637 HC RX REV CODE- 250/637: Performed by: INTERNAL MEDICINE

## 2020-01-08 RX ORDER — ACETAMINOPHEN 325 MG/1
650 TABLET ORAL EVERY 6 HOURS
Status: DISCONTINUED | OUTPATIENT
Start: 2020-01-08 | End: 2020-01-13 | Stop reason: HOSPADM

## 2020-01-08 RX ORDER — OXYCODONE HYDROCHLORIDE 5 MG/1
10 TABLET ORAL
Status: DISCONTINUED | OUTPATIENT
Start: 2020-01-08 | End: 2020-01-13 | Stop reason: HOSPADM

## 2020-01-08 RX ORDER — OXYCODONE HYDROCHLORIDE 5 MG/1
5 TABLET ORAL
Status: DISCONTINUED | OUTPATIENT
Start: 2020-01-08 | End: 2020-01-13 | Stop reason: HOSPADM

## 2020-01-08 RX ADMIN — METRONIDAZOLE 500 MG: 500 INJECTION, SOLUTION INTRAVENOUS at 13:09

## 2020-01-08 RX ADMIN — FAMOTIDINE 20 MG: 10 INJECTION, SOLUTION INTRAVENOUS at 11:35

## 2020-01-08 RX ADMIN — OXYCODONE 10 MG: 5 TABLET ORAL at 18:46

## 2020-01-08 RX ADMIN — OXYCODONE 5 MG: 5 TABLET ORAL at 15:05

## 2020-01-08 RX ADMIN — THERA TABS 1 TABLET: TAB at 09:42

## 2020-01-08 RX ADMIN — CEFEPIME HYDROCHLORIDE 2 G: 2 INJECTION, POWDER, FOR SOLUTION INTRAVENOUS at 22:18

## 2020-01-08 RX ADMIN — METRONIDAZOLE 500 MG: 500 INJECTION, SOLUTION INTRAVENOUS at 22:18

## 2020-01-08 RX ADMIN — ACETAMINOPHEN 650 MG: 325 TABLET ORAL at 03:32

## 2020-01-08 RX ADMIN — CEFEPIME HYDROCHLORIDE 2 G: 2 INJECTION, POWDER, FOR SOLUTION INTRAVENOUS at 11:25

## 2020-01-08 RX ADMIN — ACETAMINOPHEN 650 MG: 325 TABLET ORAL at 18:18

## 2020-01-08 RX ADMIN — OXYCODONE 10 MG: 5 TABLET ORAL at 09:41

## 2020-01-08 RX ADMIN — ACETAMINOPHEN 650 MG: 325 TABLET ORAL at 13:09

## 2020-01-08 RX ADMIN — SODIUM CHLORIDE 150 ML/HR: 900 INJECTION, SOLUTION INTRAVENOUS at 04:48

## 2020-01-08 RX ADMIN — SODIUM CHLORIDE 150 ML/HR: 900 INJECTION, SOLUTION INTRAVENOUS at 11:31

## 2020-01-08 RX ADMIN — ACETAMINOPHEN 650 MG: 325 TABLET ORAL at 23:47

## 2020-01-08 RX ADMIN — DOCUSATE SODIUM 100 MG: 100 CAPSULE, LIQUID FILLED ORAL at 09:42

## 2020-01-08 RX ADMIN — FAMOTIDINE 20 MG: 10 INJECTION, SOLUTION INTRAVENOUS at 18:18

## 2020-01-08 RX ADMIN — SODIUM CHLORIDE 150 ML/HR: 900 INJECTION, SOLUTION INTRAVENOUS at 22:18

## 2020-01-08 RX ADMIN — METHYLPREDNISOLONE SODIUM SUCCINATE 40 MG: 40 INJECTION, POWDER, FOR SOLUTION INTRAMUSCULAR; INTRAVENOUS at 11:35

## 2020-01-08 RX ADMIN — SODIUM CHLORIDE 5 MG: 9 INJECTION INTRAMUSCULAR; INTRAVENOUS; SUBCUTANEOUS at 18:46

## 2020-01-08 NOTE — PROGRESS NOTES
Hospitalist Progress Note    NAME: Dharmesh Child   :  1985   MRN:  [id number]       Assessment / Plan:  Abdominal pain, presumed Crohn's flare  -CT abdomen showed : . Marked inflammation of the terminal ileum with stranding in the adjacent soft  tissues but no drainable abscess or obstruction. There is pneumoperitoneum  indicating bowel perforation and a small amount of free fluid. GI recs appreciated , c/w IV abx and IV solumedrol   Diet advanced to sips of clears . Pt still with intermittent abdominal pain   C/w IV dilaudid and prn compazine    Surgery consulted , recs reviewed : low threshold for surgical exploration if recurrent fever and worsening symptoms   No stools , so enteric precautions DC    -Currently pending approval for Humira    SIRS (leukocytosis, HR 130s, fever 101.5F), possible sepsis versus noninfectious SIRS due to Crohn's flare  -Blood cultures NTD   -Empiric antibiotics as above  Wbc trending down     Anxiety and depression  -Current health challenges are currently exacerbating this  -Patient with no active suicidal thoughts at this time    GERD  IBS  Asthma, not currently in exacerbation    Code Status: Full  Surrogate Decision Maker: , Taylors Clamp    DVT Prophylaxis: SCDs as possible surgery   GI Prophylaxis: not indicated    Baseline: , working, but has been missing a lot of time from work a      30.0 - 39.9 Obese / Body mass index is 31.28 kg/m². Subjective:     Chief Complaint / Reason for Physician Visit  FU crohn flare . reported that she has less pain today , had CLD but didn't tolerated that well , was dg to sips of clears . Discussed with RN events overnight.      Review of Systems:  Symptom Y/N Comments  Symptom Y/N Comments   Fever/Chills n   Chest Pain n    Poor Appetite    Edema     Cough    Abdominal Pain y    Sputum    Joint Pain     SOB/BENAVIDES n   Pruritis/Rash     Nausea/vomit n   Tolerating PT/OT     Diarrhea    Tolerating Diet y Quirino Hunger of clears Constipation    Other       Could NOT obtain due to:      Objective:     VITALS:   Last 24hrs VS reviewed since prior progress note. Most recent are:  Patient Vitals for the past 24 hrs:   Temp Pulse Resp BP SpO2   01/08/20 0738 97.9 °F (36.6 °C) 80 18 (!) 140/95 98 %   01/08/20 0315 98.7 °F (37.1 °C) 88 18 141/89 98 %   01/07/20 2320 98.7 °F (37.1 °C) 98 18 137/89 97 %   01/07/20 2009 98.3 °F (36.8 °C) 96 18 131/80 97 %   01/07/20 1515 98 °F (36.7 °C) 85 18 123/75 97 %       Intake/Output Summary (Last 24 hours) at 1/8/2020 1135  Last data filed at 1/8/2020 0736  Gross per 24 hour   Intake 2000 ml   Output    Net 2000 ml        PHYSICAL EXAM:  General: WD, WN. Alert, cooperative, no acute distress    EENT:  EOMI. Anicteric sclerae. MMM  Resp:  CTA bilaterally, no wheezing or rales. No accessory muscle use  CV:  Regular  rhythm,  No edema  GI:  Soft, Non distended,tender+  +Bowel sounds  Neurologic:  Alert and oriented X 3, normal speech,   Psych:   Good insight. Not anxious nor agitated  Skin:  No rashes. No jaundice    Reviewed most current lab test results and cultures  YES  Reviewed most current radiology test results   YES  Review and summation of old records today    NO  Reviewed patient's current orders and MAR    YES  PMH/ reviewed - no change compared to H&P  ________________________________________________________________________  Care Plan discussed with:    Comments   Patient x    Family  x    RN x    Care Manager     Consultant                       x Multidiciplinary team rounds were held today with , nursing, pharmacist and clinical coordinator. Patient's plan of care was discussed; medications were reviewed and discharge planning was addressed.      ________________________________________________________________________  Total NON critical care TIME:  30 Minutes    Total CRITICAL CARE TIME Spent:   Minutes non procedure based      Comments   >50% of visit spent in counseling and coordination of care x    ________________________________________________________________________  Pat Merino MD     Procedures: see electronic medical records for all procedures/Xrays and details which were not copied into this note but were reviewed prior to creation of Plan. LABS:  I reviewed today's most current labs and imaging studies.   Pertinent labs include:  Recent Labs     01/08/20 0318 01/07/20  0322 01/06/20  0823   WBC 18.6* 24.3* 20.3*   HGB 11.9 13.0 14.3   HCT 37.3 41.2 45.0    343 372     Recent Labs     01/07/20  0322 01/06/20  0823    140   K 3.9 3.7   * 106   CO2 23 25   * 95   BUN 12 19   CREA 0.83 0.98   CA 8.5 8.6   MG 2.1  --    ALB 2.7* 3.1*   TBILI 0.7 0.4   SGOT 10* 6*   ALT 16 21       Signed: Pat Merino MD

## 2020-01-08 NOTE — PROGRESS NOTES
Verbal shift change report given to Manjula (oncoming nurse) by Tete Cruz (offgoing nurse). Report included the following information SBAR, Kardex, Intake/Output, MAR and Recent Results. Pt medicated for pain and nausea. Ambulated in hallway. No bowel movement today. Voiding appropriately.

## 2020-01-08 NOTE — PROGRESS NOTES
GI PROGRESS NOTE  Shi Andrade, KRISTOFER  [phone] NP in-hospital cell phone M-F until 4:30  After 5pm or on weekends, please call  for physician on call    Sidra Miles :1985 FBE:[id number]   ATTG: Dr Maureen England  PCP: Miguel Barriga MD  Date/Time:  2020 10:15 AM     Primary GI: Dr. Flory Ceballos    Reason for following: Crohns Diseas    Assessment:   Crohn's disease with stricturing/inflammation and fistula/microperforation on CT scan - no tissue diagnosis  Abdominal pain for month but recently getting worse again  - Nausea and vomiting are improving  - Leukocytosis of 18.6 today - trending down - ? Steroid effect vs infectious? - Elevated CRP  - CT: 1. Marked inflammation of the terminal ileum with stranding in the adjacent soft  tissues but no drainable abscess or obstruction. There is pneumoperitoneum  indicating bowel perforation and a small amount of free fluid.  - C.diff not collected yet - Only passing gas today - No BM  - Abdomen remains tender to palpation. Feels full and distended per patient. Soft on exam.        Plan:   -- IV Antibiotics- cefepime and flagyl  -- IV solumedrol continue 40mg daily  -- Advanced to clear liquid - encouraged to take it slowly- she was having worsening abdominal cramping after drinking apple juice    -- Pt will need biologic therapy - she is eager to do humira but currently that will be postponed start date due to current findings on CT scan. Plan discussed with Dr Flory Ceballos. Subjective:   Discussed with RN events overnight. Sitting up at window seat today and appears comfortable but reported a severe episode of abdominal pain last night that she required IV pain medication. She was doing better this AM but had some more cramping after starting Clear liquids. She does feel better overall until she gets these 'episodes.' Walked a lot last night and this AM, feels better after passing gas.      Review of Systems:  Symptom Y/N Comments  Symptom Y/N Comments   Fever/Chills    Chest Pain     Cough    Headaches     Sputum    Joint Pain     SOB/BNEAVIDES    Pruritis/Rash     Tolerating Diet  clears  Other       Could NOT obtain due to:      Objective:   VITALS:   Last 24hrs VS reviewed since prior progress note. Most recent are:  Visit Vitals  BP (!) 140/95   Pulse 80   Temp 97.9 °F (36.6 °C)   Resp 18   Ht 5' 2\" (1.575 m)   Wt 77.6 kg (171 lb)   SpO2 98%   Breastfeeding No   BMI 31.28 kg/m²       Intake/Output Summary (Last 24 hours) at 1/8/2020 0906  Last data filed at 1/8/2020 0646  Gross per 24 hour   Intake 2000 ml   Output    Net 2000 ml     PHYSICAL EXAM:  General: WD, WN. Alert, cooperative, no acute distress    HEENT: NC, Atraumatic. Anicteric sclerae. Lungs:  CTA Bilaterally. No Wheezing/Rhonchi/Rales. Heart:  Regular  rhythm,  No murmur (), No Rubs, No Gallops  Abdomen: Soft, Non distended, RLQ tender> LLQ tender.  +Bowel sounds, no HSM  Extremities: BLE no edema, +1 Right hand edema  Neurologic:  Alert and oriented X 3. No acute neurological distress   Psych:   Good insight. Not anxious nor agitated. Lab and Radiology Data Reviewed: (see below)    Medications Reviewed: (see below)  PMH/SH reviewed - no change compared to H&P  ________________________________________________________________________  Total time spent with patient: 20 minutes ________________________________________________________________________  Care Plan discussed with:  Patient y   Family  y-spouse   RN y - Andrw              Consultant: Angelita Menchaca NP     Procedures: see electronic medical records for all procedures/Xrays and details which were not copied into this note but were reviewed prior to creation of Plan.       LABS:  Recent Labs     01/08/20 0318 01/07/20 0322   WBC 18.6* 24.3*   HGB 11.9 13.0   HCT 37.3 41.2    343     Recent Labs     01/07/20 0322 01/06/20  0823    140   K 3.9 3.7   * 106   CO2 23 25   BUN 12 19   CREA 0.83 0.98   GLU 134* 95   CA 8.5 8.6   MG 2.1  --      Recent Labs     01/07/20  0322 01/06/20  0823   SGOT 10* 6*   AP 61 65   TP 6.6 6.5   ALB 2.7* 3.1*   GLOB 3.9 3.4   LPSE  --  69*     No results for input(s): INR, PTP, APTT, INREXT, INREXT in the last 72 hours. No results for input(s): FE, TIBC, PSAT, FERR in the last 72 hours. No results found for: FOL, RBCF  No results for input(s): PH, PCO2, PO2 in the last 72 hours. No results for input(s): CPK, CKMB in the last 72 hours.     No lab exists for component: TROPONINI  Lab Results   Component Value Date/Time    Color YELLOW/STRAW 01/06/2020 08:23 AM    Appearance CLEAR 01/06/2020 08:23 AM    Specific gravity 1.022 01/06/2020 08:23 AM    Specific gravity 1.020 08/06/2019 08:15 AM    pH (UA) 5.5 01/06/2020 08:23 AM    Protein 30 (A) 01/06/2020 08:23 AM    Glucose NEGATIVE  01/06/2020 08:23 AM    Ketone NEGATIVE  01/06/2020 08:23 AM    Bilirubin NEGATIVE  01/06/2020 08:23 AM    Urobilinogen 0.2 01/06/2020 08:23 AM    Nitrites NEGATIVE  01/06/2020 08:23 AM    Leukocyte Esterase NEGATIVE  01/06/2020 08:23 AM    Epithelial cells FEW 01/06/2020 08:23 AM    Bacteria NEGATIVE  01/06/2020 08:23 AM    WBC 0-4 01/06/2020 08:23 AM    RBC 5-10 01/06/2020 08:23 AM       MEDICATIONS:  Current Facility-Administered Medications   Medication Dose Route Frequency    oxyCODONE IR (ROXICODONE) tablet 5 mg  5 mg Oral Q4H PRN    oxyCODONE IR (ROXICODONE) tablet 10 mg  10 mg Oral Q4H PRN    acetaminophen (TYLENOL) tablet 650 mg  650 mg Oral Q6H    methylPREDNISolone (PF) (SOLU-MEDROL) injection 40 mg  40 mg IntraVENous DAILY    famotidine (PF) (PEPCID) injection 20 mg  20 mg IntraVENous BID    0.9% sodium chloride infusion  150 mL/hr IntraVENous CONTINUOUS    prochlorperazine (COMPAZINE) with saline injection 5 mg  5 mg IntraVENous Q6H PRN    docusate sodium (COLACE) capsule 100 mg  100 mg Oral DAILY    therapeutic multivitamin (THERAGRAN) tablet 1 Tab  1 Tab Oral DAILY    simethicone (MYLICON) tablet 80 mg  80 mg Oral QID PRN    metroNIDAZOLE (FLAGYL) IVPB premix 500 mg  500 mg IntraVENous Q12H    cefepime (MAXIPIME) 2 g in 0.9% sodium chloride (MBP/ADV) 100 mL  2 g IntraVENous Q12H    HYDROmorphone (PF) (DILAUDID) injection 1 mg  1 mg IntraVENous Q4H PRN

## 2020-01-08 NOTE — PROGRESS NOTES
Bedside shift change report given to Kecia N Fili Tanner (oncoming nurse) by Umer Esteban (offgoing nurse). Report included the following information SBAR, Kardex, Intake/Output and Recent Results.

## 2020-01-08 NOTE — PROGRESS NOTES
Surgical Note         Assessment :    Plan:  Patient Active Problem List   Diagnosis Code    Normal pregnancy Z34.90    SROM (spontaneous rupture of membranes)     Arrested active phase of labor O62.1    S/P hysterectomy Z90.710    Ovarian cyst rupture N83.209    Nausea with vomiting R11.2    Abdominal pain R10.9    Crohn disease ([address] K50.90    Bowel obstruction ([address] K56.609    Crohn's disease ([address] K50.90    Nausea and vomiting R11.2    Sepsis ([address] A41.9      Continues to feel better  Sitting up comfortably  +flatus    Agree with advancing to CLD  Add nutrition supplements  Oral pain meds    Afebrile overnight and tachycardia resolved. Low threshold for surgical exploration if she deteriorates.      Expressed understanding. Subjective:     Chief Complaint:      Review of Systems:  Y  N       Y  N  [] [x]  Fever/chills                                               [] [x]  Chest Pain  [] [x]  Cough                                                       [] [x]  Diarrhea   [] [x]  Sputum                                                     [] [x]  Constipation  [] [x]  SOB/BENAVIDES                                                [] [x]  Nausea/Vomit  [x] []  Abd Pain                                                    [x] []  Tolerating diet  [] [x]  Dysuria                                                           []Unable to obtain  ROS due to  []mental status change  []sedated   []intubated    Objective:     VITALS:   Last 24hrs VS reviewed since prior hospitalist progress note.  Most recent are:  Visit Vitals  BP (!) 140/95   Pulse 80   Temp 97.9 °F (36.6 °C)   Resp 18   Ht 5' 2\" (1.575 m)   Wt 77.6 kg (171 lb)   SpO2 98%   Breastfeeding No   BMI 31.28 kg/m²     Temp (24hrs), Av.3 °F (36.8 °C), Min:97.9 °F (36.6 °C), Max:98.7 °F (37.1 °C)      Intake/Output Summary (Last 24 hours) at 2020 1116  Last data filed at 2020 0736  Gross per 24 hour   Intake 2000 ml   Output    Net 2000 ml []Arcos []NGT  []Intubated on vent    PHYSICAL EXAM:  Gen:  [] A&O  []non-toxic  [x] No acute distress             [] ill apearing  []  Critical        HEENT:   [x]NC/AT/PERRLA/EOMI    []pink conjunctivae      []pale conjunctivae                  PERRL  []yes  []no      []moist mucosa    []dry mucosa    hearing intact to voice []yes  []No    RESP:   [x]CTA bialterally/no wheezing/rhonchi/rales/crackles    []clear bilaterally  []wheezing   []rhonchi   []crackles     use of accessory muscles []yes []no    CARD:   [x] regular rate and rhythm/No murmurs/rubs/gallops    murmur  []yes ()  []no      Rubs  []yes  []no       Gallops []yes  []no    Rate [] regular  [] irregular        carotid bruits  []Right  [] Left                 LE edema []yes  []no           JVP  [] yes   [] no    ABD:    [x]soft  [x]non distended  [x] tender R>L, improved    [] NABS    SKIN:   Rashes [] yes   [x] no    Ulcers [] yes   [x] no               [x]normal   []tight to palpitation    skin turgor [] good  []poor  []decreased               Cyanosis/clubbing [] yes [x] no    NEUR:   [x]cranial nerves II-XII grossly intact       []Cranial nerves deficit                 [] facial droop    [] slurred speech   []aphasic     []Strength normal     [] weakness  [] LUE  []  RUE/ [] LLE  []  RLE    follows commands  [] yes [] no           PSYCH:   insight []poor [x]good   Alert and Oriented to  [x]person  [x]place  [x] time                    []depressed []anxious []agitated  []lethargic []stuporous  []sedated           Lab Data Reviewed: (see below)    Medications Reviewed: (see below)    PMH/SH reviewed - no change compared to H&P    Care Plan discussed with:  []Patient   []Family    []Care Manager   []RN    []Consultant/Specialist :    Prophylaxis:  []Lovenox  []Coumadin  []Hep SQ  []SCDs  []H2B/PPI    Disposition:  []Home w/ Family   []HH PT,OT,RN   []SNF/LTC   []SAH/Rehab    Ancillary Serices:   [] PT     []OT      []SW      []Nut      []HH ________________________________________________________________________  LABS:  Recent Labs     01/08/20 0318 01/07/20 0322   WBC 18.6* 24.3*   HGB 11.9 13.0   HCT 37.3 41.2    343     Recent Labs     01/07/20 0322 01/06/20 0823    140   K 3.9 3.7   * 106   CO2 23 25   BUN 12 19   CREA 0.83 0.98   * 95   CA 8.5 8.6   MG 2.1  --      Recent Labs     01/07/20 0322 01/06/20 0823   SGOT 10* 6*   ALT 16 21   AP 61 65   TBILI 0.7 0.4   TP 6.6 6.5   ALB 2.7* 3.1*   GLOB 3.9 3.4   LPSE  --  69*     No results for input(s): INR, PTP, APTT, INREXT in the last 72 hours. No results for input(s): FE, TIBC, PSAT, FERR in the last 72 hours. No results for input(s): PH, PCO2, PO2 in the last 72 hours. No results for input(s): CPK, CKMB in the last 72 hours.     No lab exists for component: TROPONINI  No results found for: GLUCPOC    MEDICATIONS:  Current Facility-Administered Medications   Medication Dose Route Frequency    oxyCODONE IR (ROXICODONE) tablet 5 mg  5 mg Oral Q4H PRN    oxyCODONE IR (ROXICODONE) tablet 10 mg  10 mg Oral Q4H PRN    acetaminophen (TYLENOL) tablet 650 mg  650 mg Oral Q6H    methylPREDNISolone (PF) (SOLU-MEDROL) injection 40 mg  40 mg IntraVENous DAILY    famotidine (PF) (PEPCID) injection 20 mg  20 mg IntraVENous BID    0.9% sodium chloride infusion  150 mL/hr IntraVENous CONTINUOUS    prochlorperazine (COMPAZINE) with saline injection 5 mg  5 mg IntraVENous Q6H PRN    docusate sodium (COLACE) capsule 100 mg  100 mg Oral DAILY    therapeutic multivitamin (THERAGRAN) tablet 1 Tab  1 Tab Oral DAILY    simethicone (MYLICON) tablet 80 mg  80 mg Oral QID PRN    metroNIDAZOLE (FLAGYL) IVPB premix 500 mg  500 mg IntraVENous Q12H    cefepime (MAXIPIME) 2 g in 0.9% sodium chloride (MBP/ADV) 100 mL  2 g IntraVENous Q12H    HYDROmorphone (PF) (DILAUDID) injection 1 mg  1 mg IntraVENous Q4H PRN

## 2020-01-08 NOTE — PROGRESS NOTES
General Surgery End of Shift Nursing Note    Bedside shift change report given to Umer Esteban RN (oncoming nurse) by Fabian Frye (offgoing nurse). Report included the following information SBAR, Kardex, Intake/Output, MAR and Recent Results.       Sugey Pages

## 2020-01-09 ENCOUNTER — HOME HEALTH ADMISSION (OUTPATIENT)
Dept: HOME HEALTH SERVICES | Facility: HOME HEALTH | Age: 35
End: 2020-01-09

## 2020-01-09 LAB
BASOPHILS # BLD: 0 K/UL (ref 0–0.1)
BASOPHILS NFR BLD: 0 % (ref 0–1)
DIFFERENTIAL METHOD BLD: ABNORMAL
EOSINOPHIL # BLD: 0 K/UL (ref 0–0.4)
EOSINOPHIL NFR BLD: 0 % (ref 0–7)
ERYTHROCYTE [DISTWIDTH] IN BLOOD BY AUTOMATED COUNT: 14.6 % (ref 11.5–14.5)
HCT VFR BLD AUTO: 33.2 % (ref 35–47)
HGB BLD-MCNC: 10.4 G/DL (ref 11.5–16)
IMM GRANULOCYTES # BLD AUTO: 0.1 K/UL (ref 0–0.04)
IMM GRANULOCYTES NFR BLD AUTO: 1 % (ref 0–0.5)
LYMPHOCYTES # BLD: 1.9 K/UL (ref 0.8–3.5)
LYMPHOCYTES NFR BLD: 17 % (ref 12–49)
MCH RBC QN AUTO: 26.9 PG (ref 26–34)
MCHC RBC AUTO-ENTMCNC: 31.3 G/DL (ref 30–36.5)
MCV RBC AUTO: 86 FL (ref 80–99)
MONOCYTES # BLD: 0.8 K/UL (ref 0–1)
MONOCYTES NFR BLD: 7 % (ref 5–13)
NEUTS SEG # BLD: 8.4 K/UL (ref 1.8–8)
NEUTS SEG NFR BLD: 75 % (ref 32–75)
NRBC # BLD: 0 K/UL (ref 0–0.01)
NRBC BLD-RTO: 0 PER 100 WBC
PLATELET # BLD AUTO: 262 K/UL (ref 150–400)
PMV BLD AUTO: 10.3 FL (ref 8.9–12.9)
RBC # BLD AUTO: 3.86 M/UL (ref 3.8–5.2)
WBC # BLD AUTO: 11.2 K/UL (ref 3.6–11)

## 2020-01-09 PROCEDURE — 74011250637 HC RX REV CODE- 250/637: Performed by: INTERNAL MEDICINE

## 2020-01-09 PROCEDURE — 74011250636 HC RX REV CODE- 250/636: Performed by: INTERNAL MEDICINE

## 2020-01-09 PROCEDURE — 74011250636 HC RX REV CODE- 250/636: Performed by: NURSE PRACTITIONER

## 2020-01-09 PROCEDURE — 36415 COLL VENOUS BLD VENIPUNCTURE: CPT

## 2020-01-09 PROCEDURE — 74011250636 HC RX REV CODE- 250/636: Performed by: HOSPITALIST

## 2020-01-09 PROCEDURE — 65660000000 HC RM CCU STEPDOWN

## 2020-01-09 PROCEDURE — 74011000250 HC RX REV CODE- 250: Performed by: INTERNAL MEDICINE

## 2020-01-09 PROCEDURE — 74011250637 HC RX REV CODE- 250/637: Performed by: NURSE PRACTITIONER

## 2020-01-09 PROCEDURE — 74011000258 HC RX REV CODE- 258: Performed by: INTERNAL MEDICINE

## 2020-01-09 PROCEDURE — 85025 COMPLETE CBC W/AUTO DIFF WBC: CPT

## 2020-01-09 RX ORDER — ONDANSETRON 4 MG/1
4 TABLET, ORALLY DISINTEGRATING ORAL
Status: DISCONTINUED | OUTPATIENT
Start: 2020-01-09 | End: 2020-01-13 | Stop reason: HOSPADM

## 2020-01-09 RX ORDER — ONDANSETRON 2 MG/ML
4 INJECTION INTRAMUSCULAR; INTRAVENOUS
Status: DISCONTINUED | OUTPATIENT
Start: 2020-01-09 | End: 2020-01-13 | Stop reason: HOSPADM

## 2020-01-09 RX ADMIN — ACETAMINOPHEN 650 MG: 325 TABLET ORAL at 12:38

## 2020-01-09 RX ADMIN — DOCUSATE SODIUM 100 MG: 100 CAPSULE, LIQUID FILLED ORAL at 09:54

## 2020-01-09 RX ADMIN — ACETAMINOPHEN 650 MG: 325 TABLET ORAL at 17:09

## 2020-01-09 RX ADMIN — CEFEPIME HYDROCHLORIDE 2 G: 2 INJECTION, POWDER, FOR SOLUTION INTRAVENOUS at 09:49

## 2020-01-09 RX ADMIN — OXYCODONE 5 MG: 5 TABLET ORAL at 12:38

## 2020-01-09 RX ADMIN — THERA TABS 1 TABLET: TAB at 09:54

## 2020-01-09 RX ADMIN — CEFEPIME HYDROCHLORIDE 2 G: 2 INJECTION, POWDER, FOR SOLUTION INTRAVENOUS at 22:20

## 2020-01-09 RX ADMIN — SODIUM CHLORIDE 5 MG: 9 INJECTION INTRAMUSCULAR; INTRAVENOUS; SUBCUTANEOUS at 05:40

## 2020-01-09 RX ADMIN — SODIUM CHLORIDE 150 ML/HR: 900 INJECTION, SOLUTION INTRAVENOUS at 06:34

## 2020-01-09 RX ADMIN — ACETAMINOPHEN 650 MG: 325 TABLET ORAL at 05:40

## 2020-01-09 RX ADMIN — METRONIDAZOLE 500 MG: 500 INJECTION, SOLUTION INTRAVENOUS at 09:55

## 2020-01-09 RX ADMIN — METRONIDAZOLE 500 MG: 500 INJECTION, SOLUTION INTRAVENOUS at 22:20

## 2020-01-09 RX ADMIN — FAMOTIDINE 20 MG: 10 INJECTION, SOLUTION INTRAVENOUS at 17:09

## 2020-01-09 RX ADMIN — OXYCODONE 5 MG: 5 TABLET ORAL at 17:09

## 2020-01-09 RX ADMIN — FAMOTIDINE 20 MG: 10 INJECTION, SOLUTION INTRAVENOUS at 09:54

## 2020-01-09 RX ADMIN — METHYLPREDNISOLONE SODIUM SUCCINATE 40 MG: 40 INJECTION, POWDER, FOR SOLUTION INTRAMUSCULAR; INTRAVENOUS at 09:55

## 2020-01-09 NOTE — PROGRESS NOTES
GI PROGRESS NOTE  Taylor Gonzáles NP  [phone] NP in-hospital cell phone M-F until 4:30  After 5pm or on weekends, please call  for physician on call    Elisa De Oliveira :1985 [id number]   ATTG: Dr Champ Jean  PCP: Anuja Olmedo MD  Date/Time:  2020 9:15 AM     Primary GI: Dr. Zonia Andrade    Reason for following: Crohns Disease    Assessment:   Crohn's disease with stricturing/inflammation and fistula/microperforation on CT scan - no tissue diagnosis  Abdominal pain and tenderness  - Nausea and vomiting are improving  - Leukocytosis of 11.2 today - trending down  - Elevated CRP  - CT: 1. Marked inflammation of the terminal ileum with stranding in the adjacent soft  tissues but no drainable abscess or obstruction. There is pneumoperitoneum  indicating bowel perforation and a small amount of free fluid. - Only passing gas - No BM  - Abdomen remains tender to palpation but mostly in suprapubic area, other areas are much less tender than yesterday. Plan:   -- IV Antibiotics- cefepime and flagyl  -- IV solumedrol continue 40mg daily - do not change to PO at this time  -- Tolerating clear liquids but having some abdominal fullness after having partial tray    -- Pt will need biologic therapy - needs to clear current situation prior to starting therapy. Plan discussed with Dr Zonia Andrade. Subjective:   Discussed with RN events overnight. Doing well off of diluadid. Now getting oxycodone and tylenol which is controlling pain. Took compazine this morning and it isn't making her feel well but I found her is johnson trying to walk off the 'weird feeling.'    Review of Systems:  Symptom Y/N Comments  Symptom Y/N Comments   Fever/Chills    Chest Pain     Cough    Headaches     Sputum    Joint Pain     SOB/BENAVIDES    Pruritis/Rash     Tolerating Diet  clears  Other       Could NOT obtain due to:      Objective:   VITALS:   Last 24hrs VS reviewed since prior progress note.  Most recent are:  Visit Vitals  /86   Pulse 90   Temp 98.4 °F (36.9 °C)   Resp 16   Ht 5' 2\" (1.575 m)   Wt 77.6 kg (171 lb)   SpO2 99%   Breastfeeding No   BMI 31.28 kg/m²       Intake/Output Summary (Last 24 hours) at 1/9/2020 0915  Last data filed at 1/9/2020 0630  Gross per 24 hour   Intake 4280 ml   Output    Net 4280 ml     PHYSICAL EXAM:  General: WD, WN. Alert, cooperative, no acute distress    HEENT: NC, Atraumatic. Anicteric sclerae. Lungs:  CTA Bilaterally. No Wheezing/Rhonchi/Rales. Heart:  Regular  rhythm,  No murmur (), No Rubs, No Gallops  Abdomen: Soft, Non distended, diffuse but greatest in suprapubic - overall improving .  +Bowel sounds, no HSM  Extremities: BLE no edema, +1 Right hand edema  Neurologic:  Alert and oriented X 3. No acute neurological distress   Psych:   Good insight. Not anxious nor agitated. Lab and Radiology Data Reviewed: (see below)    Medications Reviewed: (see below)  PMH/SH reviewed - no change compared to H&P  ________________________________________________________________________  Total time spent with patient: 20 minutes ________________________________________________________________________  Care Plan discussed with:  Patient y   Family  y-spouse   Awilda Bernard              Consultant: Chris Verduzco NP     Procedures: see electronic medical records for all procedures/Xrays and details which were not copied into this note but were reviewed prior to creation of Plan. LABS:  Recent Labs     01/09/20  0509 01/08/20  0318   WBC 11.2* 18.6*   HGB 10.4* 11.9   HCT 33.2* 37.3    308     Recent Labs     01/07/20  0322      K 3.9   *   CO2 23   BUN 12   CREA 0.83   *   CA 8.5   MG 2.1     Recent Labs     01/07/20 0322   SGOT 10*   AP 61   TP 6.6   ALB 2.7*   GLOB 3.9     No results for input(s): INR, PTP, APTT, INREXT, INREXT in the last 72 hours. No results for input(s): FE, TIBC, PSAT, FERR in the last 72 hours.    No results found for: FOL, RBCF  No results for input(s): PH, PCO2, PO2 in the last 72 hours. No results for input(s): CPK, CKMB in the last 72 hours.     No lab exists for component: TROPONINI  Lab Results   Component Value Date/Time    Color YELLOW/STRAW 01/06/2020 08:23 AM    Appearance CLEAR 01/06/2020 08:23 AM    Specific gravity 1.022 01/06/2020 08:23 AM    Specific gravity 1.020 08/06/2019 08:15 AM    pH (UA) 5.5 01/06/2020 08:23 AM    Protein 30 (A) 01/06/2020 08:23 AM    Glucose NEGATIVE  01/06/2020 08:23 AM    Ketone NEGATIVE  01/06/2020 08:23 AM    Bilirubin NEGATIVE  01/06/2020 08:23 AM    Urobilinogen 0.2 01/06/2020 08:23 AM    Nitrites NEGATIVE  01/06/2020 08:23 AM    Leukocyte Esterase NEGATIVE  01/06/2020 08:23 AM    Epithelial cells FEW 01/06/2020 08:23 AM    Bacteria NEGATIVE  01/06/2020 08:23 AM    WBC 0-4 01/06/2020 08:23 AM    RBC 5-10 01/06/2020 08:23 AM       MEDICATIONS:  Current Facility-Administered Medications   Medication Dose Route Frequency    ondansetron (ZOFRAN) injection 4 mg  4 mg IntraVENous Q6H PRN    ondansetron (ZOFRAN ODT) tablet 4 mg  4 mg Oral Q6H PRN    oxyCODONE IR (ROXICODONE) tablet 5 mg  5 mg Oral Q4H PRN    oxyCODONE IR (ROXICODONE) tablet 10 mg  10 mg Oral Q4H PRN    acetaminophen (TYLENOL) tablet 650 mg  650 mg Oral Q6H    methylPREDNISolone (PF) (SOLU-MEDROL) injection 40 mg  40 mg IntraVENous DAILY    famotidine (PF) (PEPCID) injection 20 mg  20 mg IntraVENous BID    0.9% sodium chloride infusion  150 mL/hr IntraVENous CONTINUOUS    docusate sodium (COLACE) capsule 100 mg  100 mg Oral DAILY    therapeutic multivitamin (THERAGRAN) tablet 1 Tab  1 Tab Oral DAILY    simethicone (MYLICON) tablet 80 mg  80 mg Oral QID PRN    metroNIDAZOLE (FLAGYL) IVPB premix 500 mg  500 mg IntraVENous Q12H    cefepime (MAXIPIME) 2 g in 0.9% sodium chloride (MBP/ADV) 100 mL  2 g IntraVENous Q12H    HYDROmorphone (PF) (DILAUDID) injection 1 mg  1 mg IntraVENous Q4H PRN

## 2020-01-09 NOTE — PROGRESS NOTES
DEBI Plan:     *Home with family    CM offered pt a H2H visit and pt accepted. CM will send referral and continue to follow. Patient does not have any concerns or needs at this time.     Toma Points  Ext [phone]

## 2020-01-09 NOTE — PROGRESS NOTES
General Surgery End of Shift Nursing Note     Bedside shift change report given to [address] (oncoming nurse) by Juliana Valenzuela RN (offgoing nurse). Report included the following information SBAR, Kardex, Intake/Output, MAR and Recent Results. Pt noted resting, no distress noted at this time. No complaint of pain during shift. Pt.  With complaint of mild nausea, treated and resolved.         Willis Maradiaga

## 2020-01-09 NOTE — PROGRESS NOTES
Bedside shift change report given to Kec[address] (oncoming nurse) by ANA CRISTINA (offgoing nurse). Report included the following information SBAR, Kardex, Intake/Output and Recent Results.

## 2020-01-09 NOTE — PROGRESS NOTES
Hospitalist Progress Note    NAME: Dharmesh Child   :  1985   MRN:  [id number]       Assessment / Plan:  Abdominal pain, presumed Crohn's flare  -CT abdomen showed : . Marked inflammation of the terminal ileum with stranding in the adjacent soft  tissues but no drainable abscess or obstruction. There is pneumoperitoneum  indicating bowel perforation and a small amount of free fluid. GI recs appreciated , c/w IV abx and IV solumedrol   Diet advanced to gi lite . Pt still with intermittent abdominal pain with diet and some nausea   IV dilaudid stopped , c/w oxycodone   Surgery consulted , recs reviewed : low threshold for surgical exploration if recurrent fever and worsening symptoms   No stools , so enteric precautions DC    -Currently pending approval for Humira    SIRS (leukocytosis, HR 130s, fever 101.5F), possible sepsis versus noninfectious SIRS due to Crohn's flare  -Blood cultures NTD   -Empiric antibiotics as above  Wbc trending down     Anxiety and depression  -Current health challenges are currently exacerbating this  -Patient with no active suicidal thoughts at this time    GERD  IBS  Asthma, not currently in exacerbation    Code Status: Full  Surrogate Decision Maker: , [place]    DVT Prophylaxis: SCDs as possible surgery   GI Prophylaxis: not indicated    Baseline: , working, but has been missing a lot of time from work a      30.0 - 39.9 Obese / Body mass index is 31.28 kg/m². Subjective:     Chief Complaint / Reason for Physician Visit  FU crohn flare . reported that she has less pain today but still with some cramping with food ,.  Discussed with RN events overnight.      Review of Systems:  Symptom Y/N Comments  Symptom Y/N Comments   Fever/Chills n   Chest Pain n    Poor Appetite    Edema     Cough    Abdominal Pain y    Sputum    Joint Pain     SOB/BENAVIDES n   Pruritis/Rash     Nausea/vomit n   Tolerating PT/OT     Diarrhea    Tolerating Diet y CLD    Constipation Other       Could NOT obtain due to:      Objective:     VITALS:   Last 24hrs VS reviewed since prior progress note. Most recent are:  Patient Vitals for the past 24 hrs:   Temp Pulse Resp BP SpO2   01/09/20 0748 98.4 °F (36.9 °C) 90 16 152/86 99 %   01/09/20 0419 97.7 °F (36.5 °C) 96 16 135/72 97 %   01/08/20 2301 97.5 °F (36.4 °C) 64 16 138/90 96 %   01/08/20 1909 98.3 °F (36.8 °C) 74 17 137/75 99 %   01/08/20 1200 98.4 °F (36.9 °C) 77  138/85 97 %       Intake/Output Summary (Last 24 hours) at 1/9/2020 0852  Last data filed at 1/9/2020 0630  Gross per 24 hour   Intake 4280 ml   Output    Net 4280 ml        PHYSICAL EXAM:  General: WD, WN. Alert, cooperative, no acute distress    EENT:  EOMI. Anicteric sclerae. MMM  Resp:  CTA bilaterally, no wheezing or rales. No accessory muscle use  CV:  Regular  rhythm,  No edema  GI:  Soft, Non distended,tender+  +Bowel sounds  Neurologic:  Alert and oriented X 3, normal speech,   Psych:   Good insight. Not anxious nor agitated  Skin:  No rashes. No jaundice    Reviewed most current lab test results and cultures  YES  Reviewed most current radiology test results   YES  Review and summation of old records today    NO  Reviewed patient's current orders and MAR    YES  PMH/ reviewed - no change compared to H&P  ________________________________________________________________________  Care Plan discussed with:    Comments   Patient x    Family  x    RN x    Care Manager     Consultant                       x Multidiciplinary team rounds were held today with , nursing, pharmacist and clinical coordinator. Patient's plan of care was discussed; medications were reviewed and discharge planning was addressed.      ________________________________________________________________________  Total NON critical care TIME:  30 Minutes    Total CRITICAL CARE TIME Spent:   Minutes non procedure based      Comments   >50% of visit spent in counseling and coordination of care x ________________________________________________________________________  Surekha Herring MD     Procedures: see electronic medical records for all procedures/Xrays and details which were not copied into this note but were reviewed prior to creation of Plan. LABS:  I reviewed today's most current labs and imaging studies.   Pertinent labs include:  Recent Labs     01/09/20  0509 01/08/20  0318 01/07/20  0322   WBC 11.2* 18.6* 24.3*   HGB 10.4* 11.9 13.0   HCT 33.2* 37.3 41.2    308 343     Recent Labs     01/07/20  0322      K 3.9   *   CO2 23   *   BUN 12   CREA 0.83   CA 8.5   MG 2.1   ALB 2.7*   TBILI 0.7   SGOT 10*   ALT 16       Signed: Surekha Herring MD

## 2020-01-09 NOTE — PROGRESS NOTES
Surgical Note    Date/Time:  2020 9:16 AM        Assessment :    Plan:  Patient Active Problem List   Diagnosis Code    Normal pregnancy Z34.90    SROM (spontaneous rupture of membranes)     Arrested active phase of labor O62.1    S/P hysterectomy Z90.710    Ovarian cyst rupture N83.209    Nausea with vomiting R11.2    Abdominal pain R10.9    Crohn disease ([facility] [address] K50.90    Bowel obstruction ([facility] [address] K56.609    Crohn's disease ([facility] [address] K50.90    Nausea and vomiting R11.2    Sepsis ([facility] [address] A41.9      Significant improvement  Much less tender  Off the dilaudid, pain controlled with tylenol and occ oxycodone  +flatus    Try some more clears today  Adv later if tolerates         Subjective:     Chief Complaint:      Review of Systems:  Y  N       Y  N  [] [x]  Fever/chills                                               [] [x]  Chest Pain  [] [x]  Cough                                                       [] [x]  Diarrhea   [] [x]  Sputum                                                     [] [x]  Constipation  [] [x]  SOB/BENAVIDES                                                [] [x]  Nausea/Vomit  [x] []  Abd Pain                                                    [x] []  Tolerating diet  [] [x]  Dysuria                                                           []Unable to obtain  ROS due to  []mental status change  []sedated   []intubated    Objective:     VITALS:   Last 24hrs VS reviewed since prior hospitalist progress note.  Most recent are:  Visit Vitals  /86   Pulse 90   Temp 98.4 °F (36.9 °C)   Resp 16   Ht 5' 2\" (1.575 m)   Wt 77.6 kg (171 lb)   SpO2 99%   Breastfeeding No   BMI 31.28 kg/m²     Temp (24hrs), Av.1 °F (36.7 °C), Min:97.5 °F (36.4 °C), Max:98.4 °F (36.9 °C)      Intake/Output Summary (Last 24 hours) at 2020 0916  Last data filed at 2020 0630  Gross per 24 hour   Intake 4280 ml   Output    Net 4280 ml         []Arcos []NGT  []Intubated on vent    PHYSICAL EXAM:  Gen:  [] A&O []non-toxic  [x] No acute distress             [] ill apearing  []  Critical        HEENT:   [x]NC/AT/PERRLA/EOMI    []pink conjunctivae      []pale conjunctivae                  PERRL  []yes  []no      []moist mucosa    []dry mucosa    hearing intact to voice []yes  []No    RESP:   [x]CTA bialterally/no wheezing/rhonchi/rales/crackles    []clear bilaterally  []wheezing   []rhonchi   []crackles     use of accessory muscles []yes []no    CARD:   [x] regular rate and rhythm/No murmurs/rubs/gallops    murmur  []yes ()  []no      Rubs  []yes  []no       Gallops []yes  []no    Rate [] regular  [] irregular        carotid bruits  []Right  [] Left                 LE edema []yes  []no           JVP  [] yes   [] no    ABD:    [x]soft  [x]non distended  [x]tender lower abdomen. Improved.   [] NABS    SKIN:   Rashes [] yes   [x] no    Ulcers [] yes   [x] no               [x]normal   []tight to palpitation    skin turgor [] good  []poor  []decreased               Cyanosis/clubbing [] yes [x] no    NEUR:   [x]cranial nerves II-XII grossly intact       []Cranial nerves deficit                 [] facial droop    [] slurred speech   []aphasic     []Strength normal     [] weakness  [] LUE  []  RUE/ [] LLE  []  RLE    follows commands  [] yes [] no           PSYCH:   insight []poor [x]good   Alert and Oriented to  [x]person  [x]place  [x] time                    []depressed []anxious []agitated  []lethargic []stuporous  []sedated           Lab Data Reviewed: (see below)    Medications Reviewed: (see below)    PMH/SH reviewed - no change compared to H&P    Care Plan discussed with:  [x]Patient   []Family    []Care Manager   []RN    []Consultant/Specialist :    Prophylaxis:  []Lovenox  []Coumadin  []Hep SQ  []SCDs  []H2B/PPI    Disposition:  []Home w/ Family   []HH PT,OT,RN   []SNF/LTC   []SAH/Rehab    Ancillary Serices:   [] PT     []OT      []SW      []Nut      []HH ________________________________________________________________________  LABS:  Recent Labs     01/09/20  0509 01/08/20  0318   WBC 11.2* 18.6*   HGB 10.4* 11.9   HCT 33.2* 37.3    308     Recent Labs     01/07/20  0322      K 3.9   *   CO2 23   BUN 12   CREA 0.83   *   CA 8.5   MG 2.1     Recent Labs     01/07/20  0322   SGOT 10*   ALT 16   AP 61   TBILI 0.7   TP 6.6   ALB 2.7*   GLOB 3.9     No results for input(s): INR, PTP, APTT, INREXT in the last 72 hours. No results for input(s): FE, TIBC, PSAT, FERR in the last 72 hours. No results for input(s): PH, PCO2, PO2 in the last 72 hours. No results for input(s): CPK, CKMB in the last 72 hours.     No lab exists for component: TROPONINI  No results found for: GLUCPOC    MEDICATIONS:  Current Facility-Administered Medications   Medication Dose Route Frequency    ondansetron (ZOFRAN) injection 4 mg  4 mg IntraVENous Q6H PRN    ondansetron (ZOFRAN ODT) tablet 4 mg  4 mg Oral Q6H PRN    oxyCODONE IR (ROXICODONE) tablet 5 mg  5 mg Oral Q4H PRN    oxyCODONE IR (ROXICODONE) tablet 10 mg  10 mg Oral Q4H PRN    acetaminophen (TYLENOL) tablet 650 mg  650 mg Oral Q6H    methylPREDNISolone (PF) (SOLU-MEDROL) injection 40 mg  40 mg IntraVENous DAILY    famotidine (PF) (PEPCID) injection 20 mg  20 mg IntraVENous BID    0.9% sodium chloride infusion  150 mL/hr IntraVENous CONTINUOUS    docusate sodium (COLACE) capsule 100 mg  100 mg Oral DAILY    therapeutic multivitamin (THERAGRAN) tablet 1 Tab  1 Tab Oral DAILY    simethicone (MYLICON) tablet 80 mg  80 mg Oral QID PRN    metroNIDAZOLE (FLAGYL) IVPB premix 500 mg  500 mg IntraVENous Q12H    cefepime (MAXIPIME) 2 g in 0.9% sodium chloride (MBP/ADV) 100 mL  2 g IntraVENous Q12H    HYDROmorphone (PF) (DILAUDID) injection 1 mg  1 mg IntraVENous Q4H PRN

## 2020-01-09 NOTE — PROGRESS NOTES
Spiritual Care Partner Volunteer visited patient in Gen Surg on January 9, 2020.     Documented by:     Sylvan Prader, MPS, [facility], Staff [address] [place] [address] [employer] Paging Service  [phone]

## 2020-01-10 LAB
ANION GAP SERPL CALC-SCNC: 6 MMOL/L (ref 5–15)
APPEARANCE UR: CLEAR
BACTERIA URNS QL MICRO: ABNORMAL /HPF
BASOPHILS # BLD: 0 K/UL (ref 0–0.1)
BASOPHILS NFR BLD: 0 % (ref 0–1)
BILIRUB UR QL: NEGATIVE
BUN SERPL-MCNC: 16 MG/DL (ref 6–20)
BUN/CREAT SERPL: 21 (ref 12–20)
CALCIUM SERPL-MCNC: 8.2 MG/DL (ref 8.5–10.1)
CHLORIDE SERPL-SCNC: 110 MMOL/L (ref 97–108)
CO2 SERPL-SCNC: 25 MMOL/L (ref 21–32)
COLOR UR: ABNORMAL
CREAT SERPL-MCNC: 0.77 MG/DL (ref 0.55–1.02)
DIFFERENTIAL METHOD BLD: ABNORMAL
EOSINOPHIL # BLD: 0 K/UL (ref 0–0.4)
EOSINOPHIL NFR BLD: 0 % (ref 0–7)
EPITH CASTS URNS QL MICRO: ABNORMAL /LPF
ERYTHROCYTE [DISTWIDTH] IN BLOOD BY AUTOMATED COUNT: 14.4 % (ref 11.5–14.5)
GLUCOSE SERPL-MCNC: 93 MG/DL (ref 65–100)
GLUCOSE UR STRIP.AUTO-MCNC: NEGATIVE MG/DL
HCT VFR BLD AUTO: 35.6 % (ref 35–47)
HGB BLD-MCNC: 11.3 G/DL (ref 11.5–16)
HGB UR QL STRIP: ABNORMAL
HYALINE CASTS URNS QL MICRO: ABNORMAL /LPF (ref 0–5)
IMM GRANULOCYTES # BLD AUTO: 0.1 K/UL (ref 0–0.04)
IMM GRANULOCYTES NFR BLD AUTO: 1 % (ref 0–0.5)
KETONES UR QL STRIP.AUTO: NEGATIVE MG/DL
LEUKOCYTE ESTERASE UR QL STRIP.AUTO: NEGATIVE
LYMPHOCYTES # BLD: 2.6 K/UL (ref 0.8–3.5)
LYMPHOCYTES NFR BLD: 22 % (ref 12–49)
MCH RBC QN AUTO: 27.2 PG (ref 26–34)
MCHC RBC AUTO-ENTMCNC: 31.7 G/DL (ref 30–36.5)
MCV RBC AUTO: 85.6 FL (ref 80–99)
MONOCYTES # BLD: 1 K/UL (ref 0–1)
MONOCYTES NFR BLD: 9 % (ref 5–13)
NEUTS SEG # BLD: 8.4 K/UL (ref 1.8–8)
NEUTS SEG NFR BLD: 68 % (ref 32–75)
NITRITE UR QL STRIP.AUTO: NEGATIVE
NRBC # BLD: 0 K/UL (ref 0–0.01)
NRBC BLD-RTO: 0 PER 100 WBC
PH UR STRIP: 6 [PH] (ref 5–8)
PLATELET # BLD AUTO: 309 K/UL (ref 150–400)
PMV BLD AUTO: 10.2 FL (ref 8.9–12.9)
POTASSIUM SERPL-SCNC: 3.2 MMOL/L (ref 3.5–5.1)
PROT UR STRIP-MCNC: NEGATIVE MG/DL
RBC # BLD AUTO: 4.16 M/UL (ref 3.8–5.2)
RBC #/AREA URNS HPF: ABNORMAL /HPF (ref 0–5)
SODIUM SERPL-SCNC: 141 MMOL/L (ref 136–145)
SP GR UR REFRACTOMETRY: 1.01 (ref 1–1.03)
UA: UC IF INDICATED,UAUC: ABNORMAL
UROBILINOGEN UR QL STRIP.AUTO: 0.2 EU/DL (ref 0.2–1)
WBC # BLD AUTO: 12.1 K/UL (ref 3.6–11)
WBC URNS QL MICRO: ABNORMAL /HPF (ref 0–4)

## 2020-01-10 PROCEDURE — 87086 URINE CULTURE/COLONY COUNT: CPT

## 2020-01-10 PROCEDURE — 74011250637 HC RX REV CODE- 250/637: Performed by: INTERNAL MEDICINE

## 2020-01-10 PROCEDURE — 36415 COLL VENOUS BLD VENIPUNCTURE: CPT

## 2020-01-10 PROCEDURE — 74011250636 HC RX REV CODE- 250/636: Performed by: NURSE PRACTITIONER

## 2020-01-10 PROCEDURE — 74011000258 HC RX REV CODE- 258: Performed by: INTERNAL MEDICINE

## 2020-01-10 PROCEDURE — 85025 COMPLETE CBC W/AUTO DIFF WBC: CPT

## 2020-01-10 PROCEDURE — 74011250636 HC RX REV CODE- 250/636: Performed by: INTERNAL MEDICINE

## 2020-01-10 PROCEDURE — 80048 BASIC METABOLIC PNL TOTAL CA: CPT

## 2020-01-10 PROCEDURE — 74011250637 HC RX REV CODE- 250/637: Performed by: NURSE PRACTITIONER

## 2020-01-10 PROCEDURE — 65660000000 HC RM CCU STEPDOWN

## 2020-01-10 PROCEDURE — 81001 URINALYSIS AUTO W/SCOPE: CPT

## 2020-01-10 PROCEDURE — 74011250636 HC RX REV CODE- 250/636: Performed by: HOSPITALIST

## 2020-01-10 RX ORDER — LANOLIN ALCOHOL/MO/W.PET/CERES
3 CREAM (GRAM) TOPICAL
Status: DISCONTINUED | OUTPATIENT
Start: 2020-01-10 | End: 2020-01-13 | Stop reason: HOSPADM

## 2020-01-10 RX ORDER — FAMOTIDINE 20 MG/1
20 TABLET, FILM COATED ORAL 2 TIMES DAILY
Status: DISCONTINUED | OUTPATIENT
Start: 2020-01-10 | End: 2020-01-13 | Stop reason: HOSPADM

## 2020-01-10 RX ORDER — POTASSIUM CHLORIDE 750 MG/1
40 TABLET, FILM COATED, EXTENDED RELEASE ORAL ONCE
Status: COMPLETED | OUTPATIENT
Start: 2020-01-10 | End: 2020-01-10

## 2020-01-10 RX ADMIN — CEFEPIME HYDROCHLORIDE 2 G: 2 INJECTION, POWDER, FOR SOLUTION INTRAVENOUS at 11:42

## 2020-01-10 RX ADMIN — FAMOTIDINE 20 MG: 10 INJECTION, SOLUTION INTRAVENOUS at 11:42

## 2020-01-10 RX ADMIN — THERA TABS 1 TABLET: TAB at 08:44

## 2020-01-10 RX ADMIN — ACETAMINOPHEN 650 MG: 325 TABLET ORAL at 17:18

## 2020-01-10 RX ADMIN — OXYCODONE 5 MG: 5 TABLET ORAL at 14:02

## 2020-01-10 RX ADMIN — POTASSIUM CHLORIDE 40 MEQ: 750 TABLET, FILM COATED, EXTENDED RELEASE ORAL at 11:41

## 2020-01-10 RX ADMIN — ACETAMINOPHEN 650 MG: 325 TABLET ORAL at 23:40

## 2020-01-10 RX ADMIN — DOCUSATE SODIUM 100 MG: 100 CAPSULE, LIQUID FILLED ORAL at 08:44

## 2020-01-10 RX ADMIN — ACETAMINOPHEN 650 MG: 325 TABLET ORAL at 11:41

## 2020-01-10 RX ADMIN — METHYLPREDNISOLONE SODIUM SUCCINATE 40 MG: 40 INJECTION, POWDER, FOR SOLUTION INTRAMUSCULAR; INTRAVENOUS at 11:44

## 2020-01-10 RX ADMIN — ONDANSETRON 4 MG: 2 INJECTION INTRAMUSCULAR; INTRAVENOUS at 23:47

## 2020-01-10 RX ADMIN — OXYCODONE 5 MG: 5 TABLET ORAL at 07:43

## 2020-01-10 RX ADMIN — ONDANSETRON 4 MG: 2 INJECTION INTRAMUSCULAR; INTRAVENOUS at 06:19

## 2020-01-10 RX ADMIN — FAMOTIDINE 20 MG: 20 TABLET ORAL at 17:18

## 2020-01-10 RX ADMIN — METRONIDAZOLE 500 MG: 500 INJECTION, SOLUTION INTRAVENOUS at 13:19

## 2020-01-10 RX ADMIN — ACETAMINOPHEN 650 MG: 325 TABLET ORAL at 00:30

## 2020-01-10 RX ADMIN — CEFEPIME HYDROCHLORIDE 2 G: 2 INJECTION, POWDER, FOR SOLUTION INTRAVENOUS at 23:35

## 2020-01-10 NOTE — PROGRESS NOTES
Hospitalist Progress Note    NAME: Shari Wilde   :  1985   MRN:  [id number]       Assessment / Plan:  Abdominal pain, presumed Crohn's flare  -CT abdomen showed : . Marked inflammation of the terminal ileum with stranding in the adjacent soft  tissues but no drainable abscess or obstruction. There is pneumoperitoneum  indicating bowel perforation and a small amount of free fluid. GI recs appreciated , c/w IV abx and IV solumedrol . Repeat CT abdomen on Monday   Diet advanced to gi lite . Pt still with intermittent abdominal pain with diet and some nausea , still persistent   IV dilaudid stopped , c/w oxycodone   Surgery consulted , recs reviewed : low threshold for surgical exploration if recurrent fever and worsening symptoms   No stools , so enteric precautions DC    -Currently pending approval for Humira    SIRS (leukocytosis, HR 130s, fever 101.5F), possible sepsis versus noninfectious SIRS due to Crohn's flare  -Blood cultures NTD   -Empiric antibiotics as above  Wbc trending down     Anxiety and depression  -Current health challenges are currently exacerbating this  -Patient with no active suicidal thoughts at this time    GERD  IBS  Asthma, not currently in exacerbation    Code Status: Full  Surrogate Decision Maker: , Carmen Hightower    DVT Prophylaxis: SCDs as possible surgery   GI Prophylaxis: not indicated    Baseline: , working, but has been missing a lot of time from work a      30.0 - 39.9 Obese / Body mass index is 31.28 kg/m². Subjective:     Chief Complaint / Reason for Physician Visit  FU crohn flare . tolerated diet but still c/o nausea   Review of Systems:  Symptom Y/N Comments  Symptom Y/N Comments   Fever/Chills n   Chest Pain n    Poor Appetite    Edema     Cough    Abdominal Pain y    Sputum    Joint Pain     SOB/BENAVIDES n   Pruritis/Rash     Nausea/vomit n   Tolerating PT/OT     Diarrhea    Tolerating Diet y Gi lite    Constipation    Other       Could NOT obtain due to:      Objective:     VITALS:   Last 24hrs VS reviewed since prior progress note. Most recent are:  Patient Vitals for the past 24 hrs:   Temp Pulse Resp BP SpO2   01/10/20 1457 98.4 °F (36.9 °C) 83 17 137/80 96 %   01/10/20 1119 98.5 °F (36.9 °C) 74 17 122/78 97 %   01/10/20 0744 98.4 °F (36.9 °C) 69 16 (!) 148/92 98 %   01/10/20 0431 97.9 °F (36.6 °C) 66 16 134/71 97 %   01/10/20 0010 97.9 °F (36.6 °C) 67 16 138/75 99 %       Intake/Output Summary (Last 24 hours) at 1/10/2020 1632  Last data filed at 1/10/2020 1500  Gross per 24 hour   Intake 1040 ml   Output    Net 1040 ml        PHYSICAL EXAM:  General: WD, WN. Alert, cooperative, no acute distress    EENT:  EOMI. Anicteric sclerae. MMM  Resp:  CTA bilaterally, no wheezing or rales. No accessory muscle use  CV:  Regular  rhythm,  No edema  GI:  Soft, Non distended,tender+  +Bowel sounds  Neurologic:  Alert and oriented X 3, normal speech,   Psych:   Good insight. Not anxious nor agitated  Skin:  No rashes. No jaundice    Reviewed most current lab test results and cultures  YES  Reviewed most current radiology test results   YES  Review and summation of old records today    NO  Reviewed patient's current orders and MAR    YES  PMH/ reviewed - no change compared to H&P  ________________________________________________________________________  Care Plan discussed with:    Comments   Patient x    Family  x    RN x    Care Manager     Consultant  x GI NP                    x Multidiciplinary team rounds were held today with , nursing, pharmacist and clinical coordinator. Patient's plan of care was discussed; medications were reviewed and discharge planning was addressed.      ________________________________________________________________________  Total NON critical care TIME:  30 Minutes    Total CRITICAL CARE TIME Spent:   Minutes non procedure based      Comments   >50% of visit spent in counseling and coordination of care x ________________________________________________________________________  Rubi Roca MD     Procedures: see electronic medical records for all procedures/Xrays and details which were not copied into this note but were reviewed prior to creation of Plan. LABS:  I reviewed today's most current labs and imaging studies.   Pertinent labs include:  Recent Labs     01/10/20  0355 01/09/20  0509 01/08/20  0318   WBC 12.1* 11.2* 18.6*   HGB 11.3* 10.4* 11.9   HCT 35.6 33.2* 37.3    262 308     Recent Labs     01/10/20  0355      K 3.2*   *   CO2 25   GLU 93   BUN 16   CREA 0.77   CA 8.2*       Signed: Rubi Roca MD

## 2020-01-10 NOTE — PROGRESS NOTES
GI PROGRESS NOTE  Eloy Deluna NP  [phone] NP in-hospital cell phone M-F until 4:30  After 5pm or on weekends, please call  for physician on call    Moise Pelayo :1985 RMK:[id number]   ATTG: Dr Mitch Cedillo  PCP: Johnson Cassidy MD  Date/Time:  1/10/2020 1143     Primary GI: Dr. Candie Burt    Reason for following: Crohns Disease    Assessment:   Crohn's disease with stricturing/inflammation and fistula/microperforation on CT scan - no tissue diagnosis  Abdominal pain and tenderness  - Nausea and vomiting are improving  - Leukocytosis - 12.1 - may not completely normalize with steroid effect  - Elevated CRP  - CT: 1. Marked inflammation of the terminal ileum with stranding in the adjacent soft  tissues but no drainable abscess or obstruction. There is pneumoperitoneum  indicating bowel perforation and a small amount of free fluid. - Passed soft, light brown mucoid stool this AM  - Abdomen remains tender to palpation, actually a bit more tender in lower abdomen than yesterday. Plan:   -- IV Antibiotics- cefepime and flagyl - will need full 14 days  -- IV solumedrol continue 40mg daily - over the weeekend  -- Gi lite - tolerating this well regarding nausea but did have a bit more pain although she did just get out of the shower. -- Repeat CT scan on Monday. -- Pt will need biologic therapy - needs to clear current situation prior to starting therapy. Will have on-call partner see over the weekend. Plan discussed with Dr Candie Burt and Dr Meyer Standing     Subjective:   Discussed with RN events overnight. Had a better night last night but did get up at 0100 due to nausea and ate some crackers, again had nausea at 0500 but did better after getting zofran.  She is eager to go home but also understands that we are being a bit conservative as she hasn't tolerated PO steroids super well in the past.     Review of Systems:  Symptom Y/N Comments  Symptom Y/N Comments   Fever/Chills    Chest Pain Cough    Headaches     Sputum    Joint Pain     SOB/BENAVIDES    Pruritis/Rash     Tolerating Diet  Gi lite today  Other       Could NOT obtain due to:      Objective:   VITALS:   Last 24hrs VS reviewed since prior progress note. Most recent are:  Visit Vitals  /78   Pulse 74   Temp 98.5 °F (36.9 °C)   Resp 17   Ht 5' 2\" (1.575 m)   Wt 77.6 kg (171 lb)   SpO2 97%   Breastfeeding No   BMI 31.28 kg/m²       Intake/Output Summary (Last 24 hours) at 1/10/2020 1143  Last data filed at 1/10/2020 0431  Gross per 24 hour   Intake 1382.5 ml   Output    Net 1382.5 ml     PHYSICAL EXAM:  General: WD, WN. Alert, cooperative, no acute distress    HEENT: NC, Atraumatic. Anicteric sclerae. Lungs:  CTA Bilaterally. No Wheezing/Rhonchi/Rales. Heart:  Regular  rhythm,  No murmur (), No Rubs, No Gallops  Abdomen: Soft, Non distended,  Lower abdominal tenderness .  +Bowel sounds, no HSM  Extremities: BLE no edema,  Neurologic:  Alert and oriented X 3. No acute neurological distress   Psych:   Good insight. Not anxious nor agitated. Lab and Radiology Data Reviewed: (see below)    Medications Reviewed: (see below)  PMH/SH reviewed - no change compared to H&P  ________________________________________________________________________  Total time spent with patient: 20 minutes ________________________________________________________________________  Care Plan discussed with:  Patient y   Family  Spouse not at bedside today   ORIN Fitzgerald              Consultant:  lexus Avery, KRISTOFER     Procedures: see electronic medical records for all procedures/Xrays and details which were not copied into this note but were reviewed prior to creation of Plan.       LABS:  Recent Labs     01/10/20  0355 01/09/20  0509   WBC 12.1* 11.2*   HGB 11.3* 10.4*   HCT 35.6 33.2*    262     Recent Labs     01/10/20  0355      K 3.2*   *   CO2 25   BUN 16   CREA 0.77   GLU 93   CA 8.2*     No results for input(s): SGOT, GPT, AP, TBIL, TP, ALB, GLOB, GGT, AML, LPSE in the last 72 hours. No lab exists for component: AMYP, HLPSE  No results for input(s): INR, PTP, APTT, INREXT, INREXT in the last 72 hours. No results for input(s): FE, TIBC, PSAT, FERR in the last 72 hours. No results found for: FOL, RBCF  No results for input(s): PH, PCO2, PO2 in the last 72 hours. No results for input(s): CPK, CKMB in the last 72 hours.     No lab exists for component: TROPONINI  Lab Results   Component Value Date/Time    Color YELLOW/STRAW 01/06/2020 08:23 AM    Appearance CLEAR 01/06/2020 08:23 AM    Specific gravity 1.022 01/06/2020 08:23 AM    Specific gravity 1.020 08/06/2019 08:15 AM    pH (UA) 5.5 01/06/2020 08:23 AM    Protein 30 (A) 01/06/2020 08:23 AM    Glucose NEGATIVE  01/06/2020 08:23 AM    Ketone NEGATIVE  01/06/2020 08:23 AM    Bilirubin NEGATIVE  01/06/2020 08:23 AM    Urobilinogen 0.2 01/06/2020 08:23 AM    Nitrites NEGATIVE  01/06/2020 08:23 AM    Leukocyte Esterase NEGATIVE  01/06/2020 08:23 AM    Epithelial cells FEW 01/06/2020 08:23 AM    Bacteria NEGATIVE  01/06/2020 08:23 AM    WBC 0-4 01/06/2020 08:23 AM    RBC 5-10 01/06/2020 08:23 AM       MEDICATIONS:  Current Facility-Administered Medications   Medication Dose Route Frequency    ondansetron (ZOFRAN) injection 4 mg  4 mg IntraVENous Q6H PRN    ondansetron (ZOFRAN ODT) tablet 4 mg  4 mg Oral Q6H PRN    oxyCODONE IR (ROXICODONE) tablet 5 mg  5 mg Oral Q4H PRN    oxyCODONE IR (ROXICODONE) tablet 10 mg  10 mg Oral Q4H PRN    acetaminophen (TYLENOL) tablet 650 mg  650 mg Oral Q6H    methylPREDNISolone (PF) (SOLU-MEDROL) injection 40 mg  40 mg IntraVENous DAILY    famotidine (PF) (PEPCID) injection 20 mg  20 mg IntraVENous BID    docusate sodium (COLACE) capsule 100 mg  100 mg Oral DAILY    therapeutic multivitamin (THERAGRAN) tablet 1 Tab  1 Tab Oral DAILY    simethicone (MYLICON) tablet 80 mg  80 mg Oral QID PRN    metroNIDAZOLE (FLAGYL) IVPB premix 500 mg  500 mg IntraVENous Q12H    cefepime (MAXIPIME) 2 g in 0.9% sodium chloride (MBP/ADV) 100 mL  2 g IntraVENous Q12H

## 2020-01-10 NOTE — PROGRESS NOTES
Nutrition Assessment:    RECOMMENDATIONS:   Diet per GI  Continue supplements    ASSESSMENT:   Chart reviewed, pt lying in bed awake and alert. Pt reports she just ate lunch and it is not settling well, she is nauseous. Pt has been drinking boost at home and tolerates this fairly well. Some questions about her diet when she goes home, encouraged a bland, low fiber diet while recovering from a flare up. Also encouraged food journaling to discover individual tolerances/intolerances once flare up subsides. Encouraged her to continue protein shakes as tolerated at home until she returns to eating a balanced diet. Dietitians Intervention(s)/Plan(s): Continue diet, supplements as tolerated, monitor plan of care  SUBJECTIVE/OBJECTIVE:   \"I'm not doing too hot right now\"   Diet Order: Other (comment)(GI Lite + Ensure Clear TID)  % Eaten:    Patient Vitals for the past 72 hrs:   % Diet Eaten   01/09/20 1936 75 %   01/09/20 1428 50 %   01/09/20 0759 25 %   01/08/20 1909 50 %   01/08/20 1136 100 %   01/08/20 0736 0 %        Pertinent Medications:cefepime, colace, pepcid, flagyl, solumedrol, MVI; PRN: zofran. Chemistries:  Lab Results   Component Value Date/Time    Sodium 141 01/10/2020 03:55 AM    Potassium 3.2 (L) 01/10/2020 03:55 AM    Chloride 110 (H) 01/10/2020 03:55 AM    CO2 25 01/10/2020 03:55 AM    Anion gap 6 01/10/2020 03:55 AM    Glucose 93 01/10/2020 03:55 AM    BUN 16 01/10/2020 03:55 AM    Creatinine 0.77 01/10/2020 03:55 AM    BUN/Creatinine ratio 21 (H) 01/10/2020 03:55 AM    GFR est AA >60 01/10/2020 03:55 AM    GFR est non-AA >60 01/10/2020 03:55 AM    Calcium 8.2 (L) 01/10/2020 03:55 AM    Albumin 2.7 (L) 01/07/2020 03:22 AM      Anthropometrics: Height: 5' 2\" (157.5 cm) Weight: 77.6 kg (171 lb)  [] standing scale    []bed scale    []stated   []unknown     IBW (%IBW):   ( ) UBW (%UBW):   (  %)    BMI: Body mass index is 31.28 kg/m².     This BMI is indicative of:  []Underweight   []Normal []Overweight   [x] Obesity   [] Extreme Obesity (BMI>40)  Estimated Nutrition Needs (Based on): 1860 Kcals/day(MSJ 1430 x 1.3) , 77 g(-93 (1-1.2gPro/kg) ) Protein  Carbohydrate: At Least 130 g/day  Fluids: 1800 mL/day    Last BM: 1/10   [x]Active     []Hyperactive  []Hypoactive       [] Absent   BS  Skin:    [x] Intact   [] Incision  [] Breakdown   [] DTI   [] Tears/Excoriation/Abrasion  []Edema [] Other: Wt Readings from Last 30 Encounters:   01/06/20 77.6 kg (171 lb)   11/21/19 76.2 kg (168 lb)   11/08/19 74.4 kg (164 lb 0.4 oz)   10/08/19 68 kg (150 lb)   09/26/19 70.8 kg (156 lb)   09/09/19 69.9 kg (154 lb 1.6 oz)   09/06/19 70.7 kg (155 lb 14.4 oz)   08/05/19 74.9 kg (165 lb 2 oz)   08/04/19 75.5 kg (166 lb 7.2 oz)   07/13/19 75.6 kg (166 lb 11.2 oz)   10/15/18 84.6 kg (186 lb 8 oz)   10/05/18 84.4 kg (186 lb)   08/04/18 85 kg (187 lb 6.3 oz)   09/15/16 78.4 kg (172 lb 13.5 oz)   12/07/15 84.8 kg (186 lb 15.2 oz)   12/31/11 78.9 kg (174 lb)   12/16/11 79.8 kg (176 lb)        Dx of Malnutrition since admission: no    NUTRITION DIAGNOSES:   Problem:  Altered GI function      Etiology: related to crohn's flareup     Signs/Symptoms: as evidenced by pt NPO, imaging. Previous dx re: altered GI function resolving. NUTRITION INTERVENTIONS:  Meals/Snacks: General/healthful diet   Supplements: Commercial supplement              GOAL:   Pt will consume >50% of meals/supplements with lessening nausea in 2-4 days.      NUTRITION MONITORING AND EVALUATION   Previous Goal: Plan of care will be determined re: nutrition in 2-4 days   Previous Goal Met: Yes   Previous Recommendations Implemented: Yes   Cultural, Episcopalian, or Ethnic Dietary Needs: None   LEARNING NEEDS (Diet, Food/Nutrient-Drug Interaction):    [x] None Identified   [] Identified and Education Provided/Documented   [] Identified and Pt declined/was not appropriate      [x] Interdisciplinary Care Plan Reviewed/Documented    [x] Participated in Discharge Planning: TBD   [] Interdisciplinary Rounds     NUTRITION RISK:    [] High              [x] Moderate           []  Low  []  Minimal/Uncompromised      Wilder Greenwood [address], [address] [facility]  Pager [phone]  Weekend Pager [phone]

## 2020-01-10 NOTE — PROGRESS NOTES
Surgical Note    Date/Time:  1/10/2020 8:57 AM        Assessment :    Plan:  Patient Active Problem List   Diagnosis Code    Normal pregnancy Z34.90    SROM (spontaneous rupture of membranes)     Arrested active phase of labor O62.1    S/P hysterectomy Z90.710    Ovarian cyst rupture N83.209    Nausea with vomiting R11.2    Abdominal pain R10.9    Crohn disease ([address] K50.90    Bowel obstruction ([address] K56.609    Crohn's disease ([address] K50.90    Nausea and vomiting R11.2    Sepsis ([address] A41.9        Showing improvement  Less pain. Controlled with tylenol and occ oxycodone  +BM, more formed this AM    oscar solid food    Seems to be going the right direction    No surgical intervention planned           Subjective:     Chief Complaint:      Review of Systems:  Y  N       Y  N  [] [x]  Fever/chills                                               [] [x]  Chest Pain  [] [x]  Cough                                                       [] [x]  Diarrhea   [] [x]  Sputum                                                     [] [x]  Constipation  [] [x]  SOB/BENAVIDES                                                [] [x]  Nausea/Vomit  [x] []  Abd Pain                                                    [x] []  Tolerating diet  [] [x]  Dysuria                                                           []Unable to obtain  ROS due to  []mental status change  []sedated   []intubated    Objective:     VITALS:   Last 24hrs VS reviewed since prior hospitalist progress note.  Most recent are:  Visit Vitals  BP (!) 148/92   Pulse 69   Temp 98.4 °F (36.9 °C)   Resp 16   Ht 5' 2\" (1.575 m)   Wt 77.6 kg (171 lb)   SpO2 98%   Breastfeeding No   BMI 31.28 kg/m²     Temp (24hrs), Av.1 °F (36.7 °C), Min:97.7 °F (36.5 °C), Max:98.5 °F (36.9 °C)      Intake/Output Summary (Last 24 hours) at 1/10/2020 0857  Last data filed at 1/10/2020 0431  Gross per 24 hour   Intake 1382.5 ml   Output    Net 1382.5 ml         []Arcos []NGT  []Intubated on vent    PHYSICAL EXAM:  Gen:  [] A&O  []non-toxic  [x] No acute distress             [] ill apearing  []  Critical        HEENT:   [x]NC/AT/PERRLA/EOMI    []pink conjunctivae      []pale conjunctivae                  PERRL  []yes  []no      []moist mucosa    []dry mucosa    hearing intact to voice []yes  []No    RESP:   [x]CTA bialterally/no wheezing/rhonchi/rales/crackles    []clear bilaterally  []wheezing   []rhonchi   []crackles     use of accessory muscles []yes []no    CARD:   [x] regular rate and rhythm/No murmurs/rubs/gallops    murmur  []yes ()  []no      Rubs  []yes  []no       Gallops []yes  []no    Rate [] regular  [] irregular        carotid bruits  []Right  [] Left                 LE edema []yes  []no           JVP  [] yes   [] no    ABD:    [x]soft  [x]non distended  [x] tender just RLQ now. mild  [] NABS    SKIN:   Rashes [] yes   [x] no    Ulcers [] yes   [x] no               [x]normal   []tight to palpitation    skin turgor [] good  []poor  []decreased               Cyanosis/clubbing [] yes [x] no    NEUR:   [x]cranial nerves II-XII grossly intact       []Cranial nerves deficit                 [] facial droop    [] slurred speech   []aphasic     []Strength normal     [] weakness  [] LUE  []  RUE/ [] LLE  []  RLE    follows commands  [] yes [] no           PSYCH:   insight []poor [x]good   Alert and Oriented to  [x]person  [x]place  [x] time                    []depressed []anxious []agitated  []lethargic []stuporous  []sedated           Lab Data Reviewed: (see below)    Medications Reviewed: (see below)    PMH/SH reviewed - no change compared to H&P    Care Plan discussed with:  [x]Patient   []Family    []Care Manager   []RN    []Consultant/Specialist :    Prophylaxis:  []Lovenox  []Coumadin  []Hep SQ  []SCDs  []H2B/PPI    Disposition:  []Home w/ Family   []HH PT,OT,RN   []SNF/LTC   []SAH/Rehab    Ancillary Serices:   [] PT     []OT      []SW      []Nut      []HH ________________________________________________________________________  LABS:  Recent Labs     01/10/20  0355 01/09/20  0509   WBC 12.1* 11.2*   HGB 11.3* 10.4*   HCT 35.6 33.2*    262     Recent Labs     01/10/20  0355      K 3.2*   *   CO2 25   BUN 16   CREA 0.77   GLU 93   CA 8.2*     No results for input(s): SGOT, GPT, ALT, AP, TBIL, TBILI, TP, ALB, GLOB, GGT, AML, LPSE in the last 72 hours. No lab exists for component: AMYP, HLPSE  No results for input(s): INR, PTP, APTT, INREXT in the last 72 hours. No results for input(s): FE, TIBC, PSAT, FERR in the last 72 hours. No results for input(s): PH, PCO2, PO2 in the last 72 hours. No results for input(s): CPK, CKMB in the last 72 hours.     No lab exists for component: TROPONINI  No results found for: GLUCPOC    MEDICATIONS:  Current Facility-Administered Medications   Medication Dose Route Frequency    ondansetron (ZOFRAN) injection 4 mg  4 mg IntraVENous Q6H PRN    ondansetron (ZOFRAN ODT) tablet 4 mg  4 mg Oral Q6H PRN    oxyCODONE IR (ROXICODONE) tablet 5 mg  5 mg Oral Q4H PRN    oxyCODONE IR (ROXICODONE) tablet 10 mg  10 mg Oral Q4H PRN    acetaminophen (TYLENOL) tablet 650 mg  650 mg Oral Q6H    methylPREDNISolone (PF) (SOLU-MEDROL) injection 40 mg  40 mg IntraVENous DAILY    famotidine (PF) (PEPCID) injection 20 mg  20 mg IntraVENous BID    docusate sodium (COLACE) capsule 100 mg  100 mg Oral DAILY    therapeutic multivitamin (THERAGRAN) tablet 1 Tab  1 Tab Oral DAILY    simethicone (MYLICON) tablet 80 mg  80 mg Oral QID PRN    metroNIDAZOLE (FLAGYL) IVPB premix 500 mg  500 mg IntraVENous Q12H    cefepime (MAXIPIME) 2 g in 0.9% sodium chloride (MBP/ADV) 100 mL  2 g IntraVENous Q12H

## 2020-01-10 NOTE — PROGRESS NOTES
General Surgery End of Shift Nursing Note     Bedside shift change report given to Lia RN (oncoming nurse) by Farzana Trevino RN (offgoing nurse). Report included the following information SBAR, Kardex, Intake/Output, MAR and Recent Results. Pt noted resting, no distress noted at this time. No complaint of pain during shift. Pt.  With complaint of mild nausea, treated.         Willis Maradiaga

## 2020-01-11 LAB
BACTERIA SPEC CULT: NORMAL
BASOPHILS # BLD: 0 K/UL (ref 0–0.1)
BASOPHILS NFR BLD: 0 % (ref 0–1)
DIFFERENTIAL METHOD BLD: ABNORMAL
EOSINOPHIL # BLD: 0 K/UL (ref 0–0.4)
EOSINOPHIL NFR BLD: 0 % (ref 0–7)
ERYTHROCYTE [DISTWIDTH] IN BLOOD BY AUTOMATED COUNT: 14.2 % (ref 11.5–14.5)
HCT VFR BLD AUTO: 39 % (ref 35–47)
HGB BLD-MCNC: 12.4 G/DL (ref 11.5–16)
IMM GRANULOCYTES # BLD AUTO: 0.1 K/UL (ref 0–0.04)
IMM GRANULOCYTES NFR BLD AUTO: 1 % (ref 0–0.5)
LYMPHOCYTES # BLD: 2.7 K/UL (ref 0.8–3.5)
LYMPHOCYTES NFR BLD: 27 % (ref 12–49)
MCH RBC QN AUTO: 27.2 PG (ref 26–34)
MCHC RBC AUTO-ENTMCNC: 31.8 G/DL (ref 30–36.5)
MCV RBC AUTO: 85.5 FL (ref 80–99)
MONOCYTES # BLD: 0.5 K/UL (ref 0–1)
MONOCYTES NFR BLD: 5 % (ref 5–13)
NEUTS SEG # BLD: 6.7 K/UL (ref 1.8–8)
NEUTS SEG NFR BLD: 67 % (ref 32–75)
NRBC # BLD: 0 K/UL (ref 0–0.01)
NRBC BLD-RTO: 0 PER 100 WBC
PLATELET # BLD AUTO: 328 K/UL (ref 150–400)
PMV BLD AUTO: 10.1 FL (ref 8.9–12.9)
RBC # BLD AUTO: 4.56 M/UL (ref 3.8–5.2)
SERVICE CMNT-IMP: NORMAL
WBC # BLD AUTO: 10 K/UL (ref 3.6–11)

## 2020-01-11 PROCEDURE — 74011250636 HC RX REV CODE- 250/636: Performed by: INTERNAL MEDICINE

## 2020-01-11 PROCEDURE — 74011250637 HC RX REV CODE- 250/637: Performed by: INTERNAL MEDICINE

## 2020-01-11 PROCEDURE — 74011250636 HC RX REV CODE- 250/636: Performed by: HOSPITALIST

## 2020-01-11 PROCEDURE — 74011250636 HC RX REV CODE- 250/636: Performed by: NURSE PRACTITIONER

## 2020-01-11 PROCEDURE — 65660000000 HC RM CCU STEPDOWN

## 2020-01-11 PROCEDURE — 74011250637 HC RX REV CODE- 250/637: Performed by: NURSE PRACTITIONER

## 2020-01-11 PROCEDURE — 85025 COMPLETE CBC W/AUTO DIFF WBC: CPT

## 2020-01-11 PROCEDURE — 74011000258 HC RX REV CODE- 258: Performed by: INTERNAL MEDICINE

## 2020-01-11 RX ORDER — AMLODIPINE BESYLATE 2.5 MG/1
2.5 TABLET ORAL DAILY
Status: DISCONTINUED | OUTPATIENT
Start: 2020-01-11 | End: 2020-01-11

## 2020-01-11 RX ADMIN — ACETAMINOPHEN 650 MG: 325 TABLET ORAL at 12:10

## 2020-01-11 RX ADMIN — METHYLPREDNISOLONE SODIUM SUCCINATE 40 MG: 40 INJECTION, POWDER, FOR SOLUTION INTRAMUSCULAR; INTRAVENOUS at 10:18

## 2020-01-11 RX ADMIN — THERA TABS 1 TABLET: TAB at 10:18

## 2020-01-11 RX ADMIN — OXYCODONE 10 MG: 5 TABLET ORAL at 10:27

## 2020-01-11 RX ADMIN — OXYCODONE 10 MG: 5 TABLET ORAL at 21:05

## 2020-01-11 RX ADMIN — ACETAMINOPHEN 650 MG: 325 TABLET ORAL at 17:59

## 2020-01-11 RX ADMIN — FAMOTIDINE 20 MG: 20 TABLET ORAL at 10:18

## 2020-01-11 RX ADMIN — METRONIDAZOLE 500 MG: 500 INJECTION, SOLUTION INTRAVENOUS at 13:10

## 2020-01-11 RX ADMIN — CEFEPIME HYDROCHLORIDE 2 G: 2 INJECTION, POWDER, FOR SOLUTION INTRAVENOUS at 23:23

## 2020-01-11 RX ADMIN — METRONIDAZOLE 500 MG: 500 INJECTION, SOLUTION INTRAVENOUS at 01:00

## 2020-01-11 RX ADMIN — CEFEPIME HYDROCHLORIDE 2 G: 2 INJECTION, POWDER, FOR SOLUTION INTRAVENOUS at 12:08

## 2020-01-11 RX ADMIN — OXYCODONE 10 MG: 5 TABLET ORAL at 16:13

## 2020-01-11 RX ADMIN — ACETAMINOPHEN 650 MG: 325 TABLET ORAL at 23:23

## 2020-01-11 RX ADMIN — ACETAMINOPHEN 650 MG: 325 TABLET ORAL at 05:44

## 2020-01-11 RX ADMIN — FAMOTIDINE 20 MG: 20 TABLET ORAL at 17:59

## 2020-01-11 NOTE — PROGRESS NOTES
SURGERY PROGRESS NOTE      Admit Date: 2020      Subjective:     Patient states she had RLQ pain with solid food last night. milagros full liquids this moring without pain. Passing flatus and had 2 semi-solid BMs without visible blood      Objective:     Visit Vitals  BP (!) 161/97 (BP 1 Location: Right arm, BP Patient Position: At rest)   Pulse 70   Temp 98 °F (36.7 °C)   Resp 18   Ht 5' 2\" (1.575 m)   Wt 77.6 kg (171 lb)   LMP 10/08/2018   SpO2 100%   Breastfeeding No   BMI 31.28 kg/m²        Temp (24hrs), Av.1 °F (36.7 °C), Min:97.8 °F (36.6 °C), Max:98.4 °F (36.9 °C)      No intake/output data recorded.  1901 -  0700  In: 2680 [P.O.:890; I.V.:300]  Out: -     Physical Exam:    General:  alert, cooperative, no distress, appears stated age   Abdomen: soft, bowel sounds active, non-tender           Lab Results   Component Value Date/Time    WBC 10.0 2020 04:57 AM    HGB 12.4 2020 04:57 AM    HCT 39.0 2020 04:57 AM    PLATELET 795  04:57 AM    MCV 85.5 2020 04:57 AM     Lab Results   Component Value Date/Time    GFR est non-AA >60 01/10/2020 03:55 AM    GFR est AA >60 01/10/2020 03:55 AM    Creatinine 0.77 01/10/2020 03:55 AM    BUN 16 01/10/2020 03:55 AM    Sodium 141 01/10/2020 03:55 AM    Potassium 3.2 (L) 01/10/2020 03:55 AM    Chloride 110 (H) 01/10/2020 03:55 AM    CO2 25 01/10/2020 03:55 AM    Magnesium 2.1 2020 03:22 AM       Assessment:     Active Problems:    Abdominal pain (2019)      Crohn's disease ([address] (2020)      Nausea and vomiting (2020)      Sepsis ([address] (2020)    Improving.       Plan:       Continue present treatment

## 2020-01-11 NOTE — PROGRESS NOTES
Hospitalist Progress Note    NAME: Andie Harvey   :  1985   MRN:  [id number]       Assessment / Plan:  Abdominal pain, presumed Crohn's flare  -CT abdomen showed : . Marked inflammation of the terminal ileum with stranding in the adjacent soft  tissues but no drainable abscess or obstruction. There is pneumoperitoneum  indicating bowel perforation and a small amount of free fluid. GI recs appreciated , c/w IV abx and IV solumedrol . Repeat CT abdomen on Monday   Diet advanced to gi lite . Pt still with intermittent abdominal pain with diet and some nausea , still persistent   IV dilaudid stopped , c/w oxycodone   Surgery consulted , recs reviewed : low threshold for surgical exploration if recurrent fever and worsening symptoms   Plan for repeat CT abdomen /pelvis on Monday    -Currently pending approval for Humira    Hypokalemia , mild   k 3.2, replete with PO KCL     SIRS (leukocytosis, HR 130s, fever 101.5F), possible sepsis versus noninfectious SIRS due to Crohn's flare  -Blood cultures NTD   -Empiric antibiotics as above  Wbc trending down     Elevated BP most likely due to pain   SBP in the 160's  Pt reported not requesting pain meds   Control pain and reassess BP     Anxiety and depression  -Current health challenges are currently exacerbating this  -Patient with no active suicidal thoughts at this time    GERD  IBS  Asthma, not currently in exacerbation    Code Status: Full  Surrogate Decision Maker: , Elton Srinivasan    DVT Prophylaxis: SCDs as possible surgery   GI Prophylaxis: not indicated    Baseline: , working, but has been missing a lot of time from work a      30.0 - 39.9 Obese / Body mass index is 31.28 kg/m². Subjective:     Chief Complaint / Reason for Physician Visit  FU crohn flare . pt had a loose BM this am , c/o crampy abdominal  pain , not able to fully tolerate GI lite .   Review of Systems:  Symptom Y/N Comments  Symptom Y/N Comments   Fever/Chills n   Chest Pain n Poor Appetite    Edema     Cough    Abdominal Pain y    Sputum    Joint Pain     SOB/BENAVIDES n   Pruritis/Rash     Nausea/vomit n   Tolerating PT/OT     Diarrhea    Tolerating Diet y Gi lite    Constipation    Other       Could NOT obtain due to:      Objective:     VITALS:   Last 24hrs VS reviewed since prior progress note. Most recent are:  Patient Vitals for the past 24 hrs:   Temp Pulse Resp BP SpO2   01/11/20 0739 98 °F (36.7 °C) 70 18 (!) 161/97 100 %   01/11/20 0455 98.1 °F (36.7 °C) 65 18 140/81 99 %   01/11/20 0103  (!) 52  150/83    01/10/20 2329 97.8 °F (36.6 °C) 62 18 (!) 164/100 99 %   01/10/20 1457 98.4 °F (36.9 °C) 83 17 137/80 96 %   01/10/20 1119 98.5 °F (36.9 °C) 74 17 122/78 97 %       Intake/Output Summary (Last 24 hours) at 1/11/2020 0815  Last data filed at 1/10/2020 2335  Gross per 24 hour   Intake 350 ml   Output    Net 350 ml        PHYSICAL EXAM:  General: WD, WN. Alert, cooperative, no acute distress    EENT:  EOMI. Anicteric sclerae. MMM  Resp:  CTA bilaterally, no wheezing or rales. No accessory muscle use  CV:  Regular  rhythm,  No edema  GI:  Soft, Non distended,tender+  +Bowel sounds  Neurologic:  Alert and oriented X 3, normal speech,   Psych:   Good insight. Not anxious nor agitated  Skin:  No rashes. No jaundice    Reviewed most current lab test results and cultures  YES  Reviewed most current radiology test results   YES  Review and summation of old records today    NO  Reviewed patient's current orders and MAR    YES  PMH/SH reviewed - no change compared to H&P  ________________________________________________________________________  Care Plan discussed with:    Comments   Patient x    Family  x    RN x    Care Manager     Consultant                       x Multidiciplinary team rounds were held today with , nursing, pharmacist and clinical coordinator. Patient's plan of care was discussed; medications were reviewed and discharge planning was addressed. ________________________________________________________________________  Total NON critical care TIME:  30 Minutes    Total CRITICAL CARE TIME Spent:   Minutes non procedure based      Comments   >50% of visit spent in counseling and coordination of care x    ________________________________________________________________________  Tamara Hameed MD     Procedures: see electronic medical records for all procedures/Xrays and details which were not copied into this note but were reviewed prior to creation of Plan. LABS:  I reviewed today's most current labs and imaging studies.   Pertinent labs include:  Recent Labs     01/11/20  0457 01/10/20  0355 01/09/20  0509   WBC 10.0 12.1* 11.2*   HGB 12.4 11.3* 10.4*   HCT 39.0 35.6 33.2*    309 262     Recent Labs     01/10/20  0355      K 3.2*   *   CO2 25   GLU 93   BUN 16   CREA 0.77   CA 8.2*       Signed: Tamara Hameed MD

## 2020-01-11 NOTE — ROUTINE PROCESS
Bedside and Verbal shift change report given to [facility] (oncoming nurse) by Kira Contreras (offgoing nurse). Report included the following information SBAR, Kardex, ED Summary, Intake/Output, MAR and Recent Results.      Patient request wants to sleep more tonight, endorsed to oncoming RN to cluster care and have quiet to encourage sleep

## 2020-01-11 NOTE — PROGRESS NOTES
Bedside shift change report given to [address] [place] [address] (oncoming nurse) by Bere Flores RN (offgoing nurse). Report included the following information SBAR, Kardex, ED Summary, Procedure Summary, Intake/Output, MAR, Recent Results, Alarm Parameters  and Quality Measures.

## 2020-01-12 LAB
ANION GAP SERPL CALC-SCNC: 7 MMOL/L (ref 5–15)
BACTERIA SPEC CULT: NORMAL
BUN SERPL-MCNC: 14 MG/DL (ref 6–20)
BUN/CREAT SERPL: 21 (ref 12–20)
CALCIUM SERPL-MCNC: 8.2 MG/DL (ref 8.5–10.1)
CC UR VC: NORMAL
CHLORIDE SERPL-SCNC: 108 MMOL/L (ref 97–108)
CO2 SERPL-SCNC: 27 MMOL/L (ref 21–32)
CREAT SERPL-MCNC: 0.66 MG/DL (ref 0.55–1.02)
GLUCOSE SERPL-MCNC: 78 MG/DL (ref 65–100)
POTASSIUM SERPL-SCNC: 3.6 MMOL/L (ref 3.5–5.1)
SERVICE CMNT-IMP: NORMAL
SODIUM SERPL-SCNC: 142 MMOL/L (ref 136–145)

## 2020-01-12 PROCEDURE — 99232 SBSQ HOSP IP/OBS MODERATE 35: CPT | Performed by: SURGERY

## 2020-01-12 PROCEDURE — 74011250636 HC RX REV CODE- 250/636: Performed by: HOSPITALIST

## 2020-01-12 PROCEDURE — 36415 COLL VENOUS BLD VENIPUNCTURE: CPT

## 2020-01-12 PROCEDURE — 74011250636 HC RX REV CODE- 250/636: Performed by: INTERNAL MEDICINE

## 2020-01-12 PROCEDURE — 74011250637 HC RX REV CODE- 250/637: Performed by: INTERNAL MEDICINE

## 2020-01-12 PROCEDURE — 74011250636 HC RX REV CODE- 250/636: Performed by: NURSE PRACTITIONER

## 2020-01-12 PROCEDURE — 74011250637 HC RX REV CODE- 250/637: Performed by: NURSE PRACTITIONER

## 2020-01-12 PROCEDURE — 65660000000 HC RM CCU STEPDOWN

## 2020-01-12 PROCEDURE — 74011000258 HC RX REV CODE- 258: Performed by: INTERNAL MEDICINE

## 2020-01-12 PROCEDURE — 80048 BASIC METABOLIC PNL TOTAL CA: CPT

## 2020-01-12 RX ORDER — AMLODIPINE BESYLATE 2.5 MG/1
2.5 TABLET ORAL DAILY
Status: DISCONTINUED | OUTPATIENT
Start: 2020-01-12 | End: 2020-01-13

## 2020-01-12 RX ADMIN — ACETAMINOPHEN 650 MG: 325 TABLET ORAL at 13:37

## 2020-01-12 RX ADMIN — FAMOTIDINE 20 MG: 20 TABLET ORAL at 17:57

## 2020-01-12 RX ADMIN — OXYCODONE 10 MG: 5 TABLET ORAL at 17:57

## 2020-01-12 RX ADMIN — FAMOTIDINE 20 MG: 20 TABLET ORAL at 08:42

## 2020-01-12 RX ADMIN — METHYLPREDNISOLONE SODIUM SUCCINATE 40 MG: 40 INJECTION, POWDER, FOR SOLUTION INTRAMUSCULAR; INTRAVENOUS at 08:42

## 2020-01-12 RX ADMIN — CEFEPIME HYDROCHLORIDE 2 G: 2 INJECTION, POWDER, FOR SOLUTION INTRAVENOUS at 23:37

## 2020-01-12 RX ADMIN — METRONIDAZOLE 500 MG: 500 INJECTION, SOLUTION INTRAVENOUS at 14:32

## 2020-01-12 RX ADMIN — THERA TABS 1 TABLET: TAB at 08:42

## 2020-01-12 RX ADMIN — ACETAMINOPHEN 650 MG: 325 TABLET ORAL at 19:51

## 2020-01-12 RX ADMIN — ACETAMINOPHEN 650 MG: 325 TABLET ORAL at 08:42

## 2020-01-12 RX ADMIN — METRONIDAZOLE 500 MG: 500 INJECTION, SOLUTION INTRAVENOUS at 01:25

## 2020-01-12 RX ADMIN — CEFEPIME HYDROCHLORIDE 2 G: 2 INJECTION, POWDER, FOR SOLUTION INTRAVENOUS at 13:15

## 2020-01-12 RX ADMIN — OXYCODONE 10 MG: 5 TABLET ORAL at 04:08

## 2020-01-12 NOTE — ROUTINE PROCESS
Verbal shift change report given to [employer] (oncoming nurse) by Isaiah Mccallum (offgoing nurse). Report included the following information SBAR, Kardex, Intake/Output, MAR and Recent Results     Pt complained of abdominal pain x 2 during shift that was managed with Rosemary 10 mg. Ambulated in the hallway with family x 2. Pt had 4 watery BM. No reports of nausea or vomiting.  Pt was able to tolerate about 30-45% of her meals

## 2020-01-12 NOTE — PROGRESS NOTES
Admit Date: 2020    Subjective:     Patient has no new complaints. Cat po better. Much less pain. Anxious to go home. Objective:     Blood pressure 151/85, pulse 65, temperature 97.9 °F (36.6 °C), resp. rate 16, height 5' 2\" (1.575 m), weight 171 lb (77.6 kg), last menstrual period 10/08/2018, SpO2 98 %, not currently breastfeeding. Temp (24hrs), Av.9 °F (36.6 °C), Min:97.7 °F (36.5 °C), Max:98.2 °F (36.8 °C)      Physical Exam:  GENERAL: alert, cooperative, no distress, appears stated age, LUNG: clear to auscultation bilaterally, HEART: regular rate and rhythm, ABDOMEN: soft, non-tender. Bowel sounds normal. No masses,  no organomegaly, EXTREMITIES:  extremities normal, atraumatic, no cyanosis or edema    Labs:   Recent Results (from the past 24 hour(s))   METABOLIC PANEL, BASIC    Collection Time: 20  3:36 AM   Result Value Ref Range    Sodium 142 136 - 145 mmol/L    Potassium 3.6 3.5 - 5.1 mmol/L    Chloride 108 97 - 108 mmol/L    CO2 27 21 - 32 mmol/L    Anion gap 7 5 - 15 mmol/L    Glucose 78 65 - 100 mg/dL    BUN 14 6 - 20 MG/DL    Creatinine 0.66 0.55 - 1.02 MG/DL    BUN/Creatinine ratio 21 (H) 12 - 20      GFR est AA >60 >60 ml/min/1.73m2    GFR est non-AA >60 >60 ml/min/1.73m2    Calcium 8.2 (L) 8.5 - 10.1 MG/DL       Data Review images and reports reviewed    Assessment:     Active Problems:    Abdominal pain (2019)      Crohn's disease ([address] (2020)      Nausea and vomiting (2020)      Sepsis ([facility] (2020)        Plan/Recommendations/Medical Decision Making:     Continue present treatment   Interval CT planned for tomorrow. Suspect will be ready for d/c home on po steroids and antibiotics at that time. Awaiting Humira approval.    Paul Barrientos.  Sb Enciso MD, [facility]

## 2020-01-12 NOTE — PROGRESS NOTES
Hospitalist Progress Note    NAME: Sheila Bowen   :  1985   MRN:  [id number]       Assessment / Plan:  Abdominal pain, presumed Crohn's flare  -CT abdomen showed : . Marked inflammation of the terminal ileum with stranding in the adjacent soft  tissues but no drainable abscess or obstruction. There is pneumoperitoneum  indicating bowel perforation and a small amount of free fluid. GI recs appreciated , c/w IV abx and IV solumedrol . Repeat CT abdomen on Monday   Diet advanced to gi lite . Pt still with intermittent abdominal pain with diet and some nausea , still persistent   IV dilaudid stopped , c/w oxycodone   Surgery consulted , recs reviewed : low threshold for surgical exploration if recurrent fever and worsening symptoms   Plan for repeat CT abdomen /pelvis on Monday    -Currently pending approval for Humira    Hypokalemia , mild resolved      SIRS (leukocytosis, HR 130s, fever 101.5F), possible sepsis versus noninfectious SIRS due to Crohn's flare  -Blood cultures NTD   -Empiric antibiotics as above  Wbc trending down     Elevated BP most likely due to pain   SBP in the 160's  Pt reported not requesting pain meds   Control pain   Started on low dose Norvasc     Anxiety and depression  -Current health challenges are currently exacerbating this  -Patient with no active suicidal thoughts at this time    GERD  IBS  Asthma, not currently in exacerbation    Code Status: Full  Surrogate Decision Maker: , Anushka Layne    DVT Prophylaxis: SCDs as possible surgery   GI Prophylaxis: not indicated    Baseline: , working, but has been missing a lot of time from work a      30.0 - 39.9 Obese / Body mass index is 31.28 kg/m². Subjective:     Chief Complaint / Reason for Physician Visit  FU crohn flare . pt feels better , still some cramping with some type of food  Review of Systems:  Symptom Y/N Comments  Symptom Y/N Comments   Fever/Chills n   Chest Pain n    Poor Appetite    Edema     Cough Abdominal Pain y    Sputum    Joint Pain     SOB/BENAVIDES n   Pruritis/Rash     Nausea/vomit n   Tolerating PT/OT     Diarrhea    Tolerating Diet y Gi lite    Constipation    Other       Could NOT obtain due to:      Objective:     VITALS:   Last 24hrs VS reviewed since prior progress note. Most recent are:  Patient Vitals for the past 24 hrs:   Temp Pulse Resp BP SpO2   01/12/20 0754 97.9 °F (36.6 °C) 65 16 151/85 98 %   01/12/20 0513 97.9 °F (36.6 °C) (!) 59 18 (!) 154/96 98 %   01/12/20 0433 97.9 °F (36.6 °C) (!) 58 18 (!) 173/96 97 %   01/12/20 0345 97.9 °F (36.6 °C) 60 18 (!) 154/102 98 %   01/11/20 2322 97.8 °F (36.6 °C) 64 18 148/88 99 %   01/11/20 1949 98.2 °F (36.8 °C) (!) 55 18 (!) 142/91 100 %   01/11/20 1543 98.1 °F (36.7 °C) 73 16 (!) 130/94 98 %   01/11/20 1200 97.7 °F (36.5 °C) 66 16 131/85 95 %       Intake/Output Summary (Last 24 hours) at 1/12/2020 0848  Last data filed at 1/11/2020 2121  Gross per 24 hour   Intake 100 ml   Output    Net 100 ml        PHYSICAL EXAM:  General: WD, WN. Alert, cooperative, no acute distress    EENT:  EOMI. Anicteric sclerae. MMM  Resp:  CTA bilaterally, no wheezing or rales. No accessory muscle use  CV:  Regular  rhythm,  No edema  GI:  Soft, Non distended,tender+  +Bowel sounds  Neurologic:  Alert and oriented X 3, normal speech,   Psych:   Good insight. Not anxious nor agitated  Skin:  No rashes. No jaundice    Reviewed most current lab test results and cultures  YES  Reviewed most current radiology test results   YES  Review and summation of old records today    NO  Reviewed patient's current orders and MAR    YES  PMH/SH reviewed - no change compared to H&P  ________________________________________________________________________  Care Plan discussed with:    Comments   Patient x    Family      RN x    Care Manager     Consultant                        Multidiciplinary team rounds were held today with , nursing, pharmacist and clinical coordinator. Patient's plan of care was discussed; medications were reviewed and discharge planning was addressed. ________________________________________________________________________  Total NON critical care TIME:  25 Minutes    Total CRITICAL CARE TIME Spent:   Minutes non procedure based      Comments   >50% of visit spent in counseling and coordination of care x    ________________________________________________________________________  Yoon Gabriel MD     Procedures: see electronic medical records for all procedures/Xrays and details which were not copied into this note but were reviewed prior to creation of Plan. LABS:  I reviewed today's most current labs and imaging studies.   Pertinent labs include:  Recent Labs     01/11/20  0457 01/10/20  0355   WBC 10.0 12.1*   HGB 12.4 11.3*   HCT 39.0 35.6    309     Recent Labs     01/12/20  0336 01/10/20  0355    141   K 3.6 3.2*    110*   CO2 27 25   GLU 78 93   BUN 14 16   CREA 0.66 0.77   CA 8.2* 8.2*       Signed: Yoon Gabriel MD

## 2020-01-12 NOTE — PROGRESS NOTES
Bedside shift change report given to [address] [place] [address] (oncoming nurse) by Bere Flores RN (offgoing nurse). Report included the following information SBAR, Kardex, ED Summary, Procedure Summary, Intake/Output, MAR, Recent Results, Alarm Parameters  and Quality Measures.

## 2020-01-13 ENCOUNTER — APPOINTMENT (OUTPATIENT)
Dept: CT IMAGING | Age: 35
DRG: 385 | End: 2020-01-13
Attending: NURSE PRACTITIONER
Payer: COMMERCIAL

## 2020-01-13 VITALS
OXYGEN SATURATION: 97 % | TEMPERATURE: 98 F | SYSTOLIC BLOOD PRESSURE: 117 MMHG | RESPIRATION RATE: 15 BRPM | WEIGHT: 171 LBS | DIASTOLIC BLOOD PRESSURE: 85 MMHG | HEART RATE: 72 BPM | BODY MASS INDEX: 31.47 KG/M2 | HEIGHT: 62 IN

## 2020-01-13 PROCEDURE — 74011636320 HC RX REV CODE- 636/320: Performed by: INTERNAL MEDICINE

## 2020-01-13 PROCEDURE — 74177 CT ABD & PELVIS W/CONTRAST: CPT

## 2020-01-13 PROCEDURE — 74011250636 HC RX REV CODE- 250/636: Performed by: HOSPITALIST

## 2020-01-13 PROCEDURE — 74011250637 HC RX REV CODE- 250/637: Performed by: INTERNAL MEDICINE

## 2020-01-13 PROCEDURE — 74011250637 HC RX REV CODE- 250/637: Performed by: NURSE PRACTITIONER

## 2020-01-13 RX ORDER — METRONIDAZOLE 500 MG/1
500 TABLET ORAL 2 TIMES DAILY
Qty: 14 TAB | Refills: 0 | Status: SHIPPED | OUTPATIENT
Start: 2020-01-13 | End: 2020-01-20

## 2020-01-13 RX ORDER — AMLODIPINE BESYLATE 5 MG/1
5 TABLET ORAL DAILY
Qty: 30 TAB | Refills: 1 | Status: SHIPPED | OUTPATIENT
Start: 2020-01-14 | End: 2020-03-14

## 2020-01-13 RX ORDER — OXYCODONE HYDROCHLORIDE 5 MG/1
5 TABLET ORAL
Qty: 18 TAB | Refills: 0 | Status: SHIPPED | OUTPATIENT
Start: 2020-01-13 | End: 2020-01-16

## 2020-01-13 RX ORDER — AMLODIPINE BESYLATE 5 MG/1
5 TABLET ORAL DAILY
Status: DISCONTINUED | OUTPATIENT
Start: 2020-01-14 | End: 2020-01-13 | Stop reason: HOSPADM

## 2020-01-13 RX ORDER — CIPROFLOXACIN 500 MG/1
500 TABLET ORAL 2 TIMES DAILY
Qty: 14 TAB | Refills: 0 | Status: SHIPPED | OUTPATIENT
Start: 2020-01-13 | End: 2020-01-20

## 2020-01-13 RX ORDER — SODIUM CHLORIDE 0.9 % (FLUSH) 0.9 %
10 SYRINGE (ML) INJECTION
Status: DISCONTINUED | OUTPATIENT
Start: 2020-01-13 | End: 2020-01-13 | Stop reason: HOSPADM

## 2020-01-13 RX ORDER — FAMOTIDINE 20 MG/1
20 TABLET, FILM COATED ORAL 2 TIMES DAILY
Qty: 60 TAB | Refills: 0 | Status: SHIPPED | OUTPATIENT
Start: 2020-01-13 | End: 2020-02-12

## 2020-01-13 RX ORDER — PREDNISONE 20 MG/1
40 TABLET ORAL DAILY
Qty: 28 TAB | Refills: 0 | Status: SHIPPED | OUTPATIENT
Start: 2020-01-13 | End: 2020-01-27

## 2020-01-13 RX ADMIN — METRONIDAZOLE 500 MG: 500 INJECTION, SOLUTION INTRAVENOUS at 00:35

## 2020-01-13 RX ADMIN — IOPAMIDOL 100 ML: 755 INJECTION, SOLUTION INTRAVENOUS at 10:16

## 2020-01-13 RX ADMIN — FAMOTIDINE 20 MG: 20 TABLET ORAL at 11:01

## 2020-01-13 RX ADMIN — ACETAMINOPHEN 650 MG: 325 TABLET ORAL at 01:43

## 2020-01-13 RX ADMIN — THERA TABS 1 TABLET: TAB at 11:01

## 2020-01-13 RX ADMIN — DOCUSATE SODIUM 100 MG: 100 CAPSULE, LIQUID FILLED ORAL at 11:01

## 2020-01-13 RX ADMIN — ACETAMINOPHEN 650 MG: 325 TABLET ORAL at 11:01

## 2020-01-13 NOTE — PROGRESS NOTES
IV has been removed. Pt discharged per MD order. Pt has all personal belongings, 6 prescriptions, and discharge instructions. Discharge instructions gone over with pt. Pt does not have any further questions regarding discharge.

## 2020-01-13 NOTE — ROUTINE PROCESS
Verbal shift change report given to Delaney (oncoming nurse) by Karina Lopez (offgoing nurse). Report included the following information SBAR, Kardex, ED Summary, Procedure Summary, Intake/Output and MAR     Pt received Tylenol as scheduled for a headache and Rosemary once for abdominal pain. Pt was tearful once during shift because she was ready to go home. Used therapeutic touch, guided imagery and prayer to  Help console her. Very effective. Pt ambulated in the hallways with family x 3. Meals tolerated with no reports of nausea or vomiting. Increased appetite from yesterday.

## 2020-01-13 NOTE — DISCHARGE SUMMARY
Hospitalist Discharge Summary     Patient ID:  Shari Wilde  [id number]  29 y.o.  1985 1/6/2020    PCP on record: Melinda Wood MD    Admit date: 1/6/2020  Discharge date and time: 1/13/2020    DISCHARGE DIAGNOSIS:  Abdominal pain, presumed Crohn's flare  Hypokalemia , mild   SIRS (leukocytosis, HR 130s, fever 101.5F), possible sepsis versus noninfectious SIRS due to Crohn's flare  Elevated BP most likely due to pain VS undiagnosed HTN    Anxiety and depression  GERD  IBS  Asthma,      CONSULTATIONS:  IP CONSULT TO GASTROENTEROLOGY  IP CONSULT TO GASTROENTEROLOGY  IP CONSULT TO HOSPITALIST  IP CONSULT TO GENERAL SURGERY    Excerpted HPI from H&P of Paola Vasquez MD:  Shari Wilde is a 29 y.o.  female who presents with severe abdominal pain and dry heaving. She has been having significant issues with abdominal pain for months now and there has been some diagnostic uncertainty prompting multiple GYN and GI evaluations (including PillCam that was retained, requiring diagnostic laparoscopy with lysis of adhesions and open small bowel resection with removal of PillCam at this hospital in September of 2019), and at this point she is said to have Crohn's disease. She has had episodic flares of her abdominal pain and was recently placed on a prednisone taper by Dr. Fausto Conde, which initially seemed to result in significant improvement of symptoms. She is currently on 30 mg of prednisone, last dose yesterday, and has been tapering by 10 mg every 5 days. Her present symptoms started last night with right sided abdominal pain that radiates to the umbilicus, associated with nausea and dry heaving. She hasn't had gila diarrhea per se and no recent bloody stools. Her symptoms escalated significantly over the course of the night, prompting her to come to the emergency department.  The symptoms are stereotypical of her prior flare episodes, with the exception that today she has developed fever in the emergency department, and her pain and nausea have been totally unrelieved by the morphine and zofran she received in the ER. She is a bit emotional in giving her history, states she has accrued a lot of debt over the course of this illness and has missed a lot of work and is struggling to cope. Outpatient plan has been to get her approved for Humira.     We were asked to admit for work up and evaluation of the above problems. ______________________________________________________________________  DISCHARGE SUMMARY/HOSPITAL COURSE:  for full details see H&P, daily progress notes, labs, consult notes. Abdominal pain, presumed Crohn's flare  -CT abdomen on admission showed : . Marked inflammation of the terminal ileum with stranding in the adjacent soft  tissues but no drainable abscess or obstruction. There is pneumoperitoneum  indicating bowel perforation and a small amount of free fluid. Pt was elevaluated by surgery who recommended conservative measures   GI recs appreciated , s/p  IV abx and IV solumedrol . Repeat CT abdomen on 01/13 , reviewed by GI , showed improvement , final read pending .  Pt tolerating diet   Pt will be DC on cipro/flagyl to complete 14day course of abx + prednisone 40mg x2weeks + 3days of oxycodone    -Currently pending approval for Humira     Hypokalemia , mild resolved        SIRS (leukocytosis, HR 130s, fever 101.5F), possible sepsis versus noninfectious SIRS due to Crohn's flare  -Blood cultures NTD   - wbc trended down to wnl   - pt dc on cipro/flagyl  - reported allergy to Moxifloxacin but pt reported tolerating cipro in the past     Elevated BP most likely due to pain   SBP in the 160's despite pain control  Pt started on norvasc 5mg , FU with PCP and Check BP at home      Anxiety and depression  -Current health challenges are currently exacerbating this  -Patient with no active suicidal thoughts at this time     GERD  IBS  Asthma, not currently in exacerbation    ______________________________________________________________________  Patient seen and examined by me on discharge day. Pertinent Findings:  Gen:    Not in distress  Chest: Clear lungs  CVS:   Regular rhythm. No edema  Abd:  Soft, not distended, not tender  Neuro:  Alert, orientedx4  _______________________________________________________________________  DISCHARGE MEDICATIONS:   Current Discharge Medication List      START taking these medications    Details   amLODIPine (NORVASC) 5 mg tablet Take 1 Tab by mouth daily for 60 days. Qty: 30 Tab, Refills: 1      famotidine (PEPCID) 20 mg tablet Take 1 Tab by mouth two (2) times a day for 30 days. Qty: 60 Tab, Refills: 0      oxyCODONE IR (ROXICODONE) 5 mg immediate release tablet Take 1 Tab by mouth every four (4) hours as needed for Pain for up to 3 days. Max Daily Amount: 30 mg.  Qty: 18 Tab, Refills: 0    Associated Diagnoses: Crohn's disease of small intestine with abscess (HCC)      ciprofloxacin HCl (CIPRO) 500 mg tablet Take 1 Tab by mouth two (2) times a day for 7 days. Qty: 14 Tab, Refills: 0      metroNIDAZOLE (FLAGYL) 500 mg tablet Take 1 Tab by mouth two (2) times a day for 7 days. Qty: 14 Tab, Refills: 0         CONTINUE these medications which have CHANGED    Details   predniSONE (DELTASONE) 20 mg tablet Take 40 mg by mouth daily for 14 days. Qty: 28 Tab, Refills: 0         CONTINUE these medications which have NOT CHANGED    Details   acetaminophen (TYLENOL) 500 mg tablet Take 1,000 mg by mouth every six (6) hours as needed for Pain. promethazine (PHENERGAN) 25 mg suppository Insert 25 mg into rectum every six (6) hours as needed for Nausea. docusate sodium (COLACE) 100 mg capsule Take 100 mg by mouth daily. simethicone (MYLICON) 80 mg chewable tablet Take 80 mg by mouth every six (6) hours as needed for Flatulence.       dicyclomine (BENTYL) 20 mg tablet Take 1 Tab by mouth every six (6) hours as needed (abdominal cramps) for up to 20 doses. Hold until directed to resume by Dr. Jaylen Najera. Qty: 20 Tab, Refills: 0      multivitamin (ONE A DAY) tablet Take 1 Tab by mouth daily. ondansetron (ZOFRAN ODT) 4 mg disintegrating tablet Take 1 Tab by mouth every eight (8) hours as needed for Nausea. Qty: 10 Tab, Refills: 0               Patient Follow Up Instructions:    Activity: Activity as tolerated  Diet: Gi lite   Wound Care: None needed    Follow-up with see below  Follow-up tests/labs   Follow-up Information     Follow up With Specialties Details Why Contact Info    Saud Desir MD Family Practice On 1/14/2020 10;30am  hospital follow-up [address] [place] [phone]  [place] [id number]   A nurse will call to schedule a one time home visit [address] [place] [address]  [place] [address]  [phone]        ________________________________________________________________    Risk of deterioration: Moderate    Condition at Discharge:  Stable  __________________________________________________________________    Disposition  Home with family, no needs    ____________________________________________________________________    Code Status: Full Code  ___________________________________________________________________      Total time in minutes spent coordinating this discharge (includes going over instructions, follow-up, prescriptions, and preparing report for sign off to her PCP) :  40 minutes    Signed:  Edwige Carrera MD

## 2020-01-13 NOTE — PROGRESS NOTES
DEBI Plan:     *Home with family   *family to transport pt home    Patient is being discharged home today without any needs or concerns. Follow-up appointments are on the AVS.    Care Management Interventions  PCP Verified by CM: Marj Jamison MD)  Mode of Transport at Discharge:  Other (see comment)(pt's mother can transport at [facility])  Transition of Care Consult (CM Consult): Discharge Planning  Discharge Durable Medical Equipment: No  Physical Therapy Consult: No  Occupational Therapy Consult: No  Speech Therapy Consult: No  Current Support Network: Own Home(lives with son, 2 story house, 6 steps to enter)  Confirm Follow Up Transport: Family  Discharge Location  Discharge Placement: [address]

## 2020-01-13 NOTE — PROGRESS NOTES
GI PROGRESS NOTE  [phone] NP in-hospital cell phone M-F until 4:30  After 5pm or on weekends, please call  for physician on call    Brad April :1985 [id number]   ATTG: Dr Renetta Baker  PCP: Froy Gorssman MD  Date/Time:  2020 1143     Primary GI: Dr. Cleopatra Sparks    Reason for following: Crohns Disease    Assessment:   Crohn's disease with stricturing/inflammation and fistula/microperforation on CT scan - no tissue diagnosis  Abdominal pain and tenderness  - tolerating a diet  -- final read pending, but CT appears improved     Plan:   -- PO prednisone 40 mg daily  -- complete flagyl and cipro x 14 day course  -- f/u for likely initiation of Humira, pending final CT read; and eventually a colonoscopy. -- clear for D/C from GI perspective     Subjective:   Discussed with patient and her . Doing well. Ambulating johnson. Eating solids. Pain resolved, but sometimes has bits of gas pains and cramping. CT completed this AM. Quite a bit of stool in colon. Review of Systems:  Symptom Y/N Comments  Symptom Y/N Comments   Fever/Chills    Chest Pain     Cough    Headaches     Sputum    Joint Pain     SOB/BENAVIDES    Pruritis/Rash     Tolerating Diet  Gi lite today  Other       Could NOT obtain due to:      Objective:   VITALS:   Last 24hrs VS reviewed since prior progress note. Most recent are:  Visit Vitals  /80   Pulse 75   Temp 98.2 °F (36.8 °C)   Resp 16   Ht 5' 2\" (1.575 m)   Wt 77.6 kg (171 lb)   SpO2 98%   Breastfeeding No   BMI 31.28 kg/m²       Intake/Output Summary (Last 24 hours) at 2020 1220  Last data filed at 2020 0307  Gross per 24 hour   Intake 1200 ml   Output    Net 1200 ml     PHYSICAL EXAM:  General: WD, WN. Alert, cooperative, no acute distress    HEENT: NC, Atraumatic. Anicteric sclerae. Lungs:  CTA Bilaterally. No Wheezing/Rhonchi/Rales.   Heart:  Regular  rhythm,  No murmur (), No Rubs, No Gallops  Abdomen: Soft, Non distended,  NT .  +Bowel sounds, no HSM  Extremities: BLE no edema,  Neurologic:  Alert and oriented X 3. No acute neurological distress   Psych:   Good insight. Not anxious nor agitated. Lab and Radiology Data Reviewed: (see below)    Medications Reviewed: (see below)  PMH/SH reviewed - no change compared to H&P  ________________________________________________________________________  Total time spent with patient: 20 minutes ________________________________________________________________________  Care Plan discussed with:  Patient y   Family  y   RN               Consultant:  tommie Kelley MD     Procedures: see electronic medical records for all procedures/Xrays and details which were not copied into this note but were reviewed prior to creation of Plan. LABS:  Recent Labs     01/11/20  0457   WBC 10.0   HGB 12.4   HCT 39.0        Recent Labs     01/12/20  0336      K 3.6      CO2 27   BUN 14   CREA 0.66   GLU 78   CA 8.2*     No results for input(s): SGOT, GPT, AP, TBIL, TP, ALB, GLOB, GGT, AML, LPSE in the last 72 hours. No lab exists for component: AMYP, HLPSE  No results for input(s): INR, PTP, APTT, INREXT, INREXT in the last 72 hours. No results for input(s): FE, TIBC, PSAT, FERR in the last 72 hours. No results found for: FOL, RBCF  No results for input(s): PH, PCO2, PO2 in the last 72 hours. No results for input(s): CPK, CKMB in the last 72 hours.     No lab exists for component: TROPONINI  Lab Results   Component Value Date/Time    Color YELLOW/STRAW 01/10/2020 05:39 PM    Appearance CLEAR 01/10/2020 05:39 PM    Specific gravity 1.007 01/10/2020 05:39 PM    Specific gravity 1.020 08/06/2019 08:15 AM    pH (UA) 6.0 01/10/2020 05:39 PM    Protein NEGATIVE  01/10/2020 05:39 PM    Glucose NEGATIVE  01/10/2020 05:39 PM    Ketone NEGATIVE  01/10/2020 05:39 PM    Bilirubin NEGATIVE  01/10/2020 05:39 PM    Urobilinogen 0.2 01/10/2020 05:39 PM    Nitrites NEGATIVE  01/10/2020 05:39 PM    Leukocyte Esterase NEGATIVE  01/10/2020 05:39 PM    Epithelial cells FEW 01/10/2020 05:39 PM    Bacteria 1+ (A) 01/10/2020 05:39 PM    WBC 0-4 01/10/2020 05:39 PM    RBC 20-50 01/10/2020 05:39 PM       MEDICATIONS:  Current Facility-Administered Medications   Medication Dose Route Frequency    [START ON 1/14/2020] amLODIPine (NORVASC) tablet 5 mg  5 mg Oral DAILY    sodium chloride (NS) flush 10 mL  10 mL IntraVENous RAD ONCE    famotidine (PEPCID) tablet 20 mg  20 mg Oral BID    melatonin tablet 3 mg  3 mg Oral QHS    ondansetron (ZOFRAN) injection 4 mg  4 mg IntraVENous Q6H PRN    ondansetron (ZOFRAN ODT) tablet 4 mg  4 mg Oral Q6H PRN    oxyCODONE IR (ROXICODONE) tablet 5 mg  5 mg Oral Q4H PRN    oxyCODONE IR (ROXICODONE) tablet 10 mg  10 mg Oral Q4H PRN    acetaminophen (TYLENOL) tablet 650 mg  650 mg Oral Q6H    methylPREDNISolone (PF) (SOLU-MEDROL) injection 40 mg  40 mg IntraVENous DAILY    docusate sodium (COLACE) capsule 100 mg  100 mg Oral DAILY    therapeutic multivitamin (THERAGRAN) tablet 1 Tab  1 Tab Oral DAILY    simethicone (MYLICON) tablet 80 mg  80 mg Oral QID PRN    metroNIDAZOLE (FLAGYL) IVPB premix 500 mg  500 mg IntraVENous Q12H    cefepime (MAXIPIME) 2 g in 0.9% sodium chloride (MBP/ADV) 100 mL  2 g IntraVENous Q12H

## 2020-01-13 NOTE — PROGRESS NOTES
General Surgery End of Shift Nursing Note    Bedside shift change report given to Angeles Mullins RN (oncoming nurse) by Jimi Arroyo RN (offgoing nurse). Report included the following information SBAR, Kardex, Intake/Output, MAR and Recent Results. Shift worked:   7p-7a   Summary of shift:    Patient rested with no need of pain medications other than the scheduled Tylenol. Issues for physician to address:   None at this time. Number times ambulated in hallway past shift: 0    Number of times OOB to chair past shift: 1    Pain Management:  Current medication: See MAR  Patient states pain is manageable on current pain medication: YES    GI:    Current diet:  DIET GI LITE (POST SURGICAL)  DIET NUTRITIONAL SUPPLEMENTS No; AM Snack, PM Snack; Ensure [place] current diet: YES    Patient Safety:    Bed Alarm On? No  Sitter?  No    Juvenal Yeung

## 2020-01-13 NOTE — DISCHARGE INSTRUCTIONS
DISCHARGE DIAGNOSIS:  Abdominal pain, presumed Crohn's flare  Hypokalemia , mild   SIRS (leukocytosis, HR 130s, fever 101.5F), possible sepsis versus noninfectious SIRS due to Crohn's flare  Elevated BP most likely due to pain VS undiagnosed HTN    Anxiety and depression  GERD  IBS  Asthma,    MEDICATIONS:  · It is important that you take the medication exactly as they are prescribed. · Keep your medication in the bottles provided by the pharmacist and keep a list of the medication names, dosages, and times to be taken in your wallet. · Do not take other medications without consulting your doctor. Pain Management: per above medications    What to do at Home    Recommended diet:  GI lite     Recommended activity: Activity as tolerated    If you have questions regarding the hospital related prescriptions or hospital related issues please call [address] [place] [address] at . You can always direct your questions to your primary care doctor if you are unable to reach your hospital physician; your PCP works as an extension of your hospital doctor just like your hospital doctor is an extension of your PCP for your time at the hospital [facility], [place]). If you experience any of the following symptoms then please call your primary care physician or return to the emergency room if you cannot get hold of your doctor:  Fever, chills, nausea, vomiting, diarrhea, change in mentation, falling, bleeding, shortness of breath  Patient Education        Crohn's Disease: Care Instructions  Your Care Instructions    Crohn's disease is a lifelong inflammatory bowel disease (IBD). Parts of the digestive tract get swollen and irritated and may develop deep sores called ulcers. Crohn's disease usually occurs in the last part of the small intestine and the first part of the large intestine. But it can develop anywhere from the mouth to the anus.   The main symptoms of Crohn's disease are belly pain, diarrhea, fever, and weight loss. Some people may have constipation. Crohn's disease also sometimes causes problems with the joints, eyes, or skin. Your symptoms may be mild at some times and severe at others. The disease can also go into remission, which means that it is not active and you have no symptoms. Bad attacks of Crohn's disease often have to be treated in the hospital so that you can get medicines and liquids through a tube in your vein, called an IV. This gives your digestive system time to rest and recover. Talk with your doctor about the best treatments for you. You may need medicines that help prevent or treat flare-ups of the disease. You may need surgery to remove part of your bowel if you have an abnormal opening in the bowel (fistula), an abscess, or a bowel obstruction. In some cases, surgery is needed if medicines do not work. But symptoms often return to other areas of the intestines after surgery. Learning good self-care can help you reduce your symptoms and manage Crohn's disease. Follow-up care is a key part of your treatment and safety. Be sure to make and go to all appointments, and call your doctor if you are having problems. It's also a good idea to know your test results and keep a list of the medicines you take. How can you care for yourself at home? · Take your medicines exactly as prescribed. Call your doctor if you think you are having a problem with your medicine. You will get more details on the specific medicines your doctor prescribes. · Do not take anti-inflammatory medicines, such as aspirin, ibuprofen (Advil, Motrin), or naproxen (Aleve). They may make your symptoms worse. Do not take any other medicines or herbal products without talking to your doctor first.  · Avoid foods that make your symptoms worse. These might include milk, alcohol, high-fiber foods, or spicy foods. · Eat a healthy diet. Make sure to get enough iron. Rectal bleeding may make you lose iron.  Good sources of iron include beef, lentils, spinach, raisins, and iron-enriched breads and cereals. · Drink liquid meal replacements if your doctor recommends them. These are high in calories and contain vitamins and minerals. Severe symptoms may make it hard for your body to absorb vitamins and minerals. · Do not smoke. Smoking makes Crohn's disease worse. If you need help quitting, talk to your doctor about stop-smoking programs and medicines. These can increase your chances of quitting for good. · Seek support from friends and family to help cope with Crohn's disease. The illness can affect all parts of your life. Get counseling if you need it. When should you call for help? Call 911 anytime you think you may need emergency care. For example, call if:    · Your stools are maroon or very bloody.     · You passed out (lost consciousness).    Call your doctor now or seek immediate medical care if:    · You are vomiting.     · You have new or worse belly pain.     · You have a fever.     · You cannot pass stools or gas.     · You have new or more blood in your stools.    Watch closely for changes in your health, and be sure to contact your doctor if:    · You have new or worse symptoms.     · You are losing weight.     · You do not get better as expected. Where can you learn more? Go to http://jody-clara.info/. Enter 21 [phone] in the search box to learn more about \"Crohn's Disease: Care Instructions. \"  Current as of: November 7, 2018  Content Version: 12.2  © 0989-9390 [employer]. Care instructions adapted under license by [employer] (which disclaims liability or warranty for this information). If you have questions about a medical condition or this instruction, always ask your healthcare professional. [place] [address] any warranty or liability for your use of this information.

## 2020-04-10 ENCOUNTER — HOSPITAL ENCOUNTER (EMERGENCY)
Age: 35
Discharge: HOME | End: 2020-04-10
Attending: EMERGENCY MEDICINE
Payer: COMMERCIAL

## 2020-04-10 ENCOUNTER — APPOINTMENT (OUTPATIENT)
Dept: CT IMAGING | Age: 35
End: 2020-04-10
Attending: EMERGENCY MEDICINE
Payer: COMMERCIAL

## 2020-04-10 VITALS
BODY MASS INDEX: 32.42 KG/M2 | HEIGHT: 62 IN | HEART RATE: 91 BPM | WEIGHT: 176.15 LBS | RESPIRATION RATE: 20 BRPM | TEMPERATURE: 98.2 F | OXYGEN SATURATION: 96 % | SYSTOLIC BLOOD PRESSURE: 124 MMHG | DIASTOLIC BLOOD PRESSURE: 83 MMHG

## 2020-04-10 DIAGNOSIS — K50.90 EXACERBATION OF CROHN'S DISEASE WITHOUT COMPLICATION (HCC): Primary | ICD-10-CM

## 2020-04-10 DIAGNOSIS — R10.84 ABDOMINAL PAIN, GENERALIZED: ICD-10-CM

## 2020-04-10 LAB
ALBUMIN SERPL-MCNC: 2.9 G/DL (ref 3.5–5)
ALBUMIN/GLOB SERPL: 0.9 {RATIO} (ref 1.1–2.2)
ALP SERPL-CCNC: 58 U/L (ref 45–117)
ALT SERPL-CCNC: 50 U/L (ref 12–78)
ANION GAP SERPL CALC-SCNC: 5 MMOL/L (ref 5–15)
APPEARANCE UR: CLEAR
AST SERPL-CCNC: 32 U/L (ref 15–37)
BACTERIA URNS QL MICRO: NEGATIVE /HPF
BASOPHILS # BLD: 0 K/UL (ref 0–0.1)
BASOPHILS NFR BLD: 0 % (ref 0–1)
BILIRUB SERPL-MCNC: 0.7 MG/DL (ref 0.2–1)
BILIRUB UR QL: NEGATIVE
BUN SERPL-MCNC: 14 MG/DL (ref 6–20)
BUN/CREAT SERPL: 17 (ref 12–20)
CALCIUM SERPL-MCNC: 7.8 MG/DL (ref 8.5–10.1)
CHLORIDE SERPL-SCNC: 108 MMOL/L (ref 97–108)
CO2 SERPL-SCNC: 23 MMOL/L (ref 21–32)
COLOR UR: ABNORMAL
CREAT SERPL-MCNC: 0.81 MG/DL (ref 0.55–1.02)
DIFFERENTIAL METHOD BLD: ABNORMAL
EOSINOPHIL # BLD: 0 K/UL (ref 0–0.4)
EOSINOPHIL NFR BLD: 0 % (ref 0–7)
EPITH CASTS URNS QL MICRO: ABNORMAL /LPF
ERYTHROCYTE [DISTWIDTH] IN BLOOD BY AUTOMATED COUNT: 13.8 % (ref 11.5–14.5)
GLOBULIN SER CALC-MCNC: 3.2 G/DL (ref 2–4)
GLUCOSE SERPL-MCNC: 89 MG/DL (ref 65–100)
GLUCOSE UR STRIP.AUTO-MCNC: NEGATIVE MG/DL
HCT VFR BLD AUTO: 45.2 % (ref 35–47)
HGB BLD-MCNC: 15 G/DL (ref 11.5–16)
HGB UR QL STRIP: ABNORMAL
HYALINE CASTS URNS QL MICRO: ABNORMAL /LPF (ref 0–5)
IMM GRANULOCYTES # BLD AUTO: 0.1 K/UL (ref 0–0.04)
IMM GRANULOCYTES NFR BLD AUTO: 0 % (ref 0–0.5)
KETONES UR QL STRIP.AUTO: 15 MG/DL
LEUKOCYTE ESTERASE UR QL STRIP.AUTO: NEGATIVE
LIPASE SERPL-CCNC: 56 U/L (ref 73–393)
LYMPHOCYTES # BLD: 0.8 K/UL (ref 0.8–3.5)
LYMPHOCYTES NFR BLD: 6 % (ref 12–49)
MCH RBC QN AUTO: 28.2 PG (ref 26–34)
MCHC RBC AUTO-ENTMCNC: 33.2 G/DL (ref 30–36.5)
MCV RBC AUTO: 85.1 FL (ref 80–99)
MONOCYTES # BLD: 1 K/UL (ref 0–1)
MONOCYTES NFR BLD: 7 % (ref 5–13)
NEUTS SEG # BLD: 12.3 K/UL (ref 1.8–8)
NEUTS SEG NFR BLD: 87 % (ref 32–75)
NITRITE UR QL STRIP.AUTO: NEGATIVE
NRBC # BLD: 0 K/UL (ref 0–0.01)
NRBC BLD-RTO: 0 PER 100 WBC
PH UR STRIP: 6 [PH] (ref 5–8)
PLATELET # BLD AUTO: 283 K/UL (ref 150–400)
PMV BLD AUTO: 10.6 FL (ref 8.9–12.9)
POTASSIUM SERPL-SCNC: 3.9 MMOL/L (ref 3.5–5.1)
PROT SERPL-MCNC: 6.1 G/DL (ref 6.4–8.2)
PROT UR STRIP-MCNC: NEGATIVE MG/DL
RBC # BLD AUTO: 5.31 M/UL (ref 3.8–5.2)
RBC #/AREA URNS HPF: ABNORMAL /HPF (ref 0–5)
SODIUM SERPL-SCNC: 136 MMOL/L (ref 136–145)
SP GR UR REFRACTOMETRY: 1.01 (ref 1–1.03)
UA: UC IF INDICATED,UAUC: ABNORMAL
UROBILINOGEN UR QL STRIP.AUTO: 0.2 EU/DL (ref 0.2–1)
WBC # BLD AUTO: 14.2 K/UL (ref 3.6–11)
WBC URNS QL MICRO: ABNORMAL /HPF (ref 0–4)

## 2020-04-10 PROCEDURE — 80053 COMPREHEN METABOLIC PANEL: CPT

## 2020-04-10 PROCEDURE — 74011250636 HC RX REV CODE- 250/636: Performed by: EMERGENCY MEDICINE

## 2020-04-10 PROCEDURE — 74011250637 HC RX REV CODE- 250/637: Performed by: EMERGENCY MEDICINE

## 2020-04-10 PROCEDURE — 96374 THER/PROPH/DIAG INJ IV PUSH: CPT

## 2020-04-10 PROCEDURE — 99285 EMERGENCY DEPT VISIT HI MDM: CPT

## 2020-04-10 PROCEDURE — 85025 COMPLETE CBC W/AUTO DIFF WBC: CPT

## 2020-04-10 PROCEDURE — 83690 ASSAY OF LIPASE: CPT

## 2020-04-10 PROCEDURE — 74011250636 HC RX REV CODE- 250/636

## 2020-04-10 PROCEDURE — 36415 COLL VENOUS BLD VENIPUNCTURE: CPT

## 2020-04-10 PROCEDURE — 96375 TX/PRO/DX INJ NEW DRUG ADDON: CPT

## 2020-04-10 PROCEDURE — 81001 URINALYSIS AUTO W/SCOPE: CPT

## 2020-04-10 PROCEDURE — 96376 TX/PRO/DX INJ SAME DRUG ADON: CPT

## 2020-04-10 PROCEDURE — 74011636320 HC RX REV CODE- 636/320: Performed by: EMERGENCY MEDICINE

## 2020-04-10 PROCEDURE — 99284 EMERGENCY DEPT VISIT MOD MDM: CPT

## 2020-04-10 PROCEDURE — 74177 CT ABD & PELVIS W/CONTRAST: CPT

## 2020-04-10 RX ORDER — MORPHINE SULFATE 4 MG/ML
INJECTION INTRAVENOUS
Status: COMPLETED
Start: 2020-04-10 | End: 2020-04-10

## 2020-04-10 RX ORDER — MORPHINE SULFATE 2 MG/ML
4 INJECTION, SOLUTION INTRAMUSCULAR; INTRAVENOUS
Status: COMPLETED | OUTPATIENT
Start: 2020-04-10 | End: 2020-04-10

## 2020-04-10 RX ORDER — ONDANSETRON 2 MG/ML
4 INJECTION INTRAMUSCULAR; INTRAVENOUS
Status: COMPLETED | OUTPATIENT
Start: 2020-04-10 | End: 2020-04-10

## 2020-04-10 RX ORDER — ACETAMINOPHEN 500 MG
1000 TABLET ORAL ONCE
Status: COMPLETED | OUTPATIENT
Start: 2020-04-10 | End: 2020-04-10

## 2020-04-10 RX ORDER — HYDROCODONE BITARTRATE AND ACETAMINOPHEN 5; 325 MG/1; MG/1
1 TABLET ORAL
Qty: 12 TAB | Refills: 0 | Status: SHIPPED | OUTPATIENT
Start: 2020-04-10 | End: 2020-04-13

## 2020-04-10 RX ORDER — ONDANSETRON 4 MG/1
4 TABLET, ORALLY DISINTEGRATING ORAL
Qty: 10 TAB | Refills: 0 | Status: SHIPPED | OUTPATIENT
Start: 2020-04-10

## 2020-04-10 RX ORDER — MORPHINE SULFATE 2 MG/ML
4 INJECTION, SOLUTION INTRAMUSCULAR; INTRAVENOUS
Status: DISCONTINUED | OUTPATIENT
Start: 2020-04-10 | End: 2020-04-10 | Stop reason: HOSPADM

## 2020-04-10 RX ORDER — SODIUM CHLORIDE 0.9 % (FLUSH) 0.9 %
10 SYRINGE (ML) INJECTION
Status: COMPLETED | OUTPATIENT
Start: 2020-04-10 | End: 2020-04-10

## 2020-04-10 RX ADMIN — SODIUM CHLORIDE 1000 ML: 900 INJECTION, SOLUTION INTRAVENOUS at 06:39

## 2020-04-10 RX ADMIN — MORPHINE SULFATE 4 MG: 2 INJECTION, SOLUTION INTRAMUSCULAR; INTRAVENOUS at 06:40

## 2020-04-10 RX ADMIN — IOPAMIDOL 100 ML: 755 INJECTION, SOLUTION INTRAVENOUS at 10:09

## 2020-04-10 RX ADMIN — METHYLPREDNISOLONE SODIUM SUCCINATE 40 MG: 40 INJECTION, POWDER, FOR SOLUTION INTRAMUSCULAR; INTRAVENOUS at 11:56

## 2020-04-10 RX ADMIN — ONDANSETRON 4 MG: 2 INJECTION INTRAMUSCULAR; INTRAVENOUS at 06:40

## 2020-04-10 RX ADMIN — ACETAMINOPHEN 1000 MG: 500 TABLET ORAL at 06:39

## 2020-04-10 RX ADMIN — MORPHINE SULFATE 4 MG: 4 INJECTION, SOLUTION INTRAMUSCULAR; INTRAVENOUS at 09:04

## 2020-04-10 RX ADMIN — IOHEXOL 50 ML: 240 INJECTION, SOLUTION INTRATHECAL; INTRAVASCULAR; INTRAVENOUS; ORAL at 09:04

## 2020-04-10 RX ADMIN — Medication 10 ML: at 10:09

## 2020-04-10 RX ADMIN — SODIUM CHLORIDE 1000 ML: 900 INJECTION, SOLUTION INTRAVENOUS at 07:58

## 2020-04-10 NOTE — ED PROVIDER NOTES
EMERGENCY DEPARTMENT HISTORY AND PHYSICAL EXAM      Date: 4/10/2020  Patient Name: Julian Huston    History of Presenting Illness     Chief Complaint   Patient presents with    Abdominal Pain     pt arrives ambulatory to triage with c/o 4/82 umbilical pain with N/V since wednesday. pt states she was recently dx with crohns, hx of perf bowel    Headache     pt states she has a generalized HA 7/10, pt denies vision changes blurred vision    Fever     pt states she spiked a temp of 101 at 2am today and took 1,00mg tylenol, pt arrives 99.4       History Provided By: Patient    HPI: Julian Huston, 29 y.o. female  presents to the ED with cc of abdominal pain. Patient states she started having periumbilical pain 2 to 3 days ago. She has a recent history of Crohn's. She has had bowel surgery due to a PillCam that became stuck. She is also had microperforations related to Crohn's. Is also been discovered that endometriosis has been contributing to her obstruction. Patient complains of nausea and vomiting. She has had decreased oral intake. She is developed headaches and she states she had a fever at home in the past 24 hours. Her bowels have become more loose and mucousy. No urinary symptoms. She is currently on Humira. There are no other complaints, changes, or physical findings at this time. PCP: Samuel Coulter MD    No current facility-administered medications on file prior to encounter. Current Outpatient Medications on File Prior to Encounter   Medication Sig Dispense Refill    acetaminophen (TYLENOL) 500 mg tablet Take 1,000 mg by mouth every six (6) hours as needed for Pain.  promethazine (PHENERGAN) 25 mg suppository Insert 25 mg into rectum every six (6) hours as needed for Nausea.  docusate sodium (COLACE) 100 mg capsule Take 100 mg by mouth daily.  simethicone (MYLICON) 80 mg chewable tablet Take 80 mg by mouth every six (6) hours as needed for Flatulence.       dicyclomine (BENTYL) 20 mg tablet Take 1 Tab by mouth every six (6) hours as needed (abdominal cramps) for up to 20 doses. Hold until directed to resume by Dr. Sydnie Fischer. 20 Tab 0    multivitamin (ONE A DAY) tablet Take 1 Tab by mouth daily.  [DISCONTINUED] ondansetron (ZOFRAN ODT) 4 mg disintegrating tablet Take 1 Tab by mouth every eight (8) hours as needed for Nausea. 10 Tab 0       Past History     Past Medical History:  Past Medical History:   Diagnosis Date    Abnormal Pap smear     Biopsy done last year and came back ok    Asthma     flares with bronchitis has been over a year as stated 10/5/2018    Autoimmune disease ([facility] [address]     Crohns    Crohn's disease ([facility] [address] 2019    Difficult intravenous access     GERD (gastroesophageal reflux disease)     IBS (irritable bowel syndrome)     Kidney stones     Nausea & vomiting     PONV-nausea    Psychiatric disorder     anxiety & depression    PUD (peptic ulcer disease)        Past Surgical History:  Past Surgical History:   Procedure Laterality Date    COLONOSCOPY N/A 2019    COLONOSCOPY performed by April Guerrero MD at [facility] ENDOSCOPY    HX APPENDECTOMY      7th grade.  HX COLONOSCOPY      HX ENDOSCOPY      HX GYN          HX GYN  2004        HX GYN      hysterectomy    HX HEENT      dental surgery    HX OTHER SURGICAL  09/10/2019    s/p Diagnostic laparoscopy with lysis of adhesions, Robitc Assist,Open small bowel resection (with removal of PillCam)        Family History:  Family History   Problem Relation Age of Onset    Heart Disease Mother     Diabetes Mother     Cancer Maternal Aunt         colon cancer       Social History:  Social History     Tobacco Use    Smoking status: Former Smoker     Packs/day: 0.25     Last attempt to quit: 2017     Years since quittin.5    Smokeless tobacco: Never Used   Substance Use Topics    Alcohol use: Yes     Comment: occassionally    Drug use:  No Allergies: Allergies   Allergen Reactions    Avelox [Moxifloxacin] Swelling and Unknown (comments)     But pt tolerated cipro    Macrolide Antibiotics Hives, Rash and Swelling     Per patient reported as a previous reaction to a \"mycin\" drug. Did not know which drug.  Nsaids (Non-Steroidal Anti-Inflammatory Drug) Other (comments)     D/t esophageal erosion and GERD         Review of Systems   Review of Systems   Constitutional: Positive for fever. Negative for chills. HENT: Negative for congestion, ear pain, rhinorrhea, sore throat and trouble swallowing. Eyes: Negative for visual disturbance. Respiratory: Negative for cough, chest tightness and shortness of breath. Cardiovascular: Negative for chest pain and palpitations. Gastrointestinal: Positive for abdominal pain, nausea and vomiting. Negative for blood in stool, constipation and diarrhea. Genitourinary: Negative for decreased urine volume, difficulty urinating, dysuria and frequency. Musculoskeletal: Negative for back pain and neck pain. Skin: Negative for color change and rash. Neurological: Positive for headaches. Negative for dizziness, weakness and light-headedness. Physical Exam   Physical Exam  Vitals signs and nursing note reviewed. Constitutional:       General: She is not in acute distress. Appearance: She is well-developed. She is not ill-appearing. Eyes:      Conjunctiva/sclera: Conjunctivae normal.   Neck:      Musculoskeletal: Neck supple. Cardiovascular:      Rate and Rhythm: Normal rate and regular rhythm. Pulmonary:      Effort: Pulmonary effort is normal. No accessory muscle usage or respiratory distress. Breath sounds: Normal breath sounds. Abdominal:      General: There is no distension. Palpations: Abdomen is soft. Tenderness: There is abdominal tenderness in the periumbilical area. There is no guarding or rebound. Lymphadenopathy:      Cervical: No cervical adenopathy. Skin:     General: Skin is warm and dry. Neurological:      Mental Status: She is alert and oriented to person, place, and time. Cranial Nerves: No cranial nerve deficit. Sensory: No sensory deficit. Diagnostic Study Results     Labs -     Recent Results (from the past 24 hour(s))   CBC WITH AUTOMATED DIFF    Collection Time: 04/10/20  6:13 AM   Result Value Ref Range    WBC 14.2 (H) 3.6 - 11.0 K/uL    RBC 5.31 (H) 3.80 - 5.20 M/uL    HGB 15.0 11.5 - 16.0 g/dL    HCT 45.2 35.0 - 47.0 %    MCV 85.1 80.0 - 99.0 FL    MCH 28.2 26.0 - 34.0 PG    MCHC 33.2 30.0 - 36.5 g/dL    RDW 13.8 11.5 - 14.5 %    PLATELET 902 209 - 726 K/uL    MPV 10.6 8.9 - 12.9 FL    NRBC 0.0 0  WBC    ABSOLUTE NRBC 0.00 0.00 - 0.01 K/uL    NEUTROPHILS 87 (H) 32 - 75 %    LYMPHOCYTES 6 (L) 12 - 49 %    MONOCYTES 7 5 - 13 %    EOSINOPHILS 0 0 - 7 %    BASOPHILS 0 0 - 1 %    IMMATURE GRANULOCYTES 0 0.0 - 0.5 %    ABS. NEUTROPHILS 12.3 (H) 1.8 - 8.0 K/UL    ABS. LYMPHOCYTES 0.8 0.8 - 3.5 K/UL    ABS. MONOCYTES 1.0 0.0 - 1.0 K/UL    ABS. EOSINOPHILS 0.0 0.0 - 0.4 K/UL    ABS. BASOPHILS 0.0 0.0 - 0.1 K/UL    ABS. IMM. GRANS. 0.1 (H) 0.00 - 0.04 K/UL    DF AUTOMATED     METABOLIC PANEL, COMPREHENSIVE    Collection Time: 04/10/20  7:59 AM   Result Value Ref Range    Sodium 136 136 - 145 mmol/L    Potassium 3.9 3.5 - 5.1 mmol/L    Chloride 108 97 - 108 mmol/L    CO2 23 21 - 32 mmol/L    Anion gap 5 5 - 15 mmol/L    Glucose 89 65 - 100 mg/dL    BUN 14 6 - 20 MG/DL    Creatinine 0.81 0.55 - 1.02 MG/DL    BUN/Creatinine ratio 17 12 - 20      GFR est AA >60 >60 ml/min/1.73m2    GFR est non-AA >60 >60 ml/min/1.73m2    Calcium 7.8 (L) 8.5 - 10.1 MG/DL    Bilirubin, total 0.7 0.2 - 1.0 MG/DL    ALT (SGPT) 50 12 - 78 U/L    AST (SGOT) 32 15 - 37 U/L    Alk.  phosphatase 58 45 - 117 U/L    Protein, total 6.1 (L) 6.4 - 8.2 g/dL    Albumin 2.9 (L) 3.5 - 5.0 g/dL    Globulin 3.2 2.0 - 4.0 g/dL    A-G Ratio 0.9 (L) 1.1 - 2.2     LIPASE Collection Time: 04/10/20  7:59 AM   Result Value Ref Range    Lipase 56 (L) 73 - 393 U/L   URINALYSIS W/ REFLEX CULTURE    Collection Time: 04/10/20  9:09 AM   Result Value Ref Range    Color YELLOW/STRAW      Appearance CLEAR CLEAR      Specific gravity 1.010 1.003 - 1.030      pH (UA) 6.0 5.0 - 8.0      Protein Negative NEG mg/dL    Glucose Negative NEG mg/dL    Ketone 15 (A) NEG mg/dL    Bilirubin Negative NEG      Blood MODERATE (A) NEG      Urobilinogen 0.2 0.2 - 1.0 EU/dL    Nitrites Negative NEG      Leukocyte Esterase Negative NEG      WBC 0-4 0 - 4 /hpf    RBC 5-10 0 - 5 /hpf    Epithelial cells FEW FEW /lpf    Bacteria Negative NEG /hpf    UA:UC IF INDICATED CULTURE NOT INDICATED BY UA RESULT CNI      Hyaline cast 0-2 0 - 5 /lpf       Radiologic Studies -   CT ABD PELV W CONT   Final Result   IMPRESSION: Inflammatory change around distal ileum and site of enteroenteric   anastomosis likely reflective of active Crohn's inflammation with other   complication identified. Small pelvic free fluid. CT Results  (Last 48 hours)               04/10/20 1009  CT ABD PELV W CONT Final result    Impression:  IMPRESSION: Inflammatory change around distal ileum and site of enteroenteric   anastomosis likely reflective of active Crohn's inflammation with other   complication identified. Small pelvic free fluid. Narrative:  EXAM: CT ABD PELV W CONT       INDICATION: abdominal pain, Crohns       COMPARISON: CT 1/13/2020. CONTRAST: 100 mL of Isovue-370. TECHNIQUE:    Following the uneventful intravenous administration of contrast, thin axial   images were obtained through the abdomen and pelvis. Coronal and sagittal   reconstructions were generated. Oral contrast administered. CT dose reduction   was achieved through use of a standardized protocol tailored for this   examination and automatic exposure control for dose modulation.        FINDINGS:    LOWER THORAX: No significant abnormality in the incidentally imaged lower chest.   LIVER: No mass. BILIARY TREE: Gallbladder is within normal limits. CBD is not dilated. SPLEEN: within normal limits. PANCREAS: No mass or ductal dilatation. ADRENALS: Unremarkable. KIDNEYS: No mass, calculus, or hydronephrosis. STOMACH: Unremarkable. SMALL BOWEL: There is an mildly thickened appearance to the distal and terminal   ileum with mild adjacent fat stranding in the region of right lower quadrant   enteroenteric suture line of the distal ileum. Small bowel is otherwise   unremarkable with no dilation or other areas of inflammation or wall thickening. No extraluminal collection, evident tethering, or fistulous tract, or other   abnormality. COLON: No dilation or wall thickening. APPENDIX: Surgically absent. PERITONEUM: No pneumoperitoneum with trace pelvic free fluid. RETROPERITONEUM: No aneurysm or enlarged adenopathy. REPRODUCTIVE ORGANS: Uterus is surgically absent. Right ovary is not seen and   may be surgically absent. Left ovary appears unremarkable. URINARY BLADDER: No mass or calculus. BONES: No destructive bone lesion. ABDOMINAL WALL: No mass or hernia. ADDITIONAL COMMENTS: N/A               CXR Results  (Last 48 hours)    None            Medical Decision Making   I am the first provider for this patient. I reviewed the vital signs, available nursing notes, past medical history, past surgical history, family history and social history. Vital Signs-Reviewed the patient's vital signs. Patient Vitals for the past 24 hrs:   Temp Pulse Resp BP SpO2   04/10/20 1000 98.2 °F (36.8 °C) 92 18 112/80 97 %   04/10/20 0930  92 18 120/86 98 %   04/10/20 0900  86 19 109/76 98 %   04/10/20 0830  92 25 121/78 96 %   04/10/20 0700  (!) 108 20 120/89 97 %   04/10/20 0544 99.4 °F (37.4 °C) (!) 135 18 (!) 140/95 100 %         Records Reviewed: Nursing Notes and Old Medical Records    Provider Notes (Medical Decision Making):    This patient with a history of Crohn's disease presents with pain in her abdomen associated with nausea and vomiting. She is also been running fevers at home. She is on Humira. She has a leukocytosis today. CT scan shows an acute exacerbation of Crohn's at her anastomosis site. She is not obstructed. In consultation with gastroenterology we will administer steroids. Will control pain, give IV fluids, and antiemetics. We will have the goal of getting her home as she is high risk for infectious disease if admitted. ED Course:   Initial assessment performed. The patients presenting problems have been discussed, and they are in agreement with the care plan formulated and outlined with them. I have encouraged them to ask questions as they arise throughout their visit. Orders Placed This Encounter    CT ABD PELV W CONT    URINALYSIS W/ REFLEX CULTURE    CBC WITH AUTOMATED DIFF    METABOLIC PANEL, COMPREHENSIVE    LIPASE    DIET NPO    ABD PAIN BELOW PANEL TRACKING (DO NOT DESELECT)    IP CONSULT TO GASTROENTEROLOGY    acetaminophen (TYLENOL) tablet 1,000 mg    sodium chloride 0.9 % bolus infusion 1,000 mL    sodium chloride 0.9 % bolus infusion 1,000 mL    ondansetron (ZOFRAN) injection 4 mg    morphine injection 4 mg    iohexol (OMNIPAQUE) ORAL mixture    iopamidoL (ISOVUE-370) 76 % injection 100 mL    sodium chloride (NS) flush 10 mL    morphine injection 4 mg    morphine 4 mg/mL injection    methylPREDNISolone (PF) (SOLU-MEDROL) injection 40 mg    ondansetron (Zofran ODT) 4 mg disintegrating tablet    HYDROcodone-acetaminophen (Lorcet, HYDROcodone,) 5-325 mg per tablet       Procedures      Critical Care Time:   0    Disposition:  Discharge  12:55 PM    The patient's emergency department evaluation is now complete. I have reviewed all labs, imaging, and pertinent information. I have discussed all results with the patient and/or family.   Based on our evaluation today I do believe that the patient is safe to be discharged home. The patient has been provided with at home instructions that are pertinent to their complaint today, although these may not be specific to the exact diagnosis. I have reviewed the patient's home medications and attempted to reconcile if not already done so by pharmacy or nursing staff. I have discussed all new prescriptions with the patient. The patient has been encouraged to follow-up with primary care doctor and/or specialist, and these have been discussed with the patient. The patient has been advised that they may return to the emergency department if they have any worsening symptoms and or new symptoms that are of concern to them. Verbal discharge instructions may have also been provided to the patient that may not be specifically contained in the written discharge instructions. The patient has been given opportunity to ask questions prior to discharge. PLAN:  1. Current Discharge Medication List      START taking these medications    Details   HYDROcodone-acetaminophen (Lorcet, HYDROcodone,) 5-325 mg per tablet Take 1 Tab by mouth every six (6) hours as needed for Pain for up to 3 days. Max Daily Amount: 4 Tabs. Qty: 12 Tab, Refills: 0    Associated Diagnoses: Exacerbation of Crohn's disease without complication ([facility] [address] Abdominal pain, generalized         CONTINUE these medications which have CHANGED    Details   ondansetron (Zofran ODT) 4 mg disintegrating tablet Take 1 Tab by mouth every eight (8) hours as needed for Nausea. Qty: 10 Tab, Refills: 0           2. Follow-up Information     Follow up With Specialties Details Why Contact Info    Marina Azul MD Gastroenterology Schedule an appointment as soon as possible for a visit in 1 week  [address] [place] [id number] [phone]          Return to ED if worse     Diagnosis     Clinical Impression:   1.  Exacerbation of Crohn's disease without complication ([address]    2. Abdominal pain, generalized            This note will not be viewable in [address]

## 2020-04-10 NOTE — ED NOTES
Bedside and Verbal shift change report given to ORIN Ferguson (oncoming nurse) by Mery Feliciano (offgoing nurse).  Report included the following information SBAR, ED Summary, Intake/Output, MAR, Recent Results and Cardiac Rhythm ST.

## 2020-04-10 NOTE — PROGRESS NOTES
GI note    Seen at bedside. CT reviewed. Labs reviewed. Agree with IV steroids and D/C home with taper. Continue Humira. I already send Rx for prednisone taper to her pharmacy. I'll arrange f/u with me in a week or two via telemedicine. Please call if any questions.

## 2020-06-05 ENCOUNTER — HOSPITAL ENCOUNTER (EMERGENCY)
Age: 35
Discharge: HOME | End: 2020-06-06
Attending: EMERGENCY MEDICINE
Payer: COMMERCIAL

## 2020-06-05 DIAGNOSIS — Z13.9 ENCOUNTER FOR MEDICAL SCREENING EXAMINATION: Primary | ICD-10-CM

## 2020-06-05 DIAGNOSIS — T88.7XXA MEDICATION SIDE EFFECT: ICD-10-CM

## 2020-06-05 PROCEDURE — 99282 EMERGENCY DEPT VISIT SF MDM: CPT

## 2020-06-05 PROCEDURE — 99283 EMERGENCY DEPT VISIT LOW MDM: CPT

## 2020-06-06 VITALS
OXYGEN SATURATION: 97 % | DIASTOLIC BLOOD PRESSURE: 91 MMHG | SYSTOLIC BLOOD PRESSURE: 135 MMHG | TEMPERATURE: 99.4 F | HEIGHT: 62 IN | RESPIRATION RATE: 18 BRPM | HEART RATE: 87 BPM | BODY MASS INDEX: 32.2 KG/M2 | WEIGHT: 175 LBS

## 2020-06-06 NOTE — ED PROVIDER NOTES
EMERGENCY DEPARTMENT HISTORY AND PHYSICAL EXAM      Date: 6/5/2020  Patient Name: Julian Huston    History of Presenting Illness     Chief Complaint   Patient presents with    Nausea     Pt is brought in via EMS with a cc of nauseawith 1 episode of vomiting after injecting her biweekly Humira- c/o pain at injection site (epigastric). PMHX of Chrohns       History Provided By: Patient    HPI: Julian Huston, 29 y.o. female  With past medical history of Crohn's disease on Humira presenting today after a reaction to her Humira injection. The patient notes that she took Benadryl, took her Humira injection, but noted a large amount of bleeding, and sudden onset of nausea with one episode of vomiting afterwards. She suddenly felt hot, felt nauseous and dizzy, and this is what prompted her call to EMS. She notes that after vomiting she had sudden improvement of her symptoms. She now notes minor pain at the injection site, but no nausea, chest pain, shortness of breath, wheezing. She has never had any allergic reaction to her injection. There are no other complaints, changes, or physical findings at this time. PCP: Samuel Coulter MD    No current facility-administered medications on file prior to encounter. Current Outpatient Medications on File Prior to Encounter   Medication Sig Dispense Refill    ondansetron (Zofran ODT) 4 mg disintegrating tablet Take 1 Tab by mouth every eight (8) hours as needed for Nausea. 10 Tab 0    acetaminophen (TYLENOL) 500 mg tablet Take 1,000 mg by mouth every six (6) hours as needed for Pain.  promethazine (PHENERGAN) 25 mg suppository Insert 25 mg into rectum every six (6) hours as needed for Nausea.  docusate sodium (COLACE) 100 mg capsule Take 100 mg by mouth daily.  simethicone (MYLICON) 80 mg chewable tablet Take 80 mg by mouth every six (6) hours as needed for Flatulence.       dicyclomine (BENTYL) 20 mg tablet Take 1 Tab by mouth every six (6) hours as needed (abdominal cramps) for up to 20 doses. Hold until directed to resume by Dr. Esteban Richardson. 20 Tab 0    multivitamin (ONE A DAY) tablet Take 1 Tab by mouth daily. Past History     Past Medical History:  Past Medical History:   Diagnosis Date    Abnormal Pap smear     Biopsy done last year and came back ok    Asthma     flares with bronchitis has been over a year as stated 10/5/2018    Autoimmune disease ([facility] [address]     Crohns    Crohn's disease ([facility] [address] 2019    Difficult intravenous access     GERD (gastroesophageal reflux disease)     IBS (irritable bowel syndrome)     Kidney stones     Nausea & vomiting     PONV-nausea    Psychiatric disorder     anxiety & depression    PUD (peptic ulcer disease)        Past Surgical History:  Past Surgical History:   Procedure Laterality Date    COLONOSCOPY N/A 2019    COLONOSCOPY performed [facility] Xavier Kumar [facility] at [facility] ENDOSCOPY    HX APPENDECTOMY      7th grade.  HX COLONOSCOPY      HX ENDOSCOPY      HX GYN          HX GYN  2004        HX GYN      hysterectomy    HX HEENT      dental surgery    HX OTHER SURGICAL  09/10/2019    s/p Diagnostic laparoscopy with lysis of adhesions, Robitc Assist,Open small bowel resection (with removal of PillCam)        Family History:  Family History   Problem Relation Age of Onset    Heart Disease Mother     Diabetes Mother     Cancer Maternal Aunt         colon cancer       Social History:  Social History     Tobacco Use    Smoking status: Former Smoker     Packs/day: 0.25     Last attempt to quit: 2017     Years since quittin.7    Smokeless tobacco: Never Used   Substance Use Topics    Alcohol use: Yes     Comment: occassionally    Drug use: No       Allergies:   Allergies   Allergen Reactions    Avelox [Moxifloxacin] Swelling and Unknown (comments)     But pt tolerated cipro    Macrolide Antibiotics Hives, Rash and Swelling     Per patient reported as a previous reaction to a \"mycin\" drug. Did not know which drug.  Nsaids (Non-Steroidal Anti-Inflammatory Drug) Other (comments)     D/t esophageal erosion and GERD         Review of Systems   Constitutional: No  Fever, + mild headache  Skin: No  rash  HEENT: No  nasal congestion  Resp: No cough  CV: No chest pain  GI: No vomiting  : No dysuria  MSK: No joint pain  Neuro: No numbness  Psych: No suicidal      Physical Exam     Patient Vitals for the past 12 hrs:   Temp Pulse Resp BP SpO2   06/06/20 0001    (!) 135/91    06/05/20 2348 99.4 °F (37.4 °C) 87 18 133/89 97 %     General: alert, No acute distress  Eyes: EOMI, normal conjunctiva  ENT: moist mucous membranes. Neck: Active, full ROM of neck. Skin: No rashes. no jaundice              Lungs: Equal chest expansion. no respiratory distress. No accessory muscle usage  Heart: regular rate     no peripheral edema   2+ radial pulses and DPs bilaterally  Abd:  non distended soft, nontender. No rebound tenderness. No guarding, small area of erythema on the abdominal wall surrounding the injection site, no other rashes  Back: Full ROM  MSK: Full, active ROM in all 4 extremities. Neuro: Person, Place, Time and Situation; normal speech;   Psych: Cooperative with exam; Appropriate mood and affect             Diagnostic Study Results     Labs -   No results found for this or any previous visit (from the past 12 hour(s)). Radiologic Studies -   No orders to display     CT Results  (Last 48 hours)    None        CXR Results  (Last 48 hours)    None          Medical Decision Making   I am the first provider for this patient. I reviewed the vital signs, available nursing notes, past medical history, past surgical history, family history and social history. Provider Notes (Medical Decision Making):     Differential Diagnosis: allergic reaction, medication side effect    Initial Plan: Will observe the patient for another hour.   She does not have any evidence of anaphylactic reaction, mild erythema surrounding the site. She notes symptomatic improvement at this time. Will observe, and ensure that she does not have any return of symptoms, but likely this is medication effect she may have hit a capillary or small vein when she injected her medication causing the symptoms. ED Course:   Initial assessment performed. The patients presenting problems have been discussed, and they are in agreement with the care plan formulated and outlined with them. I have encouraged them to ask questions as they arise throughout their visit. ED Course as of Jun 06 0136   Sat Jun 06, 2020 0135 The patient was observed for an hour in the emergency department and she had complete resolution of her symptoms. She does not have any rash, I think that her primary issue was side effects from the medication rather than a true allergic reaction. Ultimately she is discharged with return precautions. [NW]      ED Course User Index  [NW] Roma Klinefelter, MD       I, Agustina Corea MD, am the attending of record for this patient encounter. Dispo: Discharged. The patient has been re-evaluated and is ready for discharge. Reviewed available results with patient. Counseled patient on diagnosis and care plan. Patient has expressed understanding, and all questions have been answered. Patient agrees with plan and agrees to follow up as recommended, or to return to the ED if their symptoms worsen. Discharge instructions have been provided and explained to the patient, along with reasons to return to the ED. PLAN:  Current Discharge Medication List        2. Follow-up Information    None       3. Return to ED if worse       Diagnosis     Clinical Impression:   1. Encounter for medical screening examination    2. Medication side effect        Attestations:    Agustina Corea MD    Please note that this dictation was completed with [employer]atch, the [employer] voice recognition software.   Quite often unanticipated grammatical, syntax, homophones, and other interpretive errors are inadvertently transcribed by the computer software. Please disregard these errors. Please excuse any errors that have escaped final proofreading. Thank you.

## 2020-06-06 NOTE — ED NOTES
Dr. Maggie Vincent reviewed discharge instructions with the patient. The patient verbalized understanding. All questions and concerns were addressed. The patient declined a wheelchair and is discharged ambulatory in the care of family members with instructions and prescriptions in hand. Pt is alert and oriented x 4. Respirations are clear and unlabored.

## 2020-12-29 ENCOUNTER — TRANSCRIBE ORDER (OUTPATIENT)
Dept: REGISTRATION | Age: 35
End: 2020-12-29

## 2020-12-29 ENCOUNTER — HOSPITAL ENCOUNTER (OUTPATIENT)
Dept: GENERAL RADIOLOGY | Age: 35
Discharge: HOME | DRG: 329 | End: 2020-12-29
Payer: COMMERCIAL

## 2020-12-29 ENCOUNTER — APPOINTMENT (OUTPATIENT)
Dept: CT IMAGING | Age: 35
DRG: 329 | End: 2020-12-29
Attending: EMERGENCY MEDICINE
Payer: COMMERCIAL

## 2020-12-29 ENCOUNTER — HOSPITAL ENCOUNTER (INPATIENT)
Age: 35
LOS: 11 days | Discharge: HOME | DRG: 329 | End: 2021-01-10
Attending: EMERGENCY MEDICINE | Admitting: HOSPITALIST
Payer: COMMERCIAL

## 2020-12-29 DIAGNOSIS — K50.019 CROHN'S DISEASE OF SMALL INTESTINE WITH COMPLICATION (HCC): ICD-10-CM

## 2020-12-29 DIAGNOSIS — K50.112 CROHN'S DISEASE OF LARGE INTESTINE WITH INTESTINAL OBSTRUCTION (HCC): Primary | ICD-10-CM

## 2020-12-29 DIAGNOSIS — R10.31 RIGHT LOWER QUADRANT ABDOMINAL PAIN: ICD-10-CM

## 2020-12-29 DIAGNOSIS — R10.31 ABDOMINAL PAIN, RIGHT LOWER QUADRANT: Primary | ICD-10-CM

## 2020-12-29 DIAGNOSIS — K50.112 CROHN'S DISEASE OF LARGE INTESTINE WITH INTESTINAL OBSTRUCTION (HCC): ICD-10-CM

## 2020-12-29 LAB
ALBUMIN SERPL-MCNC: 3.6 G/DL (ref 3.5–5)
ALBUMIN/GLOB SERPL: 0.8 {RATIO} (ref 1.1–2.2)
ALP SERPL-CCNC: 92 U/L (ref 45–117)
ALT SERPL-CCNC: 32 U/L (ref 12–78)
ANION GAP SERPL CALC-SCNC: 5 MMOL/L (ref 5–15)
AST SERPL-CCNC: 20 U/L (ref 15–37)
BILIRUB SERPL-MCNC: 0.4 MG/DL (ref 0.2–1)
BUN SERPL-MCNC: 8 MG/DL (ref 6–20)
BUN/CREAT SERPL: 9 (ref 12–20)
CALCIUM SERPL-MCNC: 9 MG/DL (ref 8.5–10.1)
CHLORIDE SERPL-SCNC: 106 MMOL/L (ref 97–108)
CO2 SERPL-SCNC: 26 MMOL/L (ref 21–32)
CREAT SERPL-MCNC: 0.88 MG/DL (ref 0.55–1.02)
ERYTHROCYTE [DISTWIDTH] IN BLOOD BY AUTOMATED COUNT: 12.8 % (ref 11.5–14.5)
GLOBULIN SER CALC-MCNC: 4.3 G/DL (ref 2–4)
GLUCOSE SERPL-MCNC: 80 MG/DL (ref 65–100)
HCT VFR BLD AUTO: 45 % (ref 35–47)
HGB BLD-MCNC: 14.6 G/DL (ref 11.5–16)
LIPASE SERPL-CCNC: 85 U/L (ref 73–393)
MCH RBC QN AUTO: 27.9 PG (ref 26–34)
MCHC RBC AUTO-ENTMCNC: 32.4 G/DL (ref 30–36.5)
MCV RBC AUTO: 86 FL (ref 80–99)
NRBC # BLD: 0 K/UL (ref 0–0.01)
NRBC BLD-RTO: 0 PER 100 WBC
PLATELET # BLD AUTO: 374 K/UL (ref 150–400)
PMV BLD AUTO: 10 FL (ref 8.9–12.9)
POTASSIUM SERPL-SCNC: 3.4 MMOL/L (ref 3.5–5.1)
PROT SERPL-MCNC: 7.9 G/DL (ref 6.4–8.2)
RBC # BLD AUTO: 5.23 M/UL (ref 3.8–5.2)
SODIUM SERPL-SCNC: 137 MMOL/L (ref 136–145)
WBC # BLD AUTO: 9.4 K/UL (ref 3.6–11)

## 2020-12-29 PROCEDURE — 96374 THER/PROPH/DIAG INJ IV PUSH: CPT

## 2020-12-29 PROCEDURE — 99285 EMERGENCY DEPT VISIT HI MDM: CPT

## 2020-12-29 PROCEDURE — 36415 COLL VENOUS BLD VENIPUNCTURE: CPT

## 2020-12-29 PROCEDURE — 74011250636 HC RX REV CODE- 250/636: Performed by: EMERGENCY MEDICINE

## 2020-12-29 PROCEDURE — 85027 COMPLETE CBC AUTOMATED: CPT

## 2020-12-29 PROCEDURE — 96375 TX/PRO/DX INJ NEW DRUG ADDON: CPT

## 2020-12-29 PROCEDURE — 80053 COMPREHEN METABOLIC PANEL: CPT

## 2020-12-29 PROCEDURE — 83690 ASSAY OF LIPASE: CPT

## 2020-12-29 PROCEDURE — 74018 RADEX ABDOMEN 1 VIEW: CPT

## 2020-12-29 PROCEDURE — 74011000636 HC RX REV CODE- 636: Performed by: EMERGENCY MEDICINE

## 2020-12-29 RX ORDER — FENTANYL CITRATE 50 UG/ML
50 INJECTION, SOLUTION INTRAMUSCULAR; INTRAVENOUS
Status: COMPLETED | OUTPATIENT
Start: 2020-12-29 | End: 2020-12-29

## 2020-12-29 RX ORDER — ONDANSETRON 2 MG/ML
4 INJECTION INTRAMUSCULAR; INTRAVENOUS
Status: COMPLETED | OUTPATIENT
Start: 2020-12-29 | End: 2020-12-29

## 2020-12-29 RX ADMIN — FENTANYL CITRATE 50 MCG: 50 INJECTION, SOLUTION INTRAMUSCULAR; INTRAVENOUS at 23:19

## 2020-12-29 RX ADMIN — IOHEXOL 50 ML: 240 INJECTION, SOLUTION INTRATHECAL; INTRAVASCULAR; INTRAVENOUS; ORAL at 23:19

## 2020-12-29 RX ADMIN — ONDANSETRON 4 MG: 2 INJECTION INTRAMUSCULAR; INTRAVENOUS at 23:19

## 2020-12-29 RX ADMIN — SODIUM CHLORIDE 1000 ML: 9 INJECTION, SOLUTION INTRAVENOUS at 22:06

## 2020-12-30 ENCOUNTER — APPOINTMENT (OUTPATIENT)
Dept: CT IMAGING | Age: 35
DRG: 329 | End: 2020-12-30
Attending: EMERGENCY MEDICINE
Payer: COMMERCIAL

## 2020-12-30 PROBLEM — T18.3XXA FOREIGN BODY IN SMALL INTESTINE: Status: ACTIVE | Noted: 2020-12-30

## 2020-12-30 PROBLEM — R11.2 NAUSEA & VOMITING: Status: ACTIVE | Noted: 2020-12-30

## 2020-12-30 PROBLEM — E87.6 HYPOKALEMIA: Status: ACTIVE | Noted: 2020-12-30

## 2020-12-30 LAB
ANION GAP SERPL CALC-SCNC: 4 MMOL/L (ref 5–15)
APPEARANCE UR: CLEAR
BACTERIA URNS QL MICRO: NEGATIVE /HPF
BILIRUB UR QL: NEGATIVE
BUN SERPL-MCNC: 7 MG/DL (ref 6–20)
BUN/CREAT SERPL: 8 (ref 12–20)
CALCIUM SERPL-MCNC: 9 MG/DL (ref 8.5–10.1)
CHLORIDE SERPL-SCNC: 110 MMOL/L (ref 97–108)
CO2 SERPL-SCNC: 26 MMOL/L (ref 21–32)
COLOR UR: ABNORMAL
CREAT SERPL-MCNC: 0.83 MG/DL (ref 0.55–1.02)
EPITH CASTS URNS QL MICRO: ABNORMAL /LPF
GLUCOSE SERPL-MCNC: 69 MG/DL (ref 65–100)
GLUCOSE UR STRIP.AUTO-MCNC: NEGATIVE MG/DL
HGB UR QL STRIP: ABNORMAL
HYALINE CASTS URNS QL MICRO: ABNORMAL /LPF (ref 0–5)
KETONES UR QL STRIP.AUTO: NEGATIVE MG/DL
LACTATE BLD-SCNC: 1.96 MMOL/L (ref 0.4–2)
LEUKOCYTE ESTERASE UR QL STRIP.AUTO: NEGATIVE
MAGNESIUM SERPL-MCNC: 2.2 MG/DL (ref 1.6–2.4)
NITRITE UR QL STRIP.AUTO: NEGATIVE
PH UR STRIP: 5.5 [PH] (ref 5–8)
PHOSPHATE SERPL-MCNC: 3.1 MG/DL (ref 2.6–4.7)
POTASSIUM SERPL-SCNC: 3.7 MMOL/L (ref 3.5–5.1)
PROT UR STRIP-MCNC: 30 MG/DL
RBC #/AREA URNS HPF: ABNORMAL /HPF (ref 0–5)
SODIUM SERPL-SCNC: 140 MMOL/L (ref 136–145)
SP GR UR REFRACTOMETRY: >1.03 (ref 1–1.03)
UA: UC IF INDICATED,UAUC: ABNORMAL
UROBILINOGEN UR QL STRIP.AUTO: 0.2 EU/DL (ref 0.2–1)
WBC URNS QL MICRO: ABNORMAL /HPF (ref 0–4)

## 2020-12-30 PROCEDURE — 74011000636 HC RX REV CODE- 636: Performed by: EMERGENCY MEDICINE

## 2020-12-30 PROCEDURE — 36415 COLL VENOUS BLD VENIPUNCTURE: CPT

## 2020-12-30 PROCEDURE — 65270000029 HC RM PRIVATE

## 2020-12-30 PROCEDURE — 83735 ASSAY OF MAGNESIUM: CPT

## 2020-12-30 PROCEDURE — 83605 ASSAY OF LACTIC ACID: CPT

## 2020-12-30 PROCEDURE — 80048 BASIC METABOLIC PNL TOTAL CA: CPT

## 2020-12-30 PROCEDURE — 96376 TX/PRO/DX INJ SAME DRUG ADON: CPT

## 2020-12-30 PROCEDURE — 74011250637 HC RX REV CODE- 250/637: Performed by: NURSE PRACTITIONER

## 2020-12-30 PROCEDURE — 74011250636 HC RX REV CODE- 250/636: Performed by: EMERGENCY MEDICINE

## 2020-12-30 PROCEDURE — 74011250636 HC RX REV CODE- 250/636: Performed by: STUDENT IN AN ORGANIZED HEALTH CARE EDUCATION/TRAINING PROGRAM

## 2020-12-30 PROCEDURE — 74177 CT ABD & PELVIS W/CONTRAST: CPT

## 2020-12-30 PROCEDURE — 84100 ASSAY OF PHOSPHORUS: CPT

## 2020-12-30 PROCEDURE — 74011250636 HC RX REV CODE- 250/636: Performed by: HOSPITALIST

## 2020-12-30 PROCEDURE — 74011250636 HC RX REV CODE- 250/636: Performed by: INTERNAL MEDICINE

## 2020-12-30 PROCEDURE — 81001 URINALYSIS AUTO W/SCOPE: CPT

## 2020-12-30 RX ORDER — SODIUM CHLORIDE 0.9 % (FLUSH) 0.9 %
5-40 SYRINGE (ML) INJECTION AS NEEDED
Status: DISCONTINUED | OUTPATIENT
Start: 2020-12-30 | End: 2021-01-08 | Stop reason: SDUPTHER

## 2020-12-30 RX ORDER — ADALIMUMAB 40MG/0.8ML
40 KIT SUBCUTANEOUS SEE ADMIN INSTRUCTIONS
COMMUNITY
End: 2021-01-10

## 2020-12-30 RX ORDER — SODIUM CHLORIDE 9 MG/ML
100 INJECTION, SOLUTION INTRAVENOUS CONTINUOUS
Status: DISCONTINUED | OUTPATIENT
Start: 2020-12-30 | End: 2020-12-30

## 2020-12-30 RX ORDER — FENTANYL CITRATE 50 UG/ML
25 INJECTION, SOLUTION INTRAMUSCULAR; INTRAVENOUS
Status: COMPLETED | OUTPATIENT
Start: 2020-12-30 | End: 2020-12-30

## 2020-12-30 RX ORDER — SODIUM CHLORIDE, SODIUM LACTATE, POTASSIUM CHLORIDE, CALCIUM CHLORIDE 600; 310; 30; 20 MG/100ML; MG/100ML; MG/100ML; MG/100ML
125 INJECTION, SOLUTION INTRAVENOUS CONTINUOUS
Status: DISCONTINUED | OUTPATIENT
Start: 2020-12-30 | End: 2021-01-06

## 2020-12-30 RX ORDER — ACETAMINOPHEN 650 MG/1
650 SUPPOSITORY RECTAL
Status: DISCONTINUED | OUTPATIENT
Start: 2020-12-30 | End: 2021-01-06

## 2020-12-30 RX ORDER — MORPHINE SULFATE 2 MG/ML
1 INJECTION, SOLUTION INTRAMUSCULAR; INTRAVENOUS
Status: DISCONTINUED | OUTPATIENT
Start: 2020-12-30 | End: 2021-01-04

## 2020-12-30 RX ORDER — DIPHENHYDRAMINE HCL 25 MG
50 CAPSULE ORAL ONCE
Status: COMPLETED | OUTPATIENT
Start: 2020-12-30 | End: 2020-12-30

## 2020-12-30 RX ORDER — ONDANSETRON 2 MG/ML
4 INJECTION INTRAMUSCULAR; INTRAVENOUS
Status: DISCONTINUED | OUTPATIENT
Start: 2020-12-30 | End: 2021-01-08 | Stop reason: SDUPTHER

## 2020-12-30 RX ORDER — ONDANSETRON 2 MG/ML
4 INJECTION INTRAMUSCULAR; INTRAVENOUS
Status: DISCONTINUED | OUTPATIENT
Start: 2020-12-30 | End: 2021-01-10 | Stop reason: HOSPADM

## 2020-12-30 RX ORDER — ONDANSETRON 2 MG/ML
4 INJECTION INTRAMUSCULAR; INTRAVENOUS
Status: COMPLETED | OUTPATIENT
Start: 2020-12-30 | End: 2020-12-30

## 2020-12-30 RX ORDER — ACETAMINOPHEN 325 MG/1
650 TABLET ORAL
Status: DISCONTINUED | OUTPATIENT
Start: 2020-12-30 | End: 2021-01-06

## 2020-12-30 RX ORDER — SODIUM CHLORIDE 0.9 % (FLUSH) 0.9 %
5-40 SYRINGE (ML) INJECTION EVERY 8 HOURS
Status: DISCONTINUED | OUTPATIENT
Start: 2020-12-30 | End: 2021-01-08 | Stop reason: SDUPTHER

## 2020-12-30 RX ORDER — FENTANYL CITRATE 50 UG/ML
50 INJECTION, SOLUTION INTRAMUSCULAR; INTRAVENOUS
Status: DISCONTINUED | OUTPATIENT
Start: 2020-12-30 | End: 2021-01-10 | Stop reason: HOSPADM

## 2020-12-30 RX ORDER — POLYETHYLENE GLYCOL 3350 17 G/17G
17 POWDER, FOR SOLUTION ORAL DAILY PRN
Status: DISCONTINUED | OUTPATIENT
Start: 2020-12-30 | End: 2021-01-05

## 2020-12-30 RX ORDER — PROMETHAZINE HYDROCHLORIDE 25 MG/1
12.5 TABLET ORAL
Status: DISCONTINUED | OUTPATIENT
Start: 2020-12-30 | End: 2021-01-10 | Stop reason: HOSPADM

## 2020-12-30 RX ADMIN — DIPHENHYDRAMINE HYDROCHLORIDE 50 MG: 25 CAPSULE ORAL at 21:36

## 2020-12-30 RX ADMIN — SODIUM CHLORIDE, POTASSIUM CHLORIDE, SODIUM LACTATE AND CALCIUM CHLORIDE 100 ML/HR: 600; 310; 30; 20 INJECTION, SOLUTION INTRAVENOUS at 08:42

## 2020-12-30 RX ADMIN — Medication 10 ML: at 21:36

## 2020-12-30 RX ADMIN — FENTANYL CITRATE 50 MCG: 50 INJECTION, SOLUTION INTRAMUSCULAR; INTRAVENOUS at 06:34

## 2020-12-30 RX ADMIN — MORPHINE SULFATE 1 MG: 2 INJECTION, SOLUTION INTRAMUSCULAR; INTRAVENOUS at 11:15

## 2020-12-30 RX ADMIN — SODIUM CHLORIDE 100 ML/HR: 9 INJECTION, SOLUTION INTRAVENOUS at 05:08

## 2020-12-30 RX ADMIN — IOPAMIDOL 100 ML: 755 INJECTION, SOLUTION INTRAVENOUS at 00:43

## 2020-12-30 RX ADMIN — METHYLPREDNISOLONE SODIUM SUCCINATE 40 MG: 40 INJECTION, POWDER, FOR SOLUTION INTRAMUSCULAR; INTRAVENOUS at 13:35

## 2020-12-30 RX ADMIN — SODIUM CHLORIDE, POTASSIUM CHLORIDE, SODIUM LACTATE AND CALCIUM CHLORIDE 100 ML/HR: 600; 310; 30; 20 INJECTION, SOLUTION INTRAVENOUS at 18:07

## 2020-12-30 RX ADMIN — ONDANSETRON 4 MG: 2 INJECTION INTRAMUSCULAR; INTRAVENOUS at 01:52

## 2020-12-30 RX ADMIN — MORPHINE SULFATE 1 MG: 2 INJECTION, SOLUTION INTRAMUSCULAR; INTRAVENOUS at 18:07

## 2020-12-30 RX ADMIN — Medication 10 ML: at 13:35

## 2020-12-30 RX ADMIN — FENTANYL CITRATE 25 MCG: 50 INJECTION, SOLUTION INTRAMUSCULAR; INTRAVENOUS at 01:56

## 2020-12-30 NOTE — ROUTINE PROCESS
End of Shift Note Bedside shift change report given to Jimbo Denton (oncoming nurse) by Yany Davies RN (offgoing nurse). Report included the following information SBAR, Kardex, Intake/Output, MAR and Accordion Shift worked:  7-7 Shift summary and any significant changes:  
  pt is on clear liquid, complained of pain got morphine and is controlled, Concerns for physician to address:  N/A Zone phone for oncoming shift:   1349 Activity: 
Activity Level: Up ad val Number times ambulated in hallways past shift: 0 Number of times OOB to chair past shift: 1 Cardiac:  
Cardiac Monitoring: No     
Cardiac Rhythm: Normal sinus rhythm Access:  
Current line(s): PIV Genitourinary:  
Urinary status: voiding Respiratory:  
O2 Device: Room air Chronic home O2 use?: NO Incentive spirometer at bedside: NO 
  
GI: 
Last Bowel Movement Date: 12/29/20 Current diet:  DIET CLEAR LIQUID Passing flatus: YES Tolerating current diet: YES Pain Management:  
Patient states pain is manageable on current regimen: YES Skin: 
Trevor Score: 20 Interventions: increase time out of bed Patient Safety: 
Fall Score: Total Score: 1 Interventions: gripper socks Length of Stay: 
Expected LOS: 3d 0h 
Actual LOS: 0 Yany Davies RN

## 2020-12-30 NOTE — ROUTINE PROCESS
TRANSFER - IN REPORT: 
 
Verbal report received from MakenzieRn(name) on Missy Shine  being received from ED(unit) for routine progression of care Report consisted of patients Situation, Background, Assessment and  
Recommendations(SBAR). Information from the following report(s) SBAR, Kardex, Intake/Output, MAR and Accordion was reviewed with the receiving nurse. Opportunity for questions and clarification was provided.

## 2020-12-30 NOTE — CONSULTS
GI CONSULTATION NOTE  Lillian Stanford, NP  [phone] NP in-hospital cell phone M-F until 4:30  After 5pm or on weekends, please call  for physician on call    NAME: Vahe Ni   :  1985   MRN:  [id number]   Attending:  Dr. Meena Martinez   Primary GI:  Dr. Hui Cordero   Date/Time:  2020 11:03 AM  Assessment:   Abdominal pain with nausea and vomiting secondary to retained pillcam  · CT shows pill cam lying within the distal ileum in the right lower quadrant  · Patient has a hx of the same that required surgical intervention     Hx of ? Crohn's disease   · Previous CT scan (2020) showed stricturing/inflammation and fistula/microperfortion   · Colonoscopy in  by Dr. Marisol Martin showed no evidence of any IBD or Crohn's   · No path diagnosis     Plan:   · NPO  · Conservative management for now; monitor for passing of pillcam without intervention. Will consult surgery if indicated. · Symptomatic care per primary team   · Antiemetics PRN    Plan discussed with Dr. Meryl Alonzo:     HISTORY OF PRESENT ILLNESS:     Santiago Galveza y. o. female with PMHx significant for endometriosis, Crohn's disease, status post appendectomy and bowel resection presents to the ED with chief complaint of abdominal pain.  Patient has had a complicated course as far as her Crohn's disease.  Symptoms started in the summer .  She started having abdominal pain and she had a PillCam study that then got stuck and required surgical removal with a bowel resection when a stricture was found.  She then was admitted in 2020 with a microperforation.  She was started on Humira at that time which she continued until April when she was switched to azathioprine.  Throughout the summer she had some side effects that she thought was related to the azathioprine, but was doing better as far as her abdominal symptoms.  In October, with the approval of her GI specialist, she stopped all Crohn's medications due to potential side effects and the fact that she was doing better from her abdominal pain standpoint.  In November, she started having abdominal pain again and had an appointment last week with her GI doctor who suggested that she have another PillCam study.  She swallowed the PillCam last Wednesday and then started having abdominal pain 3 days later. Wendy Hernandez has not come out yet.  She had an x-ray yesterday which showed the PillCam stuck in her ileum.  She was instructed by her GI doctor to use MiraLAX to try to help the PillCam come out and to come back tomorrow for another x-ray to see if it had moved, however tonight her abdominal pain worsened and she decided to come in for further evaluation.  She reports her pain is in her right mid abdomen it is severe and radiates to her back. Christina Lucero has had nausea and vomiting today. Sandeep Branham denies any fevers or chills.  She denies any dysuria, hematuria, urinary frequency. Past Medical History:   Diagnosis Date    Abnormal Pap smear     Biopsy done last year and came back ok    Asthma     flares with bronchitis has been over a year as stated 10/5/2018    Autoimmune disease ([facility] [address]     Crohns    Crohn's disease ([address] 2019    Difficult intravenous access     GERD (gastroesophageal reflux disease)     IBS (irritable bowel syndrome)     Kidney stones     Nausea & vomiting     PONV-nausea    Psychiatric disorder     anxiety & depression    PUD (peptic ulcer disease)       Past Surgical History:   Procedure Laterality Date    COLONOSCOPY N/A 2019    COLONOSCOPY performed by Manish Vick MD at [facility] ENDOSCOPY    HX APPENDECTOMY      7th grade.     HX COLONOSCOPY      HX ENDOSCOPY      HX GYN          HX GYN  2004        HX GYN      hysterectomy    HX HEENT      dental surgery    HX OTHER SURGICAL  09/10/2019    s/p Diagnostic laparoscopy with lysis of adhesions, Robitc Assist,Open small bowel resection (with removal of PillCam) Social History     Tobacco Use    Smoking status: Former Smoker     Packs/day: 0.25     Quit date: 9/6/2017     Years since quitting: 3.3    Smokeless tobacco: Never Used   Substance Use Topics    Alcohol use: Yes     Comment: occassionally      Family History   Problem Relation Age of Onset    Heart Disease Mother     Diabetes Mother     Cancer Maternal Aunt         colon cancer      Allergies   Allergen Reactions    Avelox [Moxifloxacin] Swelling and Unknown (comments)     But pt tolerated cipro    Macrolide Antibiotics Hives, Rash and Swelling     Per patient reported as a previous reaction to a \"mycin\" drug. Did not know which drug.  Nsaids (Non-Steroidal Anti-Inflammatory Drug) Other (comments)     D/t esophageal erosion and GERD      Prior to Admission medications    Medication Sig Start Date End Date Taking? Authorizing Provider   adalimumab (Humira Pen) 40 mg/0.8 mL injection pen 40 mg by SubCUTAneous route See Admin Instructions. Every other week   Indications: Crohn's disease   Yes Other, MD Serina   ondansetron (Zofran ODT) 4 mg disintegrating tablet Take 1 Tab by mouth every eight (8) hours as needed for Nausea. 4/10/20  Yes Julianna Alford MD   acetaminophen (TYLENOL) 500 mg tablet Take 1,000 mg by mouth every six (6) hours as needed for Pain. Yes Provider, Historical   promethazine (PHENERGAN) 25 mg suppository Insert 25 mg into rectum every six (6) hours as needed for Nausea.    Yes Provider, Historical       Patient Active Problem List   Diagnosis Code    Normal pregnancy Z34.90    SROM (spontaneous rupture of membranes)     Arrested active phase of labor O62.1    S/P hysterectomy Z90.710    Ovarian cyst rupture N83.209    Nausea with vomiting R11.2    Abdominal pain R10.9    Crohn disease ([address] K50.90    Bowel obstruction ([address] K56.609    Crohn's disease ([address] K50.90    Nausea and vomiting R11.2    Sepsis ([address] A41.9    Hypokalemia E87.6    Nausea & vomiting R11.2    Foreign body in small intestine T18. 3XXA       REVIEW OF SYSTEMS:    Constitutional: negative fever, negative chills, negative weight loss  Eyes:   negative visual changes  ENT:   negative sore throat, tongue or lip swelling   Respiratory:  negative cough, negative dyspnea  Cards:  negative for chest pain, palpitations, lower extremity edema  GI:   See HPI  :  negative for frequency, dysuria  Integument:  negative for rash and pruritus  Heme:  negative for easy bruising and gum/nose bleeding  Musculoskel: negative for myalgias,  back pain and muscle weakness  Neuro: negative for headaches, dizziness, vertigo  Psych: negative for feelings of anxiety, depression     Objective:   VITALS:    Visit Vitals  BP (!) 144/91   Pulse 87   Temp 98.2 °F (36.8 °C)   Resp 20   Ht 5' 2\" (1.575 m)   Wt 90 kg (198 lb 6.6 oz)   SpO2 96%   BMI 36.29 kg/m²       PHYSICAL EXAM:   General:         Pleasant  female. NAD  Head:               Normocephalic, without obvious abnormality, atraumatic. Eyes:               Conjunctivae clear and pale, anicteric sclerae. Pupils are equal  Nose:               Nares normal. No drainage or sinus tenderness. Throat:             Lips, mucosa, and tongue normal.  No Thrush  Neck:               Supple, symmetrical,  no adenopathy, thyroid: non tender  Back:               Symmetric,  No CVA tenderness. Lungs:             CTA bilaterally. No wheezing/rhonchi/rales. Chest wall:      No tenderness or deformity. No Accessory muscle use. Heart:              Regular rate and rhythm,  no murmur, rub or gallop. Abdomen:        Soft, non-tender. Not distended. Bowel sounds normal. No masses  Extremities:     Atraumatic, No cyanosis. No edema. No clubbing  Skin:                Texture, turgor normal. No rashes/lesions/jaundice  Psych:             Good insight. Not depressed. Not anxious or agitated. Neurologic:      EOMs intact. No facial asymmetry. No aphasia or slurred speech.  Normal strength, A/O X 3. LAB DATA REVIEWED:    Recent Results (from the past 24 hour(s))   CBC W/O DIFF    Collection Time: 12/29/20  9:21 PM   Result Value Ref Range    WBC 9.4 3.6 - 11.0 K/uL    RBC 5.23 (H) 3.80 - 5.20 M/uL    HGB 14.6 11.5 - 16.0 g/dL    HCT 45.0 35.0 - 47.0 %    MCV 86.0 80.0 - 99.0 FL    MCH 27.9 26.0 - 34.0 PG    MCHC 32.4 30.0 - 36.5 g/dL    RDW 12.8 11.5 - 14.5 %    PLATELET 658 584 - 125 K/uL    MPV 10.0 8.9 - 12.9 FL    NRBC 0.0 0  WBC    ABSOLUTE NRBC 0.00 0.00 - 7.87 K/uL   METABOLIC PANEL, COMPREHENSIVE    Collection Time: 12/29/20  9:21 PM   Result Value Ref Range    Sodium 137 136 - 145 mmol/L    Potassium 3.4 (L) 3.5 - 5.1 mmol/L    Chloride 106 97 - 108 mmol/L    CO2 26 21 - 32 mmol/L    Anion gap 5 5 - 15 mmol/L    Glucose 80 65 - 100 mg/dL    BUN 8 6 - 20 MG/DL    Creatinine 0.88 0.55 - 1.02 MG/DL    BUN/Creatinine ratio 9 (L) 12 - 20      GFR est AA >60 >60 ml/min/1.73m2    GFR est non-AA >60 >60 ml/min/1.73m2    Calcium 9.0 8.5 - 10.1 MG/DL    Bilirubin, total 0.4 0.2 - 1.0 MG/DL    ALT (SGPT) 32 12 - 78 U/L    AST (SGOT) 20 15 - 37 U/L    Alk.  phosphatase 92 45 - 117 U/L    Protein, total 7.9 6.4 - 8.2 g/dL    Albumin 3.6 3.5 - 5.0 g/dL    Globulin 4.3 (H) 2.0 - 4.0 g/dL    A-G Ratio 0.8 (L) 1.1 - 2.2     LIPASE    Collection Time: 12/29/20  9:21 PM   Result Value Ref Range    Lipase 85 73 - 393 U/L   POC LACTIC ACID    Collection Time: 12/30/20  1:20 AM   Result Value Ref Range    Lactic Acid (POC) 1.96 0.40 - 2.00 mmol/L   URINALYSIS W/ REFLEX CULTURE    Collection Time: 12/30/20  3:22 AM    Specimen: Urine   Result Value Ref Range    Color YELLOW/STRAW      Appearance CLEAR CLEAR      Specific gravity >1.030 (H) 1.003 - 1.030    pH (UA) 5.5 5.0 - 8.0      Protein 30 (A) NEG mg/dL    Glucose Negative NEG mg/dL    Ketone Negative NEG mg/dL    Bilirubin Negative NEG      Blood TRACE (A) NEG      Urobilinogen 0.2 0.2 - 1.0 EU/dL    Nitrites Negative NEG Leukocyte Esterase Negative NEG      UA:UC IF INDICATED CULTURE NOT INDICATED BY UA RESULT CNI      WBC 0-4 0 - 4 /hpf    RBC 0-5 0 - 5 /hpf    Epithelial cells FEW FEW /lpf    Bacteria Negative NEG /hpf    Hyaline cast 0-2 0 - 5 /lpf   PHOSPHORUS    Collection Time: 12/30/20  9:15 AM   Result Value Ref Range    Phosphorus 3.1 2.6 - 4.7 MG/DL   MAGNESIUM    Collection Time: 12/30/20  9:15 AM   Result Value Ref Range    Magnesium 2.2 1.6 - 2.4 mg/dL   METABOLIC PANEL, BASIC    Collection Time: 12/30/20  9:15 AM   Result Value Ref Range    Sodium 140 136 - 145 mmol/L    Potassium 3.7 3.5 - 5.1 mmol/L    Chloride 110 (H) 97 - 108 mmol/L    CO2 26 21 - 32 mmol/L    Anion gap 4 (L) 5 - 15 mmol/L    Glucose 69 65 - 100 mg/dL    BUN 7 6 - 20 MG/DL    Creatinine 0.83 0.55 - 1.02 MG/DL    BUN/Creatinine ratio 8 (L) 12 - 20      GFR est AA >60 >60 ml/min/1.73m2    GFR est non-AA >60 >60 ml/min/1.73m2    Calcium 9.0 8.5 - 10.1 MG/DL       IMAGING RESULTS:  I have personally reviewed the imaging reports      Total time spent with patient: 50 minutes ________________________________________________________________________  Care Plan discussed with:  Patient x   Family     RN x              Consultant:       CT  12/30/2020:  ________________________________________________________________________    ___________________________________________________  Consulting Provider: Rajat Jurado NP      12/30/2020  11:03 AM

## 2020-12-30 NOTE — ED PROVIDER NOTES
EMERGENCY DEPARTMENT HISTORY AND PHYSICAL EXAM      Date: 12/29/2020  Patient Name: Bala Barrios    History of Presenting Illness     Chief Complaint   Patient presents with    Abdominal Pain     Pt with CC of abd pain, dx with chrohns in august. history of bowel perforation in january. stopped new medication in october due to side effects. Had bowel resection sept 2019. Had Pill study last week and had x ray today and \" it is lodged, now throwing up and having extreme pain\". History Provided By: Patient    HPI: Bala Barrios, 28 y.o. female with PMHx significant for endometriosis, Crohn's disease, status post appendectomy and bowel resection presents to the ED with chief complaint of abdominal pain. Patient has had a complicated course as far as her Crohn's disease. Symptoms started in the summer 2019. She started having abdominal pain and she had a PillCam study that then got stuck and required surgical removal with a bowel resection when a stricture was found. She then was admitted in January 2020 with a microperforation. She was started on Humira at that time which she continued until April when she was switched to azathioprine. Throughout the summer she had some side effects that she thought was related to the azathioprine, but was doing better as far as her abdominal symptoms. In October, with the approval of her GI specialist, she stopped all Crohn's medications due to potential side effects and the fact that she was doing better from her abdominal pain standpoint. In November, she started having abdominal pain again and had an appointment last week with her GI doctor who suggested that she have another PillCam study. She swallowed the PillCam last Wednesday and then started having abdominal pain 3 days later. The PillCam has not come out yet. She had an x-ray yesterday which showed the PillCam stuck in her ileum.   She was instructed by her GI doctor to use MiraLAX to try to help the PillCam come out and to come back tomorrow for another x-ray to see if it had moved, however tonight her abdominal pain worsened and she decided to come in for further evaluation. She reports her pain is in her right mid abdomen it is severe and radiates to her back. She has had nausea and vomiting today. .  She denies any fevers or chills. She denies any dysuria, hematuria, urinary frequency. She is followed by Jason Becerra from GI. PCP: Debbie Rincon MD    No current facility-administered medications on file prior to encounter. Current Outpatient Medications on File Prior to Encounter   Medication Sig Dispense Refill    ondansetron (Zofran ODT) 4 mg disintegrating tablet Take 1 Tab by mouth every eight (8) hours as needed for Nausea. 10 Tab 0    acetaminophen (TYLENOL) 500 mg tablet Take 1,000 mg by mouth every six (6) hours as needed for Pain.  promethazine (PHENERGAN) 25 mg suppository Insert 25 mg into rectum every six (6) hours as needed for Nausea.  docusate sodium (COLACE) 100 mg capsule Take 100 mg by mouth daily.  simethicone (MYLICON) 80 mg chewable tablet Take 80 mg by mouth every six (6) hours as needed for Flatulence.  dicyclomine (BENTYL) 20 mg tablet Take 1 Tab by mouth every six (6) hours as needed (abdominal cramps) for up to 20 doses. Hold until directed to resume by Dr. Tashi Christy. 20 Tab 0    multivitamin (ONE A DAY) tablet Take 1 Tab by mouth daily.          Past History     Past Medical History:  Past Medical History:   Diagnosis Date    Abnormal Pap smear     Biopsy done last year and came back ok    Asthma     flares with bronchitis has been over a year as stated 10/5/2018    Autoimmune disease ([facility] [address]     Crohns    Crohn's disease ([facility] [address] 08/2019    Difficult intravenous access     GERD (gastroesophageal reflux disease)     IBS (irritable bowel syndrome)     Kidney stones     Nausea & vomiting     PONV-nausea    Psychiatric disorder     anxiety & depression    PUD (peptic ulcer disease)        Past Surgical History:  Past Surgical History:   Procedure Laterality Date    COLONOSCOPY N/A 2019    COLONOSCOPY performed by Carisa Chauhan MD at [facility] ENDOSCOPY    HX APPENDECTOMY      7th grade.  HX COLONOSCOPY      HX ENDOSCOPY      HX GYN          HX GYN  2004        HX GYN      hysterectomy    HX HEENT      dental surgery    HX OTHER SURGICAL  09/10/2019    s/p Diagnostic laparoscopy with lysis of adhesions, Robitc Assist,Open small bowel resection (with removal of PillCam)        Family History:  Family History   Problem Relation Age of Onset    Heart Disease Mother     Diabetes Mother     Cancer Maternal Aunt         colon cancer       Social History:  Social History     Tobacco Use    Smoking status: Former Smoker     Packs/day: 0.25     Quit date: 2017     Years since quitting: 3.3    Smokeless tobacco: Never Used   Substance Use Topics    Alcohol use: Yes     Comment: occassionally    Drug use: No       Allergies: Allergies   Allergen Reactions    Avelox [Moxifloxacin] Swelling and Unknown (comments)     But pt tolerated cipro    Macrolide Antibiotics Hives, Rash and Swelling     Per patient reported as a previous reaction to a \"mycin\" drug. Did not know which drug.  Nsaids (Non-Steroidal Anti-Inflammatory Drug) Other (comments)     D/t esophageal erosion and GERD         Review of Systems   Review of Systems   Constitutional: Negative for chills and fever. HENT: Negative for congestion, rhinorrhea and sore throat. Respiratory: Negative for cough, chest tightness, shortness of breath and wheezing. Cardiovascular: Negative for chest pain and palpitations. Gastrointestinal: Positive for abdominal pain, nausea and vomiting. Negative for blood in stool, constipation and diarrhea. Genitourinary: Negative for dysuria, flank pain and hematuria. Musculoskeletal: Negative for back pain and myalgias. Skin: Negative for rash and wound. Neurological: Negative for dizziness, syncope, light-headedness and headaches. Psychiatric/Behavioral: Negative for confusion. The patient is not nervous/anxious. All other systems reviewed and are negative.         Physical Exam   General appearance -overweight well appearing, and in no distress  Eyes - pupils equal and reactive, extraocular eye movements intact  ENT - mucous membranes moist, pharynx normal without lesions  Neck - supple, no significant adenopathy; non-tender to palpation  Chest - clear to auscultation, no wheezes, rales or rhonchi; non-tender to palpation  Heart -tachycardic regular rhythm, S1 and S2 normal, no murmurs noted  Abdomen - soft, tender to palpation right mid abdomen, no rebound or guarding, nondistended, no masses or organomegaly  Musculoskeletal - no joint tenderness, deformity or swelling; normal ROM  Extremities - peripheral pulses normal, no pedal edema  Skin - normal coloration and turgor, no rashes  Neurological - alert, oriented x3, normal speech, no focal findings or movement disorder noted    Diagnostic Study Results     Labs -     Recent Results (from the past 12 hour(s))   CBC W/O DIFF    Collection Time: 12/29/20  9:21 PM   Result Value Ref Range    WBC 9.4 3.6 - 11.0 K/uL    RBC 5.23 (H) 3.80 - 5.20 M/uL    HGB 14.6 11.5 - 16.0 g/dL    HCT 45.0 35.0 - 47.0 %    MCV 86.0 80.0 - 99.0 FL    MCH 27.9 26.0 - 34.0 PG    MCHC 32.4 30.0 - 36.5 g/dL    RDW 12.8 11.5 - 14.5 %    PLATELET 158 817 - 916 K/uL    MPV 10.0 8.9 - 12.9 FL    NRBC 0.0 0  WBC    ABSOLUTE NRBC 0.00 0.00 - 6.31 K/uL   METABOLIC PANEL, COMPREHENSIVE    Collection Time: 12/29/20  9:21 PM   Result Value Ref Range    Sodium 137 136 - 145 mmol/L    Potassium 3.4 (L) 3.5 - 5.1 mmol/L    Chloride 106 97 - 108 mmol/L    CO2 26 21 - 32 mmol/L    Anion gap 5 5 - 15 mmol/L    Glucose 80 65 - 100 mg/dL    BUN 8 6 - 20 MG/DL    Creatinine 0.88 0.55 - 1.02 MG/DL BUN/Creatinine ratio 9 (L) 12 - 20      GFR est AA >60 >60 ml/min/1.73m2    GFR est non-AA >60 >60 ml/min/1.73m2    Calcium 9.0 8.5 - 10.1 MG/DL    Bilirubin, total 0.4 0.2 - 1.0 MG/DL    ALT (SGPT) 32 12 - 78 U/L    AST (SGOT) 20 15 - 37 U/L    Alk. phosphatase 92 45 - 117 U/L    Protein, total 7.9 6.4 - 8.2 g/dL    Albumin 3.6 3.5 - 5.0 g/dL    Globulin 4.3 (H) 2.0 - 4.0 g/dL    A-G Ratio 0.8 (L) 1.1 - 2.2     LIPASE    Collection Time: 12/29/20  9:21 PM   Result Value Ref Range    Lipase 85 73 - 393 U/L       Radiologic Studies -   CT ABD PELV W CONT    (Results Pending)     CT Results  (Last 48 hours)    None        CXR Results  (Last 48 hours)    None            Medical Decision Making   I am the first provider for this patient. I reviewed the vital signs, available nursing notes, past medical history, past surgical history, family history and social history. Vital Signs-Reviewed the patient's vital signs. Patient Vitals for the past 12 hrs:   Temp Pulse Resp BP SpO2   12/29/20 2148     100 %   12/29/20 2055 98 °F (36.7 °C) (!) 115 20 (!) 156/115 100 %           Records Reviewed: Nursing Notes and Old Medical Records    Provider Notes (Medical Decision Making):   Differential l diagnosis: Gastritis, pancreatitis, cholecystitis, Crohn's flare, bowel obstruction, UTI, kidney stone  We will check CBC, CMP, lactate, lipase, UA, and abdominal pelvis CT. Will treat with IV fluids analgesics and antiemetics. ED Course:   Initial assessment performed. The patients presenting problems have been discussed, and they are in agreement with the care plan formulated and outlined with them. I have encouraged them to ask questions as they arise throughout their visit. Progress Notes:  ED Course as of Pop 03 1514   Wed Dec 30, 2020   0413 No more vomiting since last dose of Zofran, but patient is still having significant right lower quadrant abdominal pain.   Will discuss with GI.    [AO]   3425 Case discussed with Dr. Fitz Cook (GI) who recommends that patient be admitted to the hospitalist and Dr. Jonathon Olmos will see in the morning.    [AO]   4759 Case discussed with Dr. Amber Michael (hospitalist) who will see and admit the patient.    [AO]      ED Course User Index  [AO] Ruth Ann Quijano MD       Disposition:  Admit to hospitalist        Diagnosis     Clinical Impression:   1.  Abdominal pain, right lower quadrant

## 2020-12-30 NOTE — PROGRESS NOTES
.seen and examined  Admitted earlier this morning  Continues with on/off abdominal crmaping pain. Recent pill endoscopy swallowed and no passage  Had history of same previously requiring surgical removal  Crohn's not on treatment. Kept NPO. Still awaiting GI eval. Might need surgical eval as well    Not in acute distress.  Hemodynamics stable  Chest clear  Abdomen exam benign    Agree with admitting physician plan    No billable rounding

## 2020-12-30 NOTE — H&P
Hospitalist Admission Note    NAME: Joseph Parsons   :  1985   MRN:  [id number]     Date/Time:  2020 4:41 AM    Patient PCP: Pola Bueno MD  _____________________________________________________________________  Given the patient's current clinical presentation, I have a high level of concern for decompensation if discharged from the emergency department. Complex decision making was performed, which includes reviewing the patient's available past medical records, laboratory results, and x-ray films. My assessment of this patient's clinical condition and my plan of care is as follows. Assessment / Plan:    Abd. Pain/N/V  2/2 below POA  Pillcam is stuck within distal ileum POA  Crohn's disease  CT abd  1. No evidence of acute process in the abdomen or pelvis. 2. PillCam lies within the distal ileum in the right lower quadrant. 3. Hepatic steatosis. Xray abd KUB  MPRESSION: Nonspecific intestinal gas pattern. PillCam overlies the right lower  quadrant and likely lies within the distal ileum or cecum    Admit to Hospitalist  -NPO  -GI Consulted by ed who rec. Keep npo and they will see the pt. In am and will decide wethere she will need surgry consult or not  -IVF   -Hold all po Meds    Other PMhX  Hx of Crohn's disease with stricturing/inflammation and fistula/microperforation  In the past. F/u  GI Leon Soulier, MD   Anxiety and depression  GERD  IBS  Asthma, not currently in exacerbation  Holding PO meds 2/2 npo status      Code Status: Full code  Surrogate Decision Maker: Lucia Ventura [phone]         DVT Prophylaxis: no ac  For possible procedure in am,scd  GI Prophylaxis: not indicated    Baseline: independent        Subjective:   CHIEF COMPLAINT:  Abdominal Pain/Nausea.  Had Pill study last week and The PillCam has not come out yet and it is stuck    HISTORY OF PRESENT ILLNESS:     Joseph Parsons, 28 y.o. female with PMHx significant for endometriosis, Crohn's disease, status post appendectomy and bowel resection presents to the ED with chief complaint of abdominal pain.  Patient has had a complicated course as far as her Crohn's disease.  Symptoms started in the summer 2019.  She started having abdominal pain and she had a PillCam study that then got stuck and required surgical removal with a bowel resection when a stricture was found.  She then was admitted in January 2020 with a microperforation.  She was started on Humira at that time which she continued until April when she was switched to azathioprine.  Throughout the summer she had some side effects that she thought was related to the azathioprine, but was doing better as far as her abdominal symptoms.  In October, with the approval of her GI specialist, she stopped all Crohn's medications due to potential side effects and the fact that she was doing better from her abdominal pain standpoint.  In November, she started having abdominal pain again and had an appointment last week with her GI doctor who suggested that she have another PillCam study.  She swallowed the PillCam last Wednesday and then started having abdominal pain 3 days later.  The PillCam has not come out yet.  She had an x-ray yesterday which showed the PillCam stuck in her ileum.  She was instructed by her GI doctor to use MiraLAX to try to help the PillCam come out and to come back tomorrow for another x-ray to see if it had moved, however tonight her abdominal pain worsened and she decided to come in for further evaluation.  She reports her pain is in her right mid abdomen it is severe and radiates to her back.  She has had nausea and vomiting today..  She denies any fevers or chills.  She denies any dysuria, hematuria, urinary frequency.  She is followed by Rony Gilmore from GI.  PCP: Jefe Thomason Jr., MD     No current facility-administered medications on file prior to encounter.         We were asked to admit for  work up and evaluation of the above problems. Vital Signs-Reviewed the patient's vital signs. Patient Vitals for the past 12 hrs:    Temp Pulse Resp BP SpO2   20 2148     100 %   20 2055 98 °F (36.7 °C) (!) 115 20 (!) 156/115 100 %         Past Medical History:   Diagnosis Date    Abnormal Pap smear     Biopsy done last year and came back ok    Asthma     flares with bronchitis has been over a year as stated 10/5/2018    Autoimmune disease ([facility] [address]     Crohns    Crohn's disease ([facility] [address] 2019    Difficult intravenous access     GERD (gastroesophageal reflux disease)     IBS (irritable bowel syndrome)     Kidney stones     Nausea & vomiting     PONV-nausea    Psychiatric disorder     anxiety & depression    PUD (peptic ulcer disease)         Past Surgical History:   Procedure Laterality Date    COLONOSCOPY N/A 2019    COLONOSCOPY performed by Ventura Callahan MD at [facility] ENDOSCOPY    HX APPENDECTOMY      7th grade.  HX COLONOSCOPY      HX ENDOSCOPY      HX GYN          HX GYN  2004        HX GYN      hysterectomy    HX HEENT      dental surgery    HX OTHER SURGICAL  09/10/2019    s/p Diagnostic laparoscopy with lysis of adhesions, Robitc Assist,Open small bowel resection (with removal of PillCam)        Social History     Tobacco Use    Smoking status: Former Smoker     Packs/day: 0.25     Quit date: 2017     Years since quitting: 3.3    Smokeless tobacco: Never Used   Substance Use Topics    Alcohol use: Yes     Comment: occassionally        Family History   Problem Relation Age of Onset    Heart Disease Mother     Diabetes Mother     Cancer Maternal Aunt         colon cancer     Allergies   Allergen Reactions    Avelox [Moxifloxacin] Swelling and Unknown (comments)     But pt tolerated cipro    Macrolide Antibiotics Hives, Rash and Swelling     Per patient reported as a previous reaction to a \"mycin\" drug. Did not know which drug.     Nsaids (Non-Steroidal Anti-Inflammatory Drug) Other (comments)     D/t esophageal erosion and GERD        Prior to Admission medications    Medication Sig Start Date End Date Taking? Authorizing Provider   adalimumab (Humira Pen) 40 mg/0.8 mL injection pen 40 mg by SubCUTAneous route See Admin Instructions. Every other week   Indications: Crohn's disease   Yes Lissett, MD Serina   ondansetron (Zofran ODT) 4 mg disintegrating tablet Take 1 Tab by mouth every eight (8) hours as needed for Nausea. 4/10/20  Yes Heather Buckley MD   acetaminophen (TYLENOL) 500 mg tablet Take 1,000 mg by mouth every six (6) hours as needed for Pain. Yes Provider, Historical   promethazine (PHENERGAN) 25 mg suppository Insert 25 mg into rectum every six (6) hours as needed for Nausea. Yes Provider, Historical       REVIEW OF SYSTEMS:     I am not able to complete the review of systems because: The patient is intubated and sedated    The patient has altered mental status due to his acute medical problems    The patient has baseline aphasia from prior stroke(s)    The patient has baseline dementia and is not reliable historian    The patient is in acute medical distress and unable to provide information         Constitutional: Negative for chills and fever. HENT: Negative for congestion, rhinorrhea and sore throat. Respiratory: Negative for cough, chest tightness, shortness of breath and wheezing. Cardiovascular: Negative for chest pain and palpitations. Gastrointestinal: Positive for abdominal pain, nausea and vomiting. Negative for blood in stool, constipation and diarrhea. Genitourinary: Negative for dysuria, flank pain and hematuria. Musculoskeletal: Negative for back pain and myalgias. Skin: Negative for rash and wound. Neurological: Negative for dizziness, syncope, light-headedness and headaches. Psychiatric/Behavioral: Negative for confusion. The patient is not nervous/anxious.     All other systems reviewed and are negative. Objective:   VITALS:    Visit Vitals  BP (!) 156/115 (BP 1 Location: Left arm, BP Patient Position: At rest)   Pulse (!) 115   Temp 98 °F (36.7 °C)   Resp 20   Ht 5' 2\" (1.575 m)   Wt 90 kg (198 lb 6.6 oz)   SpO2 100%   BMI 36.29 kg/m²       PHYSICAL EXAM:  General appearance -overweight well appearing, and in no distress  Eyes - pupils equal and reactive, extraocular eye movements intact  ENT - mucous membranes moist, pharynx normal without lesions  Neck - supple, no significant adenopathy; non-tender to palpation  Chest - clear to auscultation, no wheezes, rales or rhonchi; non-tender to palpation  Heart -tachycardic regular rhythm, S1 and S2 normal, no murmurs noted  Abdomen - soft, tender to palpation right mid abdomen, no rebound or guarding, nondistended, no masses or organomegaly  Musculoskeletal - no joint tenderness, deformity or swelling; normal ROM  Extremities - peripheral pulses normal, no pedal edema  Skin - normal coloration and turgor, no rashes  Neurological - alert, oriented x3, normal speech, no focal findings or movement disorder noted      _______________________________________________________________________  Care Plan discussed with:    Comments   Patient y    Family      RN y    Care Manager                    Consultant:  tommie Barrett md   _______________________________________________________________________  Expected  Disposition:   Home with Family y   HH/PT/OT/RN    SNF/LTC    CALE    ________________________________________________________________________  TOTAL TIME:   72  Minutes    Critical Care Provided     Minutes non procedure based      Comments    y Reviewed previous records   >50% of visit spent in counseling and coordination of care y Discussion with patient and/or family and questions answered       Given the patient's current clinical presentation, I have a high level of concern for decompensation if discharged from the ED.  Complex decision making was performed which includes reviewing the patient's available past medical records, laboratory results, and Xray films. I have also directly communicated my plan and discussed this case with the involved ED physician.     ____________________________________________________________________  Will MD Toshia    Procedures: see electronic medical records for all procedures/Xrays and details which were not copied into this note but were reviewed prior to creation of Plan. LAB DATA REVIEWED:    Recent Results (from the past 24 hour(s))   CBC W/O DIFF    Collection Time: 12/29/20  9:21 PM   Result Value Ref Range    WBC 9.4 3.6 - 11.0 K/uL    RBC 5.23 (H) 3.80 - 5.20 M/uL    HGB 14.6 11.5 - 16.0 g/dL    HCT 45.0 35.0 - 47.0 %    MCV 86.0 80.0 - 99.0 FL    MCH 27.9 26.0 - 34.0 PG    MCHC 32.4 30.0 - 36.5 g/dL    RDW 12.8 11.5 - 14.5 %    PLATELET 136 218 - 502 K/uL    MPV 10.0 8.9 - 12.9 FL    NRBC 0.0 0  WBC    ABSOLUTE NRBC 0.00 0.00 - 7.32 K/uL   METABOLIC PANEL, COMPREHENSIVE    Collection Time: 12/29/20  9:21 PM   Result Value Ref Range    Sodium 137 136 - 145 mmol/L    Potassium 3.4 (L) 3.5 - 5.1 mmol/L    Chloride 106 97 - 108 mmol/L    CO2 26 21 - 32 mmol/L    Anion gap 5 5 - 15 mmol/L    Glucose 80 65 - 100 mg/dL    BUN 8 6 - 20 MG/DL    Creatinine 0.88 0.55 - 1.02 MG/DL    BUN/Creatinine ratio 9 (L) 12 - 20      GFR est AA >60 >60 ml/min/1.73m2    GFR est non-AA >60 >60 ml/min/1.73m2    Calcium 9.0 8.5 - 10.1 MG/DL    Bilirubin, total 0.4 0.2 - 1.0 MG/DL    ALT (SGPT) 32 12 - 78 U/L    AST (SGOT) 20 15 - 37 U/L    Alk.  phosphatase 92 45 - 117 U/L    Protein, total 7.9 6.4 - 8.2 g/dL    Albumin 3.6 3.5 - 5.0 g/dL    Globulin 4.3 (H) 2.0 - 4.0 g/dL    A-G Ratio 0.8 (L) 1.1 - 2.2     LIPASE    Collection Time: 12/29/20  9:21 PM   Result Value Ref Range    Lipase 85 73 - 393 U/L   POC LACTIC ACID    Collection Time: 12/30/20  1:20 AM   Result Value Ref Range    Lactic Acid (POC) 1.96 0.40 - 2.00 mmol/L URINALYSIS W/ REFLEX CULTURE    Collection Time: 12/30/20  3:22 AM    Specimen: Urine   Result Value Ref Range    Color YELLOW/STRAW      Appearance CLEAR CLEAR      Specific gravity >1.030 (H) 1.003 - 1.030    pH (UA) 5.5 5.0 - 8.0      Protein 30 (A) NEG mg/dL    Glucose Negative NEG mg/dL    Ketone Negative NEG mg/dL    Bilirubin Negative NEG      Blood TRACE (A) NEG      Urobilinogen 0.2 0.2 - 1.0 EU/dL    Nitrites Negative NEG      Leukocyte Esterase Negative NEG      UA:UC IF INDICATED CULTURE NOT INDICATED BY UA RESULT CNI      WBC 0-4 0 - 4 /hpf    RBC 0-5 0 - 5 /hpf    Epithelial cells FEW FEW /lpf    Bacteria Negative NEG /hpf    Hyaline cast 0-2 0 - 5 /lpf

## 2020-12-31 ENCOUNTER — APPOINTMENT (OUTPATIENT)
Dept: GENERAL RADIOLOGY | Age: 35
DRG: 329 | End: 2020-12-31
Attending: NURSE PRACTITIONER
Payer: COMMERCIAL

## 2020-12-31 LAB
ALBUMIN SERPL-MCNC: 2.8 G/DL (ref 3.5–5)
ALBUMIN/GLOB SERPL: 0.7 {RATIO} (ref 1.1–2.2)
ALP SERPL-CCNC: 80 U/L (ref 45–117)
ALT SERPL-CCNC: 25 U/L (ref 12–78)
ANION GAP SERPL CALC-SCNC: 7 MMOL/L (ref 5–15)
AST SERPL-CCNC: 17 U/L (ref 15–37)
BASOPHILS # BLD: 0 K/UL (ref 0–0.1)
BASOPHILS NFR BLD: 0 % (ref 0–1)
BILIRUB SERPL-MCNC: 0.6 MG/DL (ref 0.2–1)
BUN SERPL-MCNC: 7 MG/DL (ref 6–20)
BUN/CREAT SERPL: 11 (ref 12–20)
CALCIUM SERPL-MCNC: 8.7 MG/DL (ref 8.5–10.1)
CHLORIDE SERPL-SCNC: 110 MMOL/L (ref 97–108)
CO2 SERPL-SCNC: 21 MMOL/L (ref 21–32)
CREAT SERPL-MCNC: 0.63 MG/DL (ref 0.55–1.02)
DIFFERENTIAL METHOD BLD: ABNORMAL
EOSINOPHIL # BLD: 0 K/UL (ref 0–0.4)
EOSINOPHIL NFR BLD: 0 % (ref 0–7)
ERYTHROCYTE [DISTWIDTH] IN BLOOD BY AUTOMATED COUNT: 12.7 % (ref 11.5–14.5)
GLOBULIN SER CALC-MCNC: 3.8 G/DL (ref 2–4)
GLUCOSE SERPL-MCNC: 86 MG/DL (ref 65–100)
HCT VFR BLD AUTO: 41.2 % (ref 35–47)
HGB BLD-MCNC: 13 G/DL (ref 11.5–16)
IMM GRANULOCYTES # BLD AUTO: 0 K/UL (ref 0–0.04)
IMM GRANULOCYTES NFR BLD AUTO: 0 % (ref 0–0.5)
LYMPHOCYTES # BLD: 2 K/UL (ref 0.8–3.5)
LYMPHOCYTES NFR BLD: 27 % (ref 12–49)
MCH RBC QN AUTO: 27.3 PG (ref 26–34)
MCHC RBC AUTO-ENTMCNC: 31.6 G/DL (ref 30–36.5)
MCV RBC AUTO: 86.4 FL (ref 80–99)
MONOCYTES # BLD: 0.3 K/UL (ref 0–1)
MONOCYTES NFR BLD: 4 % (ref 5–13)
NEUTS SEG # BLD: 5.1 K/UL (ref 1.8–8)
NEUTS SEG NFR BLD: 69 % (ref 32–75)
NRBC # BLD: 0 K/UL (ref 0–0.01)
NRBC BLD-RTO: 0 PER 100 WBC
PLATELET # BLD AUTO: 332 K/UL (ref 150–400)
PMV BLD AUTO: 9.8 FL (ref 8.9–12.9)
POTASSIUM SERPL-SCNC: 4 MMOL/L (ref 3.5–5.1)
PROT SERPL-MCNC: 6.6 G/DL (ref 6.4–8.2)
RBC # BLD AUTO: 4.77 M/UL (ref 3.8–5.2)
SODIUM SERPL-SCNC: 138 MMOL/L (ref 136–145)
WBC # BLD AUTO: 7.4 K/UL (ref 3.6–11)

## 2020-12-31 PROCEDURE — 65270000029 HC RM PRIVATE

## 2020-12-31 PROCEDURE — 99232 SBSQ HOSP IP/OBS MODERATE 35: CPT | Performed by: SURGERY

## 2020-12-31 PROCEDURE — 74011250637 HC RX REV CODE- 250/637: Performed by: HOSPITALIST

## 2020-12-31 PROCEDURE — 74018 RADEX ABDOMEN 1 VIEW: CPT

## 2020-12-31 PROCEDURE — 74011250636 HC RX REV CODE- 250/636: Performed by: NURSE PRACTITIONER

## 2020-12-31 PROCEDURE — 74011250636 HC RX REV CODE- 250/636: Performed by: HOSPITALIST

## 2020-12-31 PROCEDURE — 85025 COMPLETE CBC W/AUTO DIFF WBC: CPT

## 2020-12-31 PROCEDURE — 74011250637 HC RX REV CODE- 250/637: Performed by: NURSE PRACTITIONER

## 2020-12-31 PROCEDURE — 80053 COMPREHEN METABOLIC PANEL: CPT

## 2020-12-31 PROCEDURE — 36415 COLL VENOUS BLD VENIPUNCTURE: CPT

## 2020-12-31 RX ORDER — LANOLIN ALCOHOL/MO/W.PET/CERES
6 CREAM (GRAM) TOPICAL
Status: DISCONTINUED | OUTPATIENT
Start: 2020-12-31 | End: 2021-01-10 | Stop reason: HOSPADM

## 2020-12-31 RX ADMIN — MORPHINE SULFATE 1 MG: 2 INJECTION, SOLUTION INTRAMUSCULAR; INTRAVENOUS at 20:02

## 2020-12-31 RX ADMIN — METHYLPREDNISOLONE SODIUM SUCCINATE 40 MG: 40 INJECTION, POWDER, FOR SOLUTION INTRAMUSCULAR; INTRAVENOUS at 20:05

## 2020-12-31 RX ADMIN — ACETAMINOPHEN 650 MG: 325 TABLET ORAL at 08:00

## 2020-12-31 RX ADMIN — METHYLPREDNISOLONE SODIUM SUCCINATE 40 MG: 40 INJECTION, POWDER, FOR SOLUTION INTRAMUSCULAR; INTRAVENOUS at 12:33

## 2020-12-31 RX ADMIN — MORPHINE SULFATE 1 MG: 2 INJECTION, SOLUTION INTRAMUSCULAR; INTRAVENOUS at 15:02

## 2020-12-31 RX ADMIN — Medication 6 MG: at 22:51

## 2020-12-31 RX ADMIN — MORPHINE SULFATE 1 MG: 2 INJECTION, SOLUTION INTRAMUSCULAR; INTRAVENOUS at 11:19

## 2020-12-31 NOTE — CONSULTS
Consult Date: 12/31/2020    IP CONSULT TO GENERAL SURGERY  Consult performed by: Danilo Edwards MD  Consult ordered by: Marlon Giron MD  Reason for consult: stuck PillCam  Assessment/Recommendations:   Reviewed findings with Matthew Hansen. She is not anxious to undergo another surgery. She wants to try to wait a little longer for the PillCam to pass. As she currently does not have an obstruction I think treating her with laxatives and motility agents to give the PillCam a chance to pass is reasonable. She understands that if does not pass she is getting another operative intervention. We discussed enterotomy and PillCam retrieval versus enterectomy versus milking the PillCam to the cecum and allowing it to pass depending on the appearance of the bowel at the location of the obstruction. Repeat imaging over the weekend. If not advanced to the colon by Monday, would encourage her to consider operative intervention. Expressed understanding of her discussion and agreeable with the plan    Thank you this consult          Subjective      Patient known to me. In 9/2019 she was operated on for a stuck PillCam causing a bowel obstruction secondary to a adhesed loop of small bowel secondary to endometriosis. She was admitted again in January of this year with look like a flareup of her Crohn's inflamed terminal ileum. She was treated with Crohn's medication which she stopped a few months ago due to side effects. She developed severe right lower quadrant pain on Saturday and restarted the Humira on Sunday. She is currently undergoing another PillCam study. CT scan on admission shows the PillCam to be in the distal small bowel. There is no evidence of inflammation of this area. There is no evidence of obstruction of this area. The terminal ileum appears unremarkable. Today she is feeling better minimal pain. She has had a couple bowel movements.         Past Medical History:   Diagnosis Date    Abnormal Pap smear     Biopsy done last year and came back ok    Asthma     flares with bronchitis has been over a year as stated 10/5/2018    Autoimmune disease ([facility] [address]     Crohns    Crohn's disease ([facility] [address] 2019    Difficult intravenous access     GERD (gastroesophageal reflux disease)     IBS (irritable bowel syndrome)     Kidney stones     Nausea & vomiting     PONV-nausea    Psychiatric disorder     anxiety & depression    PUD (peptic ulcer disease)       Past Surgical History:   Procedure Laterality Date    COLONOSCOPY N/A 2019    COLONOS[facility] performed [facility]ah Ramirez MD at [facility] ENDOSCOPY    HX APPENDECTOMY      7th grade.     HX COLONOSCOPY      HX ENDOSCOPY      HX GYN          HX GYN  2004        HX GYN      hysterectomy    HX HEENT      dental surgery    HX OTHER SURGICAL  09/10/2019    s/p Diagnostic laparoscopy with lysis of adhesions, Robitc Assist,Open small bowel resection (with removal of PillCam)      Family History   Problem Relation Age of Onset    Heart Disease Mother     Diabetes Mother     Cancer Maternal Aunt         colon cancer      Social History     Tobacco Use    Smoking status: Former Smoker     Packs/day: 0.25     Quit date: 2017     Years since quitting: 3.3    Smokeless tobacco: Never Used   Substance Use Topics    Alcohol use: Yes     Comment: occassionally       Current Facility-Administered Medications   Medication Dose Route Frequency Provider Last Rate Last Admin    methylPREDNISolone (PF) (SOLU-MEDROL) injection 40 mg  40 mg IntraVENous Q12H Rupa Aldana NP   40 mg at 20 1233    ondansetron (ZOFRAN) injection 4 mg  4 mg IntraVENous Q4H PRN Ruth Ann Quijano MD        fentaNYL citrate (PF) injection 50 mcg  50 mcg IntraVENous Q4H PRN Ruth Ann Quijano MD   50 mcg at 20 8806    sodium chloride (NS) flush 5-40 mL  5-40 mL IntraVENous Q8H Jeanmarie Santoyo MD   10 mL at 20 2136    sodium chloride (NS) flush 5-40 mL  5-40 mL IntraVENous PRN Samanta Santoyo MD        acetaminophen (TYLENOL) tablet 650 mg  650 mg Oral Q6H PRN Vipul Foss MD   650 mg at 12/31/20 0800    Or    acetaminophen (TYLENOL) suppository 650 mg  650 mg Rectal Q6H PRN Samanta Santoyo MD        polyethylene glycol (MIRALAX) packet 17 g  17 g Oral DAILY PRN Samanta Santoyo MD        promethazine (PHENERGAN) tablet 12.5 mg  12.5 mg Oral Q6H PRN Samanta aSntoyo MD        Or    ondansetron (ZOFRAN) injection 4 mg  4 mg IntraVENous Q6H PRN Samanta Santoyo MD        morphine injection 1 mg  1 mg IntraVENous Q2H PRN Vipul Foss MD   1 mg at 12/31/20 1119    lactated Ringers infusion  100 mL/hr IntraVENous CONTINUOUS Harriett Baumgarten,  mL/hr at 12/30/20 1807 100 mL/hr at 12/30/20 1807        Review of Systems   Constitutional: Negative for activity change and unexpected weight change. HENT: Negative for trouble swallowing and voice change. Eyes: Negative for pain. Respiratory: Negative for chest tightness and shortness of breath. Cardiovascular: Negative for chest pain. Gastrointestinal: Negative for abdominal pain and nausea. Genitourinary: Negative for frequency. Musculoskeletal: Negative for back pain and neck pain. Skin: Negative for rash. Neurological: Negative for dizziness and numbness. Hematological: Negative for adenopathy. Psychiatric/Behavioral: Negative for confusion. Objective     Vital signs for last 24 hours:  Visit Vitals  BP (!) 145/95 (BP Patient Position: At rest)   Pulse 74   Temp 97.9 °F (36.6 °C)   Resp 18   Ht 5' 2\" (1.575 m)   Wt 90 kg (198 lb 6.6 oz)   LMP 10/08/2018   SpO2 97%   BMI 36.29 kg/m²       Intake/Output this shift:  Current Shift: No intake/output data recorded.   Last 3 Shifts: 12/29 1901 - 12/31 0700  In: 1071.7 [I.V.:1071.7]  Out: 350 [Urine:350]    Data Review:   Recent Results (from the past 24 hour(s))   METABOLIC PANEL, COMPREHENSIVE    Collection Time: 12/31/20  4:11 AM Result Value Ref Range    Sodium 138 136 - 145 mmol/L    Potassium 4.0 3.5 - 5.1 mmol/L    Chloride 110 (H) 97 - 108 mmol/L    CO2 21 21 - 32 mmol/L    Anion gap 7 5 - 15 mmol/L    Glucose 86 65 - 100 mg/dL    BUN 7 6 - 20 MG/DL    Creatinine 0.63 0.55 - 1.02 MG/DL    BUN/Creatinine ratio 11 (L) 12 - 20      GFR est AA >60 >60 ml/min/1.73m2    GFR est non-AA >60 >60 ml/min/1.73m2    Calcium 8.7 8.5 - 10.1 MG/DL    Bilirubin, total 0.6 0.2 - 1.0 MG/DL    ALT (SGPT) 25 12 - 78 U/L    AST (SGOT) 17 15 - 37 U/L    Alk. phosphatase 80 45 - 117 U/L    Protein, total 6.6 6.4 - 8.2 g/dL    Albumin 2.8 (L) 3.5 - 5.0 g/dL    Globulin 3.8 2.0 - 4.0 g/dL    A-G Ratio 0.7 (L) 1.1 - 2.2     CBC WITH AUTOMATED DIFF    Collection Time: 12/31/20  4:11 AM   Result Value Ref Range    WBC 7.4 3.6 - 11.0 K/uL    RBC 4.77 3.80 - 5.20 M/uL    HGB 13.0 11.5 - 16.0 g/dL    HCT 41.2 35.0 - 47.0 %    MCV 86.4 80.0 - 99.0 FL    MCH 27.3 26.0 - 34.0 PG    MCHC 31.6 30.0 - 36.5 g/dL    RDW 12.7 11.5 - 14.5 %    PLATELET 166 326 - 027 K/uL    MPV 9.8 8.9 - 12.9 FL    NRBC 0.0 0  WBC    ABSOLUTE NRBC 0.00 0.00 - 0.01 K/uL    NEUTROPHILS 69 32 - 75 %    LYMPHOCYTES 27 12 - 49 %    MONOCYTES 4 (L) 5 - 13 %    EOSINOPHILS 0 0 - 7 %    BASOPHILS 0 0 - 1 %    IMMATURE GRANULOCYTES 0 0.0 - 0.5 %    ABS. NEUTROPHILS 5.1 1.8 - 8.0 K/UL    ABS. LYMPHOCYTES 2.0 0.8 - 3.5 K/UL    ABS. MONOCYTES 0.3 0.0 - 1.0 K/UL    ABS. EOSINOPHILS 0.0 0.0 - 0.4 K/UL    ABS. BASOPHILS 0.0 0.0 - 0.1 K/UL    ABS. IMM. GRANS. 0.0 0.00 - 0.04 K/UL    DF AUTOMATED         Physical Exam  Constitutional:       General: She is not in acute distress. Appearance: She is well-developed. She is not diaphoretic. HENT:      Head: Normocephalic and atraumatic. Mouth/Throat:      Pharynx: No oropharyngeal exudate. Eyes:      Pupils: Pupils are equal, round, and reactive to light. Neck:      Musculoskeletal: Normal range of motion.       Trachea: No tracheal deviation. Cardiovascular:      Rate and Rhythm: Normal rate and regular rhythm. Heart sounds: Normal heart sounds. No murmur. Pulmonary:      Effort: Pulmonary effort is normal. No respiratory distress. Breath sounds: Normal breath sounds. No wheezing. Abdominal:      General: Bowel sounds are normal. There is no distension. Palpations: Abdomen is soft. There is no mass. Tenderness: There is abdominal tenderness (mild to moderate) in the right lower quadrant. There is no guarding or rebound. Musculoskeletal: Normal range of motion. General: No tenderness. Lymphadenopathy:      Cervical: No cervical adenopathy. Skin:     General: Skin is warm. Findings: No erythema or rash. Neurological:      Mental Status: She is alert and oriented to person, place, and time.    Psychiatric:         Behavior: Behavior normal.

## 2020-12-31 NOTE — PROGRESS NOTES
DEBI Plan:    *Disposition: Home with spouse  *OP F/U: PCP  *Transport at D/C: Family will transport    Reason for Admission:   Abd. pain                   RUR Score: 8%                    Plan for utilizing home health:  No home health needs identify        PCP: First and Last name:  Dr. Pablo Garza. Eliel Luciano   Name of Practice: [facility]   Are you a current patient: Yes/No:  No   Approximate date of last visit:  Has an appointment in Feb.   Can you participate in a virtual visit with your PCP:  Yes                    Current Advanced Directive/Advance Care Plan:  Pt is a full code and no ACP on file. CM offer pt information on AMD but pt declined. Transition of Care Plan:                    Pt is a 28 y.o. female who was admitted to [facility] with a dx of abd pain. PMHx includes Crohn's disease, anxiety,depression, GERD, IBS, and asthma. Pt lives with her spouse in a two story home with about 4-5 steps to enter. No DME reported. No hx of home health services, outpatient rehab, SNF or inpatient rehab. She is independent in her ADLs and IADLs. She drives herself to her medical appointments. Pt uses [facility] in [place]. At the time of d/c, family will transport. CM will continue to follow and assist with d/c planning. Care Management Interventions  PCP Verified by CM: Yes(Dr. Pablo Garza[facility]  Mode of Transport at Discharge: Other (see comment)(Family will transport at d/c)  Transition of Care Consult (CM Consult): Discharge Planning(Home with spouse)  Discharge Durable Medical Equipment: No(No DME reported )  Physical Therapy Consult: No  Occupational Therapy Consult: No  Speech Therapy Consult: No  Current Support Network: Lives with Spouse(Lives in a two story home with about 4-5 steps to enter)  Confirm Follow Up Transport: Self(Pt drives herself to her medical appointments)  Discharge Location  Discharge Placement: [address] Manager ED [facility]

## 2020-12-31 NOTE — PROGRESS NOTES
Hospitalist Progress Note    NAME: Marilyn Santoyo   :  1985   MRN:  [id number]       Assessment / Plan:    Abd. Pain/N/V  2/2 below POA  Pillcam is stuck within distal ileum POA  Crohn's disease  CT abd  1. No evidence of acute process in the abdomen or pelvis. 2. PillCam lies within the distal ileum in the right lower quadrant. 3. Hepatic steatosis.     Xray abd KUB  MPRESSION: Nonspecific intestinal gas pattern. PillCam overlies the right lower  quadrant and likely lies within the distal ileum or cecum     On CLD     GI on board : started on solumedrol 40mg bid + laxatives to  Allow pullcam passage  Surgery consulted in case conservative doesn't work , pt might need retrieval of pillcam via surgery   Pt c/o pain while eating     Other PMhX  Hx of Crohn's disease with stricturing/inflammation and fistula/microperforation  In the past. F/u  GI Brandi Desai MD   Anxiety and depression  GERD  IBS  Asthma, not currently in exacerbation  Holding PO meds 2/2 npo status        Code Status: Full code  Surrogate Decision Maker: Grecia Tomlinson  Spouse [phone]            DVT Prophylaxis: scd  GI Prophylaxis: not indicated     Baseline: independent      30.0 - 39.9 Obese / Body mass index is 36.29 kg/m². Subjective:     Chief Complaint / Reason for Physician Visit  FU abdominal pain . Still in pain when she eats . Discussed with RN events overnight. Review of Systems:  Symptom Y/N Comments  Symptom Y/N Comments   Fever/Chills    Chest Pain n    Poor Appetite y   Edema     Cough    Abdominal Pain y    Sputum    Joint Pain     SOB/BENAVIDES n   Pruritis/Rash     Nausea/vomit n   Tolerating PT/OT     Diarrhea    Tolerating Diet     Constipation    Other       Could NOT obtain due to:      Objective:     VITALS:   Last 24hrs VS reviewed since prior progress note.  Most recent are:  Patient Vitals for the past 24 hrs:   Temp Pulse Resp BP SpO2   20 0755 97.6 °F (36.4 °C) 87 18 (!) 155/99 97 %   12/30/20 2358 97.9 °F (36.6 °C) 84 18 128/78 95 %   12/30/20 1925 97.8 °F (36.6 °C) 81 18 134/81 95 %   12/30/20 1410 98.5 °F (36.9 °C) 86 18 136/87 97 %   12/30/20 0937 98.2 °F (36.8 °C) 87 20 (!) 144/91 96 %       Intake/Output Summary (Last 24 hours) at 12/31/2020 2853  Last data filed at 12/30/2020 1925  Gross per 24 hour   Intake 1071.67 ml   Output 350 ml   Net 721.67 ml        I had a face to face encounter and independently examined this patient on 12/31/2020, as outlined below:  PHYSICAL EXAM:  General: WD, WN. Alert, cooperative, no acute distress    EENT:  EOMI. Anicteric sclerae. MMM  Resp:  CTA bilaterally, no wheezing or rales. No accessory muscle use  CV:  Regular  rhythm,  No edema  GI:  Soft, Non distended, Non tender. +Bowel sounds  Neurologic:  Alert and oriented X 3, normal speech,   Psych:   Good insight. Not anxious nor agitated  Skin:  No rashes. No jaundice    Reviewed most current lab test results and cultures  YES  Reviewed most current radiology test results   YES  Review and summation of old records today    NO  Reviewed patient's current orders and MAR    YES  PMH/SH reviewed - no change compared to H&P  ________________________________________________________________________  Care Plan discussed with:    Comments   Patient x    Family      RN x    Care Manager     Consultant                        Multidiciplinary team rounds were held today with , nursing, pharmacist and clinical coordinator. Patient's plan of care was discussed; medications were reviewed and discharge planning was addressed.      ________________________________________________________________________  Total NON critical care TIME: 25  Minutes    Total CRITICAL CARE TIME Spent:   Minutes non procedure based      Comments   >50% of visit spent in counseling and coordination of care     ________________________________________________________________________  Abril Bain MD     Procedures: see electronic medical records for all procedures/Xrays and details which were not copied into this note but were reviewed prior to creation of Plan. LABS:  I reviewed today's most current labs and imaging studies.   Pertinent labs include:  Recent Labs     12/31/20 0411 12/29/20 2121   WBC 7.4 9.4   HGB 13.0 14.6   HCT 41.2 45.0    374     Recent Labs     12/31/20 0411 12/30/20 0915 12/29/20 2121    140 137   K 4.0 3.7 3.4*   * 110* 106   CO2 21 26 26   GLU 86 69 80   BUN 7 7 8   CREA 0.63 0.83 0.88   CA 8.7 9.0 9.0   MG  --  2.2  --    PHOS  --  3.1  --    ALB 2.8*  --  3.6   TBILI 0.6  --  0.4   ALT 25  --  32       Signed: Jake Fraire MD

## 2020-12-31 NOTE — PROGRESS NOTES
GI PROGRESS NOTE  Mansi King NP  [phone] NP in-hospital cell phone M-F until 4:30  After 5pm or on weekends, please call  for physician on call    Radha eLe :1985 IHN:[id number]   ATTG: Dr Minnie Hanks   PCP: Kaila Gross MD  Date/Time:  2020 11:49 AM     Primary GI: Dr. Hui Cordero    Reason for following: Abdominal pain with pillcam in distal ileum    Assessment:   Abdominal pain with nausea and vomiting - discomfort is much improved today  Retained pillcam  · CT scan hows pill cam lying within the distal ileum in the right lower quadrant  · FU KUB today shows the Pill-cam appears unchanged in position in the right lower quadrant, likely within the ileum. · Patient has a hx of the retained pill-cam that required surgical intervention 2019     Hx of ? Crohn's disease   · Previous CT scan (2020) showed stricturing/inflammation and fistula/microperfortion   · Colonoscopy in  by Dr. Marisol Martin showed no evidence of any IBD or Crohn's   · Colonoscopy in 3/2020 by Dr Hui Cordero normal with I/E hemorrhoids, bx normal TI, and random colon. · No path diagnosis   · No fevers, chills. · Started Humira previously but just restarted it. Plan:   · Clear liquid diet  · Consulted surgery (Dr Bhavna Herrera) in case needed, hoping to continue to conservative management for now  · Continue miralax  · Start solumedrol 40mg BID to maybe help decrease mild swelling to help pill cam pass  · Repeat KUB in am  · Please retain pillcam if passes - this must be sent away to get valuable images that were retained. · Rest per primary team   · Antiemetics PRN    Plan discussed with Dr Hui Cordero. Will have oncall doc follow over the long weekend. Subjective:   Discussed with RN events overnight. Walking around room. Apperas comfortable and happy. Denies pain at this time. Frustrated pill won't pass and hoping to avoid surgery. Agreeable to wait this out a bit more since pain has resolved. Complaint Y/N Description   Abdominal Pain n    Hematemesis n    Hematochezia n    Melena n    Constipation n    Diarrhea y Soft stools - 2 overnight   Dyspepsia n    Dysphagia n    Jaundiced n    Nausea/vomiting n      Review of Systems:  Symptom Y/N Comments  Symptom Y/N Comments   Fever/Chills    Chest Pain     Cough    Headaches     Sputum    Joint Pain     SOB/BENAVIDES    Pruritis/Rash     Tolerating Diet y clears  Other       Could NOT obtain due to:      Objective:   VITALS:   Last 24hrs VS reviewed since prior progress note. Most recent are:  Visit Vitals  BP (!) 155/99   Pulse 87   Temp 97.6 °F (36.4 °C)   Resp 18   Ht 5' 2\" (1.575 m)   Wt 90 kg (198 lb 6.6 oz)   SpO2 97%   BMI 36.29 kg/m²       Intake/Output Summary (Last 24 hours) at 12/31/2020 1149  Last data filed at 12/30/2020 1925  Gross per 24 hour   Intake 1071.67 ml   Output 350 ml   Net 721.67 ml     PHYSICAL EXAM:  General: WD, WN. Alert, cooperative, no acute distress    HEENT: NC, Atraumatic. Anicteric sclerae. Lungs:  CTA Bilaterally. No Wheezing/Rhonchi/Rales. Heart:  Regular  rhythm,  No murmur (), No Rubs, No Gallops  Abdomen: Soft, Non distended, Non tender.  +Bowel sounds, no HSM. Old healed surgical sites. Extremities: No c/c/e  Neurologic:  Alert and oriented X 3. No acute neurological distress   Psych:   Good insight. Not anxious nor agitated. Lab and Radiology Data Reviewed: (see below)    Medications Reviewed: (see below)  PMH/SH reviewed - no change compared to H&P  ________________________________________________________________________  Total time spent with patient: 20 minutes ________________________________________________________________________  Care Plan discussed with:  Patient y   Family     RN y              Consultant: Naveen Sheets NP     Procedures: see electronic medical records for all procedures/Xrays and details which were not copied into this note but were reviewed prior to creation of Plan. LABS:  Recent Labs     12/31/20 0411 12/29/20 2121   WBC 7.4 9.4   HGB 13.0 14.6   HCT 41.2 45.0    374     Recent Labs     12/31/20  0411 12/30/20  0915 12/29/20 2121    140 137   K 4.0 3.7 3.4*   * 110* 106   CO2 21 26 26   BUN 7 7 8   CREA 0.63 0.83 0.88   GLU 86 69 80   CA 8.7 9.0 9.0   MG  --  2.2  --    PHOS  --  3.1  --      Recent Labs     12/31/20 0411 12/29/20 2121   AP 80 92   TP 6.6 7.9   ALB 2.8* 3.6   GLOB 3.8 4.3*   LPSE  --  85     No results for input(s): INR, PTP, APTT, INREXT in the last 72 hours. No results for input(s): FE, TIBC, PSAT, FERR in the last 72 hours. No results found for: FOL, RBCF  No results for input(s): PH, PCO2, PO2 in the last 72 hours. No results for input(s): CPK, CKMB in the last 72 hours.     No lab exists for component: TROPONINI  Lab Results   Component Value Date/Time    Color YELLOW/STRAW 12/30/2020 03:22 AM    Appearance CLEAR 12/30/2020 03:22 AM    Specific gravity >1.030 (H) 12/30/2020 03:22 AM    Specific gravity 1.010 04/10/2020 09:09 AM    pH (UA) 5.5 12/30/2020 03:22 AM    Protein 30 (A) 12/30/2020 03:22 AM    Glucose Negative 12/30/2020 03:22 AM    Ketone Negative 12/30/2020 03:22 AM    Bilirubin Negative 12/30/2020 03:22 AM    Urobilinogen 0.2 12/30/2020 03:22 AM    Nitrites Negative 12/30/2020 03:22 AM    Leukocyte Esterase Negative 12/30/2020 03:22 AM    Epithelial cells FEW 12/30/2020 03:22 AM    Bacteria Negative 12/30/2020 03:22 AM    WBC 0-4 12/30/2020 03:22 AM    RBC 0-5 12/30/2020 03:22 AM       MEDICATIONS:  Current Facility-Administered Medications   Medication Dose Route Frequency    ondansetron (ZOFRAN) injection 4 mg  4 mg IntraVENous Q4H PRN    fentaNYL citrate (PF) injection 50 mcg  50 mcg IntraVENous Q4H PRN    sodium chloride (NS) flush 5-40 mL  5-40 mL IntraVENous Q8H    sodium chloride (NS) flush 5-40 mL  5-40 mL IntraVENous PRN    acetaminophen (TYLENOL) tablet 650 mg  650 mg Oral Q6H PRN    Or    acetaminophen (TYLENOL) suppository 650 mg  650 mg Rectal Q6H PRN    polyethylene glycol (MIRALAX) packet 17 g  17 g Oral DAILY PRN    promethazine (PHENERGAN) tablet 12.5 mg  12.5 mg Oral Q6H PRN    Or    ondansetron (ZOFRAN) injection 4 mg  4 mg IntraVENous Q6H PRN    morphine injection 1 mg  1 mg IntraVENous Q2H PRN    lactated Ringers infusion  100 mL/hr IntraVENous CONTINUOUS

## 2020-12-31 NOTE — PROGRESS NOTES
Received message from patient's nurse Corrie Villavicencio stating :    Patient has requested something to help her sleep. She stated that at home she will take 2 Benadryl and that melatonin does NOT work for her. She is here for N/V/ Abd pain. She has a pill cam stuck in her small bowel and is schedule for a KUB in the morning to see if it has moved. Discussion / orders:    Ordered Benadryl 50 mg p.o. x1 dose         Please note that this note was dictated using Dragon computer voice recognition software. Quite often unanticipated grammatical, syntax, homophones, and other interpretive errors are inadvertently transcribed by the computer software. Please disregard these errors. Please excuse any errors that have escaped final proofreading.

## 2020-12-31 NOTE — PROGRESS NOTES
End of Shift Note    Bedside shift change report given to Primo KC RN (oncoming nurse) by Livia Forbes RN (offgoing nurse). Report included the following information SBAR, Kardex, ED Summary, Procedure Summary, Intake/Output, MAR, Recent Results and Med Rec Status    Shift worked:  7p-7a     Shift summary and any significant changes:     Patient has had 2 bowel movements my shift. No pill cam was passed either time. Concerns for physician to address:       Zone phone for oncoming shift:   2028       Activity:  Activity Level: Up ad val  Number times ambulated in hallways past shift: 0  Number of times OOB to chair past shift: 0, up to void    Cardiac:   Cardiac Monitoring: No      Cardiac Rhythm: Normal sinus rhythm    Access:   Current line(s): PIV     Genitourinary:   Urinary status: voiding    Respiratory:   O2 Device: Room air  Chronic home O2 use?: NO  Incentive spirometer at bedside: NO     GI:  Last Bowel Movement Date: 12/30/20  Current diet:  DIET CLEAR LIQUID  Passing flatus: YES  Tolerating current diet: YES       Pain Management:   Patient states pain is manageable on current regimen: YES    Skin:  Trevor Score: 20  Interventions: float heels and increase time out of bed    Patient Safety:  Fall Score:  Total Score: 1  Interventions: gripper socks, pt to call before getting OOB and stay with me (per policy)       Length of Stay:  Expected LOS: 3d 0h  Actual LOS: 1      Corrie Villavicencio RN

## 2021-01-01 ENCOUNTER — APPOINTMENT (OUTPATIENT)
Dept: GENERAL RADIOLOGY | Age: 36
DRG: 329 | End: 2021-01-01
Attending: NURSE PRACTITIONER
Payer: COMMERCIAL

## 2021-01-01 PROCEDURE — 99231 SBSQ HOSP IP/OBS SF/LOW 25: CPT | Performed by: SURGERY

## 2021-01-01 PROCEDURE — 74011250636 HC RX REV CODE- 250/636: Performed by: INTERNAL MEDICINE

## 2021-01-01 PROCEDURE — 74018 RADEX ABDOMEN 1 VIEW: CPT

## 2021-01-01 PROCEDURE — 65270000029 HC RM PRIVATE

## 2021-01-01 PROCEDURE — 74011250636 HC RX REV CODE- 250/636: Performed by: NURSE PRACTITIONER

## 2021-01-01 PROCEDURE — 74011250637 HC RX REV CODE- 250/637: Performed by: HOSPITALIST

## 2021-01-01 PROCEDURE — 74011250636 HC RX REV CODE- 250/636: Performed by: EMERGENCY MEDICINE

## 2021-01-01 PROCEDURE — 74011000250 HC RX REV CODE- 250: Performed by: INTERNAL MEDICINE

## 2021-01-01 PROCEDURE — 74011250636 HC RX REV CODE- 250/636: Performed by: STUDENT IN AN ORGANIZED HEALTH CARE EDUCATION/TRAINING PROGRAM

## 2021-01-01 RX ORDER — FLUMAZENIL 0.1 MG/ML
0.2 INJECTION INTRAVENOUS
Status: CANCELLED | OUTPATIENT
Start: 2021-01-01 | End: 2021-01-01

## 2021-01-01 RX ORDER — HYDROMORPHONE HYDROCHLORIDE 1 MG/ML
0.5 INJECTION, SOLUTION INTRAMUSCULAR; INTRAVENOUS; SUBCUTANEOUS
Status: DISCONTINUED | OUTPATIENT
Start: 2021-01-01 | End: 2021-01-04

## 2021-01-01 RX ORDER — EPINEPHRINE 0.1 MG/ML
1 INJECTION INTRACARDIAC; INTRAVENOUS
Status: CANCELLED | OUTPATIENT
Start: 2021-01-01 | End: 2021-01-02

## 2021-01-01 RX ORDER — ATROPINE SULFATE 0.1 MG/ML
0.5 INJECTION INTRAVENOUS
Status: CANCELLED | OUTPATIENT
Start: 2021-01-01 | End: 2021-01-02

## 2021-01-01 RX ORDER — DEXTROMETHORPHAN/PSEUDOEPHED 2.5-7.5/.8
1.2 DROPS ORAL
Status: CANCELLED | OUTPATIENT
Start: 2021-01-01

## 2021-01-01 RX ORDER — MIDAZOLAM HYDROCHLORIDE 1 MG/ML
.25-1 INJECTION, SOLUTION INTRAMUSCULAR; INTRAVENOUS
Status: CANCELLED | OUTPATIENT
Start: 2021-01-01 | End: 2021-01-01

## 2021-01-01 RX ORDER — NALOXONE HYDROCHLORIDE 0.4 MG/ML
0.4 INJECTION, SOLUTION INTRAMUSCULAR; INTRAVENOUS; SUBCUTANEOUS
Status: CANCELLED | OUTPATIENT
Start: 2021-01-01 | End: 2021-01-01

## 2021-01-01 RX ADMIN — HYDROMORPHONE HYDROCHLORIDE 0.5 MG: 1 INJECTION, SOLUTION INTRAMUSCULAR; INTRAVENOUS; SUBCUTANEOUS at 20:18

## 2021-01-01 RX ADMIN — ONDANSETRON 4 MG: 2 INJECTION INTRAMUSCULAR; INTRAVENOUS at 20:18

## 2021-01-01 RX ADMIN — PROMETHAZINE HYDROCHLORIDE 12.5 MG: 25 TABLET ORAL at 07:47

## 2021-01-01 RX ADMIN — METHYLPREDNISOLONE SODIUM SUCCINATE 40 MG: 40 INJECTION, POWDER, FOR SOLUTION INTRAMUSCULAR; INTRAVENOUS at 09:11

## 2021-01-01 RX ADMIN — METHYLPREDNISOLONE SODIUM SUCCINATE 40 MG: 40 INJECTION, POWDER, FOR SOLUTION INTRAMUSCULAR; INTRAVENOUS at 20:18

## 2021-01-01 RX ADMIN — SODIUM CHLORIDE, POTASSIUM CHLORIDE, SODIUM LACTATE AND CALCIUM CHLORIDE 100 ML/HR: 600; 310; 30; 20 INJECTION, SOLUTION INTRAVENOUS at 20:17

## 2021-01-01 RX ADMIN — SODIUM CHLORIDE, POTASSIUM CHLORIDE, SODIUM LACTATE AND CALCIUM CHLORIDE 100 ML/HR: 600; 310; 30; 20 INJECTION, SOLUTION INTRAVENOUS at 09:11

## 2021-01-01 RX ADMIN — HYDROMORPHONE HYDROCHLORIDE 0.5 MG: 1 INJECTION, SOLUTION INTRAMUSCULAR; INTRAVENOUS; SUBCUTANEOUS at 14:55

## 2021-01-01 RX ADMIN — Medication 10 ML: at 06:50

## 2021-01-01 RX ADMIN — Medication 10 ML: at 20:19

## 2021-01-01 RX ADMIN — POLYETHYLENE GLYCOL 3350, SODIUM CHLORIDE, SODIUM BICARBONATE AND POTASSIUM CHLORIDE 4000 ML: 420; 5.72; 11.2; 1.48 POWDER, FOR SOLUTION ORAL at 20:17

## 2021-01-01 NOTE — PROGRESS NOTES
[address]  [address]  [phone]                                                GI PROGRESS NOTE      NAME: Lloyd Root   :  1985   MRN:  [id number]          Assessment:   · I reviewed CT and KUB images  · I think the capsule is within reach of a colonoscope If we can get into the terminal ileum as long as there is no stricture  · Even if there is a stricture perhaps it can be dilated with a balloon  · If colonoscopy fails then would do surgery    Plan:   · Would try Golytely prep over next day or two   · If it doesn't pass colonoscopy on Saturday or   · If that fails surgery on Monday    Subjective:   Discussed with RN events overnight. Complaint Y/N Description   Abdominal Pain     Hematemesis     Hematochezia     Melena     Constipation     Diarrhea     Dyspepsia     Dysphagia     Jaundiced         Review of Systems:    Symptom Y/N Comments  Symptom Y/N Comments   Fever/Chills    Chest Pain     Cough    Abdominal Pain     Sputum    Joint Pain     SOB/BENAVIDES    Pruritis/Rash     Nausea/vomit    Tolerating PT/OT     Diarrhea    Tolerating Diet     Constipation    Other       Could not obtain due to AMS vs intubation        Objective:     VITALS:   Last 24hrs VS reviewed since prior progress note. Most recent are:  Visit Vitals  BP (!) 154/88 (BP 1 Location: Left arm, BP Patient Position: At rest)   Pulse 88   Temp 98.2 °F (36.8 °C)   Resp 16   Ht 5' 2\" (1.575 m)   Wt 90 kg (198 lb 6.6 oz)   SpO2 96%   BMI 36.29 kg/m²       Intake/Output Summary (Last 24 hours) at 2021 1727  Last data filed at 2021 0049  Gross per 24 hour   Intake 950 ml   Output 200 ml   Net 750 ml       PHYSICAL EXAM:  General: WD, WN. Alert, cooperative, no acute distress    HEENT: NC, Atraumatic. PERRLA, EOMI. Anicteric sclerae. Lungs:  CTA Bilaterally. No Wheezing/Rhonchi/Rales.   Heart:  Regular  rhythm,  No murmur (), No Rubs, No Gallops  Abdomen: Soft, Non distended, Non tender.  +Bowel sounds, no HSM  Extremities: No c/c/e  Neurologic:  CN 2-12 gi, Alert and oriented X 3. No acute neurological distress   Psych:   Good insight. Not anxious nor agitated. Lab Data Reviewed: (see below)    Medications Reviewed: (see below)    PMH/SH reviewed - no change compared to H&P  ________________________________________________________________________  Total time spent with patient: 20 minutes     Critical Care Provided     Minutes non procedure based    Care Plan discussed with:  Patient    Family     RN    Care Manager    Consultant/Specialist:       >50% of visit spent in counseling and coordination of care       Recommended Disposition:   Home with Family    HH/PT/OT/RN    SNF/LTC    CALE      Code Status:  Full Code    DNR/DNI      ________________________________________________________________________  Ted Cleveland MD   ________________________________________________________________________________________________________________________________________________  Procedures: see electronic medical records for all procedures/Xrays and details which  were not copied into this note but were reviewed prior to creation of Plan. LABS:  Recent Labs     12/31/20  0411 12/29/20  2121   WBC 7.4 9.4   HGB 13.0 14.6   HCT 41.2 45.0    374     Recent Labs     12/31/20  0411 12/30/20  0915 12/29/20 2121    140 137   K 4.0 3.7 3.4*   * 110* 106   CO2 21 26 26   BUN 7 7 8   CREA 0.63 0.83 0.88   GLU 86 69 80   CA 8.7 9.0 9.0   MG  --  2.2  --    PHOS  --  3.1  --      Recent Labs     12/31/20 0411 12/29/20 2121   AP 80 92   TP 6.6 7.9   ALB 2.8* 3.6   GLOB 3.8 4.3*   LPSE  --  85     No results for input(s): INR, PTP, APTT, INREXT in the last 72 hours. No results for input(s): FE, TIBC, PSAT, FERR in the last 72 hours. No results found for: FOL, RBCF  No results for input(s): PH, PCO2, PO2 in the last 72 hours.   No results for input(s): CPK, CKMB in the last 72 hours.     No lab exists for component: TROPONINI  Lab Results   Component Value Date/Time    Color YELLOW/STRAW 12/30/2020 03:22 AM    Appearance CLEAR 12/30/2020 03:22 AM    Specific gravity >1.030 (H) 12/30/2020 03:22 AM    Specific gravity 1.010 04/10/2020 09:09 AM    pH (UA) 5.5 12/30/2020 03:22 AM    Protein 30 (A) 12/30/2020 03:22 AM    Glucose Negative 12/30/2020 03:22 AM    Ketone Negative 12/30/2020 03:22 AM    Bilirubin Negative 12/30/2020 03:22 AM    Urobilinogen 0.2 12/30/2020 03:22 AM    Nitrites Negative 12/30/2020 03:22 AM    Leukocyte Esterase Negative 12/30/2020 03:22 AM    Epithelial cells FEW 12/30/2020 03:22 AM    Bacteria Negative 12/30/2020 03:22 AM    WBC 0-4 12/30/2020 03:22 AM    RBC 0-5 12/30/2020 03:22 AM       MEDICATIONS:  Current Facility-Administered Medications   Medication Dose Route Frequency    HYDROmorphone (PF) (DILAUDID) injection 0.5 mg  0.5 mg IntraVENous Q4H PRN    methylPREDNISolone (PF) (SOLU-MEDROL) injection 40 mg  40 mg IntraVENous Q12H    melatonin tablet 6 mg  6 mg Oral QHS PRN    ondansetron (ZOFRAN) injection 4 mg  4 mg IntraVENous Q4H PRN    fentaNYL citrate (PF) injection 50 mcg  50 mcg IntraVENous Q4H PRN    sodium chloride (NS) flush 5-40 mL  5-40 mL IntraVENous Q8H    sodium chloride (NS) flush 5-40 mL  5-40 mL IntraVENous PRN    acetaminophen (TYLENOL) tablet 650 mg  650 mg Oral Q6H PRN    Or    acetaminophen (TYLENOL) suppository 650 mg  650 mg Rectal Q6H PRN    polyethylene glycol (MIRALAX) packet 17 g  17 g Oral DAILY PRN    promethazine (PHENERGAN) tablet 12.5 mg  12.5 mg Oral Q6H PRN    Or    ondansetron (ZOFRAN) injection 4 mg  4 mg IntraVENous Q6H PRN    morphine injection 1 mg  1 mg IntraVENous Q2H PRN    lactated Ringers infusion  100 mL/hr IntraVENous CONTINUOUS

## 2021-01-01 NOTE — PROGRESS NOTES
Bedside and Verbal shift change report given to Karyna RN (oncoming nurse) by Aime Pettit RN (offgoing nurse). Report included the following information SBAR, Kardex, Procedure Summary, Intake/Output, MAR and Recent Results.

## 2021-01-01 NOTE — PROGRESS NOTES
Hospitalist Progress Note    NAME: Rose Saeed   :  1985   MRN:  [id number]       Assessment / Plan:    Abd. Pain/N/V  2/2 below POA  Pillcam is stuck within distal ileum POA  Crohn's disease  CT abd  1. No evidence of acute process in the abdomen or pelvis.  2. PillCam lies within the distal ileum in the right lower quadrant.  3. Hepatic steatosis.     Xray abd KUB  MPRESSION: Nonspecific intestinal gas pattern. PillCam overlies the right lower  quadrant and likely lies within the distal ileum or cecum     On CLD     GI on board : started on solumedrol 40mg bid + laxatives to  Allow pullcam passage  Surgery consulted in case conservative doesn't work , pt might need retrieval of pillcam via surgery   Pt c/o pain while eating   Repeat KUB unchanged     Other PMhX  Hx of Crohn's disease with stricturing/inflammation and fistula/microperforation  In the past. F/u  GI Rony Gilmore MD   Anxiety and depression  GERD  IBS  Asthma, not currently in exacerbation  Holding PO meds 2/2 npo status        Code Status: Full code  Surrogate Decision Maker:  Mitch Saeed Spouse [phone]            DVT Prophylaxis: scd  GI Prophylaxis: not indicated     Baseline: independent      30.0 - 39.9 Obese / Body mass index is 36.29 kg/m².         Subjective:     Chief Complaint / Reason for Physician Visit  Was nauseous today and had an episode of vomiting . Describe dull abdominal pain with some throbbing episodes.   Discussed with RN events overnight.     Review of Systems:  Symptom Y/N Comments  Symptom Y/N Comments   Fever/Chills    Chest Pain n    Poor Appetite y   Edema     Cough    Abdominal Pain y    Sputum    Joint Pain     SOB/BENAVIDES n   Pruritis/Rash     Nausea/vomit y/y   Tolerating PT/OT     Diarrhea    Tolerating Diet     Constipation    Other       Could NOT obtain due to:      Objective:     VITALS:   Last 24hrs VS reviewed since prior progress note. Most recent are:  Patient Vitals for the past  24 hrs:   Temp Pulse Resp BP SpO2   01/01/21 0742 98.2 °F (36.8 °C) 87 15 (!) 139/90 97 %   01/01/21 0335 97.4 °F (36.3 °C) 85 18 135/83 98 %   01/01/21 0049 97.9 °F (36.6 °C) 77 16 (!) 134/90 96 %   12/31/20 2003 97.9 °F (36.6 °C) 79 18 138/87 97 %   12/31/20 1505 97.9 °F (36.6 °C) 89 18 (!) 150/82 96 %   12/31/20 1251 97.9 °F (36.6 °C) 74 18 (!) 145/95 97 %       Intake/Output Summary (Last 24 hours) at 1/1/2021 0957  Last data filed at 1/1/2021 0049  Gross per 24 hour   Intake 950 ml   Output 200 ml   Net 750 ml        I had a face to face encounter and independently examined this patient on 1/1/2021, as outlined below:  PHYSICAL EXAM:  General: WD, WN. Alert, cooperative, no acute distress    EENT:  EOMI. Anicteric sclerae. MMM  Resp:  CTA bilaterally, no wheezing or rales. No accessory muscle use  CV:  Regular  rhythm,  No edema  GI:  Soft, Non distended, Non tender. +Bowel sounds  Neurologic:  Alert and oriented X 3, normal speech,   Psych:   Good insight. Not anxious nor agitated  Skin:  No rashes. No jaundice    Reviewed most current lab test results and cultures  YES  Reviewed most current radiology test results   YES  Review and summation of old records today    NO  Reviewed patient's current orders and MAR    YES  PMH/SH reviewed - no change compared to H&P  ________________________________________________________________________  Care Plan discussed with:    Comments   Patient x    Family      RN x    Care Manager     Consultant                        Multidiciplinary team rounds were held today with , nursing, pharmacist and clinical coordinator. Patient's plan of care was discussed; medications were reviewed and discharge planning was addressed.      ________________________________________________________________________  Total NON critical care TIME: 25  Minutes    Total CRITICAL CARE TIME Spent:   Minutes non procedure based      Comments   >50% of visit spent in counseling and coordination of care     ________________________________________________________________________  Ani Kevin MD     Procedures: see electronic medical records for all procedures/Xrays and details which were not copied into this note but were reviewed prior to creation of Plan. LABS:  I reviewed today's most current labs and imaging studies.   Pertinent labs include:  Recent Labs     12/31/20 0411 12/29/20 2121   WBC 7.4 9.4   HGB 13.0 14.6   HCT 41.2 45.0    374     Recent Labs     12/31/20  0411 12/30/20  0915 12/29/20 2121    140 137   K 4.0 3.7 3.4*   * 110* 106   CO2 21 26 26   GLU 86 69 80   BUN 7 7 8   CREA 0.63 0.83 0.88   CA 8.7 9.0 9.0   MG  --  2.2  --    PHOS  --  3.1  --    ALB 2.8*  --  3.6   TBILI 0.6  --  0.4   ALT 25  --  32       Signed: Ani Kevin MD

## 2021-01-01 NOTE — PROGRESS NOTES
0730-Report taken from Jamie Meek, [facility]. Patient in bed at this time with complaints of nausea. Medicated with PRN meds and resting in bed at this time.

## 2021-01-01 NOTE — PROGRESS NOTES
Admit Date: 2020    POD * No surgery found *    Procedure:  * No surgery found *    HOSPITAL DAY:     ANTIBIOTICS:     Subjective:     Patient with some nausea but PillCam does not appear to be causing total obstruction. X-rays reviewed for the past several days appears the PillCam is in the right lower abdomen terminal ileum. Plan is for follow-up abdominal x-rays over the weekend and if no improvement  then CT scan to help identify issues and consider surgery Monday with Dr. Abner Spence if PillCam not progressing. Patient aware. Review of Systems   Respiratory: Negative. Cardiovascular: Negative. Gastrointestinal: Positive for nausea. Negative for abdominal pain. All other systems reviewed and are negative. Objective:     Blood pressure (!) 154/88, pulse 88, temperature 98.2 °F (36.8 °C), resp. rate 16, height 5' 2\" (1.575 m), weight 198 lb 6.6 oz (90 kg), last menstrual period 10/08/2018, SpO2 96 %. Temp (24hrs), Av.9 °F (36.6 °C), Min:97.4 °F (36.3 °C), Max:98.2 °F (36.8 °C)          Physical Exam  Vitals signs and nursing note reviewed. Constitutional:       General: She is not in acute distress. Appearance: Normal appearance. Cardiovascular:      Rate and Rhythm: Normal rate and regular rhythm. Pulmonary:      Effort: Pulmonary effort is normal.      Breath sounds: Normal breath sounds. Abdominal:      General: Abdomen is flat. There is no distension. Palpations: Abdomen is soft. Tenderness: There is no abdominal tenderness. There is no guarding. Neurological:      General: No focal deficit present. Mental Status: She is alert and oriented to person, place, and time. Psychiatric:         Mood and Affect: Mood normal.         Behavior: Behavior normal.         Thought Content: Thought content normal.         Judgment: Judgment normal.            Labs: No results found for this or any previous visit (from the past 24 hour(s)).     Data Review images and reports reviewed    Assessment:     Active Problems:    Abdominal pain (8/6/2019)      Crohn disease ([address] (9/9/2019)      Hypokalemia (12/30/2020)      Nausea & vomiting (12/30/2020)      Foreign body in small intestine (12/30/2020)        Plan/Recommendations/Medical Decision Making:     Continue present treatment     Patient with some nausea but PillCam does not appear to be causing total obstruction. X-rays reviewed for the past several days appears the PillCam is in the right lower abdomen terminal ileum. Plan is for follow-up abdominal x-rays over the weekend and if no improvement Sunday then CT scan to help identify issues and consider surgery Monday with Dr. Ashutosh Davis if PillCam not progressing. Patient aware.   Prema Nelson MD , [facility]

## 2021-01-02 ENCOUNTER — ANESTHESIA (OUTPATIENT)
Dept: SURGERY | Age: 36
DRG: 329 | End: 2021-01-02
Payer: COMMERCIAL

## 2021-01-02 ENCOUNTER — ANESTHESIA EVENT (OUTPATIENT)
Dept: SURGERY | Age: 36
DRG: 329 | End: 2021-01-02
Payer: COMMERCIAL

## 2021-01-02 ENCOUNTER — APPOINTMENT (OUTPATIENT)
Dept: GENERAL RADIOLOGY | Age: 36
DRG: 329 | End: 2021-01-02
Attending: SURGERY
Payer: COMMERCIAL

## 2021-01-02 PROCEDURE — 74018 RADEX ABDOMEN 1 VIEW: CPT

## 2021-01-02 PROCEDURE — 76010000138 HC OR TIME 0.5 TO 1 HR: Performed by: INTERNAL MEDICINE

## 2021-01-02 PROCEDURE — 99232 SBSQ HOSP IP/OBS MODERATE 35: CPT | Performed by: SURGERY

## 2021-01-02 PROCEDURE — 74011250636 HC RX REV CODE- 250/636: Performed by: NURSE PRACTITIONER

## 2021-01-02 PROCEDURE — 74011250636 HC RX REV CODE- 250/636: Performed by: STUDENT IN AN ORGANIZED HEALTH CARE EDUCATION/TRAINING PROGRAM

## 2021-01-02 PROCEDURE — C1726 CATH, BAL DIL, NON-VASCULAR: HCPCS | Performed by: INTERNAL MEDICINE

## 2021-01-02 PROCEDURE — 77030019988 HC FCPS ENDOSC DISP BSC -B: Performed by: INTERNAL MEDICINE

## 2021-01-02 PROCEDURE — 65270000029 HC RM PRIVATE

## 2021-01-02 PROCEDURE — 77030040361 HC SLV COMPR DVT MDII -B: Performed by: INTERNAL MEDICINE

## 2021-01-02 PROCEDURE — 2709999900 HC NON-CHARGEABLE SUPPLY: Performed by: INTERNAL MEDICINE

## 2021-01-02 PROCEDURE — 74011250636 HC RX REV CODE- 250/636: Performed by: NURSE ANESTHETIST, CERTIFIED REGISTERED

## 2021-01-02 PROCEDURE — 76210000006 HC OR PH I REC 0.5 TO 1 HR: Performed by: INTERNAL MEDICINE

## 2021-01-02 PROCEDURE — 77030040922 HC BLNKT HYPOTHRM STRY -A

## 2021-01-02 PROCEDURE — 74011000250 HC RX REV CODE- 250: Performed by: NURSE ANESTHETIST, CERTIFIED REGISTERED

## 2021-01-02 PROCEDURE — 0DBH8ZZ EXCISION OF CECUM, VIA NATURAL OR ARTIFICIAL OPENING ENDOSCOPIC: ICD-10-PCS | Performed by: INTERNAL MEDICINE

## 2021-01-02 PROCEDURE — 88305 TISSUE EXAM BY PATHOLOGIST: CPT

## 2021-01-02 PROCEDURE — 74011250637 HC RX REV CODE- 250/637: Performed by: HOSPITALIST

## 2021-01-02 PROCEDURE — 74011250636 HC RX REV CODE- 250/636: Performed by: INTERNAL MEDICINE

## 2021-01-02 PROCEDURE — 2709999900 HC NON-CHARGEABLE SUPPLY

## 2021-01-02 PROCEDURE — 76060000032 HC ANESTHESIA 0.5 TO 1 HR: Performed by: INTERNAL MEDICINE

## 2021-01-02 PROCEDURE — 74011250636 HC RX REV CODE- 250/636: Performed by: ANESTHESIOLOGY

## 2021-01-02 PROCEDURE — 77030027363 HC DEV RETRVR ROTH STRC -B: Performed by: INTERNAL MEDICINE

## 2021-01-02 RX ORDER — HYDROMORPHONE HYDROCHLORIDE 1 MG/ML
.2-.5 INJECTION, SOLUTION INTRAMUSCULAR; INTRAVENOUS; SUBCUTANEOUS
Status: DISCONTINUED | OUTPATIENT
Start: 2021-01-02 | End: 2021-01-02 | Stop reason: HOSPADM

## 2021-01-02 RX ORDER — SODIUM CHLORIDE 0.9 % (FLUSH) 0.9 %
5-40 SYRINGE (ML) INJECTION AS NEEDED
Status: DISCONTINUED | OUTPATIENT
Start: 2021-01-02 | End: 2021-01-02 | Stop reason: HOSPADM

## 2021-01-02 RX ORDER — SODIUM CHLORIDE, SODIUM LACTATE, POTASSIUM CHLORIDE, CALCIUM CHLORIDE 600; 310; 30; 20 MG/100ML; MG/100ML; MG/100ML; MG/100ML
25 INJECTION, SOLUTION INTRAVENOUS CONTINUOUS
Status: DISCONTINUED | OUTPATIENT
Start: 2021-01-02 | End: 2021-01-02 | Stop reason: HOSPADM

## 2021-01-02 RX ORDER — MIDAZOLAM HYDROCHLORIDE 1 MG/ML
0.5 INJECTION, SOLUTION INTRAMUSCULAR; INTRAVENOUS
Status: DISCONTINUED | OUTPATIENT
Start: 2021-01-02 | End: 2021-01-02 | Stop reason: HOSPADM

## 2021-01-02 RX ORDER — FENTANYL CITRATE 50 UG/ML
50 INJECTION, SOLUTION INTRAMUSCULAR; INTRAVENOUS AS NEEDED
Status: DISCONTINUED | OUTPATIENT
Start: 2021-01-02 | End: 2021-01-02 | Stop reason: HOSPADM

## 2021-01-02 RX ORDER — LIDOCAINE HYDROCHLORIDE 20 MG/ML
INJECTION, SOLUTION EPIDURAL; INFILTRATION; INTRACAUDAL; PERINEURAL AS NEEDED
Status: DISCONTINUED | OUTPATIENT
Start: 2021-01-02 | End: 2021-01-02 | Stop reason: HOSPADM

## 2021-01-02 RX ORDER — PROPOFOL 10 MG/ML
INJECTION, EMULSION INTRAVENOUS AS NEEDED
Status: DISCONTINUED | OUTPATIENT
Start: 2021-01-02 | End: 2021-01-02 | Stop reason: HOSPADM

## 2021-01-02 RX ORDER — FENTANYL CITRATE 50 UG/ML
25 INJECTION, SOLUTION INTRAMUSCULAR; INTRAVENOUS
Status: DISCONTINUED | OUTPATIENT
Start: 2021-01-02 | End: 2021-01-02 | Stop reason: HOSPADM

## 2021-01-02 RX ORDER — DIPHENHYDRAMINE HYDROCHLORIDE 50 MG/ML
12.5 INJECTION, SOLUTION INTRAMUSCULAR; INTRAVENOUS AS NEEDED
Status: DISCONTINUED | OUTPATIENT
Start: 2021-01-02 | End: 2021-01-02 | Stop reason: HOSPADM

## 2021-01-02 RX ORDER — MORPHINE SULFATE 10 MG/ML
2 INJECTION, SOLUTION INTRAMUSCULAR; INTRAVENOUS
Status: DISCONTINUED | OUTPATIENT
Start: 2021-01-02 | End: 2021-01-02 | Stop reason: HOSPADM

## 2021-01-02 RX ORDER — LIDOCAINE HYDROCHLORIDE 10 MG/ML
0.1 INJECTION, SOLUTION EPIDURAL; INFILTRATION; INTRACAUDAL; PERINEURAL AS NEEDED
Status: DISCONTINUED | OUTPATIENT
Start: 2021-01-02 | End: 2021-01-02 | Stop reason: HOSPADM

## 2021-01-02 RX ORDER — SODIUM CHLORIDE 0.9 % (FLUSH) 0.9 %
5-40 SYRINGE (ML) INJECTION EVERY 8 HOURS
Status: DISCONTINUED | OUTPATIENT
Start: 2021-01-02 | End: 2021-01-02 | Stop reason: HOSPADM

## 2021-01-02 RX ORDER — DIPHENHYDRAMINE HCL 25 MG
25 CAPSULE ORAL
COMMUNITY

## 2021-01-02 RX ADMIN — PROPOFOL 40 MG: 10 INJECTION, EMULSION INTRAVENOUS at 08:30

## 2021-01-02 RX ADMIN — PROPOFOL 40 MG: 10 INJECTION, EMULSION INTRAVENOUS at 08:51

## 2021-01-02 RX ADMIN — SODIUM CHLORIDE, POTASSIUM CHLORIDE, SODIUM LACTATE AND CALCIUM CHLORIDE: 600; 310; 30; 20 INJECTION, SOLUTION INTRAVENOUS at 08:00

## 2021-01-02 RX ADMIN — PROPOFOL 80 MG: 10 INJECTION, EMULSION INTRAVENOUS at 08:18

## 2021-01-02 RX ADMIN — HYDROMORPHONE HYDROCHLORIDE 0.5 MG: 1 INJECTION, SOLUTION INTRAMUSCULAR; INTRAVENOUS; SUBCUTANEOUS at 18:09

## 2021-01-02 RX ADMIN — PROPOFOL 40 MG: 10 INJECTION, EMULSION INTRAVENOUS at 08:25

## 2021-01-02 RX ADMIN — PROPOFOL 40 MG: 10 INJECTION, EMULSION INTRAVENOUS at 08:22

## 2021-01-02 RX ADMIN — PROPOFOL 40 MG: 10 INJECTION, EMULSION INTRAVENOUS at 08:32

## 2021-01-02 RX ADMIN — METHYLPREDNISOLONE SODIUM SUCCINATE 40 MG: 40 INJECTION, POWDER, FOR SOLUTION INTRAMUSCULAR; INTRAVENOUS at 10:21

## 2021-01-02 RX ADMIN — PROPOFOL 40 MG: 10 INJECTION, EMULSION INTRAVENOUS at 08:44

## 2021-01-02 RX ADMIN — PROMETHAZINE HYDROCHLORIDE 12.5 MG: 25 TABLET ORAL at 20:51

## 2021-01-02 RX ADMIN — Medication 10 ML: at 15:14

## 2021-01-02 RX ADMIN — SODIUM CHLORIDE, POTASSIUM CHLORIDE, SODIUM LACTATE AND CALCIUM CHLORIDE 100 ML/HR: 600; 310; 30; 20 INJECTION, SOLUTION INTRAVENOUS at 06:25

## 2021-01-02 RX ADMIN — Medication 10 ML: at 06:25

## 2021-01-02 RX ADMIN — PROPOFOL 40 MG: 10 INJECTION, EMULSION INTRAVENOUS at 08:38

## 2021-01-02 RX ADMIN — SODIUM CHLORIDE, POTASSIUM CHLORIDE, SODIUM LACTATE AND CALCIUM CHLORIDE 100 ML/HR: 600; 310; 30; 20 INJECTION, SOLUTION INTRAVENOUS at 10:26

## 2021-01-02 RX ADMIN — Medication 10 ML: at 22:00

## 2021-01-02 RX ADMIN — PROPOFOL 40 MG: 10 INJECTION, EMULSION INTRAVENOUS at 08:21

## 2021-01-02 RX ADMIN — PROPOFOL 40 MG: 10 INJECTION, EMULSION INTRAVENOUS at 08:34

## 2021-01-02 RX ADMIN — PROPOFOL 40 MG: 10 INJECTION, EMULSION INTRAVENOUS at 08:47

## 2021-01-02 RX ADMIN — HYDROMORPHONE HYDROCHLORIDE 0.5 MG: 1 INJECTION, SOLUTION INTRAMUSCULAR; INTRAVENOUS; SUBCUTANEOUS at 13:02

## 2021-01-02 RX ADMIN — HYDROMORPHONE HYDROCHLORIDE 0.5 MG: 1 INJECTION, SOLUTION INTRAMUSCULAR; INTRAVENOUS; SUBCUTANEOUS at 01:02

## 2021-01-02 RX ADMIN — PROPOFOL 40 MG: 10 INJECTION, EMULSION INTRAVENOUS at 08:23

## 2021-01-02 RX ADMIN — METHYLPREDNISOLONE SODIUM SUCCINATE 40 MG: 40 INJECTION, POWDER, FOR SOLUTION INTRAMUSCULAR; INTRAVENOUS at 20:52

## 2021-01-02 RX ADMIN — PROPOFOL 40 MG: 10 INJECTION, EMULSION INTRAVENOUS at 08:41

## 2021-01-02 RX ADMIN — LIDOCAINE HYDROCHLORIDE 80 MG: 20 INJECTION, SOLUTION EPIDURAL; INFILTRATION; INTRACAUDAL; PERINEURAL at 08:18

## 2021-01-02 RX ADMIN — PROMETHAZINE HYDROCHLORIDE 12.5 MG: 25 TABLET ORAL at 00:28

## 2021-01-02 RX ADMIN — PROPOFOL 40 MG: 10 INJECTION, EMULSION INTRAVENOUS at 08:19

## 2021-01-02 RX ADMIN — PROPOFOL 40 MG: 10 INJECTION, EMULSION INTRAVENOUS at 08:20

## 2021-01-02 NOTE — PROGRESS NOTES
1930  - Bedside report from Rn. Occ cramping RLQ. 2020 - Dilaudid and zofran for belly cramping and nausea. Drinking prep (arrived late). 0030 - Phenergan Po for nausea. 0100 - Dilaudid per request for pain. Has drank over 2000cc of prep; liquid brown / bileous returns thusfar, no camera. End of Shift Note    Bedside shift change report given to RN (oncoming nurse) by Jamal Arora RN (offgoing nurse). Report included the following information SBAR, Kardex, Intake/Output, MAR and Recent Results    Shift worked:  7p-7a     Shift summary and any significant changes:     no change in status. Pain and nausea persist, Did well w prep, all brown liquid return. No camera found. Concerns for physician to address:  colonoscopy     Zone phone for oncoming shift:          Activity:  Activity Level: Up ad val  Number times ambulated in hallways past shift: 0  Number of times OOB to chair past shift: 2    Cardiac:   Cardiac Monitoring: No      Cardiac Rhythm: Normal sinus rhythm    Access:   Current line(s): PIV     Genitourinary:   Urinary status: voiding    Respiratory:   O2 Device: Room air  Chronic home O2 use?: N/A  Incentive spirometer at bedside: N/A     GI:  Last Bowel Movement Date: 12/31/20  Current diet:  DIET CLEAR LIQUID  Passing flatus: YES  Tolerating current diet: YES       Pain Management:   Patient states pain is manageable on current regimen: YES    Skin:  Trevor Score: 21  Interventions: increase time out of bed    Patient Safety:  Fall Score:  Total Score: 1  Interventions: gripper socks       Length of Stay:  Expected LOS: 3d 0h  Actual LOS: ORIN Jones

## 2021-01-02 NOTE — PERIOP NOTES
TRANSFER - IN REPORT:    Verbal report received from ORIN Gu(name) on Missy Shine  being received from 3208(unit) for ordered procedure      Report consisted of patients Situation, Background, Assessment and   Recommendations(SBAR). Information from the following report(s) SBAR, Kardex, ED Summary, OR Summary, Procedure Summary, Intake/Output, MAR, Accordion, Recent Results, Med Rec Status, Alarm Parameters , Pre Procedure Checklist, Procedure Verification and Quality Measures was reviewed with the receiving nurse. Opportunity for questions and clarification was provided. Assessment completed upon patients arrival to unit and care assumed.

## 2021-01-02 NOTE — PROCEDURES
[address]  [address] [facility], [address] [place] [address] [address]  [phone]    Colonoscopy Operative Report  [employer]  [id number]  1985    Indications:  Stuck M2A capsule in terminal ileum     :  Gael Palm MD  Referring Provider: Edward Gamez MD    Sedation:  MAC anesthesia Propofol    Procedure Details:  After informed consent was obtained with all risks and benefits of procedure explained and preoperative exam completed, the patient was taken to the endoscopy suite and placed in the left lateral decubitus position. Upon sequential sedation as per above, a digital rectal exam was performed and was normal. The Olympus videocolonoscope  was inserted in the rectum and carefully advanced to the terminal ileum. The cecum was identified by the ileocecal valve and appendiceal orifice. The quality of preparation was excellent  The colonoscope was slowly withdrawn with careful evaluation between folds. Retroflexion in the rectum was completed. Findings:   Rectum: normal  Sigmoid: normal  Descending Colon: normal  Transverse Colon: normal  Ascending Colon: normal  Cecum: normal, one tiny polyp near the IC valve - removed via cold biopsy. .  Terminal Ileum: normal. I advanced the scope into the ileum about 20 cms and encountered a fibrotic stricture which was too tight for the scope to traverse. Sequential dilation of the stricture was performed. I started with an 8 mm balloon and gradually used larger devices up to a maximum dilation of 15 mm. Despite this we were still unable to get the scope through the fibrotic stricture. There was no active inflammation seen. I blindly placed a Candie Cough net through the narrowing  In hopes of grabbing the capsule  - to no avail. A small diverticulum was seen next to the stricture. I injected [place] ink just down stream from the stricture to tattoo the region and enhance localization for probable surgery soon.      No active Crohn's was seen in the colon or very distal ileum    Specimen Removed:  specimen #3, 4 mm in size, located in the cecum removed by cold biopsy and sent for pathology  Complications: None. EBL:  None. Impression: Foreign body removal unsuccessful, pill camera is still in the distal ileum                       Fibrotic stricture in distal ileum causing hang up of PillCam - area tattooed                        No active inflammation seen    Recommendations: --Await pathology. --Clear liquid diet only for now                                     --Surgery to remove PillCam with stricturoplasty (alternatively could do endoscopic stricturotomy with a needle knife but surgeon should be standing by if this is done)   .    Discharge Disposition:  Observe in the hospital.    Ruth Locke MD

## 2021-01-02 NOTE — PROGRESS NOTES
[address]  [address]  [phone]    Colonoscopy Operative Report  Aris Ocampo  [id number]  1985    Indications:  Stuck M2A capsule in terminal ileum     :  Santiago Gary MD  Referring Provider: Debbie Rincon MD    Sedation:  MAC anesthesia Propofol    Procedure Details:  After informed consent was obtained with all risks and benefits of procedure explained and preoperative exam completed, the patient was taken to the endoscopy suite and placed in the left lateral decubitus position. Upon sequential sedation as per above, a digital rectal exam was performed and was normal. The Olympus videocolonoscope  was inserted in the rectum and carefully advanced to the terminal ileum. The cecum was identified by the ileocecal valve and appendiceal orifice. The quality of preparation was EXCELLENT  The colonoscope was slowly withdrawn with careful evaluation between folds. Retroflexion in the rectum was completed. Findings:   Rectum: normal  Sigmoid: normal  Descending Colon: normal  Transverse Colon: normal  Ascending Colon: normal  Cecum: normal, one tiny polyp near the IC valve - removed via cold biopsy. .  Terminal Ileum: normal. I advanced the scope into the ileum about 20 cms and encountered a fibrotic stricture which was too tight for the scope to traverse. Sequential dilation of the stricture was performed. I started with an 8 mm balloon and gradually used larger devices up to a maximum dilation of 15 mm. Despite this we were still unable to get the scope through the fibrotic stricture. There was no active inflammation seen. I blindly placed a Tenny Mangle net through the narrowing  In hopes of grabbing the capsule  - to no avail. A small diverticulum was seen next to the stricture. I Injected [place] ink just down stream from the stricture to tattoo the region to enhance localization for probable surgery soon.      No active Crohn's was seen in the colon or very distal ileum    Specimen Removed:  specimen #3, 4 mm in size, located in the cecum removed by cold biopsy and sent for pathology  Complications: None. EBL:  None. Impression: Foreign body removal unsuccessful, pill camera is still in the distal ileum                       Fibrotic stricture in distal ileum causing hang up of PillCam - area tattooed                        No active inflammation seen    Recommendations: --Await pathology. --Clear liquid diet only for now                                     --Surgery to remove PillCam with stricturoplasty (alternatively could do endoscopic stricturotomy with a needle knife but surgeon should be standing by if this is done)   . Discharge Disposition:  Home in the company of a  when able to ambulate.     Yamile Saul MD

## 2021-01-02 NOTE — PROCEDURES
[address]  [address] [facility], [address]   [phone]    Colonoscopy Operative Report  Ovi Nails  [id number]  1985    Indications:  Stuck M2A capsule in terminal ileum     :  Eliz Pizarro MD  Referring Provider: Suni Pate MD    Sedation:  MAC anesthesia Propofol    Procedure Details:  After informed consent was obtained with all risks and benefits of procedure explained and preoperative exam completed, the patient was taken to the endoscopy suite and placed in the left lateral decubitus position. Upon sequential sedation as per above, a digital rectal exam was performed and was normal. The Olympus videocolonoscope  was inserted in the rectum and carefully advanced to the terminal ileum. The cecum was identified by the ileocecal valve and appendiceal orifice. The quality of preparation was excellent  The colonoscope was slowly withdrawn with careful evaluation between folds. Retroflexion in the rectum was completed. Findings:   Rectum: normal  Sigmoid: normal  Descending Colon: normal  Transverse Colon: normal  Ascending Colon: normal  Cecum: normal, one tiny polyp near the IC valve - removed via cold biopsy. .  Terminal Ileum: normal. I advanced the scope into the ileum about 20 cms and encountered a fibrotic stricture which was too tight for the scope to traverse. Sequential dilation of the stricture was performed. I started with an 8 mm balloon and gradually used larger devices up to a maximum dilation of 15 mm. Despite this we were still unable to get the scope through the fibrotic stricture. There was no active inflammation seen. I blindly placed a Joselyn Hanane net through the narrowing  In hopes of grabbing the capsule  - to no avail. A small diverticulum was seen next to the stricture. Injected [place] ink just down stream from the stricture to enhance localization for probable surgery soon.      No active Crohn's was seen in the colon or very distal ileum    Specimen Removed:  specimen #3, 4 mm in size, located in the cecum removed by cold biopsy and sent for pathology  Complications: None. EBL:  None. Impression: Foreign body removal unsuccessful, pill camera is still in the distal ileum                       Fibrotic stricture in distal ileum causing hang up of PillCam - area tattooed                        No active inflammation seen    Recommendations: --Await pathology. --Clear liquid diet only for now                                     --Surgery to remove PillCam with stricturoplasty (alternatively could do endoscopic stricturotomy with a needle knife but surgeon should be standing by if this is done)   .    Discharge Disposition:  Observe in the hospital.    Dulce Rodríguez MD

## 2021-01-02 NOTE — ANESTHESIA POSTPROCEDURE EVALUATION
Procedure(s):  COLONOSCOPY.    total IV anesthesia    Anesthesia Post Evaluation        Patient location during evaluation: PACU  Note status: Adequate. Level of consciousness: responsive to verbal stimuli and sleepy but conscious  Pain management: satisfactory to patient  Airway patency: patent  Anesthetic complications: no  Cardiovascular status: acceptable  Respiratory status: acceptable  Hydration status: acceptable  Comments: +Post-Anesthesia Evaluation and Assessment    Patient: Julian Huston MRN: [id number]  SSN: [id number]   YOB: 1985  Age: 28 y.o. Sex: female      Cardiovascular Function/Vital Signs    /73 (BP 1 Location: Right arm, BP Patient Position: At rest;Lying left side)   Pulse 96   Temp 37.1 °C (98.7 °F)   Resp 15   Ht 5' 2\" (1.575 m)   Wt 90 kg (198 lb 6.6 oz)   SpO2 97%   BMI 36.29 kg/m²     Patient is status post Procedure(s):  COLONOSCOPY. Nausea/Vomiting: Controlled. Postoperative hydration reviewed and adequate. Pain:  Pain Scale 1: Numeric (0 - 10) (01/02/21 0930)  Pain Intensity 1: 4 (01/02/21 0930)   Managed. Neurological Status:   Neuro (WDL): Exceptions to WDL (01/02/21 0900)   At baseline. Mental Status and Level of Consciousness: Arousable. Pulmonary Status:   O2 Device: Room air (01/02/21 0930)   Adequate oxygenation and airway patent. Complications related to anesthesia: None    Post-anesthesia assessment completed. No concerns. Signed By: Jennifer Yoder MD    1/2/2021  Post anesthesia nausea and vomiting:  controlled      INITIAL Post-op Vital signs:   Vitals Value Taken Time   /73 01/02/21 0930   Temp 37.1 °C (98.7 °F) 01/02/21 0902   Pulse 88 01/02/21 0944   Resp 15 01/02/21 0944   SpO2 96 % 01/02/21 0944   Vitals shown include unvalidated device data.

## 2021-01-02 NOTE — PROGRESS NOTES
End of Shift Note    Bedside shift change report given to ORIN Storey (oncoming nurse) by Gilford Likens (offgoing nurse). Report included the following information SBAR, Kardex, Procedure Summary, Intake/Output and MAR    Shift worked:  7a7p     Shift summary and any significant changes:     pain/nausea. Colonoscopy 1/2. Bowel prep when arrives to unit. Stop at 3 am , NPO after 3 am per MD     Concerns for physician to address:  colonoscopy 1/2 at 0800     Zone phone for oncoming shift:          Activity:  Activity Level: Up ad val  Number times ambulated in hallways past shift: 0  Number of times OOB to chair past shift: 2    Cardiac:   Cardiac Monitoring: No      Cardiac Rhythm: Normal sinus rhythm    Access:   Current line(s): PIV     Genitourinary:   Urinary status: voiding    Respiratory:   O2 Device: Room air  Chronic home O2 use?: NO  Incentive spirometer at bedside: NO     GI:  Last Bowel Movement Date: 12/31/20  Current diet:  DIET CLEAR LIQUID  Passing flatus: YES  Tolerating current diet: YES       Pain Management:   Patient states pain is manageable on current regimen: YES    Skin:  Trevor Score: 21  Interventions: float heels and increase time out of bed    Patient Safety:  Fall Score:  Total Score: 1  Interventions: gripper socks       Length of Stay:  Expected LOS: 3d 0h  Actual LOS: [address]

## 2021-01-02 NOTE — PROGRESS NOTES
Hospitalist Progress Note    NAME: German Mares   :  1985   MRN:  [id number]       Assessment / Plan:    Abd. Pain/N/V  2/2 below POA  Pillcam is stuck within distal ileum POA  Crohn's disease  CT abd  1. No evidence of acute process in the abdomen or pelvis. 2. PillCam lies within the distal ileum in the right lower quadrant. 3. Hepatic steatosis.     Xray abd KUB  MPRESSION: Nonspecific intestinal gas pattern. PillCam overlies the right lower  quadrant and likely lies within the distal ileum or cecum     On CLD     GI on board : started on solumedrol 40mg bid + laxatives to  Allow pullcam passage  Surgery consulted in case conservative doesn't work , pt might need retrieval of pillcam via surgery   Pt c/o pain while eating   Repeat KUB unchanged   Had a colonoscopy today , GI was unable to retrieve the pill cam because of lots of strictures   Plan for surgery on Monday     Other PMhX  Hx of Crohn's disease with stricturing/inflammation and fistula/microperforation  In the past. F/u  GI Marline Denise MD   Anxiety and depression  GERD  IBS  Asthma, not currently in exacerbation  Holding PO meds 2/2 npo status        Code Status: Full code  Surrogate Decision Maker: [address] Spouse [phone]            DVT Prophylaxis: scd  GI Prophylaxis: not indicated     Baseline: independent      30.0 - 39.9 Obese / Body mass index is 36.29 kg/m². Subjective:     Chief Complaint / Reason for Physician Visit  Seen after colonoscopy , reported sore throat  \     Discussed with RN events overnight.      Review of Systems:  Symptom Y/N Comments  Symptom Y/N Comments   Fever/Chills    Chest Pain n    Poor Appetite y   Edema     Cough    Abdominal Pain y    Sputum    Joint Pain     SOB/BENAVIDES n   Pruritis/Rash     Nausea/vomit y   Tolerating PT/OT     Diarrhea    Tolerating Diet     Constipation    Other       Could NOT obtain due to:      Objective:     VITALS:   Last 24hrs VS reviewed since prior progress note. Most recent are:  Patient Vitals for the past 24 hrs:   Temp Pulse Resp BP SpO2   01/02/21 1005  (!) 104 16 127/69 95 %   01/02/21 0945 98.7 °F (37.1 °C) 92 16 124/68 96 %   01/02/21 0930  96 15 125/73 97 %   01/02/21 0915  79 20 137/78 98 %   01/02/21 0910  97 23 134/84 97 %   01/02/21 0905  84 23 (!) 140/77 96 %   01/02/21 0902 98.7 °F (37.1 °C) 91 19 138/82 (!) 31 %   01/02/21 0900 98.7 °F (37.1 °C) 91 18 138/82 95 %   01/02/21 0732 98.5 °F (36.9 °C) 85 19 (!) 154/87 99 %   01/02/21 0322 97.6 °F (36.4 °C) 60 18 (!) 154/91 93 %   01/01/21 2327 97.6 °F (36.4 °C) 67 18 (!) 173/86 95 %   01/01/21 1926 98.2 °F (36.8 °C) 77 18 (!) 153/102 99 %   01/01/21 1224  88 16 (!) 154/88 96 %       Intake/Output Summary (Last 24 hours) at 1/2/2021 1027  Last data filed at 1/2/2021 0855  Gross per 24 hour   Intake 5085 ml   Output 5 ml   Net 5080 ml        I had a face to face encounter and independently examined this patient on 1/2/2021, as outlined below:  PHYSICAL EXAM:  General: WD, WN. Alert, cooperative, no acute distress    EENT:  EOMI. Anicteric sclerae. MMM  Resp:  CTA bilaterally, no wheezing or rales. No accessory muscle use  CV:  Regular  rhythm,  No edema  GI:  Soft, Non distended, Non tender. +Bowel sounds  Neurologic:  Alert and oriented X 3, normal speech,   Psych:   Good insight. Not anxious nor agitated  Skin:  No rashes.   No jaundice    Reviewed most current lab test results and cultures  YES  Reviewed most current radiology test results   YES  Review and summation of old records today    NO  Reviewed patient's current orders and MAR    YES  PMH/SH reviewed - no change compared to H&P  ________________________________________________________________________  Care Plan discussed with:    Comments   Patient x    Family      RN x    Care Manager     Consultant                        Multidiciplinary team rounds were held today with , nursing, pharmacist and clinical coordinator. Patient's plan of care was discussed; medications were reviewed and discharge planning was addressed. ________________________________________________________________________  Total NON critical care TIME: 25  Minutes    Total CRITICAL CARE TIME Spent:   Minutes non procedure based      Comments   >50% of visit spent in counseling and coordination of care     ________________________________________________________________________  Yvette Ravi MD     Procedures: see electronic medical records for all procedures/Xrays and details which were not copied into this note but were reviewed prior to creation of Plan. LABS:  I reviewed today's most current labs and imaging studies.   Pertinent labs include:  Recent Labs     12/31/20  0411   WBC 7.4   HGB 13.0   HCT 41.2        Recent Labs     12/31/20  0411      K 4.0   *   CO2 21   GLU 86   BUN 7   CREA 0.63   CA 8.7   ALB 2.8*   TBILI 0.6   ALT 25       Signed: Yvette Ravi MD

## 2021-01-02 NOTE — PROGRESS NOTES
End of Shift Note    Bedside shift change report given to Marvel Sidhu RN (oncoming nurse) by Gabrielle Multani (offgoing nurse). Report included the following information SBAR, Kardex, Intake/Output, MAR, Recent Results and Med Rec Status    Shift worked:  0700 - 1930     Shift summary and any significant changes:     Surgery for tomorrow morning     Concerns for physician to address:  none     Zone phone for oncoming shift:   [phone]       Activity:  Activity Level: Up ad val  Number times ambulated in hallways past shift: 5  Number of times OOB to chair past shift: 5    Cardiac:   Cardiac Monitoring: No      Cardiac Rhythm: Normal sinus rhythm    Access:   Current line(s): PIV     Genitourinary:   Urinary status: voiding    Respiratory:   O2 Device: Room air  Chronic home O2 use?: NO  Incentive spirometer at bedside: NO     GI:  Last Bowel Movement Date: 01/02/21  Current diet:  DIET CLEAR LIQUID  DIET NPO  Passing flatus: YES  Tolerating current diet: YES       Pain Management:   Patient states pain is manageable on current regimen: YES    Skin:  Trevor Score: 22  Interventions: increase time out of bed    Patient Safety:  Fall Score:  Total Score: 1  Interventions: pt to call before getting OOB       Length of Stay:  Expected LOS: 3d 0h  Actual LOS: [address]

## 2021-01-02 NOTE — PROGRESS NOTES
[address]  [address] [facility], [address]   [phone]    Colonoscopy Operative Report  Coit Books  [id number]  1985    Indications:  Stuck M2A capsule in terminal ileum     :  Gael Palm MD  Referring Provider: Edward Gamez MD    Sedation:  MAC anesthesia Propofol    Procedure Details:  After informed consent was obtained with all risks and benefits of procedure explained and preoperative exam completed, the patient was taken to the endoscopy suite and placed in the left lateral decubitus position. Upon sequential sedation as per above, a digital rectal exam was performed and was normal. The Olympus videocolonoscope  was inserted in the rectum and carefully advanced to the terminal ileum. The cecum was identified by the ileocecal valve and appendiceal orifice. The quality of preparation was EXCELLENT  The colonoscope was slowly withdrawn with careful evaluation between folds. Retroflexion in the rectum was completed. Findings:   Rectum: normal  Sigmoid: normal  Descending Colon: normal  Transverse Colon: normal  Ascending Colon: normal  Cecum: normal, on tiny polyp near the IC valve - removed via cold biopsy. .  Terminal Ileum: normal. I advanced the scope into the ileum about 20 cms and encountered a fibrotic stricture which was too tight for the scope to traverse. Sequential dilation of the stricture was performed. I started with an 8 mm balloon and gradually used larger devices up to a maximum dilation of 15 mm. Despite this we were still unable to get the scope through the fibrotic stricture. There was no active inflammation seen. I blindly placed a Candie Cough net through the narrowing  In hopes of grabbing the capsule  - to no avail. A small diverticulum was seen next to the stricture. Injected [place] ink just down stream from the stricture to enhance localization for probable surgery soon.      No active Crohn's was seen in the colon or very distal ileum    Specimen Removed:  specimen #3, 4 mm in size, located in the cecum removed by cold biopsy and sent for pathology  Complications: None. EBL:  None. Impression: Foreign body removal unsuccessful, pill camera is still in the distal ileum                       Fibrotic stricture in distal ileum causing hang up of PillCam - area tattooed                        No active inflammation seen    Recommendations: --Await pathology. --Clear liquid diet only for now                                     --Surgery to remove PillCam with stricturoplasty (alternatively could do endoscopic stricturotomy with a needle knife but surgeon should be standing by if this is done)   .    Discharge Disposition:  Observe in the hospital     Ruth Locke MD

## 2021-01-02 NOTE — PROCEDURES
[address]  [phone]    Colonoscopy Operative Report  Marilyn Santoyo  [id number]  1985    Indications:  Stuck M2A capsule in terminal ileum     :  Juliane Steele MD  Referring Provider: Shravan Davis MD    Sedation:  MAC anesthesia Propofol    Procedure Details:  After informed consent was obtained with all risks and benefits of procedure explained and preoperative exam completed, the patient was taken to the endoscopy suite and placed in the left lateral decubitus position. Upon sequential sedation as per above, a digital rectal exam was performed and was normal. The Olympus videocolonoscope  was inserted in the rectum and carefully advanced to the terminal ileum. The cecum was identified by the ileocecal valve and appendiceal orifice. The quality of preparation was excellent  The colonoscope was slowly withdrawn with careful evaluation between folds. Retroflexion in the rectum was completed. Findings:   Rectum: normal  Sigmoid: normal  Descending Colon: normal  Transverse Colon: normal  Ascending Colon: normal  Cecum: normal, one tiny polyp near the IC valve - removed via cold biopsy. .  Terminal Ileum: normal. I advanced the scope into the ileum about 20 cms and encountered a fibrotic stricture which was too tight for the scope to traverse. Sequential dilation of the stricture was performed. I started with an 8 mm balloon and gradually used larger devices up to a maximum dilation of 15 mm. Despite this we were still unable to get the scope through the fibrotic stricture. There was no active inflammation seen. I blindly placed a Michial Cullens net through the narrowing  In hopes of grabbing the capsule  - to no avail. A small diverticulum was seen next to the stricture. I injected [place] ink just down stream from the stricture to tattoo the region and enhance localization for probable surgery soon.      No active Crohn's was seen in the colon or very distal ileum    Specimen Removed:  specimen #3, 4 mm in size, located in the cecum removed by cold biopsy and sent for pathology  Complications: None. EBL:  None. Impression: Foreign body removal unsuccessful, pill camera is still in the distal ileum                       Fibrotic stricture in distal ileum causing hang up of PillCam - area tattooed                        No active inflammation seen    Recommendations: --Await pathology. --Clear liquid diet only for now                                     --Surgery to remove PillCam with stricturoplasty (alternatively could do endoscopic stricturotomy with a needle knife but surgeon should be standing by if this is done)   .    Discharge Disposition:  Observe in the hospital.    Fernando Millard MD

## 2021-01-02 NOTE — ANESTHESIA PREPROCEDURE EVALUATION
Anesthetic History     PONV          Review of Systems / Medical History  Patient summary reviewed, nursing notes reviewed and pertinent labs reviewed    Pulmonary            Asthma     Comments: Former smoker  Hx bronchitis   Neuro/Psych         Psychiatric history    Comments: Anxiety  Depression Cardiovascular  Within defined limits                Exercise tolerance: >4 METS     GI/Hepatic/Renal     GERD    Renal disease: stones  PUD    Comments: IBS  Crohn's dz Endo/Other        Obesity     Other Findings   Comments: Pill camera stuck in terminal ileum  TMJ syndrome       Anesthetic History     PONV (denies motion sickness)          Review of Systems / Medical History  Patient summary reviewed, nursing notes reviewed and pertinent labs reviewed    Pulmonary          Smoker  Asthma : well controlled    Comments: Former smoker   Neuro/Psych   Within defined limits           Cardiovascular  Within defined limits                Exercise tolerance: >4 METS     GI/Hepatic/Renal     GERD: well controlled    Renal disease: stones      Comments: IBS Endo/Other        Obesity     Other Findings   Comments: IBS    TMJ syndrome, worse on the left side         Physical Exam    Airway  Mallampati: II  TM Distance: 4 - 6 cm  Neck ROM: normal range of motion   Mouth opening: Normal     Cardiovascular    Rhythm: regular  Rate: normal      Pertinent negatives: No murmur   Dental    Dentition: Implants     Pulmonary  Breath sounds clear to auscultation               Abdominal  GI exam deferred       Other Findings            Anesthetic Plan    ASA: 2  Anesthesia type: general    Monitoring Plan: BIS      Induction: Intravenous  Anesthetic plan and risks discussed with: Patient      Previous grade 1 view for hysterectomy here        Physical Exam    Airway  Mallampati: II  TM Distance: 4 - 6 cm  Neck ROM: normal range of motion   Mouth opening: Normal     Cardiovascular  Regular rate and rhythm,  S1 and S2 normal,  no murmur, click, rub, or gallop             Dental    Dentition: Implants     Pulmonary  Breath sounds clear to auscultation               Abdominal  GI exam deferred       Other Findings            Anesthetic Plan    ASA: 2  Anesthesia type: total IV anesthesia          Induction: Intravenous  Anesthetic plan and risks discussed with: Patient      Previous grade 1 view for hysterectomy here

## 2021-01-02 NOTE — PROGRESS NOTES
SURGERY PROGRESS NOTE      Admit Date: 2020    POD Day of Surgery    Procedure: Procedure(s):  COLONOSCOPY      Subjective:     Patient seen in recovery room. Patient has no complaints. Objective:     Visit Vitals  /69   Pulse (!) 104   Temp 98.7 °F (37.1 °C)   Resp 16   Ht 5' 2\" (1.575 m)   Wt 90 kg (198 lb 6.6 oz)   LMP 10/08/2018   SpO2 95%   BMI 36.29 kg/m²        Temp (24hrs), Av.3 °F (36.8 °C), Min:97.6 °F (36.4 °C), Max:98.7 °F (37.1 °C)      701 -  1900  In: 820 [I.V.:820]  Out: 5   1901 -  0700  In: 3845 [P.O.:3150; I.V.:2065]  Out: 200 [Urine:200]    Physical Exam:    General:  alert, cooperative, no distress, appears stated age   Abdomen: soft, distended, bowel sounds active, non-tender           Lab Results   Component Value Date/Time    WBC 7.4 2020 04:11 AM    HGB 13.0 2020 04:11 AM    HCT 41.2 2020 04:11 AM    PLATELET 804 15/86/3983 04:11 AM    MCV 86.4 2020 04:11 AM     Lab Results   Component Value Date/Time    GFR est non-AA >60 2020 04:11 AM    GFR est AA >60 2020 04:11 AM    Creatinine 0.63 2020 04:11 AM    BUN 7 2020 04:11 AM    Sodium 138 2020 04:11 AM    Potassium 4.0 2020 04:11 AM    Chloride 110 (H) 2020 04:11 AM    CO2 21 2020 04:11 AM    Magnesium 2.2 2020 09:15 AM    Phosphorus 3.1 2020 09:15 AM       Assessment:     Active Problems:    Abdominal pain (2019)      Crohn disease ([address] (2019)      Hypokalemia (2020)      Nausea & vomiting (2020)      Foreign body in small intestine (2020)    Patient with TI stricture and stuck Pill cam.  Endoscopic removal unsuccessful.   Recommend ileocecectomy in the morning to allow time for gas from endoscopy to pass

## 2021-01-02 NOTE — PERIOP NOTES
Handoff Report from Operating Room to PACU    Report received from JESSICA Garcia and PATRICIO De Souza RN  regarding Juhi Auburn. Surgeon(s):  Gricelda Munguia MD  And Procedure(s) (LRB):  COLONOSCOPY (N/A)  confirmed   with allergies and dressings discussed. Anesthesia type, drugs, patient history, complications, estimated blood loss, vital signs, intake and output, and last pain medication, lines, reversal medications and temperature were reviewed. TRANSFER - OUT REPORT:    Verbal report given to Olinda Johnson RN (name) on Juhi Auburn  being transferred to Ortho(unit) for routine post - op       Report consisted of patients Situation, Background, Assessment and   Recommendations(SBAR). Information from the following report(s) SBAR, Kardex, OR Summary, Procedure Summary, Intake/Output, MAR, Recent Results, Med Rec Status and Cardiac Rhythm NSR was reviewed with the receiving nurse. Lines:   Peripheral IV 12/30/20 Left Antecubital (Active)   Site Assessment Clean, dry, & intact 01/02/21 0900   Phlebitis Assessment 0 01/02/21 0900   Infiltration Assessment 0 01/02/21 0900   Dressing Status Clean, dry, & intact 01/02/21 0900   Dressing Type Tape;Transparent 01/02/21 0900   Hub Color/Line Status Blue; Infusing 01/02/21 0900        Opportunity for questions and clarification was provided. Patient transported with:   Registered Nurse, patient's glasses transported to room with patient. Dr. Luz Rosales spoke with patient's  and provided update.

## 2021-01-03 ENCOUNTER — APPOINTMENT (OUTPATIENT)
Dept: GENERAL RADIOLOGY | Age: 36
DRG: 329 | End: 2021-01-03
Attending: SURGERY
Payer: COMMERCIAL

## 2021-01-03 ENCOUNTER — ANESTHESIA (OUTPATIENT)
Dept: SURGERY | Age: 36
DRG: 329 | End: 2021-01-03
Payer: COMMERCIAL

## 2021-01-03 ENCOUNTER — ANESTHESIA EVENT (OUTPATIENT)
Dept: SURGERY | Age: 36
DRG: 329 | End: 2021-01-03
Payer: COMMERCIAL

## 2021-01-03 LAB
ABO + RH BLD: NORMAL
ANION GAP SERPL CALC-SCNC: 5 MMOL/L (ref 5–15)
BLOOD GROUP ANTIBODIES SERPL: NORMAL
BUN SERPL-MCNC: 9 MG/DL (ref 6–20)
BUN/CREAT SERPL: 13 (ref 12–20)
CALCIUM SERPL-MCNC: 8.6 MG/DL (ref 8.5–10.1)
CHLORIDE SERPL-SCNC: 107 MMOL/L (ref 97–108)
CO2 SERPL-SCNC: 26 MMOL/L (ref 21–32)
CREAT SERPL-MCNC: 0.7 MG/DL (ref 0.55–1.02)
ERYTHROCYTE [DISTWIDTH] IN BLOOD BY AUTOMATED COUNT: 12.8 % (ref 11.5–14.5)
GLUCOSE SERPL-MCNC: 94 MG/DL (ref 65–100)
HCT VFR BLD AUTO: 39.5 % (ref 35–47)
HGB BLD-MCNC: 12.8 G/DL (ref 11.5–16)
MCH RBC QN AUTO: 27.8 PG (ref 26–34)
MCHC RBC AUTO-ENTMCNC: 32.4 G/DL (ref 30–36.5)
MCV RBC AUTO: 85.9 FL (ref 80–99)
NRBC # BLD: 0 K/UL (ref 0–0.01)
NRBC BLD-RTO: 0 PER 100 WBC
PLATELET # BLD AUTO: 317 K/UL (ref 150–400)
PMV BLD AUTO: 10.2 FL (ref 8.9–12.9)
POTASSIUM SERPL-SCNC: 3.7 MMOL/L (ref 3.5–5.1)
RBC # BLD AUTO: 4.6 M/UL (ref 3.8–5.2)
SODIUM SERPL-SCNC: 138 MMOL/L (ref 136–145)
SPECIMEN EXP DATE BLD: NORMAL
WBC # BLD AUTO: 7.7 K/UL (ref 3.6–11)

## 2021-01-03 PROCEDURE — 77030016151 HC PROTCTR LNS DFOG COVD -B: Performed by: SURGERY

## 2021-01-03 PROCEDURE — 77030040361 HC SLV COMPR DVT MDII -B: Performed by: SURGERY

## 2021-01-03 PROCEDURE — 77030016154: Performed by: SURGERY

## 2021-01-03 PROCEDURE — 77030002895 HC DEV VASC CLOSR COVD -B: Performed by: SURGERY

## 2021-01-03 PROCEDURE — 77030019908 HC STETH ESOPH SIMS -A: Performed by: ANESTHESIOLOGY

## 2021-01-03 PROCEDURE — 74011250637 HC RX REV CODE- 250/637: Performed by: SURGERY

## 2021-01-03 PROCEDURE — 88307 TISSUE EXAM BY PATHOLOGIST: CPT

## 2021-01-03 PROCEDURE — 74011000250 HC RX REV CODE- 250: Performed by: NURSE ANESTHETIST, CERTIFIED REGISTERED

## 2021-01-03 PROCEDURE — 36415 COLL VENOUS BLD VENIPUNCTURE: CPT

## 2021-01-03 PROCEDURE — 65270000029 HC RM PRIVATE

## 2021-01-03 PROCEDURE — 74011250636 HC RX REV CODE- 250/636: Performed by: HOSPITALIST

## 2021-01-03 PROCEDURE — 77030008771 HC TU NG SALEM SUMP -A: Performed by: ANESTHESIOLOGY

## 2021-01-03 PROCEDURE — 77030013079 HC BLNKT BAIR HGGR 3M -A: Performed by: ANESTHESIOLOGY

## 2021-01-03 PROCEDURE — 80048 BASIC METABOLIC PNL TOTAL CA: CPT

## 2021-01-03 PROCEDURE — 74011000258 HC RX REV CODE- 258: Performed by: SURGERY

## 2021-01-03 PROCEDURE — 85027 COMPLETE CBC AUTOMATED: CPT

## 2021-01-03 PROCEDURE — 0DTH4ZZ RESECTION OF CECUM, PERCUTANEOUS ENDOSCOPIC APPROACH: ICD-10-PCS | Performed by: SURGERY

## 2021-01-03 PROCEDURE — 76060000038 HC ANESTHESIA 3.5 TO 4 HR: Performed by: SURGERY

## 2021-01-03 PROCEDURE — 74011250636 HC RX REV CODE- 250/636: Performed by: STUDENT IN AN ORGANIZED HEALTH CARE EDUCATION/TRAINING PROGRAM

## 2021-01-03 PROCEDURE — 44160 REMOVAL OF COLON: CPT | Performed by: SURGERY

## 2021-01-03 PROCEDURE — 77030033639 HC SHR ENDO COAG HARM 36 J&J -E: Performed by: SURGERY

## 2021-01-03 PROCEDURE — 77030026438 HC STYL ET INTUB CARD -A: Performed by: ANESTHESIOLOGY

## 2021-01-03 PROCEDURE — 2709999900 HC NON-CHARGEABLE SUPPLY: Performed by: SURGERY

## 2021-01-03 PROCEDURE — 77030008684 HC TU ET CUF COVD -B: Performed by: ANESTHESIOLOGY

## 2021-01-03 PROCEDURE — 77030020829: Performed by: SURGERY

## 2021-01-03 PROCEDURE — 77030005513 HC CATH URETH FOL11 MDII -B: Performed by: SURGERY

## 2021-01-03 PROCEDURE — 77030018836 HC SOL IRR NACL ICUM -A: Performed by: SURGERY

## 2021-01-03 PROCEDURE — 74011000250 HC RX REV CODE- 250: Performed by: SURGERY

## 2021-01-03 PROCEDURE — 86850 RBC ANTIBODY SCREEN: CPT

## 2021-01-03 PROCEDURE — 77030002966 HC SUT PDS J&J -A: Performed by: SURGERY

## 2021-01-03 PROCEDURE — 77030009968 HC RELD STPLR ENDOSC J&J -D: Performed by: SURGERY

## 2021-01-03 PROCEDURE — 74011250636 HC RX REV CODE- 250/636: Performed by: NURSE PRACTITIONER

## 2021-01-03 PROCEDURE — 74011250637 HC RX REV CODE- 250/637: Performed by: NURSE ANESTHETIST, CERTIFIED REGISTERED

## 2021-01-03 PROCEDURE — 77030002996 HC SUT SLK J&J -A: Performed by: SURGERY

## 2021-01-03 PROCEDURE — 77030021678 HC GLIDESCP STAT DISP VERT -B: Performed by: ANESTHESIOLOGY

## 2021-01-03 PROCEDURE — 74018 RADEX ABDOMEN 1 VIEW: CPT

## 2021-01-03 PROCEDURE — 77030020747 HC TU INSUF ENDOSC TELE -A: Performed by: SURGERY

## 2021-01-03 PROCEDURE — 77030031139 HC SUT VCRL2 J&J -A: Performed by: SURGERY

## 2021-01-03 PROCEDURE — 76010000134 HC OR TIME 3.5 TO 4 HR: Performed by: SURGERY

## 2021-01-03 PROCEDURE — 77030036732 HC RELD STPLR VASC J&J -F: Performed by: SURGERY

## 2021-01-03 PROCEDURE — 77030002933 HC SUT MCRYL J&J -A: Performed by: SURGERY

## 2021-01-03 PROCEDURE — 0DBB4ZZ EXCISION OF ILEUM, PERCUTANEOUS ENDOSCOPIC APPROACH: ICD-10-PCS | Performed by: SURGERY

## 2021-01-03 PROCEDURE — 77030037032 HC INSRT SCIS CLICKLLINE DISP STOR -B: Performed by: SURGERY

## 2021-01-03 PROCEDURE — 74011250636 HC RX REV CODE- 250/636: Performed by: NURSE ANESTHETIST, CERTIFIED REGISTERED

## 2021-01-03 PROCEDURE — 74011250636 HC RX REV CODE- 250/636: Performed by: INTERNAL MEDICINE

## 2021-01-03 PROCEDURE — 77030012770 HC TRCR OPT FX AMR -B: Performed by: SURGERY

## 2021-01-03 PROCEDURE — 74011250636 HC RX REV CODE- 250/636: Performed by: ANESTHESIOLOGY

## 2021-01-03 PROCEDURE — 77030008756 HC TU IRR SUC STRY -B: Performed by: SURGERY

## 2021-01-03 PROCEDURE — 77030040922 HC BLNKT HYPOTHRM STRY -A

## 2021-01-03 PROCEDURE — 2709999900 HC NON-CHARGEABLE SUPPLY

## 2021-01-03 PROCEDURE — 76210000063 HC OR PH I REC FIRST 0.5 HR: Performed by: SURGERY

## 2021-01-03 PROCEDURE — 77030008606 HC TRCR ENDOSC KII AMR -B: Performed by: SURGERY

## 2021-01-03 PROCEDURE — 74011250637 HC RX REV CODE- 250/637: Performed by: HOSPITALIST

## 2021-01-03 RX ORDER — SODIUM CHLORIDE 0.9 % (FLUSH) 0.9 %
5-40 SYRINGE (ML) INJECTION EVERY 8 HOURS
Status: DISCONTINUED | OUTPATIENT
Start: 2021-01-03 | End: 2021-01-03 | Stop reason: HOSPADM

## 2021-01-03 RX ORDER — SODIUM CHLORIDE 0.9 % (FLUSH) 0.9 %
5-40 SYRINGE (ML) INJECTION AS NEEDED
Status: DISCONTINUED | OUTPATIENT
Start: 2021-01-03 | End: 2021-01-03 | Stop reason: HOSPADM

## 2021-01-03 RX ORDER — ALBUTEROL SULFATE 90 UG/1
AEROSOL, METERED RESPIRATORY (INHALATION) AS NEEDED
Status: DISCONTINUED | OUTPATIENT
Start: 2021-01-03 | End: 2021-01-03 | Stop reason: HOSPADM

## 2021-01-03 RX ORDER — MIDAZOLAM HYDROCHLORIDE 1 MG/ML
INJECTION, SOLUTION INTRAMUSCULAR; INTRAVENOUS AS NEEDED
Status: DISCONTINUED | OUTPATIENT
Start: 2021-01-03 | End: 2021-01-03 | Stop reason: HOSPADM

## 2021-01-03 RX ORDER — HYDROMORPHONE HYDROCHLORIDE 2 MG/ML
INJECTION, SOLUTION INTRAMUSCULAR; INTRAVENOUS; SUBCUTANEOUS AS NEEDED
Status: DISCONTINUED | OUTPATIENT
Start: 2021-01-03 | End: 2021-01-03 | Stop reason: HOSPADM

## 2021-01-03 RX ORDER — BUPIVACAINE HYDROCHLORIDE AND EPINEPHRINE 5; 5 MG/ML; UG/ML
INJECTION, SOLUTION EPIDURAL; INTRACAUDAL; PERINEURAL AS NEEDED
Status: DISCONTINUED | OUTPATIENT
Start: 2021-01-03 | End: 2021-01-03 | Stop reason: HOSPADM

## 2021-01-03 RX ORDER — DEXAMETHASONE SODIUM PHOSPHATE 4 MG/ML
INJECTION, SOLUTION INTRA-ARTICULAR; INTRALESIONAL; INTRAMUSCULAR; INTRAVENOUS; SOFT TISSUE AS NEEDED
Status: DISCONTINUED | OUTPATIENT
Start: 2021-01-03 | End: 2021-01-03 | Stop reason: HOSPADM

## 2021-01-03 RX ORDER — ROCURONIUM BROMIDE 10 MG/ML
INJECTION, SOLUTION INTRAVENOUS AS NEEDED
Status: DISCONTINUED | OUTPATIENT
Start: 2021-01-03 | End: 2021-01-03 | Stop reason: HOSPADM

## 2021-01-03 RX ORDER — SODIUM CHLORIDE, SODIUM LACTATE, POTASSIUM CHLORIDE, CALCIUM CHLORIDE 600; 310; 30; 20 MG/100ML; MG/100ML; MG/100ML; MG/100ML
25 INJECTION, SOLUTION INTRAVENOUS CONTINUOUS
Status: DISCONTINUED | OUTPATIENT
Start: 2021-01-03 | End: 2021-01-03 | Stop reason: HOSPADM

## 2021-01-03 RX ORDER — SUCCINYLCHOLINE CHLORIDE 20 MG/ML
INJECTION INTRAMUSCULAR; INTRAVENOUS AS NEEDED
Status: DISCONTINUED | OUTPATIENT
Start: 2021-01-03 | End: 2021-01-03 | Stop reason: HOSPADM

## 2021-01-03 RX ORDER — MORPHINE SULFATE 10 MG/ML
2 INJECTION, SOLUTION INTRAMUSCULAR; INTRAVENOUS
Status: DISCONTINUED | OUTPATIENT
Start: 2021-01-03 | End: 2021-01-03 | Stop reason: HOSPADM

## 2021-01-03 RX ORDER — SODIUM CHLORIDE 0.9 % (FLUSH) 0.9 %
5-40 SYRINGE (ML) INJECTION AS NEEDED
Status: DISCONTINUED | OUTPATIENT
Start: 2021-01-03 | End: 2021-01-10 | Stop reason: HOSPADM

## 2021-01-03 RX ORDER — ONDANSETRON 2 MG/ML
4 INJECTION INTRAMUSCULAR; INTRAVENOUS AS NEEDED
Status: DISCONTINUED | OUTPATIENT
Start: 2021-01-03 | End: 2021-01-03 | Stop reason: HOSPADM

## 2021-01-03 RX ORDER — ONDANSETRON 2 MG/ML
INJECTION INTRAMUSCULAR; INTRAVENOUS AS NEEDED
Status: DISCONTINUED | OUTPATIENT
Start: 2021-01-03 | End: 2021-01-03 | Stop reason: HOSPADM

## 2021-01-03 RX ORDER — GLYCOPYRROLATE 0.2 MG/ML
INJECTION INTRAMUSCULAR; INTRAVENOUS AS NEEDED
Status: DISCONTINUED | OUTPATIENT
Start: 2021-01-03 | End: 2021-01-03 | Stop reason: HOSPADM

## 2021-01-03 RX ORDER — FENTANYL CITRATE 50 UG/ML
25 INJECTION, SOLUTION INTRAMUSCULAR; INTRAVENOUS
Status: DISCONTINUED | OUTPATIENT
Start: 2021-01-03 | End: 2021-01-03 | Stop reason: HOSPADM

## 2021-01-03 RX ORDER — DIPHENHYDRAMINE HYDROCHLORIDE 50 MG/ML
12.5 INJECTION, SOLUTION INTRAMUSCULAR; INTRAVENOUS AS NEEDED
Status: DISCONTINUED | OUTPATIENT
Start: 2021-01-03 | End: 2021-01-03 | Stop reason: HOSPADM

## 2021-01-03 RX ORDER — LIDOCAINE HYDROCHLORIDE 20 MG/ML
INJECTION, SOLUTION EPIDURAL; INFILTRATION; INTRACAUDAL; PERINEURAL AS NEEDED
Status: DISCONTINUED | OUTPATIENT
Start: 2021-01-03 | End: 2021-01-03 | Stop reason: HOSPADM

## 2021-01-03 RX ORDER — HYDROMORPHONE HYDROCHLORIDE 1 MG/ML
.2-.5 INJECTION, SOLUTION INTRAMUSCULAR; INTRAVENOUS; SUBCUTANEOUS
Status: DISCONTINUED | OUTPATIENT
Start: 2021-01-03 | End: 2021-01-03 | Stop reason: HOSPADM

## 2021-01-03 RX ORDER — LABETALOL HYDROCHLORIDE 5 MG/ML
INJECTION, SOLUTION INTRAVENOUS AS NEEDED
Status: DISCONTINUED | OUTPATIENT
Start: 2021-01-03 | End: 2021-01-03 | Stop reason: HOSPADM

## 2021-01-03 RX ORDER — SODIUM CHLORIDE 9 MG/ML
50 INJECTION, SOLUTION INTRAVENOUS CONTINUOUS
Status: DISPENSED | OUTPATIENT
Start: 2021-01-03 | End: 2021-01-03

## 2021-01-03 RX ORDER — FENTANYL CITRATE 50 UG/ML
INJECTION, SOLUTION INTRAMUSCULAR; INTRAVENOUS AS NEEDED
Status: DISCONTINUED | OUTPATIENT
Start: 2021-01-03 | End: 2021-01-03 | Stop reason: HOSPADM

## 2021-01-03 RX ORDER — LIDOCAINE HYDROCHLORIDE 10 MG/ML
0.1 INJECTION, SOLUTION EPIDURAL; INFILTRATION; INTRACAUDAL; PERINEURAL AS NEEDED
Status: DISCONTINUED | OUTPATIENT
Start: 2021-01-03 | End: 2021-01-03 | Stop reason: HOSPADM

## 2021-01-03 RX ORDER — NEOSTIGMINE METHYLSULFATE 1 MG/ML
INJECTION, SOLUTION INTRAVENOUS AS NEEDED
Status: DISCONTINUED | OUTPATIENT
Start: 2021-01-03 | End: 2021-01-03 | Stop reason: HOSPADM

## 2021-01-03 RX ORDER — PROPOFOL 10 MG/ML
INJECTION, EMULSION INTRAVENOUS AS NEEDED
Status: DISCONTINUED | OUTPATIENT
Start: 2021-01-03 | End: 2021-01-03 | Stop reason: HOSPADM

## 2021-01-03 RX ORDER — SODIUM CHLORIDE 0.9 % (FLUSH) 0.9 %
5-40 SYRINGE (ML) INJECTION EVERY 8 HOURS
Status: DISCONTINUED | OUTPATIENT
Start: 2021-01-03 | End: 2021-01-10 | Stop reason: HOSPADM

## 2021-01-03 RX ADMIN — MORPHINE SULFATE 1 MG: 2 INJECTION, SOLUTION INTRAMUSCULAR; INTRAVENOUS at 22:28

## 2021-01-03 RX ADMIN — CEFOTETAN DISODIUM 2 G: 2 INJECTION, POWDER, FOR SOLUTION INTRAMUSCULAR; INTRAVENOUS at 13:00

## 2021-01-03 RX ADMIN — ONDANSETRON 4 MG: 2 INJECTION INTRAMUSCULAR; INTRAVENOUS at 17:50

## 2021-01-03 RX ADMIN — PROMETHAZINE HYDROCHLORIDE 12.5 MG: 25 TABLET ORAL at 22:39

## 2021-01-03 RX ADMIN — LABETALOL HYDROCHLORIDE 5 MG: 5 INJECTION, SOLUTION INTRAVENOUS at 15:14

## 2021-01-03 RX ADMIN — DEXAMETHASONE SODIUM PHOSPHATE 4 MG: 4 INJECTION, SOLUTION INTRAMUSCULAR; INTRAVENOUS at 13:20

## 2021-01-03 RX ADMIN — Medication 10 ML: at 05:56

## 2021-01-03 RX ADMIN — ONDANSETRON HYDROCHLORIDE 4 MG: 2 INJECTION, SOLUTION INTRAMUSCULAR; INTRAVENOUS at 16:00

## 2021-01-03 RX ADMIN — HYDROMORPHONE HYDROCHLORIDE 0.4 MG: 2 INJECTION, SOLUTION INTRAMUSCULAR; INTRAVENOUS; SUBCUTANEOUS at 13:03

## 2021-01-03 RX ADMIN — METHYLPREDNISOLONE SODIUM SUCCINATE 40 MG: 40 INJECTION, POWDER, FOR SOLUTION INTRAMUSCULAR; INTRAVENOUS at 08:11

## 2021-01-03 RX ADMIN — GLYCOPYRROLATE 0.4 MG: 0.2 INJECTION, SOLUTION INTRAMUSCULAR; INTRAVENOUS at 16:24

## 2021-01-03 RX ADMIN — Medication 10 ML: at 17:51

## 2021-01-03 RX ADMIN — SODIUM CHLORIDE, POTASSIUM CHLORIDE, SODIUM LACTATE AND CALCIUM CHLORIDE 25 ML/HR: 600; 310; 30; 20 INJECTION, SOLUTION INTRAVENOUS at 11:30

## 2021-01-03 RX ADMIN — MIDAZOLAM HYDROCHLORIDE 2 MG: 1 INJECTION, SOLUTION INTRAMUSCULAR; INTRAVENOUS at 12:35

## 2021-01-03 RX ADMIN — ROCURONIUM BROMIDE 25 MG: 10 INJECTION INTRAVENOUS at 12:50

## 2021-01-03 RX ADMIN — ROCURONIUM BROMIDE 5 MG: 10 INJECTION INTRAVENOUS at 12:44

## 2021-01-03 RX ADMIN — ROCURONIUM BROMIDE 10 MG: 10 INJECTION INTRAVENOUS at 15:35

## 2021-01-03 RX ADMIN — HYDROMORPHONE HYDROCHLORIDE 0.5 MG: 1 INJECTION, SOLUTION INTRAMUSCULAR; INTRAVENOUS; SUBCUTANEOUS at 08:11

## 2021-01-03 RX ADMIN — ROCURONIUM BROMIDE 10 MG: 10 INJECTION INTRAVENOUS at 14:38

## 2021-01-03 RX ADMIN — Medication 10 ML: at 22:00

## 2021-01-03 RX ADMIN — LABETALOL HYDROCHLORIDE 5 MG: 5 INJECTION, SOLUTION INTRAVENOUS at 13:20

## 2021-01-03 RX ADMIN — FENTANYL CITRATE 100 MCG: 50 INJECTION, SOLUTION INTRAMUSCULAR; INTRAVENOUS at 12:44

## 2021-01-03 RX ADMIN — FENTANYL CITRATE 25 MCG: 50 INJECTION, SOLUTION INTRAMUSCULAR; INTRAVENOUS at 16:45

## 2021-01-03 RX ADMIN — ALBUTEROL SULFATE 3 PUFF: 90 AEROSOL, METERED RESPIRATORY (INHALATION) at 14:17

## 2021-01-03 RX ADMIN — SUCCINYLCHOLINE CHLORIDE 140 MG: 20 INJECTION, SOLUTION INTRAMUSCULAR; INTRAVENOUS at 12:44

## 2021-01-03 RX ADMIN — SODIUM CHLORIDE 50 ML/HR: 9 INJECTION, SOLUTION INTRAVENOUS at 22:27

## 2021-01-03 RX ADMIN — HYDROMORPHONE HYDROCHLORIDE 0.4 MG: 2 INJECTION, SOLUTION INTRAMUSCULAR; INTRAVENOUS; SUBCUTANEOUS at 13:13

## 2021-01-03 RX ADMIN — Medication 3 MG: at 16:24

## 2021-01-03 RX ADMIN — SODIUM CHLORIDE, POTASSIUM CHLORIDE, SODIUM LACTATE AND CALCIUM CHLORIDE: 600; 310; 30; 20 INJECTION, SOLUTION INTRAVENOUS at 15:02

## 2021-01-03 RX ADMIN — HYDROMORPHONE HYDROCHLORIDE 0.2 MG: 2 INJECTION, SOLUTION INTRAMUSCULAR; INTRAVENOUS; SUBCUTANEOUS at 14:41

## 2021-01-03 RX ADMIN — HYDROMORPHONE HYDROCHLORIDE 0.2 MG: 2 INJECTION, SOLUTION INTRAMUSCULAR; INTRAVENOUS; SUBCUTANEOUS at 16:14

## 2021-01-03 RX ADMIN — Medication 1 AMPULE: at 21:00

## 2021-01-03 RX ADMIN — PROPOFOL 200 MG: 10 INJECTION, EMULSION INTRAVENOUS at 12:44

## 2021-01-03 RX ADMIN — METHYLPREDNISOLONE SODIUM SUCCINATE 40 MG: 40 INJECTION, POWDER, FOR SOLUTION INTRAMUSCULAR; INTRAVENOUS at 21:00

## 2021-01-03 RX ADMIN — ROCURONIUM BROMIDE 10 MG: 10 INJECTION INTRAVENOUS at 14:02

## 2021-01-03 RX ADMIN — Medication 3 AMPULE: at 11:29

## 2021-01-03 RX ADMIN — ROCURONIUM BROMIDE 10 MG: 10 INJECTION INTRAVENOUS at 13:13

## 2021-01-03 RX ADMIN — LIDOCAINE HYDROCHLORIDE 100 MG: 20 INJECTION, SOLUTION INTRAVENOUS at 12:44

## 2021-01-03 RX ADMIN — ROCURONIUM BROMIDE 10 MG: 10 INJECTION INTRAVENOUS at 15:04

## 2021-01-03 NOTE — PERIOP NOTES
TRANSFER - IN REPORT:    Verbal report received from Malini Tay RN  (name) on Jenn Zhong  being received from Ortho (unit) for ordered procedure      Report consisted of patients Situation, Background, Assessment and   Recommendations(SBAR). Information from the following report(s) SBAR, Kardex, MAR and Med Rec Status was reviewed with the receiving nurse. Opportunity for questions and clarification was provided. Assessment completed upon patients arrival to unit and care assumed.

## 2021-01-03 NOTE — PROGRESS NOTES
Problem: Falls - Risk of  Goal: *Absence of Falls  Description: Document Luis A Wick Fall Risk and appropriate interventions in the flowsheet.   Outcome: Progressing Towards Goal  Note: Fall Risk Interventions:            Medication Interventions: Assess postural VS orthostatic hypotension, Evaluate medications/consider consulting pharmacy, Patient to call before getting OOB, Teach patient to arise slowly, Utilize gait belt for transfers/ambulation                   Problem: Patient Education: Go to Patient Education Activity  Goal: Patient/Family Education  Outcome: Progressing Towards Goal

## 2021-01-03 NOTE — ANESTHESIA POSTPROCEDURE EVALUATION
Procedure(s):  LAPAROSCOPIC ILEOCECECTOMY, POSSIBLE OPEN (URGENT). general    Anesthesia Post Evaluation        Patient location during evaluation: PACU  Note status: Adequate. Level of consciousness: responsive to verbal stimuli and sleepy but conscious  Pain management: satisfactory to patient  Airway patency: patent  Anesthetic complications: no  Cardiovascular status: acceptable  Respiratory status: acceptable  Hydration status: acceptable  Comments: +Post-Anesthesia Evaluation and Assessment    Patient: Jamie Mayes MRN: [id number]  SSN: [id number]   YOB: 1985  Age: 28 y.o. Sex: female      Cardiovascular Function/Vital Signs    BP (!) 147/91 (BP 1 Location: Right arm, BP Patient Position: At rest)   Pulse 76   Temp 37.3 °C (99.2 °F)   Resp 21   Ht 5' 2\" (1.575 m)   Wt 90 kg (198 lb 6.6 oz)   SpO2 99%   BMI 36.29 kg/m²     Patient is status post Procedure(s):  LAPAROSCOPIC ILEOCECECTOMY, POSSIBLE OPEN (URGENT). Nausea/Vomiting: Controlled. Postoperative hydration reviewed and adequate. Pain:  Pain Scale 1: Numeric (0 - 10) (01/03/21 1730)  Pain Intensity 1: 6 (01/03/21 1730)   Managed. Neurological Status:   Neuro (WDL): Exceptions to WDL (01/03/21 1634)   At baseline. Mental Status and Level of Consciousness: Arousable. Pulmonary Status:   O2 Device: Nasal cannula (01/03/21 1730)   Adequate oxygenation and airway patent. Complications related to anesthesia: None    Post-anesthesia assessment completed. No concerns. Signed By: Erika Salcido MD    1/3/2021  Post anesthesia nausea and vomiting:  controlled      INITIAL Post-op Vital signs:   Vitals Value Taken Time   /91 01/03/21 1730   Temp 37.3 °C (99.2 °F) 01/03/21 1638   Pulse 79 01/03/21 1733   Resp 19 01/03/21 1733   SpO2 98 % 01/03/21 1733   Vitals shown include unvalidated device data.

## 2021-01-03 NOTE — ANESTHESIA PREPROCEDURE EVALUATION
Anesthetic History     PONV          Review of Systems / Medical History  Patient summary reviewed, nursing notes reviewed and pertinent labs reviewed    Pulmonary            Asthma     Comments: Former smoker  Hx bronchitis   Neuro/Psych         Psychiatric history    Comments: Anxiety  Depression Cardiovascular  Within defined limits                Exercise tolerance: >4 METS     GI/Hepatic/Renal     GERD    Renal disease: stones  PUD    Comments: IBS  Crohn's dz Endo/Other        Obesity     Other Findings   Comments: Pill camera stuck in terminal ileum  TMJ syndrome       Anesthetic History     PONV (denies motion sickness)          Review of Systems / Medical History  Patient summary reviewed, nursing notes reviewed and pertinent labs reviewed    Pulmonary          Smoker  Asthma : well controlled    Comments: Former smoker   Neuro/Psych   Within defined limits           Cardiovascular  Within defined limits                Exercise tolerance: >4 METS     GI/Hepatic/Renal     GERD: well controlled    Renal disease: stones      Comments: IBS Endo/Other        Obesity     Other Findings   Comments: IBS    TMJ syndrome, worse on the left side         Physical Exam    Airway  Mallampati: II  TM Distance: 4 - 6 cm  Neck ROM: normal range of motion   Mouth opening: Normal     Cardiovascular    Rhythm: regular  Rate: normal      Pertinent negatives: No murmur   Dental    Dentition: Implants     Pulmonary  Breath sounds clear to auscultation               Abdominal  GI exam deferred       Other Findings            Anesthetic Plan    ASA: 2  Anesthesia type: general    Monitoring Plan: BIS      Induction: Intravenous  Anesthetic plan and risks discussed with: Patient      Previous grade 1 view for hysterectomy here        Physical Exam    Airway  Mallampati: II  TM Distance: 4 - 6 cm  Neck ROM: normal range of motion   Mouth opening: Normal     Cardiovascular  Regular rate and rhythm,  S1 and S2 normal,  no murmur, click, rub, or gallop             Dental    Dentition: Implants     Pulmonary  Breath sounds clear to auscultation               Abdominal  GI exam deferred       Other Findings            Anesthetic Plan    ASA: 2, emergent  Anesthesia type: general          Induction: Intravenous  Anesthetic plan and risks discussed with: Patient      Previous grade 1 view for hysterectomy here

## 2021-01-03 NOTE — PERIOP NOTES
Handoff Report from Operating Room to PACU    Report received from PATRICIO De Souza RN and Nita Mcnamara CRNA regarding Mylene Polo. Surgeon(s):  Jaymie Martinez MD  And Procedure(s) (LRB):  LAPAROSCOPIC ILEOCECECTOMY, POSSIBLE OPEN (URGENT) (N/A)  confirmed   with allergies and dressings discussed. Anesthesia type, drugs, patient history, complications, estimated blood loss, vital signs, intake and output, and last pain medication, lines, reversal medications and temperature were reviewed.

## 2021-01-03 NOTE — PROGRESS NOTES
Hospitalist Progress Note    NAME: Jaylen Rivers   :  1985   MRN:  [id number]       Assessment / Plan:  Abd. Pain/N/V  2/2 below POA  Pillcam is stuck within distal ileum POA  Crohn's disease  S/p LAPAROSCOPIC ILEOCECECTOMY, POSSIBLE OPEN (URGENT) on   CT abd  1. No evidence of acute process in the abdomen or pelvis. 2. PillCam lies within the distal ileum in the right lower quadrant. 3. Hepatic steatosis.     Xray abd KUB  MPRESSION: Nonspecific intestinal gas pattern. PillCam overlies the right lower  quadrant and likely lies within the distal ileum or cecum     On CLD     GI on board : started on solumedrol 40mg bid + laxatives to  Allow pullcam passage  Surgery consulted in case conservative doesn't work , pt might need retrieval of pillcam via surgery   Repeat KUB unchanged   Had a colonoscopy today , GI was unable to retrieve the pill cam because of lots of strictures   Patient has been in the OR    Other PMhX  Hx of Crohn's disease with stricturing/inflammation and fistula/microperforation  In the past. F/u  GI Nel Ayers MD   Anxiety and depression  GERD  IBS  Asthma, not currently in exacerbation  Holding PO meds 2/2 npo status        Code Status: Full code  Surrogate Decision Maker: [id number] Spouse [phone]            DVT Prophylaxis: scd  GI Prophylaxis: not indicated     Baseline: independent      30.0 - 39.9 Obese / Body mass index is 36.29 kg/m². Subjective:     Chief Complaint / Reason for Physician Visit  Seen in the PACU . C/o abdominal pain , sleepy from anesthesia . Per  RN surgery went well . Discussed with RN events overnight.      Review of Systems:  Symptom Y/N Comments  Symptom Y/N Comments   Fever/Chills    Chest Pain     Poor Appetite    Edema     Cough    Abdominal Pain     Sputum    Joint Pain     SOB/BENAVIDES    Pruritis/Rash     Nausea/vomit    Tolerating PT/OT     Diarrhea    Tolerating Diet     Constipation    Other       Could NOT obtain due to:       Objective:     VITALS:   Last 24hrs VS reviewed since prior progress note. Most recent are:  Patient Vitals for the past 24 hrs:   Temp Pulse Resp BP SpO2   01/03/21 1128 98.1 °F (36.7 °C) 72 17 (!) 162/97 98 %   01/03/21 1107 98.3 °F (36.8 °C) 78 18 (!) 143/92 95 %   01/03/21 0749 98.2 °F (36.8 °C) 69 18 (!) 130/97 97 %   01/03/21 0000 98.2 °F (36.8 °C) 99 18 132/78 99 %   01/02/21 2058 98.2 °F (36.8 °C) (!) 101 18 135/79 98 %       Intake/Output Summary (Last 24 hours) at 1/3/2021 1627  Last data filed at 1/3/2021 1625  Gross per 24 hour   Intake 3401.67 ml   Output 600 ml   Net 2801.67 ml        Physical exam   I had a face to face encounter and independently examined this patient on 1/2/2021, as outlined below:  PHYSICAL EXAM:  General:          WD, WN. Sleepy , no acute distress    EENT:              EOMI. Anicteric sclerae. MMM  Resp:               CTA bilaterally, no wheezing or rales. No accessory muscle use  CV:                  Regular  rhythm,  No edema  GI:                   Soft, Non distended, Non tender.  +Bowel sounds  Neurologic:      sleepy    Psych:   Good insight. Not anxious nor agitated  Skin:                No rashes. No jaundice  Reviewed most current lab test results and cultures  YES  Reviewed most current radiology test results   YES  Review and summation of old records today    NO  Reviewed patient's current orders and MAR    YES  PMH/SH reviewed - no change compared to H&P  ________________________________________________________________________  Care Plan discussed with:    Comments   Patient x    Family      RN x    Care Manager     Consultant                        Multidiciplinary team rounds were held today with , nursing, pharmacist and clinical coordinator. Patient's plan of care was discussed; medications were reviewed and discharge planning was addressed.      ________________________________________________________________________  Total NON critical care TIME: 25Minutes    Total CRITICAL CARE TIME Spent:   Minutes non procedure based      Comments   >50% of visit spent in counseling and coordination of care     ________________________________________________________________________  Fili Summers MD     Procedures: see electronic medical records for all procedures/Xrays and details which were not copied into this note but were reviewed prior to creation of Plan. LABS:  I reviewed today's most current labs and imaging studies.   Pertinent labs include:  Recent Labs     01/03/21  0403   WBC 7.7   HGB 12.8   HCT 39.5        Recent Labs     01/03/21  0403      K 3.7      CO2 26   GLU 94   BUN 9   CREA 0.70   CA 8.6       Signed: Fili Summers MD

## 2021-01-03 NOTE — BRIEF OP NOTE
Brief Postoperative Note    Patient: Mylene Polo  YOB: 1985  MRN: [id number]    Date of Procedure: 1/3/2021     Pre-Op Diagnosis: PILL CAMERA TERMINAL ILEUM    Post-Op Diagnosis: Same as preoperative diagnosis. Procedure(s):  LAPAROSCOPIC ILEOCECECTOMY, POSSIBLE OPEN (URGENT)    Surgeon(s):  Jaymie Martinez MD    Surgical Assistant: Surg Asst-1: Ida Benton    Anesthesia: General     Estimated Blood Loss (mL): Minimal    Complications: None    Specimens:   ID Type Source Tests Collected by Time Destination   1 : CECUM Preservative Cecum  Jaymie Martinez MD 1/3/2021 1553 Pathology        Implants: * No implants in log *    Drains: * No LDAs found *    Findings: 1) tattoo dye throughout the abdomen. 2)adhesions in the RLQ 3) pill cam stuck in the previous anastomosis.  4) on gross inspection pill cam stuck in blind pouch of the anastomosis           Electronically Signed by Tomsa Arora MD on 1/3/2021 at 4:06 PM

## 2021-01-04 LAB
ERYTHROCYTE [DISTWIDTH] IN BLOOD BY AUTOMATED COUNT: 13 % (ref 11.5–14.5)
HCT VFR BLD AUTO: 44.7 % (ref 35–47)
HGB BLD-MCNC: 14.3 G/DL (ref 11.5–16)
MCH RBC QN AUTO: 27 PG (ref 26–34)
MCHC RBC AUTO-ENTMCNC: 32 G/DL (ref 30–36.5)
MCV RBC AUTO: 84.5 FL (ref 80–99)
NRBC # BLD: 0 K/UL (ref 0–0.01)
NRBC BLD-RTO: 0 PER 100 WBC
PLATELET # BLD AUTO: 195 K/UL (ref 150–400)
PMV BLD AUTO: 11.9 FL (ref 8.9–12.9)
RBC # BLD AUTO: 5.29 M/UL (ref 3.8–5.2)
WBC # BLD AUTO: 16.5 K/UL (ref 3.6–11)

## 2021-01-04 PROCEDURE — 74011250636 HC RX REV CODE- 250/636: Performed by: NURSE PRACTITIONER

## 2021-01-04 PROCEDURE — 85027 COMPLETE CBC AUTOMATED: CPT

## 2021-01-04 PROCEDURE — 74011250637 HC RX REV CODE- 250/637: Performed by: SURGERY

## 2021-01-04 PROCEDURE — 77010033678 HC OXYGEN DAILY

## 2021-01-04 PROCEDURE — 74011250636 HC RX REV CODE- 250/636: Performed by: STUDENT IN AN ORGANIZED HEALTH CARE EDUCATION/TRAINING PROGRAM

## 2021-01-04 PROCEDURE — 74011250636 HC RX REV CODE- 250/636: Performed by: INTERNAL MEDICINE

## 2021-01-04 PROCEDURE — 36415 COLL VENOUS BLD VENIPUNCTURE: CPT

## 2021-01-04 PROCEDURE — 74011250636 HC RX REV CODE- 250/636: Performed by: HOSPITALIST

## 2021-01-04 PROCEDURE — 65270000029 HC RM PRIVATE

## 2021-01-04 RX ORDER — HYDROMORPHONE HYDROCHLORIDE 1 MG/ML
1 INJECTION, SOLUTION INTRAMUSCULAR; INTRAVENOUS; SUBCUTANEOUS
Status: DISCONTINUED | OUTPATIENT
Start: 2021-01-04 | End: 2021-01-10 | Stop reason: HOSPADM

## 2021-01-04 RX ADMIN — Medication 10 ML: at 23:18

## 2021-01-04 RX ADMIN — Medication 10 ML: at 14:02

## 2021-01-04 RX ADMIN — HYDROMORPHONE HYDROCHLORIDE 1 MG: 1 INJECTION, SOLUTION INTRAMUSCULAR; INTRAVENOUS; SUBCUTANEOUS at 14:01

## 2021-01-04 RX ADMIN — HYDROMORPHONE HYDROCHLORIDE 0.5 MG: 1 INJECTION, SOLUTION INTRAMUSCULAR; INTRAVENOUS; SUBCUTANEOUS at 02:45

## 2021-01-04 RX ADMIN — MORPHINE SULFATE 1 MG: 2 INJECTION, SOLUTION INTRAMUSCULAR; INTRAVENOUS at 04:56

## 2021-01-04 RX ADMIN — Medication 10 ML: at 05:28

## 2021-01-04 RX ADMIN — SODIUM CHLORIDE, POTASSIUM CHLORIDE, SODIUM LACTATE AND CALCIUM CHLORIDE 125 ML/HR: 600; 310; 30; 20 INJECTION, SOLUTION INTRAVENOUS at 19:33

## 2021-01-04 RX ADMIN — METHYLPREDNISOLONE SODIUM SUCCINATE 40 MG: 40 INJECTION, POWDER, FOR SOLUTION INTRAMUSCULAR; INTRAVENOUS at 08:40

## 2021-01-04 RX ADMIN — HYDROMORPHONE HYDROCHLORIDE 0.5 MG: 1 INJECTION, SOLUTION INTRAMUSCULAR; INTRAVENOUS; SUBCUTANEOUS at 08:38

## 2021-01-04 RX ADMIN — SODIUM CHLORIDE, POTASSIUM CHLORIDE, SODIUM LACTATE AND CALCIUM CHLORIDE 100 ML/HR: 600; 310; 30; 20 INJECTION, SOLUTION INTRAVENOUS at 08:42

## 2021-01-04 RX ADMIN — Medication 1 AMPULE: at 21:28

## 2021-01-04 RX ADMIN — Medication 1 AMPULE: at 10:34

## 2021-01-04 RX ADMIN — Medication 10 ML: at 05:29

## 2021-01-04 RX ADMIN — MORPHINE SULFATE 1 MG: 2 INJECTION, SOLUTION INTRAMUSCULAR; INTRAVENOUS at 10:34

## 2021-01-04 RX ADMIN — HYDROMORPHONE HYDROCHLORIDE 1 MG: 1 INJECTION, SOLUTION INTRAMUSCULAR; INTRAVENOUS; SUBCUTANEOUS at 19:10

## 2021-01-04 NOTE — PROGRESS NOTES
Bedside and Verbal shift change report given to [facility] Helen (oncoming nurse) by Leland Banks (offgoing nurse). Report included the following information SBAR, Kardex, Intake/Output, MAR and Recent Results.

## 2021-01-04 NOTE — PROGRESS NOTES
Hospitalist Progress Note    NAME: Ravi Alonzo   :  1985   MRN:  [id number]       Assessment / Plan:  Abd. Pain/N/V  2/2 below POA  Pillcam is stuck within distal ileum POA  Crohn's disease  S/p LAPAROSCOPIC ILEOCECECTOMY, POSSIBLE OPEN (URGENT) on   CT abd  1. No evidence of acute process in the abdomen or pelvis. 2. PillCam lies within the distal ileum in the right lower quadrant. 3. Hepatic steatosis.     Xray abd KUB  MPRESSION: Nonspecific intestinal gas pattern. PillCam overlies the right lower  quadrant and likely lies within the distal ileum or cecum     On CLD     GI on board : started on solumedrol 40mg bid + laxatives to  Allow pullcam passage  Surgery consulted in case conservative doesn't work , pt might need retrieval of pillcam via surgery   Repeat KUB unchanged   S/p colonoscopy on , GI was unable to retrieve the pill cam because of lots of strictures   Had surgery on , pillcam removed     Leucocytosis   Wbc 16k , most likely reactive  Other PMhX  Hx of Crohn's disease with stricturing/inflammation and fistula/microperforation  In the past. F/u  GI Vijaya Walker MD   Anxiety and depression  GERD  IBS  Asthma, not currently in exacerbation  Holding PO meds 2/2 npo status        Code Status: Full code  Surrogate Decision Maker: [address] Spouse [phone]            DVT Prophylaxis: scd  GI Prophylaxis: not indicated     Baseline: independent      30.0 - 39.9 Obese / Body mass index is 36.29 kg/m². Subjective:     Chief Complaint / Reason for Physician Visit  C/o abdominal pain , reported that her pain is not well  Controlled  . Discussed with RN events overnight.      Review of Systems:  Symptom Y/N Comments  Symptom Y/N Comments   Fever/Chills n   Chest Pain n    Poor Appetite y   Edema     Cough    Abdominal Pain y    Sputum    Joint Pain     SOB/BENAVIDES    Pruritis/Rash     Nausea/vomit n   Tolerating PT/OT     Diarrhea    Tolerating Diet y Constipation    Other       Could NOT obtain due to:       Objective:     VITALS:   Last 24hrs VS reviewed since prior progress note. Most recent are:  Patient Vitals for the past 24 hrs:   Temp Pulse Resp BP SpO2   01/04/21 0813 (P) 98.4 °F (36.9 °C) (!) (P) 107 (P) 18 (P) 135/87 (P) 96 %   01/04/21 0456 98.6 °F (37 °C) 90 18 (!) 148/88 97 %   01/04/21 0031 98.6 °F (37 °C) 96 16 (!) 150/90 97 %   01/03/21 1950 98.6 °F (37 °C) 94 18 (!) 152/93 96 %   01/03/21 1857 98.1 °F (36.7 °C) 96 18 (!) 165/93 97 %   01/03/21 1802 98 °F (36.7 °C) 79 18 (!) 147/92 95 %   01/03/21 1745 98.6 °F (37 °C) 91 18 (!) 149/93 92 %   01/03/21 1730  76 21 (!) 147/91 99 %   01/03/21 1715  73 20 (!) 158/89 98 %   01/03/21 1700  72 23 (!) 157/87 98 %   01/03/21 1650  70 22 (!) 151/87 98 %   01/03/21 1645  79 25 (!) 147/84 97 %   01/03/21 1640  81 26 (!) 154/83 95 %   01/03/21 1638 99.2 °F (37.3 °C) 87 28 (!) 150/79 94 %   01/03/21 1635  87 26 (!) 145/83 94 %   01/03/21 1634 99.2 °F (37.3 °C) 87 28 (!) 150/79 94 %   01/03/21 1128 98.1 °F (36.7 °C) 72 17 (!) 162/97 98 %   01/03/21 1107 98.3 °F (36.8 °C) 78 18 (!) 143/92 95 %       Intake/Output Summary (Last 24 hours) at 1/4/2021 0845  Last data filed at 1/3/2021 2228  Gross per 24 hour   Intake 3581.67 ml   Output 650 ml   Net 2931.67 ml        Physical exam   I had a face to face encounter and independently examined this patient on 1/2/2021, as outlined below:  PHYSICAL EXAM:  General:          WD, WN. Sleepy , no acute distress    EENT:              EOMI. Anicteric sclerae. MMM  Resp:               CTA bilaterally, no wheezing or rales. No accessory muscle use  CV:                  Regular  rhythm,  No edema  GI:                   Soft, Non distended, Non tender.  +Bowel sounds  Neurologic:      sleepy    Psych:   Good insight. Not anxious nor agitated  Skin:                No rashes.   No jaundice  Reviewed most current lab test results and cultures  YES  Reviewed most current radiology test results   YES  Review and summation of old records today    NO  Reviewed patient's current orders and MAR    YES  PMH/SH reviewed - no change compared to H&P  ________________________________________________________________________  Care Plan discussed with:    Comments   Patient x    Family      RN x    Care Manager     Consultant                        Multidiciplinary team rounds were held today with , nursing, pharmacist and clinical coordinator. Patient's plan of care was discussed; medications were reviewed and discharge planning was addressed. ________________________________________________________________________  Total NON critical care TIME: 25Minutes    Total CRITICAL CARE TIME Spent:   Minutes non procedure based      Comments   >50% of visit spent in counseling and coordination of care     ________________________________________________________________________  Jacoby Ramirez MD     Procedures: see electronic medical records for all procedures/Xrays and details which were not copied into this note but were reviewed prior to creation of Plan. LABS:  I reviewed today's most current labs and imaging studies.   Pertinent labs include:  Recent Labs     01/04/21  0306 01/03/21  0403   WBC 16.5* 7.7   HGB 14.3 12.8   HCT 44.7 39.5    317     Recent Labs     01/03/21  0403      K 3.7      CO2 26   GLU 94   BUN 9   CREA 0.70   CA 8.6       Signed: Jacoby Ramirez MD

## 2021-01-04 NOTE — PROGRESS NOTES
GI PROGRESS NOTE  Theresa Esquivel, KRISTOFER  [phone] NP in-hospital cell phone M-F until 4:30  After 5pm or on weekends, please call  for physician on call    Earnestine Crowley :1985 UUF:[id number]   ATTG: Dr Robles Pearson   PCP: Haroon Walls MD  Date/Time:  2021 11:49 AM     Primary GI: Dr. Sacha Shea    Reason for following: Abdominal pain with pillcam in distal ileum    Assessment:   Abdominal pain - post op appropriate  Retained pillcam removed via surgery after multiple attempts to pass with bowel regimen and colonoscopy  · Colonsocopy 21 - Foreign body removal unsuccessful, pill camera is still in the distal ileum. Fibrotic stricture in distal ileum causing hang up of PillCam - area tattooed. No active inflammation seen  · S/p Ileocecectomy by Dr Alexis Magana 51 - laproscoplically - surgically sites look well appearing. · CT scan 20  hows pill cam lying within the distal ileum in the right lower quadrant  · Patient has a hx of the retained pill-cam that required surgical intervention 2019     Hx of ? Crohn's disease   · Previous CT scan (2020) showed stricturing/inflammation and fistula/microperfortion   · Colonoscopy in  by Dr. Rosa Mackay showed no evidence of any IBD or Crohn's   · Colonoscopy in 3/2020 by Dr Sacha Shea normal with I/E hemorrhoids, bx normal TI, and random colon. · No path diagnosis   · No fevers, chills. · On humira - due for next injection Graham 1/10/21     Plan:   · Stop steroids  · Diet per surgery  · Await surgical pathology results - evaluate for crohns - last time was endometriosis  · Continue miralax  · Send Pill cam away to be downloaded for later to be reviewed by Dr Sacha Shea. · Rest per primary team    Okay to take humira when discharged on 1/10/21. Plan discussed with Dr Alexis Guevara. Will continue to follow. Subjective:   Discussed with RN events overnight. Very uncomfortable and frustrated about care.  Reports uncontrolled pain last night and feels there was lots of confusion/miscommunication. Discussed that what took place was necessary and we apologized for events over the weekend. Complaint Y/N Description   Abdominal Pain y Post op pain   Hematemesis n    Hematochezia n    Melena n    Constipation n    Diarrhea n    Dyspepsia n    Dysphagia n    Jaundiced n    Nausea/vomiting n      Review of Systems:  Symptom Y/N Comments  Symptom Y/N Comments   Fever/Chills    Chest Pain     Cough    Headaches     Sputum    Joint Pain     SOB/BENAVIDES    Pruritis/Rash     Tolerating Diet y clears  Other       Could NOT obtain due to:      Objective:   VITALS:   Last 24hrs VS reviewed since prior progress note. Most recent are:  Visit Vitals  /87 (BP 1 Location: Right arm, BP Patient Position: At rest)   Pulse (!) 107   Temp 98.4 °F (36.9 °C)   Resp 18   Ht 5' 2\" (1.575 m)   Wt 90 kg (198 lb 6.6 oz)   SpO2 96%   BMI 36.29 kg/m²       Intake/Output Summary (Last 24 hours) at 1/4/2021 1141  Last data filed at 1/3/2021 2228  Gross per 24 hour   Intake 1300 ml   Output 650 ml   Net 650 ml     PHYSICAL EXAM:  General: WD, WN. Alert, cooperative, no acute distress    HEENT: NC, Atraumatic. Anicteric sclerae. Lungs:  CTA Bilaterally. No Wheezing/Rhonchi/Rales. Heart:  Regular  rhythm,  No murmur (-), No Rubs, No Gallops  Abdomen: Soft, Non distended, appropriate tenderness to site.  +Bowel sounds, no HSM. Well approximated surgical sites. Extremities: No c/c/e. Neurologic:  Alert and oriented X 3. No acute neurological distress   Psych:   Good insight. Not anxious nor agitated.     Lab and Radiology Data Reviewed: (see below)    Medications Reviewed: (see below)  PMH/SH reviewed - no change compared to H&P  ________________________________________________________________________  Total time spent with patient: 20 minutes ________________________________________________________________________  Care Plan discussed with:  Patient y   Family  y - spouse   RN y Consultant: Johana Lima NP     Procedures: see electronic medical records for all procedures/Xrays and details which were not copied into this note but were reviewed prior to creation of Plan. LABS:  Recent Labs     01/04/21  0306 01/03/21  0403   WBC 16.5* 7.7   HGB 14.3 12.8   HCT 44.7 39.5    317     Recent Labs     01/03/21  0403      K 3.7      CO2 26   BUN 9   CREA 0.70   GLU 94   CA 8.6     No results for input(s): AP, TBIL, TP, ALB, GLOB, GGT, AML, LPSE in the last 72 hours. No lab exists for component: SGOT, GPT, AMYP, HLPSE  No results for input(s): INR, PTP, APTT, INREXT, INREXT in the last 72 hours. No results for input(s): FE, TIBC, PSAT, FERR in the last 72 hours. No results found for: FOL, RBCF  No results for input(s): PH, PCO2, PO2 in the last 72 hours. No results for input(s): CPK, CKMB in the last 72 hours.     No lab exists for component: TROPONINI  Lab Results   Component Value Date/Time    Color YELLOW/STRAW 12/30/2020 03:22 AM    Appearance CLEAR 12/30/2020 03:22 AM    Specific gravity >1.030 (H) 12/30/2020 03:22 AM    Specific gravity 1.010 04/10/2020 09:09 AM    pH (UA) 5.5 12/30/2020 03:22 AM    Protein 30 (A) 12/30/2020 03:22 AM    Glucose Negative 12/30/2020 03:22 AM    Ketone Negative 12/30/2020 03:22 AM    Bilirubin Negative 12/30/2020 03:22 AM    Urobilinogen 0.2 12/30/2020 03:22 AM    Nitrites Negative 12/30/2020 03:22 AM    Leukocyte Esterase Negative 12/30/2020 03:22 AM    Epithelial cells FEW 12/30/2020 03:22 AM    Bacteria Negative 12/30/2020 03:22 AM    WBC 0-4 12/30/2020 03:22 AM    RBC 0-5 12/30/2020 03:22 AM       MEDICATIONS:  Current Facility-Administered Medications   Medication Dose Route Frequency    HYDROmorphone (PF) (DILAUDID) injection 1 mg  1 mg IntraVENous Q3H PRN    influenza vaccine 2020-21 (6 mos+)(PF) (FLUARIX/FLULAVAL/FLUZONE QUAD) injection 0.5 mL  0.5 mL IntraMUSCular PRIOR TO DISCHARGE    sodium chloride (NS) flush 5-40 mL  5-40 mL IntraVENous Q8H    sodium chloride (NS) flush 5-40 mL  5-40 mL IntraVENous PRN    alcohol 62% (NOZIN) nasal  1 Ampule  1 Ampule Topical Q12H    benzocaine-menthoL (CHLORASEPTIC MAX) lozenge 1 Lozenge  1 Lozenge Oral Q2H PRN    methylPREDNISolone (PF) (SOLU-MEDROL) injection 40 mg  40 mg IntraVENous Q12H    melatonin tablet 6 mg  6 mg Oral QHS PRN    ondansetron (ZOFRAN) injection 4 mg  4 mg IntraVENous Q4H PRN    fentaNYL citrate (PF) injection 50 mcg  50 mcg IntraVENous Q4H PRN    sodium chloride (NS) flush 5-40 mL  5-40 mL IntraVENous Q8H    sodium chloride (NS) flush 5-40 mL  5-40 mL IntraVENous PRN    acetaminophen (TYLENOL) tablet 650 mg  650 mg Oral Q6H PRN    Or    acetaminophen (TYLENOL) suppository 650 mg  650 mg Rectal Q6H PRN    polyethylene glycol (MIRALAX) packet 17 g  17 g Oral DAILY PRN    promethazine (PHENERGAN) tablet 12.5 mg  12.5 mg Oral Q6H PRN    Or    ondansetron (ZOFRAN) injection 4 mg  4 mg IntraVENous Q6H PRN    lactated Ringers infusion  100 mL/hr IntraVENous CONTINUOUS

## 2021-01-04 NOTE — PROGRESS NOTES
SURGERY PROGRESS NOTE      Admit Date: 2020    POD 1 Day Post-Op    Procedure: Procedure(s):  LAPAROSCOPIC ILEOCECECTOMY, POSSIBLE OPEN (URGENT)      Subjective:     Patient states she has bee OOB to chair all day and is very tired. Denies pain. Denies nausea. Tolerating liquids      Objective:     Visit Vitals  BP (!) 144/94 (BP 1 Location: Right arm, BP Patient Position: Sitting)   Pulse (!) 120   Temp 98.3 °F (36.8 °C)   Resp 18   Ht 5' 2\" (1.575 m)   Wt 90 kg (198 lb 6.6 oz)   LMP 10/08/2018   SpO2 91%   BMI 36.29 kg/m²        Temp (24hrs), Av.5 °F (36.9 °C), Min:98 °F (36.7 °C), Max:99.2 °F (37.3 °C)      701 -  190  In: 1920 [P.O.:720; I.V.:1200]  Out: 400 [Urine:400]  1901 -  0700  In: 3701.7 [P.O.:120; I.V.:3581.7]  Out: 800 [Urine:700]    Physical Exam:    General:  alert, cooperative, no distress, appears stated age   Abdomen: soft, bowel sounds active, non-tender   Incision:   healing well, no drainage, no erythema, no hernia, no seroma, no swelling, no dehiscence, incision well approximated           Lab Results   Component Value Date/Time    WBC 16.5 (H) 2021 03:06 AM    HGB 14.3 2021 03:06 AM    HCT 44.7 2021 03:06 AM    PLATELET 255  03:06 AM    MCV 84.5 2021 03:06 AM     Lab Results   Component Value Date/Time    GFR est non-AA >60 2021 04:03 AM    GFR est AA >60 2021 04:03 AM    Creatinine 0.70 2021 04:03 AM    BUN 9 2021 04:03 AM    Sodium 138 2021 04:03 AM    Potassium 3.7 2021 04:03 AM    Chloride 107 2021 04:03 AM    CO2 26 2021 04:03 AM    Magnesium 2.2 2020 09:15 AM    Phosphorus 3.1 2020 09:15 AM       Assessment:     Active Problems:    Abdominal pain (2019)      Crohn disease ([facility] [address] (2019)      Hypokalemia (2020)      Nausea & vomiting (2020)      Foreign body in small intestine (2020)    Patient doing well symptomatically.   HR is up over the last 3 hours. Will monitor for now.         Plan:       Full liquid diet

## 2021-01-04 NOTE — PROGRESS NOTES
Patient transfer to General surgery in stable condition by wheelchair. Report given to unit Nursing RN staff.

## 2021-01-04 NOTE — OP NOTES
[address]  OPERATIVE REPORT    Name:  Orlene Kehr  MR#:  [id number]  :  1985  ACCOUNT #:  [de-identified]  DATE OF SERVICE:  2021    PREOPERATIVE DIAGNOSES:  1. Foreign body within the small intestine. 2.  Abdominal pain. 3.  Crohn's disease. POSTOPERATIVE DIAGNOSES:  1. Foreign body within the small intestine. 2.  Abdominal pain. 3.  Crohn's disease. PROCEDURE PERFORMED:  Ileocecectomy. SURGEON:  Lance Moser MD    ASSISTANT:  Yessy Turner. ANESTHESIA:  General endotracheal.    COMPLICATIONS:  none    SPECIMENS REMOVED:  Distal ileum and cecum    IMPLANTS:  none    ESTIMATED BLOOD LOSS:  100 mL. FINDINGS:  1. Tattoo ink scattered throughout the abdominal cavity. 2.  Adhesions in the right lower quadrant to both the anterior abdominal wall and interloop adhesions. 3.  PillCam within the palpable segment of the previous small bowel anastomosis. 4.  On gross inspection of the specimen on the back table, PillCam was found to be stuck in the blind end of the side-to-side functional end-to-end anastomosis. Camera could not be milked into either limb of the anastomosis. So an enterotomy was made in the specimen to remove the camera. INDICATIONS FOR PROCEDURE:  The patient is a 40-year-old female with a history of Crohn's disease. The patient swallowed a PillCam 14 months ago that became lodged in her terminal ileum. The patient underwent surgical exploration with small bowel resection and primary anastomosis at that time to remove the retained small bowel foreign body. The patient was having Crohn's symptoms again approximately 2 months ago, so 2 weeks ago, the patient's gastroenterologist asked her to swallow another PillCam.  This PillCam has not passed. The patient has tried laxatives without any success. The patient underwent colonoscopy was attempted retrieval of the PillCam yesterday which was unsuccessful.   So, the patient presents for removal of the foreign body. Given that the patient has had problems with terminal ileitis for some time and a significant stricture was seen on patient's colonoscopy, a ileocecectomy was planned to remove the area of gross disease and to prevent recurrent obstruction. DESCRIPTION OF PROCEDURE:  The patient was identified in the preoperative holding, informed consent obtained. She was given preoperative antibiotics. She was then taken to the operating room and placed in the supine position, underwent general endotracheal anesthesia without complication. A Arcos catheter was then inserted under sterile technique. Her left arm was then tucked and all pressure points padded. Her abdomen was prepped and draped in the usual sterile fashion. The time-out was performed to confirm the patient by name and that she was presenting for ileocecectomy. Once this was confirmed, entry into the abdomen was obtained in the left lateral abdominal wall at the level of the umbilicus. At this site, a 12 mm incision was made and then a 12-mm optical trocar containing a 5 mm 0-degree laparoscope was used to dissect the layer of the abdominal wall under direct laparoscopic vision. Entry into the abdomen was confirmed visually. Once within the abdomen, insufflation was provided through this trocar to a pressure of 15 mmHg. The patient tolerated insufflation well and there were no apparent complication. A 360-degree evaluation of the abdomen was then performed from this trocar site. There were no peritoneal implants. The patient was noted to have [place] ink tattoo dye scattered throughout the entire abdomen. Attention was focused in the right lower quadrant and there were multiple loops of small bowel adherent to the anterior abdominal wall. Attention was then focused in the left lower quadrant and a 5-mm trocar was placed in the suprapubic position.   A 5-mm trocar was then placed at the umbilicus and a third 5-mm trocar was placed on the left upper quadrant. The patient was then placed in slight Trendelenburg with right side up and attention focused in the right lower quadrant. Laparoscopic lysis of adhesions was performed with cold laparoscopic scissors. Care was taken to ensure only thin see-through adhesions were divided. Once the adhesions were taken down from the abdominal wall, attention was focused on the cecum. The cecum was identified and elevated towards the anterior abdominal wall. The terminal ileum was identified and the small bowel was then run in a retrograde direction in a hand-over-hand fashion. Approximately 15 cm from the ileocecal valve, the patient was noted to have a knot of interloop adhesions. So, slow careful adhesiolysis was performed with cold laparoscopic scissors. In lysing the adhesions, an anastomosis was encountered. Upon palpating the anastomosis, the retained foreign body was palpable. Adhesions were lysed until all of small bowel was free of adhesions. A site was chosen approximately 20 cm proximal to the anastomosis as a site of small bowel resection. This site was chosen due to the appearance of bowel. It appeared less edematous and inflamed. At this site chosen for transection, a linear BEBETO stapler with a white load was used to divide the bowel. A harmonic was then used to march to the mesentery just below the lumen of the bowel towards the ileocecal valve. Once the ileocecal valve was reached, the right colon was retracted medially and the white line of Toldt divided with electrocautery. The right colon was then mobilized on its mesentery and dissection was carried up to the hepatic flexure. The hepatic flexure was taken down with electrocautery. Of note, the patient was noted to have a significant amount of fat encasing her hepatic flexure and proximal transverse colon which made the dissection more difficult.   After clearing the fat, the hepatic flexure was adequately mobilized as well as the proximal transverse colon on its mesentery. Once this was completed, a site was chosen to divide the ascending colon, the mesentery was divided up to the point that point with the harmonic scalpel and then the ascending colon was transected with a blue load on the [place] stapler. A total of 2 staple fires were necessary to transect the  ascending colon. The specimen was then examined. The capsule was still found to be palpable within the specimen. The specimen was then tucked in the left upper quadrant for later retrieval.  A isoperistaltic anastomosis was then prepared. This was done by aligning the proximal transverse colon to alongside the terminal ileum overlapping the two by 8 cm. It was decided to place the anastomosis at the proximal transverse colon because the descending colon specimen was foreshortened and it was felt that it would not form the best anastomosis, so a small blind pouch was left proximal to the anastomosis. At the site chosen for the anastomosis, the antimesenteric surface of the staple line of the small-bowel was sutured to the tenia of the transverse colon. This suture was brought out through the 12-mm trocar and used as a retractor. The tenia just before the stapled end of the descending colon was then sutured to the anterior abdominal wall using a 2-0 silk suture. This allowed adequate straightening and alignment of the colon and the small bowel. Next, the harmonic scalpel was used to make an enterotomy in the antimesenteric surface of the small bowel as well as the tenia of the colon. A white load on a 60 mm [place] stapler was then used to create the anastomosis. The anvil was placed in one limb of the bowel, the cartridge on the other.   The stapler was closed, the closure was inspected to ensure that 2 walls of the bowel were well-approximated and that there was nothing else in between the stapler and then the stapler was fired creating the anastomosis. The common enterotomy created by firing the stapler was then closed in 2 layers with a running 3-0 Vicryl suture as a full-thickness suture and then 3-0 silk Lembert type seromuscular sutures were then used to oversew the staple line. A stay suture was placed distal to the staple line to take tension off of the staple line. The area was irrigated with normal saline. This irrigation came back clear. The area was inspected for hemostasis and there were no signs of any bleeding. A grasper was used to grasp the specimen and bring it to within proximity of the 12-mm trocar. This 12-mm trocar was removed. The skin incision was extended to 25 mm. Goelets were used to spread the subcutaneous fat and then the fascia was divided for a total of 25 mm. A wound protector was then inserted and the specimen was brought out through this wound protector. The specimen was placed on the back table for later examination. The wound protector was then removed, the fascial defect was closed with a running #1 PDS suture. The subcutaneous tissue was irrigated with normal saline and then closed in 2 layers with 3-0 Vicryl for deep layer, 4-0 Monocryl for superficial layer and Dermabond was placed over the skin surface. The remaining trocars removed. The skin at all these trocar sites was closed with 4-0 Monocryl and Dermabond. The patient was extubated in the room. The Arcos catheter was removed and she was taken to postanesthesia care in stable condition. Instrument and sponge counts were correct x2. The specimen was examined on the back table. The foreign body was palpable along the stapled end of the anastomosis. Attempts were made to milk the camera back into the proximal small bowel were unsuccessful. After several attempts, it was decided to open the specimen, so a pair curved Chambers scissors was used to open the staple line adjacent to the camera and the camera was removed.   The area was thoroughly inspected.  It appears the camera was sitting in the blind end of one of the limbs of the small bowel was used in the anastomosis.  The anastomosis was digitally palpated from the luminal side and was widely patent, so it appears the camera migrated past the anastomotic opening and into the blind pouch and became lodged.  The small bowel was sent to Pathology for evaluation this.      MD IRENE [id number]  D:  01/03/2021 18:33  T:  01/03/2021 22:15  JOB #:  [id number]

## 2021-01-04 NOTE — PROGRESS NOTES
End of Shift Note    Bedside shift change report given to [address] [place] [address] (oncoming nurse) by Ulysses Kallman, RN (offgoing nurse). Report included the following information SBAR, Kardex, Intake/Output, MAR and Recent Results    Shift worked:  8059-5722     Shift summary and any significant changes:     surgery today. Pill cam given to  Matthieu Almendarez upon arrival back to the unit     Concerns for physician to address:       Zone phone for oncoming shift:   [phone]       Activity:  Activity Level: Up ad val  Number times ambulated in hallways past shift: 0  Number of times OOB to chair past shift: 4    Cardiac:   Cardiac Monitoring: No      Cardiac Rhythm: Normal sinus rhythm    Access:   Current line(s): PIV     Genitourinary:   Urinary status: due to void    Respiratory:   O2 Device: Room air  Chronic home O2 use?: NO  Incentive spirometer at bedside: NO     GI:  Last Bowel Movement Date: 01/02/21  Current diet:  DIET CLEAR LIQUID  Passing flatus: YES  Tolerating current diet: YES  % Diet Eaten: 100 %    Pain Management:   Patient states pain is manageable on current regimen: YES    Skin:  Trevor Score: 22  Interventions: increase time out of bed    Patient Safety:  Fall Score:  Total Score: 1  Interventions: gripper socks, pt to call before getting OOB and stay with me (per policy)       Length of Stay:  Expected LOS: 3d 0h  Actual LOS: Arianna Mckeon RN

## 2021-01-05 PROCEDURE — 74011250636 HC RX REV CODE- 250/636: Performed by: SURGERY

## 2021-01-05 PROCEDURE — 74011250637 HC RX REV CODE- 250/637: Performed by: SURGERY

## 2021-01-05 PROCEDURE — 74011250637 HC RX REV CODE- 250/637: Performed by: STUDENT IN AN ORGANIZED HEALTH CARE EDUCATION/TRAINING PROGRAM

## 2021-01-05 PROCEDURE — 74011250636 HC RX REV CODE- 250/636: Performed by: INTERNAL MEDICINE

## 2021-01-05 PROCEDURE — 77010033678 HC OXYGEN DAILY

## 2021-01-05 PROCEDURE — 74011250637 HC RX REV CODE- 250/637: Performed by: NURSE PRACTITIONER

## 2021-01-05 PROCEDURE — 74011250636 HC RX REV CODE- 250/636: Performed by: EMERGENCY MEDICINE

## 2021-01-05 PROCEDURE — 94760 N-INVAS EAR/PLS OXIMETRY 1: CPT

## 2021-01-05 PROCEDURE — 65270000029 HC RM PRIVATE

## 2021-01-05 PROCEDURE — 93005 ELECTROCARDIOGRAM TRACING: CPT

## 2021-01-05 PROCEDURE — 74011250637 HC RX REV CODE- 250/637: Performed by: HOSPITALIST

## 2021-01-05 RX ORDER — CALCIUM CARBONATE 200(500)MG
200 TABLET,CHEWABLE ORAL
Status: DISCONTINUED | OUTPATIENT
Start: 2021-01-06 | End: 2021-01-05

## 2021-01-05 RX ORDER — GABAPENTIN 100 MG/1
100 CAPSULE ORAL 3 TIMES DAILY
Status: DISCONTINUED | OUTPATIENT
Start: 2021-01-05 | End: 2021-01-10 | Stop reason: HOSPADM

## 2021-01-05 RX ORDER — POLYETHYLENE GLYCOL 3350 17 G/17G
17 POWDER, FOR SOLUTION ORAL 2 TIMES DAILY
Status: DISCONTINUED | OUTPATIENT
Start: 2021-01-05 | End: 2021-01-10 | Stop reason: HOSPADM

## 2021-01-05 RX ORDER — CALCIUM CARBONATE 200(500)MG
200 TABLET,CHEWABLE ORAL
Status: DISCONTINUED | OUTPATIENT
Start: 2021-01-05 | End: 2021-01-10 | Stop reason: HOSPADM

## 2021-01-05 RX ORDER — KETOROLAC TROMETHAMINE 30 MG/ML
30 INJECTION, SOLUTION INTRAMUSCULAR; INTRAVENOUS EVERY 6 HOURS
Status: COMPLETED | OUTPATIENT
Start: 2021-01-05 | End: 2021-01-06

## 2021-01-05 RX ADMIN — Medication 1 AMPULE: at 08:55

## 2021-01-05 RX ADMIN — SODIUM CHLORIDE, POTASSIUM CHLORIDE, SODIUM LACTATE AND CALCIUM CHLORIDE 125 ML/HR: 600; 310; 30; 20 INJECTION, SOLUTION INTRAVENOUS at 03:18

## 2021-01-05 RX ADMIN — KETOROLAC TROMETHAMINE 30 MG: 30 INJECTION, SOLUTION INTRAMUSCULAR; INTRAVENOUS at 17:14

## 2021-01-05 RX ADMIN — ONDANSETRON 4 MG: 2 INJECTION INTRAMUSCULAR; INTRAVENOUS at 03:18

## 2021-01-05 RX ADMIN — HYDROMORPHONE HYDROCHLORIDE 1 MG: 1 INJECTION, SOLUTION INTRAMUSCULAR; INTRAVENOUS; SUBCUTANEOUS at 20:12

## 2021-01-05 RX ADMIN — GABAPENTIN 100 MG: 100 CAPSULE ORAL at 15:55

## 2021-01-05 RX ADMIN — CALCIUM CARBONATE (ANTACID) CHEW TAB 500 MG 200 MG: 500 CHEW TAB at 22:19

## 2021-01-05 RX ADMIN — Medication 1 AMPULE: at 20:13

## 2021-01-05 RX ADMIN — KETOROLAC TROMETHAMINE 30 MG: 30 INJECTION, SOLUTION INTRAMUSCULAR; INTRAVENOUS at 12:46

## 2021-01-05 RX ADMIN — KETOROLAC TROMETHAMINE 30 MG: 30 INJECTION, SOLUTION INTRAMUSCULAR; INTRAVENOUS at 23:59

## 2021-01-05 RX ADMIN — HYDROMORPHONE HYDROCHLORIDE 1 MG: 1 INJECTION, SOLUTION INTRAMUSCULAR; INTRAVENOUS; SUBCUTANEOUS at 14:24

## 2021-01-05 RX ADMIN — POLYETHYLENE GLYCOL 3350 17 G: 17 POWDER, FOR SOLUTION ORAL at 20:12

## 2021-01-05 RX ADMIN — ACETAMINOPHEN 650 MG: 325 TABLET ORAL at 15:55

## 2021-01-05 RX ADMIN — ACETAMINOPHEN 650 MG: 325 TABLET ORAL at 22:21

## 2021-01-05 RX ADMIN — Medication 10 ML: at 14:25

## 2021-01-05 RX ADMIN — GABAPENTIN 100 MG: 100 CAPSULE ORAL at 20:13

## 2021-01-05 RX ADMIN — Medication 10 ML: at 05:36

## 2021-01-05 RX ADMIN — SODIUM CHLORIDE, POTASSIUM CHLORIDE, SODIUM LACTATE AND CALCIUM CHLORIDE 125 ML/HR: 600; 310; 30; 20 INJECTION, SOLUTION INTRAVENOUS at 12:46

## 2021-01-05 RX ADMIN — HYDROMORPHONE HYDROCHLORIDE 1 MG: 1 INJECTION, SOLUTION INTRAMUSCULAR; INTRAVENOUS; SUBCUTANEOUS at 10:46

## 2021-01-05 RX ADMIN — Medication 10 ML: at 14:26

## 2021-01-05 RX ADMIN — ACETAMINOPHEN 650 MG: 325 TABLET ORAL at 10:46

## 2021-01-05 RX ADMIN — ACETAMINOPHEN 650 MG: 325 TABLET ORAL at 03:28

## 2021-01-05 NOTE — PROGRESS NOTES
GI PROGRESS NOTE  Rober Castillo, NP  [phone] NP in-hospital cell phone M-F until 4:30  After 5pm or on weekends, please call  for physician on call    Ulysses Olden :1985 CE   ATTG: Dr Cynthia Winn   PCP: Elvia Cortes MD  Date/Time:  2021 0907 AM     Primary GI: Dr. Elver Cunningham    Reason for following: Abdominal pain with pillcam in distal ileum    Assessment:   Abdominal pain - post op appropriate  Retained pillcam removed via surgery after multiple attempts to pass with bowel regimen and colonoscopy  Unclear if SB stricture is 2/2 endometriosis vs Crohn's - currently on Humira  · Colonsocopy 21 - Foreign body removal unsuccessful, pill camera is still in the distal ileum. Fibrotic stricture in distal ileum causing hang up of PillCam - area tattooed. No active inflammation seen  · S/p Ileocecectomy by Dr Vicki Dodd  - laproscoplically - surgically sites look well appearing. · CT scan 20  hows pill cam lying within the distal ileum in the right lower quadrant  · Patient has a hx of the retained pill-cam that required surgical intervention 2019     Hx of ? Crohn's disease   · Previous CT scan (2020) showed stricturing/inflammation and fistula/microperfortion   · Colonoscopy in  by Dr. Anabella Kurtz showed no evidence of any IBD or Crohn's   · Colonoscopy in 3/2020 by Dr Elver Cunningham normal with I/E hemorrhoids, bx normal TI, and random colon. · No path diagnosis   · No fevers, chills. · On humira - due for next injection Graham 1/10/21     Plan:   · Diet per surgery  · Await surgical pathology results - evaluate for crohns - last time was endometriosis. · Continue miralax - increased to BID  · Send Pill cam away to be downloaded for later to be reviewed by Dr Elver Cunningham. · Rest per primary team    Okay to take humira when discharged on 1/10/21. Plan discussed with Dr Parrish Sims. Nothing further for us to add at this time. Follow up outpatient with Dr Elver Cunningham. Subjective:   Discussed with RN events overnight. Uncomfortable this AM as she is trying to use less narcotics for pain control and just got back from walking. All questions answered. Spouse at bedside. Feels gas moving but hasn't passed gas or had a bowel movement. Complaint Y/N Description   Abdominal Pain y Post op pain   Hematemesis n    Hematochezia n    Melena n    Constipation n    Diarrhea n    Dyspepsia n    Dysphagia n    Jaundiced n    Nausea/vomiting n      Review of Systems:  Symptom Y/N Comments  Symptom Y/N Comments   Fever/Chills    Chest Pain     Cough    Headaches     Sputum    Joint Pain     SOB/BENAVIDES    Pruritis/Rash     Tolerating Diet y Full liquids  Other       Could NOT obtain due to:      Objective:   VITALS:   Last 24hrs VS reviewed since prior progress note. Most recent are:  Visit Vitals  /83   Pulse (!) 117   Temp 98.6 °F (37 °C)   Resp 16   Ht 5' 2\" (1.575 m)   Wt 90 kg (198 lb 6.6 oz)   SpO2 90%   BMI 36.29 kg/m²       Intake/Output Summary (Last 24 hours) at 1/5/2021 0907  Last data filed at 1/5/2021 0659  Gross per 24 hour   Intake 4602.49 ml   Output 400 ml   Net 4202.49 ml     PHYSICAL EXAM:  General: WD, WN. Alert, cooperative, mild acute distress    HEENT: NC, Atraumatic. Anicteric sclerae. Lungs:  CTA Bilaterally. No Wheezing/Rhonchi/Rales. Heart:  Regular  rhythm,  No murmur (-), No Rubs, No Gallops  Abdomen: Soft, Non distended, appropriate tenderness to site.  +Bowel sounds, no HSM. Well approximated surgical sites. Extremities: No c/c/e. Neurologic:  Alert and oriented X 3. No acute neurological distress   Psych:   Good insight. Not anxious nor agitated.     Lab and Radiology Data Reviewed: (see below)    Medications Reviewed: (see below)  PMH/SH reviewed - no change compared to H&P  ________________________________________________________________________  Total time spent with patient: 20 minutes ________________________________________________________________________  Care Plan discussed with:  Patient y   Family  y - spouse   RN tommie Vogel              Consultant: Vicki Singleton NP     Procedures: see electronic medical records for all procedures/Xrays and details which were not copied into this note but were reviewed prior to creation of Plan. LABS:  Recent Labs     01/04/21  0306 01/03/21  0403   WBC 16.5* 7.7   HGB 14.3 12.8   HCT 44.7 39.5    317     Recent Labs     01/03/21  0403      K 3.7      CO2 26   BUN 9   CREA 0.70   GLU 94   CA 8.6     No results for input(s): AP, TBIL, TP, ALB, GLOB, GGT, AML, LPSE in the last 72 hours. No lab exists for component: SGOT, GPT, AMYP, HLPSE  No results for input(s): INR, PTP, APTT, INREXT, INREXT in the last 72 hours. No results for input(s): FE, TIBC, PSAT, FERR in the last 72 hours. No results found for: FOL, RBCF  No results for input(s): PH, PCO2, PO2 in the last 72 hours. No results for input(s): CPK, CKMB in the last 72 hours.     No lab exists for component: TROPONINI  Lab Results   Component Value Date/Time    Color YELLOW/STRAW 12/30/2020 03:22 AM    Appearance CLEAR 12/30/2020 03:22 AM    Specific gravity >1.030 (H) 12/30/2020 03:22 AM    Specific gravity 1.010 04/10/2020 09:09 AM    pH (UA) 5.5 12/30/2020 03:22 AM    Protein 30 (A) 12/30/2020 03:22 AM    Glucose Negative 12/30/2020 03:22 AM    Ketone Negative 12/30/2020 03:22 AM    Bilirubin Negative 12/30/2020 03:22 AM    Urobilinogen 0.2 12/30/2020 03:22 AM    Nitrites Negative 12/30/2020 03:22 AM    Leukocyte Esterase Negative 12/30/2020 03:22 AM    Epithelial cells FEW 12/30/2020 03:22 AM    Bacteria Negative 12/30/2020 03:22 AM    WBC 0-4 12/30/2020 03:22 AM    RBC 0-5 12/30/2020 03:22 AM       MEDICATIONS:  Current Facility-Administered Medications   Medication Dose Route Frequency    HYDROmorphone (PF) (DILAUDID) injection 1 mg  1 mg IntraVENous Q3H PRN  influenza vaccine 2020-21 (6 mos+)(PF) (FLUARIX/FLULAVAL/FLUZONE QUAD) injection 0.5 mL  0.5 mL IntraMUSCular PRIOR TO DISCHARGE    sodium chloride (NS) flush 5-40 mL  5-40 mL IntraVENous Q8H    sodium chloride (NS) flush 5-40 mL  5-40 mL IntraVENous PRN    alcohol 62% (NOZIN) nasal  1 Ampule  1 Ampule Topical Q12H    benzocaine-menthoL (CHLORASEPTIC MAX) lozenge 1 Lozenge  1 Lozenge Oral Q2H PRN    melatonin tablet 6 mg  6 mg Oral QHS PRN    ondansetron (ZOFRAN) injection 4 mg  4 mg IntraVENous Q4H PRN    fentaNYL citrate (PF) injection 50 mcg  50 mcg IntraVENous Q4H PRN    sodium chloride (NS) flush 5-40 mL  5-40 mL IntraVENous Q8H    sodium chloride (NS) flush 5-40 mL  5-40 mL IntraVENous PRN    acetaminophen (TYLENOL) tablet 650 mg  650 mg Oral Q6H PRN    Or    acetaminophen (TYLENOL) suppository 650 mg  650 mg Rectal Q6H PRN    polyethylene glycol (MIRALAX) packet 17 g  17 g Oral DAILY PRN    promethazine (PHENERGAN) tablet 12.5 mg  12.5 mg Oral Q6H PRN    Or    ondansetron (ZOFRAN) injection 4 mg  4 mg IntraVENous Q6H PRN    lactated Ringers infusion  125 mL/hr IntraVENous CONTINUOUS

## 2021-01-05 NOTE — PROGRESS NOTES
SURGERY PROGRESS NOTE      Admit Date: 2020    POD 2 Days Post-Op    Procedure: Procedure(s):  LAPAROSCOPIC ILEOCECECTOMY, POSSIBLE OPEN (URGENT)      Subjective:     Patient complains of poor pain control but, refusing pain medication. She states narcotics make her feel light headed and cause her severe constipation. Denies nausea. States she can't ambulate without a walker because she feels unsteady. He claims she had a walker when on the ortho floor but, it was not moved to her room on the surgery floor. Finally, she is refusing lab draws. Objective:     Visit Vitals  /83   Pulse (!) 117   Temp 98.6 °F (37 °C)   Resp 16   Ht 5' 2\" (1.575 m)   Wt 90 kg (198 lb 6.6 oz)   LMP 10/08/2018   SpO2 90%   BMI 36.29 kg/m²        Temp (24hrs), Av.5 °F (36.9 °C), Min:98 °F (36.7 °C), Max:99 °F (37.2 °C)      No intake/output data recorded.  1901 -  0700  In: 4602.5 [P.O.:720; I.V.:3882.5]  Out: 600 [Urine:600]    Physical Exam:    General:  alert, cooperative, no distress, appears stated age   Abdomen: soft, bowel sounds active, non-tender   Incision:   healing well, no drainage, no erythema, no hernia, no seroma, no swelling, no dehiscence, incision well approximated           Assessment:     Active Problems:    Abdominal pain (2019)      Crohn disease ([facility] [address] (2019)      Hypokalemia (2020)      Nausea & vomiting (2020)      Foreign body in small intestine (2020)    stable for POD#2    Plan:       1. Add toradol and gabapentin   2. Encouraged compliance with labs. I suspect tachycardia is due to pain because HR is from . But, labs would help indicate if there is a problem.   3. PT consult to assess her need for walker

## 2021-01-05 NOTE — INTERDISCIPLINARY ROUNDS
Interdisciplinary Rounds were completed on 01/05/21 for this patient. Rounds included NP, nursing, clinical care leader, pharmacy, and case management. Plan of care discussed. See clinical pathway and/or care plan for interventions and desired outcomes.

## 2021-01-05 NOTE — PROGRESS NOTES
0845: Patient is still refusing to get labs at this time. Alerting MD Faizan Mondragon.    End of Shift Note    Bedside shift change report given to ORIN Wright (oncoming nurse) by Africa Jean RN (offgoing nurse). Report included the following information SBAR, Kardex, ED Summary, OR Summary, Intake/Output, MAR, Recent Results and Cardiac Rhythm (Tachy)    Shift worked:  7a-7p     Shift summary and any significant changes:     Uneventful. Patient up in chair most of shift. Treated for pain twice. Treated for low fever with tylenol, twice. Patient ambulating in room; washed self up.  at bedside most of day. Patient refused 11am vitals and AM labs. EKG completed and in chart. Concerns for physician to address:  n/a     Zone phone for oncoming shift:   [phone]       Activity:  Activity Level: Up with Assistance  Number times ambulated in hallways past shift: 0  Number of times OOB to chair past shift: 4    Cardiac:   Cardiac Monitoring: Yes      Cardiac Rhythm: Normal sinus rhythm    Access:   Current line(s): PIV     Genitourinary:   Urinary status: voiding    Respiratory:   O2 Device: Nasal cannula  Chronic home O2 use?: NO  Incentive spirometer at bedside: YES  Actual Volume (ml): 1000 ml  GI:  Last Bowel Movement Date: 01/02/21  Current diet:  DIET FULL LIQUID  Passing flatus: NO  Tolerating current diet: YES  % Diet Eaten: 70 %    Pain Management:   Patient states pain is manageable on current regimen: YES    Skin:  Trevor Score: 22  Interventions: increase time out of bed    Patient Safety:  Fall Score:  Total Score: 1  Interventions: gripper socks, pt to call before getting OOB and stay with me (per policy)       Length of Stay:  Expected LOS: 5d 21h  Actual LOS: [facility] , RN

## 2021-01-05 NOTE — PROGRESS NOTES
DEBI:    Home with Family  Follow Up Appointment         CM: Frida Santana is currently working with pt in the Gen Surg Unit.  CM reviewed pt's clinicals and no additional recommendations are being requested at this time.  Pt is known to reside with family and will have transport home at the time of d/c.  CM will verify with MD to determine when pt will be medically stable to d/c.  CM will enter a delay.      CM will continue to follow.    LESLI Michael, CM  [phone]

## 2021-01-05 NOTE — PROGRESS NOTES
End of Shift Note    Bedside shift change report given to Raquel Drake, [facility] (oncoming nurse) by Maddi Crane RN (offgoing nurse). Report included the following information SBAR, Kardex, ED Summary, Procedure Summary, Intake/Output, MAR, Recent Results and Cardiac Rhythm (NSR)    Shift worked:  7a-7p     Shift summary and any significant changes:     Patient transferred to floor around 1630. Patient asked for dilaudid and was treated. Resting in chair. Concerns for physician to address:  n/a     [facility] phone for oncoming shift:   [phone]       Activity:  Activity Level: Up with Assistance  Number times ambulated in hallways past shift: 0  Number of times OOB to chair past shift: 1    Cardiac:   Cardiac Monitoring: Yes      Cardiac Rhythm: Normal sinus rhythm    Access:   Current line(s): PIV     Genitourinary:   Urinary status: voiding    Respiratory:   O2 Device: Room air  Chronic home O2 use?: N/A  Incentive spirometer at bedside: YES  Actual Volume (ml): 750 ml  GI:  Last Bowel Movement Date: 01/02/21  Current diet:  DIET FULL LIQUID  Passing flatus: NO  Tolerating current diet: YES  % Diet Eaten: 70 %    Pain Management:   Patient states pain is manageable on current regimen: YES    Skin:  Trevor Score: 22  Interventions: increase time out of bed    Patient Safety:  Fall Score:  Total Score: 1  Interventions: gripper socks, pt to call before getting OOB and stay with me (per policy)       Length of Stay:  Expected LOS: 3d 0h  Actual LOS: Apolinar Aly RN

## 2021-01-05 NOTE — PROGRESS NOTES
0036 Pt stated she felt light headed and nauseas when getting up to the bathroom. Given prn zofran and placed on 2L NC for O2 sats in mid-80s. 94% on 2L. Attempting labs, patient asked for labs to be drawn later when her stomach is more settled. 0788 Pt refusing AM labs at this time due to nausea. Shift Note    Bedside shift change report given to Gomez Cramer (oncoming nurse) by Nicki Mcburney, RN (offgoing nurse). Report included the following information SBAR, Kardex, Intake/Output, MAR and Recent Results    Shift worked:  1900-700     Shift summary and any significant changes:    See above  Pt did not need additional pain medicine     Concerns for physician to address:    Zone phone for oncoming shift:   [phone]       Activity:  Activity Level: Up with Assistance  Number times ambulated in hallways past shift: 0  Number of times OOB to chair past shift: ad val    Cardiac:   Cardiac Monitoring: Yes      Cardiac Rhythm: Normal sinus rhythm    Access:   Current line(s): PIV     Genitourinary:   Urinary status: voiding    Respiratory:   O2 Device: Nasal cannula  Chronic home O2 use?: NO  Incentive spirometer at bedside: YES  Actual Volume (ml): 750 ml  GI:  Last Bowel Movement Date: 01/02/21  Current diet:  DIET FULL LIQUID  Passing flatus: YES  Tolerating current diet: YES  % Diet Eaten: 70 %    Pain Management:   Patient states pain is manageable on current regimen: YES    Skin:  Trevor Score: 22  Interventions: increase time out of bed    Patient Safety:  Fall Score:  Total Score: 1  Interventions: pt to call before getting OOB       Length of Stay:  Expected LOS: 3d 0h  Actual LOS: [address], RN

## 2021-01-05 NOTE — PROGRESS NOTES
Hospitalist Progress Note    NAME: Naomi Brooks   :  1985   MRN:  [id number]       Assessment / Plan:  Pillcam stuck within distal ileum - POA  S/p laparascopic ileocecectomy on   Crohn's disease  - Presented with abdominal pain, N, V three days after swallowinf pillcam  - CT abd/pelvis revealed PillCam within the distal ileum in the RLQ.  - GI on board. Failed trial of non surgical methods to retrieve the pill (solumedrol + laxatives). Had Colonoscopy on  , GI was unable to retrieve the pill cam because of lots of strictures   - Underwent surgery on ,  pill cam found stuck in the previous anastomosis. Ileocecectomy done and pillcam removed  - On full liquid diet  - Continue IV fluid for today, reassess tomorrow     Leucocytosis   - Wbc 16k on POD-1 , most likely reactive  - No CBC today  - will check CBC in a.m. Tachycardia  - possibly related to pain  - Denies CP. Not on chemical DVT prophylaxis. ?PE  - will get EKG. Obtain CBC in a.m. Hx of Crohn's disease with stricturing/inflammation and fistula/microperforation  In the past. F/u  Brenda Stevens MD   Anxiety and depression  GERD  IBS  Asthma, not currently in exacerbation  Holding PO meds 2/2 npo status        Code Status: Full code  Surrogate Decision Maker: [address] Spouse [phone]            DVT Prophylaxis: scd  GI Prophylaxis: not indicated     Baseline: independent      30.0 - 39.9 Obese / Body mass index is 36.29 kg/m². Subjective:     Chief Complaint / Reason for Physician Visit  C/o abdominal pain. \"I am not good with pain management.  I sometimes weight until the pain is 10/10 before asking for pain med\"    Had nausea last night but no vomiting        Review of Systems:  Symptom Y/N Comments  Symptom Y/N Comments   Fever/Chills n   Chest Pain n    Poor Appetite y   Edema     Cough    Abdominal Pain y    Sputum    Joint Pain     SOB/BENAVIDES    Pruritis/Rash     Nausea/vomit y   [facility] PT/OT     Diarrhea    Tolerating Diet y    Constipation    Other       Could NOT obtain due to:       Objective:     VITALS:   Last 24hrs VS reviewed since prior progress note. Most recent are:  Patient Vitals for the past 24 hrs:   Temp Pulse Resp BP SpO2   01/05/21 1530 98.2 °F (36.8 °C) (!) 123 18 120/89 92 %   01/05/21 0747 98.6 °F (37 °C) (!) 117 16 125/83 90 %   01/05/21 0320 99 °F (37.2 °C) (!) 106 16 133/80 94 %   01/04/21 2238 98.9 °F (37.2 °C) (!) 104 16 126/84 94 %   01/04/21 1844 98 °F (36.7 °C) (!) 125 18 (!) 134/93 93 %       Intake/Output Summary (Last 24 hours) at 1/5/2021 1626  Last data filed at 1/5/2021 4039  Gross per 24 hour   Intake 2682.49 ml   Output    Net 2682.49 ml        Physical exam   I had a face to face encounter and independently examined this patient on 1/2/2021, as outlined below:  PHYSICAL EXAM:  General:          WD, WN. Sleepy , no acute distress    EENT:              EOMI. Anicteric sclerae. MMM  Resp:               CTA bilaterally, no wheezing or rales. No accessory muscle use  CV:                  Regular  rhythm,  No edema  GI:                   Soft, Non distended, Non tender.  +Bowel sounds  Neurologic:      sleepy    Psych:   Good insight. Not anxious nor agitated  Skin:                No rashes. No jaundice  Reviewed most current lab test results and cultures  YES  Reviewed most current radiology test results   YES  Review and summation of old records today    NO  Reviewed patient's current orders and MAR    YES  PMH/SH reviewed - no change compared to H&P  ________________________________________________________________________  Care Plan discussed with:    Comments   Patient x    Family      RN x    Care Manager     Consultant                        Multidiciplinary team rounds were held today with , nursing, pharmacist and clinical coordinator. Patient's plan of care was discussed; medications were reviewed and discharge planning was addressed. ________________________________________________________________________  Total NON critical care TIME: 25Minutes    Total CRITICAL CARE TIME Spent:   Minutes non procedure based      Comments   >50% of visit spent in counseling and coordination of care     ________________________________________________________________________  Amelia Crowley MD     Procedures: see electronic medical records for all procedures/Xrays and details which were not copied into this note but were reviewed prior to creation of Plan. LABS:  I reviewed today's most current labs and imaging studies.   Pertinent labs include:  Recent Labs     01/04/21  0306 01/03/21  0403   WBC 16.5* 7.7   HGB 14.3 12.8   HCT 44.7 39.5    317     Recent Labs     01/03/21  0403      K 3.7      CO2 26   GLU 94   BUN 9   CREA 0.70   CA 8.6       Signed: Amelia Crowley MD

## 2021-01-06 ENCOUNTER — APPOINTMENT (OUTPATIENT)
Dept: GENERAL RADIOLOGY | Age: 36
DRG: 329 | End: 2021-01-06
Attending: STUDENT IN AN ORGANIZED HEALTH CARE EDUCATION/TRAINING PROGRAM
Payer: COMMERCIAL

## 2021-01-06 LAB
ANION GAP SERPL CALC-SCNC: 6 MMOL/L (ref 5–15)
ATRIAL RATE: 93 BPM
BASOPHILS # BLD: 0 K/UL (ref 0–0.1)
BASOPHILS NFR BLD: 0 % (ref 0–1)
BUN SERPL-MCNC: 9 MG/DL (ref 6–20)
BUN/CREAT SERPL: 15 (ref 12–20)
CALCIUM SERPL-MCNC: 7.9 MG/DL (ref 8.5–10.1)
CALCULATED P AXIS, ECG09: 16 DEGREES
CALCULATED R AXIS, ECG10: 4 DEGREES
CALCULATED T AXIS, ECG11: 26 DEGREES
CHLORIDE SERPL-SCNC: 104 MMOL/L (ref 97–108)
CO2 SERPL-SCNC: 26 MMOL/L (ref 21–32)
CREAT SERPL-MCNC: 0.61 MG/DL (ref 0.55–1.02)
DIAGNOSIS, 93000: NORMAL
DIFFERENTIAL METHOD BLD: ABNORMAL
EOSINOPHIL # BLD: 0.4 K/UL (ref 0–0.4)
EOSINOPHIL NFR BLD: 3 % (ref 0–7)
ERYTHROCYTE [DISTWIDTH] IN BLOOD BY AUTOMATED COUNT: 13.2 % (ref 11.5–14.5)
GLUCOSE SERPL-MCNC: 70 MG/DL (ref 65–100)
HCT VFR BLD AUTO: 35.5 % (ref 35–47)
HGB BLD-MCNC: 11.5 G/DL (ref 11.5–16)
IMM GRANULOCYTES # BLD AUTO: 0.1 K/UL (ref 0–0.04)
IMM GRANULOCYTES NFR BLD AUTO: 0 % (ref 0–0.5)
LYMPHOCYTES # BLD: 2.9 K/UL (ref 0.8–3.5)
LYMPHOCYTES NFR BLD: 21 % (ref 12–49)
MCH RBC QN AUTO: 28.1 PG (ref 26–34)
MCHC RBC AUTO-ENTMCNC: 32.4 G/DL (ref 30–36.5)
MCV RBC AUTO: 86.8 FL (ref 80–99)
MONOCYTES # BLD: 1.1 K/UL (ref 0–1)
MONOCYTES NFR BLD: 8 % (ref 5–13)
NEUTS SEG # BLD: 9.6 K/UL (ref 1.8–8)
NEUTS SEG NFR BLD: 68 % (ref 32–75)
NRBC # BLD: 0 K/UL (ref 0–0.01)
NRBC BLD-RTO: 0 PER 100 WBC
P-R INTERVAL, ECG05: 146 MS
PLATELET # BLD AUTO: 218 K/UL (ref 150–400)
PMV BLD AUTO: 10.9 FL (ref 8.9–12.9)
POTASSIUM SERPL-SCNC: 3.2 MMOL/L (ref 3.5–5.1)
Q-T INTERVAL, ECG07: 352 MS
QRS DURATION, ECG06: 88 MS
QTC CALCULATION (BEZET), ECG08: 437 MS
RBC # BLD AUTO: 4.09 M/UL (ref 3.8–5.2)
SODIUM SERPL-SCNC: 136 MMOL/L (ref 136–145)
VENTRICULAR RATE, ECG03: 93 BPM
WBC # BLD AUTO: 14 K/UL (ref 3.6–11)

## 2021-01-06 PROCEDURE — 80048 BASIC METABOLIC PNL TOTAL CA: CPT

## 2021-01-06 PROCEDURE — 94760 N-INVAS EAR/PLS OXIMETRY 1: CPT

## 2021-01-06 PROCEDURE — 74011250637 HC RX REV CODE- 250/637: Performed by: SURGERY

## 2021-01-06 PROCEDURE — 77010033678 HC OXYGEN DAILY

## 2021-01-06 PROCEDURE — 74011250636 HC RX REV CODE- 250/636: Performed by: SURGERY

## 2021-01-06 PROCEDURE — 74011250637 HC RX REV CODE- 250/637: Performed by: STUDENT IN AN ORGANIZED HEALTH CARE EDUCATION/TRAINING PROGRAM

## 2021-01-06 PROCEDURE — 74011250637 HC RX REV CODE- 250/637: Performed by: NURSE PRACTITIONER

## 2021-01-06 PROCEDURE — 36415 COLL VENOUS BLD VENIPUNCTURE: CPT

## 2021-01-06 PROCEDURE — 85025 COMPLETE CBC W/AUTO DIFF WBC: CPT

## 2021-01-06 PROCEDURE — 74011250636 HC RX REV CODE- 250/636: Performed by: INTERNAL MEDICINE

## 2021-01-06 PROCEDURE — 65270000029 HC RM PRIVATE

## 2021-01-06 PROCEDURE — 2709999900 HC NON-CHARGEABLE SUPPLY

## 2021-01-06 PROCEDURE — 71045 X-RAY EXAM CHEST 1 VIEW: CPT

## 2021-01-06 PROCEDURE — 74011250636 HC RX REV CODE- 250/636: Performed by: HOSPITALIST

## 2021-01-06 RX ORDER — TRAMADOL HYDROCHLORIDE 50 MG/1
50 TABLET ORAL
Status: DISCONTINUED | OUTPATIENT
Start: 2021-01-06 | End: 2021-01-10 | Stop reason: HOSPADM

## 2021-01-06 RX ORDER — POTASSIUM CHLORIDE 20 MEQ/1
40 TABLET, EXTENDED RELEASE ORAL
Status: COMPLETED | OUTPATIENT
Start: 2021-01-06 | End: 2021-01-06

## 2021-01-06 RX ORDER — ACETAMINOPHEN 325 MG/1
650 TABLET ORAL EVERY 6 HOURS
Status: DISCONTINUED | OUTPATIENT
Start: 2021-01-06 | End: 2021-01-10 | Stop reason: HOSPADM

## 2021-01-06 RX ADMIN — KETOROLAC TROMETHAMINE 30 MG: 30 INJECTION, SOLUTION INTRAMUSCULAR; INTRAVENOUS at 06:42

## 2021-01-06 RX ADMIN — Medication 1 AMPULE: at 09:01

## 2021-01-06 RX ADMIN — HYDROMORPHONE HYDROCHLORIDE 1 MG: 1 INJECTION, SOLUTION INTRAMUSCULAR; INTRAVENOUS; SUBCUTANEOUS at 09:02

## 2021-01-06 RX ADMIN — Medication 10 ML: at 03:10

## 2021-01-06 RX ADMIN — GABAPENTIN 100 MG: 100 CAPSULE ORAL at 17:54

## 2021-01-06 RX ADMIN — ACETAMINOPHEN 650 MG: 325 TABLET ORAL at 11:37

## 2021-01-06 RX ADMIN — ACETAMINOPHEN 650 MG: 325 TABLET ORAL at 17:54

## 2021-01-06 RX ADMIN — Medication 1 AMPULE: at 20:30

## 2021-01-06 RX ADMIN — CALCIUM CARBONATE (ANTACID) CHEW TAB 500 MG 200 MG: 500 CHEW TAB at 09:01

## 2021-01-06 RX ADMIN — CALCIUM CARBONATE (ANTACID) CHEW TAB 500 MG 200 MG: 500 CHEW TAB at 11:38

## 2021-01-06 RX ADMIN — Medication 10 ML: at 23:10

## 2021-01-06 RX ADMIN — HYDROMORPHONE HYDROCHLORIDE 1 MG: 1 INJECTION, SOLUTION INTRAMUSCULAR; INTRAVENOUS; SUBCUTANEOUS at 12:59

## 2021-01-06 RX ADMIN — POLYETHYLENE GLYCOL 3350 17 G: 17 POWDER, FOR SOLUTION ORAL at 09:00

## 2021-01-06 RX ADMIN — POTASSIUM CHLORIDE 40 MEQ: 20 TABLET, EXTENDED RELEASE ORAL at 14:54

## 2021-01-06 RX ADMIN — HYDROMORPHONE HYDROCHLORIDE 1 MG: 1 INJECTION, SOLUTION INTRAMUSCULAR; INTRAVENOUS; SUBCUTANEOUS at 03:35

## 2021-01-06 RX ADMIN — ACETAMINOPHEN 650 MG: 325 TABLET ORAL at 23:09

## 2021-01-06 RX ADMIN — GABAPENTIN 100 MG: 100 CAPSULE ORAL at 09:01

## 2021-01-06 RX ADMIN — GABAPENTIN 100 MG: 100 CAPSULE ORAL at 23:09

## 2021-01-06 RX ADMIN — HYDROMORPHONE HYDROCHLORIDE 1 MG: 1 INJECTION, SOLUTION INTRAMUSCULAR; INTRAVENOUS; SUBCUTANEOUS at 20:30

## 2021-01-06 RX ADMIN — KETOROLAC TROMETHAMINE 30 MG: 30 INJECTION, SOLUTION INTRAMUSCULAR; INTRAVENOUS at 11:37

## 2021-01-06 RX ADMIN — Medication 10 ML: at 17:54

## 2021-01-06 RX ADMIN — CALCIUM CARBONATE (ANTACID) CHEW TAB 500 MG 200 MG: 500 CHEW TAB at 17:54

## 2021-01-06 RX ADMIN — TRAMADOL HYDROCHLORIDE 50 MG: 50 TABLET ORAL at 17:54

## 2021-01-06 RX ADMIN — ONDANSETRON 4 MG: 2 INJECTION INTRAMUSCULAR; INTRAVENOUS at 18:00

## 2021-01-06 NOTE — PROGRESS NOTES
Physical Therapy    Received PT eval order re: possible need for RW. Spoke with pt who stated that yesterday and prior she was in a large amt of pain and needed the walker for support due to the pain. She states that today things are more under control and she feels she is doing much better getting around and no longer needs the walker. She states the need was only due to poor pain control not gait issues. She has no further concerns for any PT needs at this time and declines eval. Encouraged pt to request services if her needs change. Pt pleasant and agreeable.     Olivia Garcia, PT

## 2021-01-06 NOTE — PROGRESS NOTES
SURGERY PROGRESS NOTE      Admit Date: 2020    POD 3 Days Post-Op    Procedure: Procedure(s):  LAPAROSCOPIC ILEOCECECTOMY, POSSIBLE OPEN (URGENT)      Subjective:     Patient complains of poor pain control now related to not getting her opioids on time (had been refusing them yesterday). Asking to schedule tylenol. Denies nausea, states good appetite today. Walking in the halls and states she feels passing gas is going to occur soon. Objective:     Visit Vitals  BP (!) 134/99   Pulse (!) 118   Temp 99.1 °F (37.3 °C)   Resp 16   Ht 5' 2\" (1.575 m)   Wt 90 kg (198 lb 6.6 oz)   LMP 10/08/2018   SpO2 93%   BMI 36.29 kg/m²        Temp (24hrs), Av.5 °F (36.9 °C), Min:98 °F (36.7 °C), Max:99.1 °F (37.3 °C)      701 -  190  In: 379.2 [I.V.:379.2]  Out: -    190 -  0700  In: 3740.8 [P.O.:720; I.V.:3020.8]  Out: -     Physical Exam:    General:  alert, cooperative, no distress, appears stated age   Abdomen: soft, bowel sounds active, non-tender   Incision:   healing well, no drainage, no erythema, no hernia, no seroma, no swelling, no dehiscence, incision well approximated           Assessment:     Active Problems:    Abdominal pain (2019)      Crohn disease ([address] (2019)      Hypokalemia (2020)      Nausea & vomiting (2020)      Foreign body in small intestine (2020)    Continues to be stable. Await return of bowel function. Plan:       1.  Scheduled tylenol   2. Mobilize independently  3. Advance diet  4. Await bowel function. When occurs may d/c from surgery standpoint. Maybe today?      Munira Starks NP

## 2021-01-06 NOTE — PROGRESS NOTES
1222- Called nutrition services pt requesting no red meat or pork. 1600- Pt's HR went up to 150's, checked on pt, she had just had a BM. Dr. Charly Gonzalez checked on pt as well. Pt denied any other symptoms such as chest pain, nausea, headache. End of Shift Note    Bedside shift change report given to Hernan Deluna RN (oncoming nurse) by Levon Wise (offgoing nurse). Report included the following information SBAR, Kardex and MAR    Shift worked:  2607-6337     Shift summary and any significant changes:     Pt resting in bed and able to sit in chair several times throughout shift. Pt up to bathroom, couple of BM's (loose, brown). Pt has been tachy secondary to pain and when she goes to the bathroom. PRN pain medication and zofran given to pt for pain and nausea. Concerns for physician to address:  Pt's voice is hoarse and she states she feels a little congested. She would like to discuss possibly adding an inhaler. Pt reports past hx of asthma. Zone phone for oncoming shift:   [phone]       Activity:  Activity Level: Up ad val  Number times ambulated in hallways past shift: 3  Number of times OOB to chair past shift: 3    Cardiac:   Cardiac Monitoring: Yes      Cardiac Rhythm: Normal sinus rhythm    Access:   Current line(s): PIV     Genitourinary:   Urinary status: voiding    Respiratory:   O2 Device: Room air  Chronic home O2 use?: NO  Incentive spirometer at bedside: N/A  Actual Volume (ml): 1000 ml  GI:  Last Bowel Movement Date: 01/03/21(per pt)  Current diet:  DIET REGULAR Low Fiber  DIET NUTRITIONAL SUPPLEMENTS All Meals; Ensure Enlive  Passing flatus: YES  Tolerating current diet: YES  % Diet Eaten: 95 %    Pain Management:   Patient states pain is manageable on current regimen: YES    Skin:  Trevor Score: 22  Interventions: increase time out of bed and nutritional support     Patient Safety:  Fall Score:  Total Score: 1  Interventions: gripper socks       Length of Stay:  Expected LOS: 5d 21h  Actual LOS: 7      Bessie Ochoa

## 2021-01-06 NOTE — PROGRESS NOTES
Comprehensive Nutrition Assessment    Type and Reason for Visit: Initial, RD nutrition re-screen/LOS    Nutrition Recommendations/Plan:   Continue Low Fiber diet and Ensure as tolerated    Nutrition Assessment:   Pt admitted with Crohn's disease and pill cam obstruction. PMH: Crohn's disease, GERD. Chart reviewed for LOS. MST negative for previous nutritional risk factors. She is POD# 3 lap ileocecectomy. Diet advanced to solids earlier today. Appetite varied thus far. She did consumed 100% of her ensure today. K 3.2, being repleted. Patient Vitals for the past 72 hrs:   % Diet Eaten   01/06/21 0917 10 %   01/05/21 1950 100 %   01/05/21 1314 60 %   01/05/21 0747 50 %   01/04/21 1500 70 %       Estimated Daily Nutrient Needs:  Energy (kcal): MSJ 1850 (1548 x 1.2); Weight Used for Energy Requirements: Current  Protein (g): 72g (0.8gPro/kg); Weight Used for Protein Requirements: Current  Fluid (ml/day): 1800mL; Method Used for Fluid Requirements: 1 ml/kcal      Nutrition Related Findings:  Meds: tums, miralax, KCl. Edema: trace-generalized, +1 nonpitting-all extremities. BM 1/6      Wounds:    Surgical incision       Current Nutrition Therapies:  DIET REGULAR Low Fiber  DIET NUTRITIONAL SUPPLEMENTS All Meals; Ensure Enlive    Anthropometric Measures:  · Height:  5' 2\" (157.5 cm)  · Current Body Wt:  90 kg (198 lb 6.6 oz)   · Ideal Body Wt:  110 lbs:  180.4 %    · BMI Category:  Obese class 2 (BMI 35.0-39. 9)       Nutrition Diagnosis:   No nutrition diagnosis at this time     Nutrition Interventions:   Food and/or Nutrient Delivery: Continue current diet  Nutrition Education and Counseling: No recommendations at this time  Coordination of Nutrition Care: Continue to monitor while inpatient    Goals:  Pt will consume >70% of meals in 4-6 days.        Nutrition Monitoring and Evaluation:   Behavioral-Environmental Outcomes: None identified  Food/Nutrient Intake Outcomes: Food and nutrient intake  Physical Signs/Symptoms Outcomes: Biochemical data, Fluid status or edema, GI status, Weight    Discharge Planning:    Continue current diet     Electronically signed by Nelson Acevedo RD, [address]  on 1/6/2021 at 3:36 PM    Contact: [phone]

## 2021-01-07 LAB
ANION GAP SERPL CALC-SCNC: 4 MMOL/L (ref 5–15)
BASOPHILS # BLD: 0 K/UL (ref 0–0.1)
BASOPHILS NFR BLD: 0 % (ref 0–1)
BUN SERPL-MCNC: 8 MG/DL (ref 6–20)
BUN/CREAT SERPL: 14 (ref 12–20)
CALCIUM SERPL-MCNC: 8.1 MG/DL (ref 8.5–10.1)
CHLORIDE SERPL-SCNC: 105 MMOL/L (ref 97–108)
CO2 SERPL-SCNC: 28 MMOL/L (ref 21–32)
CREAT SERPL-MCNC: 0.59 MG/DL (ref 0.55–1.02)
DIFFERENTIAL METHOD BLD: ABNORMAL
EOSINOPHIL # BLD: 0.4 K/UL (ref 0–0.4)
EOSINOPHIL NFR BLD: 3 % (ref 0–7)
ERYTHROCYTE [DISTWIDTH] IN BLOOD BY AUTOMATED COUNT: 13.1 % (ref 11.5–14.5)
GLUCOSE SERPL-MCNC: 81 MG/DL (ref 65–100)
HCT VFR BLD AUTO: 36.5 % (ref 35–47)
HGB BLD-MCNC: 11.8 G/DL (ref 11.5–16)
IMM GRANULOCYTES # BLD AUTO: 0.1 K/UL (ref 0–0.04)
IMM GRANULOCYTES NFR BLD AUTO: 1 % (ref 0–0.5)
LYMPHOCYTES # BLD: 2.3 K/UL (ref 0.8–3.5)
LYMPHOCYTES NFR BLD: 21 % (ref 12–49)
MAGNESIUM SERPL-MCNC: 1.9 MG/DL (ref 1.6–2.4)
MCH RBC QN AUTO: 27.6 PG (ref 26–34)
MCHC RBC AUTO-ENTMCNC: 32.3 G/DL (ref 30–36.5)
MCV RBC AUTO: 85.3 FL (ref 80–99)
MONOCYTES # BLD: 0.9 K/UL (ref 0–1)
MONOCYTES NFR BLD: 9 % (ref 5–13)
NEUTS SEG # BLD: 7.3 K/UL (ref 1.8–8)
NEUTS SEG NFR BLD: 66 % (ref 32–75)
NRBC # BLD: 0 K/UL (ref 0–0.01)
NRBC BLD-RTO: 0 PER 100 WBC
PLATELET # BLD AUTO: 242 K/UL (ref 150–400)
PMV BLD AUTO: 10.4 FL (ref 8.9–12.9)
POTASSIUM SERPL-SCNC: 3.3 MMOL/L (ref 3.5–5.1)
RBC # BLD AUTO: 4.28 M/UL (ref 3.8–5.2)
SODIUM SERPL-SCNC: 137 MMOL/L (ref 136–145)
WBC # BLD AUTO: 10.9 K/UL (ref 3.6–11)

## 2021-01-07 PROCEDURE — 74011250637 HC RX REV CODE- 250/637: Performed by: NURSE PRACTITIONER

## 2021-01-07 PROCEDURE — 74011250636 HC RX REV CODE- 250/636: Performed by: STUDENT IN AN ORGANIZED HEALTH CARE EDUCATION/TRAINING PROGRAM

## 2021-01-07 PROCEDURE — 83735 ASSAY OF MAGNESIUM: CPT

## 2021-01-07 PROCEDURE — 36415 COLL VENOUS BLD VENIPUNCTURE: CPT

## 2021-01-07 PROCEDURE — 74011000258 HC RX REV CODE- 258: Performed by: STUDENT IN AN ORGANIZED HEALTH CARE EDUCATION/TRAINING PROGRAM

## 2021-01-07 PROCEDURE — 74011250637 HC RX REV CODE- 250/637: Performed by: STUDENT IN AN ORGANIZED HEALTH CARE EDUCATION/TRAINING PROGRAM

## 2021-01-07 PROCEDURE — 65270000029 HC RM PRIVATE

## 2021-01-07 PROCEDURE — 80048 BASIC METABOLIC PNL TOTAL CA: CPT

## 2021-01-07 PROCEDURE — 85025 COMPLETE CBC W/AUTO DIFF WBC: CPT

## 2021-01-07 PROCEDURE — 2709999900 HC NON-CHARGEABLE SUPPLY

## 2021-01-07 PROCEDURE — 74011250636 HC RX REV CODE- 250/636: Performed by: INTERNAL MEDICINE

## 2021-01-07 PROCEDURE — 87070 CULTURE OTHR SPECIMN AEROBIC: CPT

## 2021-01-07 PROCEDURE — 74011250637 HC RX REV CODE- 250/637: Performed by: SURGERY

## 2021-01-07 RX ORDER — ALPRAZOLAM 0.5 MG/1
0.5 TABLET ORAL
Status: DISCONTINUED | OUTPATIENT
Start: 2021-01-07 | End: 2021-01-10 | Stop reason: HOSPADM

## 2021-01-07 RX ADMIN — CEFEPIME HYDROCHLORIDE 2 G: 2 INJECTION, POWDER, FOR SOLUTION INTRAVENOUS at 12:20

## 2021-01-07 RX ADMIN — ALPRAZOLAM 0.5 MG: 0.5 TABLET ORAL at 15:42

## 2021-01-07 RX ADMIN — ACETAMINOPHEN 650 MG: 325 TABLET ORAL at 17:44

## 2021-01-07 RX ADMIN — GABAPENTIN 100 MG: 100 CAPSULE ORAL at 10:00

## 2021-01-07 RX ADMIN — Medication 10 ML: at 05:34

## 2021-01-07 RX ADMIN — HYDROMORPHONE HYDROCHLORIDE 1 MG: 1 INJECTION, SOLUTION INTRAMUSCULAR; INTRAVENOUS; SUBCUTANEOUS at 20:36

## 2021-01-07 RX ADMIN — POTASSIUM BICARBONATE 40 MEQ: 782 TABLET, EFFERVESCENT ORAL at 12:17

## 2021-01-07 RX ADMIN — CALCIUM CARBONATE (ANTACID) CHEW TAB 500 MG 200 MG: 500 CHEW TAB at 17:43

## 2021-01-07 RX ADMIN — GABAPENTIN 100 MG: 100 CAPSULE ORAL at 15:42

## 2021-01-07 RX ADMIN — POTASSIUM BICARBONATE 40 MEQ: 782 TABLET, EFFERVESCENT ORAL at 17:44

## 2021-01-07 RX ADMIN — Medication 1 AMPULE: at 10:01

## 2021-01-07 RX ADMIN — ACETAMINOPHEN 650 MG: 325 TABLET ORAL at 12:16

## 2021-01-07 RX ADMIN — TRAMADOL HYDROCHLORIDE 50 MG: 50 TABLET ORAL at 15:46

## 2021-01-07 RX ADMIN — Medication 10 ML: at 20:40

## 2021-01-07 RX ADMIN — Medication 10 ML: at 20:39

## 2021-01-07 RX ADMIN — TRAMADOL HYDROCHLORIDE 50 MG: 50 TABLET ORAL at 10:02

## 2021-01-07 RX ADMIN — HYDROMORPHONE HYDROCHLORIDE 1 MG: 1 INJECTION, SOLUTION INTRAMUSCULAR; INTRAVENOUS; SUBCUTANEOUS at 03:57

## 2021-01-07 RX ADMIN — GABAPENTIN 100 MG: 100 CAPSULE ORAL at 20:37

## 2021-01-07 RX ADMIN — CALCIUM CARBONATE (ANTACID) CHEW TAB 500 MG 200 MG: 500 CHEW TAB at 10:01

## 2021-01-07 RX ADMIN — ACETAMINOPHEN 650 MG: 325 TABLET ORAL at 05:32

## 2021-01-07 RX ADMIN — CALCIUM CARBONATE (ANTACID) CHEW TAB 500 MG 200 MG: 500 CHEW TAB at 12:17

## 2021-01-07 RX ADMIN — Medication 1 AMPULE: at 21:00

## 2021-01-07 NOTE — PROGRESS NOTES
End of Shift Note    Bedside shift change report given to ORIN Layton (oncoming nurse) by Governor Elton (offgoing nurse). Report included the following information SBAR, Kardex and MAR    Shift worked:  4270-1185     Shift summary and any significant changes:    1245 - left message for PICC team to replace peripheral IV. Current IV infiltrated. Perfect served Dr. Jb Humphreys regarding IV and having to stop antibiotic  PICC team unable to place midline. Obtain peripheral access, 20 g left forearm. Restarted antibiotic. Pt up ad val in room. Sitting in chair. Pt given PRN pain medication. Given PRN Xanax for anxiety once. Concerns for physician to address:  none at this time     Zone phone for oncoming shift:   [phone]       Activity:  Activity Level: Up ad val  Number times ambulated in hallways past shift: 1  Number of times OOB to chair past shift: 2    Cardiac:   Cardiac Monitoring: Yes      Cardiac Rhythm: Sinus tachycardia    Access:   Current line(s): PIV     Genitourinary:   Urinary status: voiding    Respiratory:   O2 Device: Room air  Chronic home O2 use?: N/A  Incentive spirometer at bedside: N/A  Actual Volume (ml): 1000 ml  GI:  Last Bowel Movement Date: 01/07/21  Current diet:  DIET REGULAR Low Fiber  DIET NUTRITIONAL SUPPLEMENTS All Meals; Ensure Enlive  Passing flatus: YES  Tolerating current diet: YES  % Diet Eaten: 25 %    Pain Management:   Patient states pain is manageable on current regimen: YES    Skin:  Trevor Score: 22  Interventions: increase time out of bed and nutritional support     Patient Safety:  Fall Score:  Total Score: 1  Interventions: gripper socks       Length of Stay:  Expected LOS: 5d 21h  Actual LOS: [address] [facility]

## 2021-01-07 NOTE — PROGRESS NOTES
Hospitalist Progress Note    NAME: Cruz Mayberry   :  1985   MRN:  [id number]       Assessment / Plan:    Hospital-acquired pneumonia  -shortness of breath, cough, pleuritic chest pain  -Low-grade fever  -Chest x-ray shows left basilar opacity  -We will obtain sputum culture  -Will start cefepime. Will do monotherapy in light of low risk factor for MDR pathogens. -Monitor CBC and fever curve. Will consider changing antibiotic to oral if afebrile for 48 hours     Pillcam stuck within distal ileum - POA  S/p laparascopic ileocecectomy on   Crohn's disease  - Presented with abdominal pain, N, V three days after swallowinf pillcam  - CT abd/pelvis revealed PillCam within the distal ileum in the RLQ.  - GI on board. Failed trial of non surgical methods to retrieve the pill (solumedrol + laxatives). Had Colonoscopy on  , GI was unable to retrieve the pill cam because of lots of strictures   - Underwent surgery on ,  pill cam found stuck in the previous anastomosis. Ileocecectomy done and pillcam removed  - On full liquid diet  - Continue IV fluid for today, reassess tomorrow     Hypokalemia  -Replete and monitor    Intermittent sinus tachycardia  - possibly related to pain and infection/pneumonia.   - Not on chemical DVT prophylaxis but patient up and walking, no leg swelling.  -Continue to monitor    Anxiety  -We will start her on as needed Xanax    Hx of Crohn's disease with stricturing/inflammation and fistula/microperforation  In the past. F/u  GI Marina Azul MD   Anxiety and depression  GERD  IBS  Asthma, not currently in exacerbation  Holding PO meds 2/2 npo status        Code Status: Full code  Surrogate Decision Maker: [address] Spouse [phone]            DVT Prophylaxis: scd  GI Prophylaxis: not indicated     Baseline: independent      30.0 - 39.9 Obese / Body mass index is 36.29 kg/m².          Subjective:     Chief Complaint / Reason for Physician Visit  Complaints of left chest pain and worsening of shortness of breath  Feels generally weak  Frustrated and anxious about staying in the hospital      Review of Systems:  Symptom Y/N Comments  Symptom Y/N Comments   Fever/Chills n   Chest Pain n    Poor Appetite y   Edema     Cough    Abdominal Pain y    Sputum    Joint Pain     SOB/BENAVIDES    Pruritis/Rash     Nausea/vomit y   Tolerating PT/OT     Diarrhea    Tolerating Diet y    Constipation    Other       Could NOT obtain due to:       Objective:     VITALS:   Last 24hrs VS reviewed since prior progress note. Most recent are:  Patient Vitals for the past 24 hrs:   Temp Pulse Resp BP SpO2   01/07/21 1606 99 °F (37.2 °C) (!) 117 16 (!) 143/90 93 %   01/07/21 1151 99.4 °F (37.4 °C) (!) 119 12 (!) 147/104 93 %   01/07/21 0758 99 °F (37.2 °C) (!) 109 20 137/85 92 %   01/07/21 0351 98.8 °F (37.1 °C) (!) 104 16 (!) 159/90 94 %   01/07/21 0000  (!) 105      01/06/21 2308 98.8 °F (37.1 °C) (!) 110 16 (!) 140/92 93 %   01/06/21 2000 99.1 °F (37.3 °C) (!) 120 18 (!) 140/72 96 %       Intake/Output Summary (Last 24 hours) at 1/7/2021 1738  Last data filed at 1/7/2021 9549  Gross per 24 hour   Intake 240 ml   Output    Net 240 ml        Physical exam   I had a face to face encounter and independently examined this patient on 1/2/2021, as outlined below:  PHYSICAL EXAM:  General:          WD, WN. Sleepy , no acute distress    EENT:              EOMI. Anicteric sclerae. MMM  Resp:               CTA bilaterally, no wheezing or rales. No accessory muscle use  CV:                  Regular  rhythm,  No edema  GI:                   Soft, Non distended, Non tender.  +Bowel sounds  Neurologic:      sleepy    Psych:   Good insight. Not anxious nor agitated  Skin:                No rashes.   No jaundice  Reviewed most current lab test results and cultures  YES  Reviewed most current radiology test results   YES  Review and summation of old records today    NO  Reviewed patient's current orders and MAR    YES  PMH/SH reviewed - no change compared to H&P  ________________________________________________________________________  Care Plan discussed with:    Comments   Patient x    Family      RN x    Care Manager     Consultant                        Multidiciplinary team rounds were held today with , nursing, pharmacist and clinical coordinator. Patient's plan of care was discussed; medications were reviewed and discharge planning was addressed. ________________________________________________________________________  Total NON critical care TIME: 25Minutes    Total CRITICAL CARE TIME Spent:   Minutes non procedure based      Comments   >50% of visit spent in counseling and coordination of care     ________________________________________________________________________  Christine Rodriguez MD     Procedures: see electronic medical records for all procedures/Xrays and details which were not copied into this note but were reviewed prior to creation of Plan. LABS:  I reviewed today's most current labs and imaging studies.   Pertinent labs include:  Recent Labs     01/07/21  0359 01/06/21  0400   WBC 10.9 14.0*   HGB 11.8 11.5   HCT 36.5 35.5    218     Recent Labs     01/07/21  0359 01/06/21  0400    136   K 3.3* 3.2*    104   CO2 28 26   GLU 81 70   BUN 8 9   CREA 0.59 0.61   CA 8.1* 7.9*   MG 1.9  --        Signed: Christine Rodriguez MD

## 2021-01-07 NOTE — PROGRESS NOTES
Problem: Falls - Risk of  Goal: *Absence of Falls  Description: Document Javi Sol Fall Risk and appropriate interventions in the flowsheet.   Outcome: Progressing Towards Goal  Note: Fall Risk Interventions:            Medication Interventions: Assess postural VS orthostatic hypotension, Evaluate medications/consider consulting pharmacy, Patient to call before getting OOB, Teach patient to arise slowly                   Problem: Patient Education: Go to Patient Education Activity  Goal: Patient/Family Education  Outcome: Progressing Towards Goal     Problem: Patient Education: Go to Patient Education Activity  Goal: Patient/Family Education  Outcome: Progressing Towards Goal     Problem: Surgical Pathway Day of Surgery  Goal: Off Pathway (Use only if patient is Off Pathway)  Outcome: Progressing Towards Goal  Goal: Activity/Safety  Outcome: Progressing Towards Goal  Goal: Consults, if ordered  Outcome: Progressing Towards Goal  Goal: Nutrition/Diet  Outcome: Progressing Towards Goal  Goal: Medications  Outcome: Progressing Towards Goal

## 2021-01-07 NOTE — PROGRESS NOTES
Hospitalist Progress Note    NAME: Fanta Kong   :  1985   MRN:  [id number]       Assessment / Plan:  Pillcam stuck within distal ileum - POA  S/p laparascopic ileocecectomy on   Crohn's disease  - Presented with abdominal pain, N, V three days after swallowinf pillcam  - CT abd/pelvis revealed PillCam within the distal ileum in the RLQ.  - GI on board. Failed trial of non surgical methods to retrieve the pill (solumedrol + laxatives). Had Colonoscopy on  , GI was unable to retrieve the pill cam because of lots of strictures   - Underwent surgery on ,  pill cam found stuck in the previous anastomosis. Ileocecectomy done and pillcam removed  - On full liquid diet  - Continue IV fluid for today, reassess tomorrow     Hypokalemia  -Replete and monitor    Leucocytosis   - Wbc 16k on POD-1, went down to 14 today , most likely reactive  - Low grade fever today.   -No urinary complaints, IV site without evidence of infection, she does have some cough. Will obtain chest x-ray. Check CBC in a.m. Intermittent sinus tachycardia  - possibly related to pain. - Denies CP or SOB. Not on chemical DVT prophylaxis but patient up and walking, no leg swelling.  -Continue to monitor    Hx of Crohn's disease with stricturing/inflammation and fistula/microperforation  In the past. F/u  GI Rubi Phillips MD   Anxiety and depression  GERD  IBS  Asthma, not currently in exacerbation  Holding PO meds 2/2 npo status        Code Status: Full code  Surrogate Decision Maker: [address] Spouse [phone]            DVT Prophylaxis: scd  GI Prophylaxis: not indicated     Baseline: independent      30.0 - 39.9 Obese / Body mass index is 36.29 kg/m². Subjective:     Chief Complaint / Reason for Physician Visit  No acute events overnight  Abdominal pain fairly controlled  Had a bowel movement today  Tolerating diet.   No nausea or vomiting        Review of Systems:  Symptom Y/N Comments Symptom Y/N Comments   Fever/Chills n   Chest Pain n    Poor Appetite y   Edema     Cough    Abdominal Pain y    Sputum    Joint Pain     SOB/BENAVIDES    Pruritis/Rash     Nausea/vomit y   Tolerating PT/OT     Diarrhea    Tolerating Diet y    Constipation    Other       Could NOT obtain due to:       Objective:     VITALS:   Last 24hrs VS reviewed since prior progress note. Most recent are:  Patient Vitals for the past 24 hrs:   Temp Pulse Resp BP SpO2   01/06/21 1456 98.3 °F (36.8 °C) (!) 122 17 123/72 90 %   01/06/21 1306  (!) 109      01/06/21 1159 100.3 °F (37.9 °C) (!) 132 16 (!) 141/97 94 %   01/06/21 0857  (!) 118      01/06/21 0842 99.1 °F (37.3 °C) (!) 123 16 (!) 134/99 93 %   01/06/21 0400 98.5 °F (36.9 °C) 80 16 120/76 100 %   01/06/21 0011 98.5 °F (36.9 °C) 84 18 130/79 99 %   01/05/21 2023 98 °F (36.7 °C) 80 18 134/80 100 %       Intake/Output Summary (Last 24 hours) at 1/6/2021 1917  Last data filed at 1/6/2021 1858  Gross per 24 hour   Intake 2489.17 ml   Output    Net 2489.17 ml        Physical exam   I had a face to face encounter and independently examined this patient on 1/2/2021, as outlined below:  PHYSICAL EXAM:  General:          WD, WN. Sleepy , no acute distress    EENT:              EOMI. Anicteric sclerae. MMM  Resp:               CTA bilaterally, no wheezing or rales. No accessory muscle use  CV:                  Regular  rhythm,  No edema  GI:                   Soft, Non distended, Non tender.  +Bowel sounds  Neurologic:      sleepy    Psych:   Good insight. Not anxious nor agitated  Skin:                No rashes.   No jaundice  Reviewed most current lab test results and cultures  YES  Reviewed most current radiology test results   YES  Review and summation of old records today    NO  Reviewed patient's current orders and MAR    YES  PMH/SH reviewed - no change compared to H&P  ________________________________________________________________________  Care Plan discussed with: Comments   Patient x    Family      RN x    Care Manager     Consultant                        Multidiciplinary team rounds were held today with , nursing, pharmacist and clinical coordinator. Patient's plan of care was discussed; medications were reviewed and discharge planning was addressed. ________________________________________________________________________  Total NON critical care TIME: 25Minutes    Total CRITICAL CARE TIME Spent:   Minutes non procedure based      Comments   >50% of visit spent in counseling and coordination of care     ________________________________________________________________________  Rhonda Acosta MD     Procedures: see electronic medical records for all procedures/Xrays and details which were not copied into this note but were reviewed prior to creation of Plan. LABS:  I reviewed today's most current labs and imaging studies.   Pertinent labs include:  Recent Labs     01/06/21  0400 01/04/21  0306   WBC 14.0* 16.5*   HGB 11.5 14.3   HCT 35.5 44.7    195     Recent Labs     01/06/21  0400      K 3.2*      CO2 26   GLU 70   BUN 9   CREA 0.61   CA 7.9*       Signed: Rhonda Acosta MD

## 2021-01-07 NOTE — PROGRESS NOTES
Midline insertion procedure note:  Pt has very limited vascular access. Procedure explained to patient along with risks and benefits. Procedure teaching completed. Pre procedure assessment done. Patient denies questions or concerns at this time. Maximum sterile barrier precautions observed throughout procedure. Lidocaine 1% 3ml sc injected to site prior to access. Right arm was initially assessed, but found no appropriate veins for Midline. Accessed Left brachial vein, however was unable completely pass guidewire. Attempted other brachial vein, however was also not successful. Basilic and Cephalic veins were too small. Explained unsuccessful Midline attempt to patient who voiced understanding. VAT was able to place a 20g longer catheter  (1.88) PIV in Left Forearm. Discussed unsuccessful Midline attempt and successful PIV placement with Primary RN, Deepti Pepe.         Trae Bell, BSN, VA-BC  Vascular Access Team

## 2021-01-07 NOTE — DISCHARGE INSTRUCTIONS
Open Bowel Resection: What to Expect at [address] [place] [address] Drive are likely to have pain that comes and goes for the next few days after bowel surgery. You may have bowel cramps, and your cut (incision) may hurt. You may also feel like you have the flu. You may have a low fever and feel tired and nauseated. This is common. You should feel better after a week and will probably be back to normal in 2 to 3 weeks. This care sheet gives you a general idea about how long it will take for you to recover. But each person recovers at a different pace. Follow the steps below to get better as quickly as possible. How can you care for yourself at home? Activity  · Rest when you feel tired. Getting enough sleep will help you recover. · Try to walk each day. Start by walking a little more than you did the day before. Bit by bit, increase the amount you walk. Walking boosts blood flow and helps prevent pneumonia and constipation. · Avoid strenuous activities, such as biking, jogging, weight lifting, or aerobic exercise, until your doctor says it is okay. · Ask your doctor when you can drive again. · You will probably need to take 3 to 4 weeks off from work. It depends on the type of work you do and how you feel. You may need to take off 4 to 6 weeks if you lift heavy objects in your job. · You may shower 24 to 48 hours after surgery, if your doctor says it is okay. Pat the cut (incision) dry. Do not take a bath for the first 2 weeks, or until your doctor tells you it is okay. · Ask your doctor when it is okay for you to have sex. Diet  · You may not have much appetite after the surgery. But try to eat a healthy diet. Your doctor will tell you about any foods you should not eat. · Eat a low-fiber diet for several weeks after surgery. Eat many small meals throughout the day. Add high-fiber foods a little at a time. · Eat yogurt. It puts good bacteria into your colon and helps prevent diarrhea.   · Try to avoid nuts, seeds, and corn for a while. They may be hard to digest.  · You may need to take vitamins that contain sodium and potassium. Ask your doctor. · Drink plenty of fluids to avoid becoming dehydrated. Medicines  · Your doctor will tell you if and when you can restart your medicines. He or she will also give you instructions about taking any new medicines. · If you take blood thinners, such as warfarin (Coumadin), clopidogrel (Plavix), or aspirin, be sure to talk to your doctor. He or she will tell you if and when to start taking those medicines again. Make sure that you understand exactly what your doctor wants you to do. · Take pain medicines exactly as directed. ¨ If the doctor gave you a prescription medicine for pain, take it as prescribed. ¨ If you are not taking a prescription pain medicine, ask your doctor if you can take an over-the-counter medicine. ¨ Do not take two or more pain medicines at the same time unless the doctor told you to. Many pain medicines have acetaminophen, which is Tylenol. Too much acetaminophen (Tylenol) can be harmful. · If you think your pain medicine is making you sick to your stomach:  ¨ Take your medicine after meals (unless your doctor tells you not to). ¨ Ask your doctor for a different pain medicine. · If your doctor prescribed antibiotics, take them as directed. Do not stop taking them just because you feel better. You need to take the full course of antibiotics. · You may need to take some medicines in a different form. You will be told whether to crush pills or take a liquid form of the medicine. · If your doctor gives you a stool softener, take it as directed. Incision care  · If you have strips of tape on the incision, leave the tape on for a week or until it falls off. · Wash the area daily with warm, soapy water, and pat it dry. Follow-up care is a key part of your treatment and safety.  Be sure to make and go to all appointments, and call your doctor if you are having problems. It's also a good idea to know your test results and keep a list of the medicines you take. When should you call for help? Call 911 anytime you think you may need emergency care. For example, call if:  · You passed out (lost consciousness). · You have sudden chest pain and shortness of breath, or you cough up blood. · You have severe pain in your belly. Call your doctor now or seek immediate medical care if:  · You are sick to your stomach and cannot drink fluids or keep them down. · You have signs of a blood clot, such as:  ¨ Pain in your calf, back of the knee, thigh, or groin. ¨ Redness and swelling in your leg or groin. · You have a lot of diarrhea that smells very bad. · You have trouble passing urine or stool, especially if you have mild pain or swelling in your lower belly. · You have signs of infection, such as:  ¨ Increased pain, swelling, warmth, or redness. ¨ Red streaks leading from the incision. ¨ Pus draining from the incision. ¨ A fever. · You have pain that does not get better after you take pain medicine. · You have loose stitches, or your incision comes open. · You are bleeding or have new drainage from the incision. Watch closely for any changes in your health, and be sure to contact your doctor if:  · You do not have a bowel movement after taking a laxative. · You do not get better as expected. Where can you learn more? Go to http://www.gray.com/. Enter 256 6153 in the search box to learn more about \"Open Bowel Resection: What to Expect at Home. \"  Current as of: August 9, 2016  Content Version: 11.3  © 9692-0862 [employer]. Care instructions adapted under license by [employer] (which disclaims liability or warranty for this information).  If you have questions about a medical condition or this instruction, always ask your healthcare professional. Jose Nicole disclaims any warranty or liability for your use of this information. Please call [phone] to make a follow up appointment with Juan Carlos Posada or Gisela Yost in 2 weeks     [facility] of [address] [facility], [address] [facility], [facility]  Shellie May Kathy [address]  [phone]  Fax [phone]

## 2021-01-07 NOTE — PROGRESS NOTES
SURGERY PROGRESS NOTE      Admit Date: 2020    POD 4 Days Post-Op    Procedure: Procedure(s):  LAPAROSCOPIC ILEOCECECTOMY, POSSIBLE OPEN (URGENT)      Subjective:     Feels better today. No complaints from surgical standpoint. Feeling SOB and like previous episodes of asthma. Hospitalist aware and managing. Objective:     Visit Vitals  /85   Pulse (!) 109   Temp 99 °F (37.2 °C)   Resp 20   Ht 5' 2\" (1.575 m)   Wt 90 kg (198 lb 6.6 oz)   LMP 10/08/2018   SpO2 92%   BMI 36.29 kg/m²        Temp (24hrs), Av.1 °F (37.3 °C), Min:98.3 °F (36.8 °C), Max:100.3 °F (37.9 °C)      701 - 1900  In: 120 [P.O.:120]  Out: -   1901 -  07  In: 2489.2 [P.O.:610; I.V.:1879.2]  Out: -     Physical Exam:    General:  alert, cooperative, no distress, appears stated age   Abdomen: soft, bowel sounds active, non-tender, having BMs   Incision:   healing well, no drainage, no erythema, no hernia, no seroma, no swelling, no dehiscence, incision well approximated           Assessment:     Active Problems:    Abdominal pain (2019)      Crohn disease ([address] (2019)      Hypokalemia (2020)      Nausea & vomiting (2020)      Foreign body in small intestine (2020)    Continues to be stable. + Return of bowel function. Plan:       1.  Scheduled tylenol   2. Mobilize independently  3. Continue diet  4. Ready for discharge from surgical standpoint.      Misty Sierra NP

## 2021-01-07 NOTE — PROGRESS NOTES
End of Shift Note    Bedside shift change report given to Raf Gao RN by Lea Villalba RN (offgoing nurse). Report included the following information SBAR, Kardex, OR Summary, Procedure Summary, Intake/Output, MAR, Accordion and Recent Results    Shift worked:  2457-3039     Shift summary and any significant changes:     VS stable throughout shift. Pt had mild tachycardia but states it was due to pain. PRN pain medications given with relief. Pt up and ambulating the halls. No issues overnight. Pt anxious to go home. Concerns for physician to address:  n/a     [facility] phone for oncoming shift:   [phone]     Activity:  Activity Level: Up ad val  Number times ambulated in hallways past shift: 2  Number of times OOB to chair past shift: 1    Cardiac:   Cardiac Monitoring: YES    Cardiac Rhythm: Sinus tachycardia    Access:   Current line(s): PIV     Genitourinary:   Urinary status: voiding    Respiratory:   O2 Device: Room air  Chronic home O2 use?: NO  Incentive spirometer at bedside: N/A  Actual Volume (ml): 1000 ml  GI:  Last Bowel Movement Date: 01/03/21(per pt)  Current diet:  DIET REGULAR Low Fiber  DIET NUTRITIONAL SUPPLEMENTS All Meals; Ensure Enlive  Passing flatus: YES  Tolerating current diet: YES  % Diet Eaten: 95 %    Pain Management:   Patient states pain is manageable on current regimen: YES    Skin:  Trevor Score: 21  Interventions: increase time out of bed    Patient Safety:  Fall Score:  Total Score: 1  Interventions: gripper socks       Length of Stay:  Expected LOS: 5d 21h  Actual LOS: ORIN Barakat

## 2021-01-08 LAB
ANION GAP SERPL CALC-SCNC: 5 MMOL/L (ref 5–15)
BASOPHILS # BLD: 0 K/UL (ref 0–0.1)
BASOPHILS NFR BLD: 0 % (ref 0–1)
BUN SERPL-MCNC: 7 MG/DL (ref 6–20)
BUN/CREAT SERPL: 9 (ref 12–20)
CALCIUM SERPL-MCNC: 8.6 MG/DL (ref 8.5–10.1)
CHLORIDE SERPL-SCNC: 102 MMOL/L (ref 97–108)
CO2 SERPL-SCNC: 29 MMOL/L (ref 21–32)
CREAT SERPL-MCNC: 0.74 MG/DL (ref 0.55–1.02)
DIFFERENTIAL METHOD BLD: ABNORMAL
EOSINOPHIL # BLD: 0.3 K/UL (ref 0–0.4)
EOSINOPHIL NFR BLD: 2 % (ref 0–7)
ERYTHROCYTE [DISTWIDTH] IN BLOOD BY AUTOMATED COUNT: 13.2 % (ref 11.5–14.5)
GLUCOSE SERPL-MCNC: 89 MG/DL (ref 65–100)
HCT VFR BLD AUTO: 38.2 % (ref 35–47)
HGB BLD-MCNC: 12 G/DL (ref 11.5–16)
IMM GRANULOCYTES # BLD AUTO: 0.1 K/UL (ref 0–0.04)
IMM GRANULOCYTES NFR BLD AUTO: 0 % (ref 0–0.5)
LYMPHOCYTES # BLD: 2.9 K/UL (ref 0.8–3.5)
LYMPHOCYTES NFR BLD: 20 % (ref 12–49)
MCH RBC QN AUTO: 27.1 PG (ref 26–34)
MCHC RBC AUTO-ENTMCNC: 31.4 G/DL (ref 30–36.5)
MCV RBC AUTO: 86.4 FL (ref 80–99)
MONOCYTES # BLD: 1.6 K/UL (ref 0–1)
MONOCYTES NFR BLD: 11 % (ref 5–13)
NEUTS SEG # BLD: 9.5 K/UL (ref 1.8–8)
NEUTS SEG NFR BLD: 67 % (ref 32–75)
NRBC # BLD: 0 K/UL (ref 0–0.01)
NRBC BLD-RTO: 0 PER 100 WBC
PLATELET # BLD AUTO: 310 K/UL (ref 150–400)
PMV BLD AUTO: 10.4 FL (ref 8.9–12.9)
POTASSIUM SERPL-SCNC: 3.9 MMOL/L (ref 3.5–5.1)
RBC # BLD AUTO: 4.42 M/UL (ref 3.8–5.2)
SODIUM SERPL-SCNC: 136 MMOL/L (ref 136–145)
WBC # BLD AUTO: 14.4 K/UL (ref 3.6–11)

## 2021-01-08 PROCEDURE — 65270000029 HC RM PRIVATE

## 2021-01-08 PROCEDURE — 85025 COMPLETE CBC W/AUTO DIFF WBC: CPT

## 2021-01-08 PROCEDURE — 74011000258 HC RX REV CODE- 258: Performed by: STUDENT IN AN ORGANIZED HEALTH CARE EDUCATION/TRAINING PROGRAM

## 2021-01-08 PROCEDURE — 74011250636 HC RX REV CODE- 250/636: Performed by: EMERGENCY MEDICINE

## 2021-01-08 PROCEDURE — 74011250637 HC RX REV CODE- 250/637: Performed by: NURSE PRACTITIONER

## 2021-01-08 PROCEDURE — 80048 BASIC METABOLIC PNL TOTAL CA: CPT

## 2021-01-08 PROCEDURE — 74011250636 HC RX REV CODE- 250/636: Performed by: STUDENT IN AN ORGANIZED HEALTH CARE EDUCATION/TRAINING PROGRAM

## 2021-01-08 PROCEDURE — 74011250637 HC RX REV CODE- 250/637: Performed by: STUDENT IN AN ORGANIZED HEALTH CARE EDUCATION/TRAINING PROGRAM

## 2021-01-08 PROCEDURE — 74011250636 HC RX REV CODE- 250/636: Performed by: INTERNAL MEDICINE

## 2021-01-08 PROCEDURE — 74011250637 HC RX REV CODE- 250/637: Performed by: SURGERY

## 2021-01-08 PROCEDURE — 36415 COLL VENOUS BLD VENIPUNCTURE: CPT

## 2021-01-08 RX ORDER — BENZONATATE 100 MG/1
100 CAPSULE ORAL
Status: DISCONTINUED | OUTPATIENT
Start: 2021-01-08 | End: 2021-01-10 | Stop reason: HOSPADM

## 2021-01-08 RX ADMIN — ACETAMINOPHEN 650 MG: 325 TABLET ORAL at 17:02

## 2021-01-08 RX ADMIN — Medication 10 ML: at 05:44

## 2021-01-08 RX ADMIN — ALPRAZOLAM 0.5 MG: 0.5 TABLET ORAL at 02:04

## 2021-01-08 RX ADMIN — CEFEPIME HYDROCHLORIDE 2 G: 2 INJECTION, POWDER, FOR SOLUTION INTRAVENOUS at 11:31

## 2021-01-08 RX ADMIN — ONDANSETRON 4 MG: 2 INJECTION INTRAMUSCULAR; INTRAVENOUS at 02:03

## 2021-01-08 RX ADMIN — HYDROMORPHONE HYDROCHLORIDE 1 MG: 1 INJECTION, SOLUTION INTRAMUSCULAR; INTRAVENOUS; SUBCUTANEOUS at 22:31

## 2021-01-08 RX ADMIN — Medication 6 MG: at 02:04

## 2021-01-08 RX ADMIN — Medication 10 ML: at 05:45

## 2021-01-08 RX ADMIN — Medication 1 AMPULE: at 10:57

## 2021-01-08 RX ADMIN — ACETAMINOPHEN 650 MG: 325 TABLET ORAL at 05:44

## 2021-01-08 RX ADMIN — ACETAMINOPHEN 650 MG: 325 TABLET ORAL at 11:31

## 2021-01-08 RX ADMIN — CALCIUM CARBONATE (ANTACID) CHEW TAB 500 MG 200 MG: 500 CHEW TAB at 08:51

## 2021-01-08 RX ADMIN — GABAPENTIN 100 MG: 100 CAPSULE ORAL at 17:02

## 2021-01-08 RX ADMIN — CALCIUM CARBONATE (ANTACID) CHEW TAB 500 MG 200 MG: 500 CHEW TAB at 11:31

## 2021-01-08 RX ADMIN — ACETAMINOPHEN 650 MG: 325 TABLET ORAL at 22:32

## 2021-01-08 RX ADMIN — Medication 10 ML: at 13:59

## 2021-01-08 RX ADMIN — Medication 3 MG: at 22:33

## 2021-01-08 RX ADMIN — GABAPENTIN 100 MG: 100 CAPSULE ORAL at 20:48

## 2021-01-08 RX ADMIN — CALCIUM CARBONATE (ANTACID) CHEW TAB 500 MG 200 MG: 500 CHEW TAB at 02:03

## 2021-01-08 RX ADMIN — CALCIUM CARBONATE (ANTACID) CHEW TAB 500 MG 200 MG: 500 CHEW TAB at 17:02

## 2021-01-08 RX ADMIN — CEFEPIME HYDROCHLORIDE 2 G: 2 INJECTION, POWDER, FOR SOLUTION INTRAVENOUS at 05:43

## 2021-01-08 RX ADMIN — HYDROMORPHONE HYDROCHLORIDE 1 MG: 1 INJECTION, SOLUTION INTRAMUSCULAR; INTRAVENOUS; SUBCUTANEOUS at 17:01

## 2021-01-08 RX ADMIN — Medication 10 ML: at 20:46

## 2021-01-08 RX ADMIN — CEFEPIME HYDROCHLORIDE 2 G: 2 INJECTION, POWDER, FOR SOLUTION INTRAVENOUS at 20:45

## 2021-01-08 RX ADMIN — ACETAMINOPHEN 650 MG: 325 TABLET ORAL at 00:03

## 2021-01-08 RX ADMIN — GABAPENTIN 100 MG: 100 CAPSULE ORAL at 08:51

## 2021-01-08 NOTE — PROGRESS NOTES
SURGERY PROGRESS NOTE      Admit Date: 2020    POD 5 Days Post-Op    Procedure: Procedure(s):  LAPAROSCOPIC ILEOCECECTOMY, POSSIBLE OPEN (URGENT)      Subjective:     Patient states abdominal pain is controlled. Tolerating po. Passign flatus and having BMs. Wants to go home. Objective:     Visit Vitals  /80   Pulse 99   Temp 98.6 °F (37 °C)   Resp 18   Ht 5' 2\" (1.575 m)   Wt 90 kg (198 lb 6.6 oz)   LMP 10/08/2018   SpO2 96%   BMI 36.29 kg/m²        Temp (24hrs), Av.8 °F (37.1 °C), Min:98.3 °F (36.8 °C), Max:99.4 °F (37.4 °C)      No intake/output data recorded.  1901 -  0700  In: 26 [P.O.:520]  Out: -     Physical Exam:    General:  alert, cooperative, no distress, appears stated age   Abdomen: soft, bowel sounds active, non-tender   Incision:   healing well, no drainage, no erythema, no hernia, no seroma, no swelling, no dehiscence, incision well approximated           Lab Results   Component Value Date/Time    WBC 14.4 (H) 2021 02:16 AM    HGB 12.0 2021 02:16 AM    HCT 38.2 2021 02:16 AM    PLATELET 791  02:16 AM    MCV 86.4 2021 02:16 AM     Lab Results   Component Value Date/Time    GFR est non-AA >60 2021 02:16 AM    GFR est AA >60 2021 02:16 AM    Creatinine 0.74 2021 02:16 AM    BUN 7 2021 02:16 AM    Sodium 136 2021 02:16 AM    Potassium 3.9 2021 02:16 AM    Chloride 102 2021 02:16 AM    CO2 29 2021 02:16 AM    Magnesium 1.9 2021 03:59 AM    Phosphorus 3.1 2020 09:15 AM       Assessment:     Active Problems:    Abdominal pain (2019)      Crohn disease ([address] (2019)      Hypokalemia (2020)      Nausea & vomiting (2020)      Foreign body in small intestine (2020)    Doing well following surgery.   Can be discharged from surgery perspective

## 2021-01-08 NOTE — PROGRESS NOTES
1900--bedside report received from marshall pineda, pt up to bathroom, family at bedside will return    2130--pt alert oriented x 4, c/o abd pain (MAR), pt anxious stating \"I had such a hard day, they had a very hard time getting my iv, I just want to take my medicine and go to sleep\" call bell in reach    2330--pt resting soundly , call bell in reach    0000--MEWS 3, triggered by NST,  pt in no distress    0230-- sinus tach, pt walking around in room stating \"I trying to pass, gas, it feels like I have a gas bubble\"  rn remains in room, pt still walking around room, agrees to take [address],  telling rn her history, pt reports \"can you give something for my nerves, I'm [place] break, I not supposed to be here\"  Pt given zofran, TUMS, melatonin and xanax, 140/90    0300--pt resting quietly in bed, has no complaints at this time     0330--fredo MINER notified of above events    0350--fredo MINER on unit talking with rn, and reviewing telemetry pt asymptomatic currently, pt HR 1 teens at this time    0700--bedside report given to marshall rivera who is assuming care of pt

## 2021-01-08 NOTE — PROGRESS NOTES
Hospitalist Progress Note    NAME: Jaylen Rivers   :  1985   MRN:  [id number]       Assessment / Plan:    Hospital-acquired pneumonia  -shortness of breath, cough, pleuritic chest pain  -Low-grade fever, leukocytosis  -Chest x-ray shows left basilar opacity  -We will obtain sputum culture  -Continue cefepime, Day #2. Will do monotherapy in light of low risk factor for MDR pathogens.    -Sputum gram stain shows occasional WBCs and GPC in pairs. Culture pending  -Monitor CBC and fever curve. Will consider changing antibiotic to oral if afebrile for 48 hours     Pillcam stuck within distal ileum - POA  S/p laparascopic ileocecectomy on   Crohn's disease  - Presented with abdominal pain, N, V three days after swallowinf pillcam  - CT abd/pelvis revealed PillCam within the distal ileum in the RLQ.  - Failed non surgical methods to retrieve the pill (solumedrol + laxatives). Had Colonoscopy on  , GI was unable to retrieve the pill cam because of lots of strictures   - Underwent surgery on ,  pill cam found stuck in the previous anastomosis. Ileocecectomy done and pillcam removed  - Tolerating regular diet     Hypokalemia - resolved  -Continue to monitor    Intermittent sinus tachycardia  - possibly related to pain and infection/pneumonia.   - Not on chemical DVT prophylaxis but patient up and walking, no leg swelling.  -Continue to monitor    Anxiety  -Continue as needed Xanax    Hx of Crohn's disease with stricturing/inflammation and fistula/microperforation  In the past. F/u  GI Nel Ayers MD   Anxiety and depression  GERD  IBS  Asthma, not currently in exacerbation  Holding PO meds 2/2 npo status        Code Status: Full code  Surrogate Decision Maker: [id number] Spouse [phone]            DVT Prophylaxis: scd  GI Prophylaxis: not indicated     Baseline: independent      30.0 - 39.9 Obese / Body mass index is 36.29 kg/m².          Subjective:     Chief Complaint / Reason for Physician Visit  Patient states the Xanax helped some  Chest pain less today  Shortness of breath when ambulating  Abdominal pain fairly controlled      Review of Systems:  Symptom Y/N Comments  Symptom Y/N Comments   Fever/Chills n   Chest Pain     Poor Appetite y   Edema y    Cough    Abdominal Pain y    Sputum    Joint Pain     SOB/BENAVIDES    Pruritis/Rash     Nausea/vomit n   Tolerating PT/OT     Diarrhea    Tolerating Diet y    Constipation    Other       Could NOT obtain due to:       Objective:     VITALS:   Last 24hrs VS reviewed since prior progress note. Most recent are:  Patient Vitals for the past 24 hrs:   Temp Pulse Resp BP SpO2   01/08/21 0913 98.6 °F (37 °C) 99 18 131/80 96 %   01/08/21 0348  (!) 119      01/08/21 0300  (!) 123      01/08/21 0220 98.5 °F (36.9 °C) (!) 150 18 (!) 140/90 93 %   01/08/21 0009 98.7 °F (37.1 °C) (!) 124 18 (!) 150/86 93 %   01/07/21 2108  (!) 105      01/07/21 2043 98.3 °F (36.8 °C) (!) 122 18 (!) 149/100 93 %   01/07/21 1606 99 °F (37.2 °C) (!) 117 16 (!) 143/90 93 %   01/07/21 1151 99.4 °F (37.4 °C) (!) 119 12 (!) 147/104 93 %       Intake/Output Summary (Last 24 hours) at 1/8/2021 0946  Last data filed at 1/8/2021 0300  Gross per 24 hour   Intake 520 ml   Output    Net 520 ml        Physical exam   I had a face to face encounter and independently examined this patient on 1/2/2021, as outlined below:  PHYSICAL EXAM:  General:          WD, WN. Sleepy , no acute distress    EENT:              EOMI. Anicteric sclerae. MMM  Resp:               CTA bilaterally, no wheezing or rales. No accessory muscle use  CV:                  Regular  rhythm,  No edema  GI:                   Soft, Non distended, Non tender.  +Bowel sounds  Neurologic:      sleepy    Psych:   Good insight. Not anxious nor agitated  Skin:                No rashes.   No jaundice  Reviewed most current lab test results and cultures  YES  Reviewed most current radiology test results YES  Review and summation of old records today    NO  Reviewed patient's current orders and MAR    YES  PMH/SH reviewed - no change compared to H&P  ________________________________________________________________________  Care Plan discussed with:    Comments   Patient x    Family      RN x    Care Manager     Consultant                        Multidiciplinary team rounds were held today with , nursing, pharmacist and clinical coordinator. Patient's plan of care was discussed; medications were reviewed and discharge planning was addressed. ________________________________________________________________________  Total NON critical care TIME: 25Minutes    Total CRITICAL CARE TIME Spent:   Minutes non procedure based      Comments   >50% of visit spent in counseling and coordination of care     ________________________________________________________________________  Lucia Lucio MD     Procedures: see electronic medical records for all procedures/Xrays and details which were not copied into this note but were reviewed prior to creation of Plan. LABS:  I reviewed today's most current labs and imaging studies.   Pertinent labs include:  Recent Labs     01/08/21  0216 01/07/21  0359 01/06/21  0400   WBC 14.4* 10.9 14.0*   HGB 12.0 11.8 11.5   HCT 38.2 36.5 35.5    242 218     Recent Labs     01/08/21  0216 01/07/21  0359 01/06/21  0400    137 136   K 3.9 3.3* 3.2*    105 104   CO2 29 28 26   GLU 89 81 70   BUN 7 8 9   CREA 0.74 0.59 0.61   CA 8.6 8.1* 7.9*   MG  --  1.9  --        Signed: Lucia Lucio MD

## 2021-01-08 NOTE — PROGRESS NOTES
Problem: Falls - Risk of  Goal: *Absence of Falls  Description: Document Jose A Becerra Fall Risk and appropriate interventions in the flowsheet.   Outcome: Progressing Towards Goal  Note: Fall Risk Interventions:            Medication Interventions: Patient to call before getting OOB

## 2021-01-08 NOTE — PROGRESS NOTES
End of Shift Note    Bedside shift change report given to Deabernardino RN (oncoming nurse) by Tanisha Crews RN (offgoing nurse). Report included the following information SBAR, Kardex, Intake/Output, MAR and Recent Results    Shift worked:  7a-7p     Shift summary and any significant changes:     Pt OOB to recliner for most of the day. Medicated for pain 1x per MAR. Pt bathed self in sink. Fair PO intake. One BM this shift. Pt demonstrated IS technique. Concerns for physician to address:  Discharge     Zone phone for oncoming shift:   [phone]       Activity:  Activity Level: Up ad avl  Number times ambulated in hallways past shift: 0  Number of times OOB to chair past shift: 2    Cardiac:   Cardiac Monitoring: Yes      Cardiac Rhythm: Sinus tachycardia    Access:   Current line(s): PIV     Genitourinary:   Urinary status: voiding    Respiratory:   O2 Device: Room air  Chronic home O2 use?: NO  Incentive spirometer at bedside: YES  Actual Volume (ml): 1000 ml  GI:  Last Bowel Movement Date: 01/07/21  Current diet:  DIET REGULAR Low Fiber  DIET NUTRITIONAL SUPPLEMENTS All Meals; Ensure Enlive  Passing flatus: YES  Tolerating current diet: YES  % Diet Eaten: 25 %    Pain Management:   Patient states pain is manageable on current regimen: YES    Skin:  Trevor Score: 20  Interventions: increase time out of bed    Patient Safety:  Fall Score:  Total Score: 1  Interventions: assistive device (walker, cane, etc) and gripper socks       Length of Stay:  Expected LOS: 10d 9h  Actual LOS: 9      Tanisha Crews RN

## 2021-01-09 LAB
BACTERIA SPEC CULT: ABNORMAL
BACTERIA SPEC CULT: ABNORMAL
BASOPHILS # BLD: 0 K/UL (ref 0–0.1)
BASOPHILS NFR BLD: 0 % (ref 0–1)
DIFFERENTIAL METHOD BLD: ABNORMAL
EOSINOPHIL # BLD: 0.4 K/UL (ref 0–0.4)
EOSINOPHIL NFR BLD: 4 % (ref 0–7)
ERYTHROCYTE [DISTWIDTH] IN BLOOD BY AUTOMATED COUNT: 13.3 % (ref 11.5–14.5)
GRAM STN SPEC: ABNORMAL
HCT VFR BLD AUTO: 35 % (ref 35–47)
HGB BLD-MCNC: 10.8 G/DL (ref 11.5–16)
IMM GRANULOCYTES # BLD AUTO: 0.1 K/UL (ref 0–0.04)
IMM GRANULOCYTES NFR BLD AUTO: 1 % (ref 0–0.5)
LYMPHOCYTES # BLD: 2.5 K/UL (ref 0.8–3.5)
LYMPHOCYTES NFR BLD: 24 % (ref 12–49)
MCH RBC QN AUTO: 27.1 PG (ref 26–34)
MCHC RBC AUTO-ENTMCNC: 30.9 G/DL (ref 30–36.5)
MCV RBC AUTO: 87.7 FL (ref 80–99)
MONOCYTES # BLD: 1.3 K/UL (ref 0–1)
MONOCYTES NFR BLD: 12 % (ref 5–13)
NEUTS SEG # BLD: 6.4 K/UL (ref 1.8–8)
NEUTS SEG NFR BLD: 59 % (ref 32–75)
NRBC # BLD: 0 K/UL (ref 0–0.01)
NRBC BLD-RTO: 0 PER 100 WBC
PLATELET # BLD AUTO: 289 K/UL (ref 150–400)
PMV BLD AUTO: 10.6 FL (ref 8.9–12.9)
RBC # BLD AUTO: 3.99 M/UL (ref 3.8–5.2)
SERVICE CMNT-IMP: ABNORMAL
WBC # BLD AUTO: 10.7 K/UL (ref 3.6–11)

## 2021-01-09 PROCEDURE — 74011250637 HC RX REV CODE- 250/637: Performed by: STUDENT IN AN ORGANIZED HEALTH CARE EDUCATION/TRAINING PROGRAM

## 2021-01-09 PROCEDURE — 85025 COMPLETE CBC W/AUTO DIFF WBC: CPT

## 2021-01-09 PROCEDURE — 65270000029 HC RM PRIVATE

## 2021-01-09 PROCEDURE — 36415 COLL VENOUS BLD VENIPUNCTURE: CPT

## 2021-01-09 PROCEDURE — 74011250637 HC RX REV CODE- 250/637: Performed by: NURSE PRACTITIONER

## 2021-01-09 PROCEDURE — 74011000258 HC RX REV CODE- 258: Performed by: STUDENT IN AN ORGANIZED HEALTH CARE EDUCATION/TRAINING PROGRAM

## 2021-01-09 PROCEDURE — 74011250637 HC RX REV CODE- 250/637: Performed by: SURGERY

## 2021-01-09 PROCEDURE — 74011250636 HC RX REV CODE- 250/636: Performed by: INTERNAL MEDICINE

## 2021-01-09 PROCEDURE — 74011250636 HC RX REV CODE- 250/636: Performed by: STUDENT IN AN ORGANIZED HEALTH CARE EDUCATION/TRAINING PROGRAM

## 2021-01-09 RX ORDER — NYSTATIN 100000 [USP'U]/ML
500000 SUSPENSION ORAL 4 TIMES DAILY
Status: DISCONTINUED | OUTPATIENT
Start: 2021-01-09 | End: 2021-01-10 | Stop reason: HOSPADM

## 2021-01-09 RX ADMIN — CALCIUM CARBONATE (ANTACID) CHEW TAB 500 MG 200 MG: 500 CHEW TAB at 12:03

## 2021-01-09 RX ADMIN — GABAPENTIN 100 MG: 100 CAPSULE ORAL at 21:01

## 2021-01-09 RX ADMIN — HYDROMORPHONE HYDROCHLORIDE 1 MG: 1 INJECTION, SOLUTION INTRAMUSCULAR; INTRAVENOUS; SUBCUTANEOUS at 08:45

## 2021-01-09 RX ADMIN — HYDROMORPHONE HYDROCHLORIDE 1 MG: 1 INJECTION, SOLUTION INTRAMUSCULAR; INTRAVENOUS; SUBCUTANEOUS at 21:01

## 2021-01-09 RX ADMIN — HYDROMORPHONE HYDROCHLORIDE 1 MG: 1 INJECTION, SOLUTION INTRAMUSCULAR; INTRAVENOUS; SUBCUTANEOUS at 16:25

## 2021-01-09 RX ADMIN — ACETAMINOPHEN 650 MG: 325 TABLET ORAL at 21:01

## 2021-01-09 RX ADMIN — CEFEPIME HYDROCHLORIDE 2 G: 2 INJECTION, POWDER, FOR SOLUTION INTRAVENOUS at 03:52

## 2021-01-09 RX ADMIN — CALCIUM CARBONATE (ANTACID) CHEW TAB 500 MG 200 MG: 500 CHEW TAB at 08:45

## 2021-01-09 RX ADMIN — ACETAMINOPHEN 650 MG: 325 TABLET ORAL at 17:47

## 2021-01-09 RX ADMIN — CEFEPIME HYDROCHLORIDE 2 G: 2 INJECTION, POWDER, FOR SOLUTION INTRAVENOUS at 12:03

## 2021-01-09 RX ADMIN — CALCIUM CARBONATE (ANTACID) CHEW TAB 500 MG 200 MG: 500 CHEW TAB at 21:01

## 2021-01-09 RX ADMIN — NYSTATIN 500000 UNITS: 100000 SUSPENSION ORAL at 18:46

## 2021-01-09 RX ADMIN — GABAPENTIN 100 MG: 100 CAPSULE ORAL at 17:47

## 2021-01-09 RX ADMIN — Medication 10 ML: at 03:52

## 2021-01-09 RX ADMIN — CEFEPIME HYDROCHLORIDE 2 G: 2 INJECTION, POWDER, FOR SOLUTION INTRAVENOUS at 21:00

## 2021-01-09 RX ADMIN — ACETAMINOPHEN 650 MG: 325 TABLET ORAL at 12:03

## 2021-01-09 RX ADMIN — GABAPENTIN 100 MG: 100 CAPSULE ORAL at 08:45

## 2021-01-09 RX ADMIN — CALCIUM CARBONATE (ANTACID) CHEW TAB 500 MG 200 MG: 500 CHEW TAB at 17:00

## 2021-01-09 NOTE — PROGRESS NOTES
Problem: Falls - Risk of  Goal: *Absence of Falls  Description: Document [place] Led Fall Risk and appropriate interventions in the flowsheet.   Outcome: Progressing Towards Goal  Note: Fall Risk Interventions:            Medication Interventions: Patient to call before getting OOB

## 2021-01-09 NOTE — PROGRESS NOTES
Hospitalist Progress Note    NAME: Rose Saeed   :  1985   MRN:  [id number]       Assessment / Plan:    Hospital-acquired pneumonia  -shortness of breath, cough, pleuritic chest pain  -Low-grade fever, leukocytosis - resolved. Afebrile for >48 hours  -Chest x-ray shows left basilar opacity  -Continue cefepime, Day #3. Will do monotherapy in light of low risk factor for MDR pathogens.    -Sputum culture grew normal resp teresa and few yeast/candida albicans  -Monitor CBC and fever curve.  Will consider changing antibiotic to oral if afebrile for 48 hours     Pillcam stuck within distal ileum - POA  S/p laparascopic ileocecectomy on   Crohn's disease  - Presented with abdominal pain, N, V three days after swallowinf pillcam  - CT abd/pelvis revealed PillCam within the distal ileum in the RLQ.  - Failed non surgical methods to retrieve the pill (solumedrol + laxatives). Had Colonoscopy on  , GI was unable to retrieve the pill cam because of lots of strictures   - Underwent surgery on ,  pill cam found stuck in the previous anastomosis. Ileocecectomy done and pillcam removed  - Tolerating regular diet     Hypokalemia - resolved  -Continue to monitor    Intermittent sinus tachycardia  - possibly related to pain and infection/pneumonia.   - Not on chemical DVT prophylaxis but patient up and walking, no leg swelling.  -Continue to monitor    Anxiety  -Continue as needed Xanax    Hx of Crohn's disease with stricturing/inflammation and fistula/microperforation  In the past. F/u  GI Rony Gilmore MD   Anxiety and depression  GERD  IBS  Asthma, not currently in exacerbation  Holding PO meds 2/2 npo status        Code Status: Full code  Surrogate Decision Maker:  Mitch Saeed Spouse [phone]            DVT Prophylaxis: scd  GI Prophylaxis: not indicated     Baseline: independent      30.0 - 39.9 Obese / Body mass index is 36.29 kg/m².         Subjective:     Chief Complaint / Reason  for Physician Visit  Less chest pain  Breathing much better, no as short of breath as the last few days  Cough getting better      Review of Systems:  Symptom Y/N Comments  Symptom Y/N Comments   Fever/Chills n   Chest Pain     Poor Appetite y   Edema y    Cough    Abdominal Pain y    Sputum    Joint Pain     SOB/BENAVIDES    Pruritis/Rash     Nausea/vomit n   Tolerating PT/OT     Diarrhea    Tolerating Diet y    Constipation    Other       Could NOT obtain due to:       Objective:     VITALS:   Last 24hrs VS reviewed since prior progress note. Most recent are:  Patient Vitals for the past 24 hrs:   Temp Pulse Resp BP SpO2   01/09/21 1531 98 °F (36.7 °C) 89 18 127/78 96 %   01/09/21 1133 98.2 °F (36.8 °C) (!) 110 18 130/87 95 %   01/09/21 0836 99.6 °F (37.6 °C) (!) 123 18 138/89 95 %   01/09/21 0426 98.7 °F (37.1 °C) (!) 116 18 123/77 98 %   01/08/21 2327 99.8 °F (37.7 °C) (!) 116 18 133/89 99 %   01/08/21 2038 99.4 °F (37.4 °C) (!) 123 18 (!) 144/93 94 %   01/08/21 1658 98.5 °F (36.9 °C) (!) 120 18 (!) 160/96 96 %       Intake/Output Summary (Last 24 hours) at 1/9/2021 1648  Last data filed at 1/8/2021 1930  Gross per 24 hour   Intake 620 ml   Output    Net 620 ml        Physical exam   I had a face to face encounter and independently examined this patient on 1/2/2021, as outlined below:  PHYSICAL EXAM:  General:          WD, WN. Sleepy , no acute distress    EENT:              EOMI. Anicteric sclerae. MMM  Resp:               CTA bilaterally, no wheezing or rales. No accessory muscle use  CV:                  Regular  rhythm,  No edema  GI:                   Soft, Non distended, Non tender.  +Bowel sounds  Neurologic:      sleepy    Psych:   Good insight. Not anxious nor agitated  Skin:                No rashes.   No jaundice  Reviewed most current lab test results and cultures  YES  Reviewed most current radiology test results   YES  Review and summation of old records today    NO  Reviewed patient's current orders and MAR    YES  PMH/SH reviewed - no change compared to H&P  ________________________________________________________________________  Care Plan discussed with:    Comments   Patient x    Family      RN x    Care Manager     Consultant                        Multidiciplinary team rounds were held today with , nursing, pharmacist and clinical coordinator. Patient's plan of care was discussed; medications were reviewed and discharge planning was addressed. ________________________________________________________________________  Total NON critical care TIME: 25Minutes    Total CRITICAL CARE TIME Spent:   Minutes non procedure based      Comments   >50% of visit spent in counseling and coordination of care     ________________________________________________________________________  Vashti Chavira MD     Procedures: see electronic medical records for all procedures/Xrays and details which were not copied into this note but were reviewed prior to creation of Plan. LABS:  I reviewed today's most current labs and imaging studies.   Pertinent labs include:  Recent Labs     01/09/21 0412 01/08/21 0216 01/07/21 0359   WBC 10.7 14.4* 10.9   HGB 10.8* 12.0 11.8   HCT 35.0 38.2 36.5    310 242     Recent Labs     01/08/21 0216 01/07/21 0359    137   K 3.9 3.3*    105   CO2 29 28   GLU 89 81   BUN 7 8   CREA 0.74 0.59   CA 8.6 8.1*   MG  --  1.9       Signed: Vashti Chavira MD

## 2021-01-09 NOTE — PROGRESS NOTES
End of Shift Note    Bedside shift change report given to Calin SR (oncoming nurse) by Raza Mary RN (offgoing nurse). Report included the following information SBAR, Kardex, Intake/Output, MAR and Recent Results    Shift worked:  7a-7p     Shift summary and any significant changes:     Pt OOB ad val to bathroom. Medicated 2x for pain. Pt bathed self in sink. Pt had 2x BM, unwitnessed by this nurse but pt reports watery and yellow in color. OOB to chair most of the day. Fair PO intake. Concerns for physician to address:  Discharge planning     Zone phone for oncoming shift:   [phone]       Activity:  Activity Level: Up ad val  Number times ambulated in hallways past shift: 0  Number of times OOB to chair past shift: 3    Cardiac:   Cardiac Monitoring: Yes      Cardiac Rhythm: Sinus tachycardia    Access:   Current line(s): PIV     Genitourinary:   Urinary status: voiding    Respiratory:   O2 Device: Room air  Chronic home O2 use?: NO  Incentive spirometer at bedside: YES  Actual Volume (ml): 1000 ml  GI:  Last Bowel Movement Date: 01/08/21  Current diet:  DIET REGULAR Low Fiber  DIET NUTRITIONAL SUPPLEMENTS All Meals; Ensure Enlive  Passing flatus: YES  Tolerating current diet: YES  % Diet Eaten: 25 %    Pain Management:   Patient states pain is manageable on current regimen: YES    Skin:  Trevor Score: 20  Interventions: increase time out of bed    Patient Safety:  Fall Score:  Total Score: 1  Interventions: gripper socks       Length of Stay:  Expected LOS: 10d 9h  Actual LOS: Eron Pablo RN

## 2021-01-09 NOTE — PROGRESS NOTES
1900--bedside report received from marshall rivera pt sitting up in chair, oriented x 4, has no complaints at this time call bell in reach assessment as noted    2230--pt expressing desire to go home in am, 1mg dilaudid given per orders    0400--am labs drawn and sent to lab per orders,     0700--bedside report given to marshall rivera who is assuming care of pt

## 2021-01-10 ENCOUNTER — APPOINTMENT (OUTPATIENT)
Dept: GENERAL RADIOLOGY | Age: 36
DRG: 329 | End: 2021-01-10
Attending: NURSE PRACTITIONER
Payer: COMMERCIAL

## 2021-01-10 VITALS
WEIGHT: 198.41 LBS | SYSTOLIC BLOOD PRESSURE: 127 MMHG | OXYGEN SATURATION: 98 % | BODY MASS INDEX: 36.51 KG/M2 | HEART RATE: 101 BPM | RESPIRATION RATE: 18 BRPM | TEMPERATURE: 98 F | DIASTOLIC BLOOD PRESSURE: 84 MMHG | HEIGHT: 62 IN

## 2021-01-10 LAB
BASOPHILS # BLD: 0 K/UL (ref 0–0.1)
BASOPHILS NFR BLD: 0 % (ref 0–1)
DIFFERENTIAL METHOD BLD: ABNORMAL
EOSINOPHIL # BLD: 0.3 K/UL (ref 0–0.4)
EOSINOPHIL NFR BLD: 3 % (ref 0–7)
ERYTHROCYTE [DISTWIDTH] IN BLOOD BY AUTOMATED COUNT: 13.3 % (ref 11.5–14.5)
HCT VFR BLD AUTO: 38.3 % (ref 35–47)
HGB BLD-MCNC: 11.9 G/DL (ref 11.5–16)
IMM GRANULOCYTES # BLD AUTO: 0.1 K/UL (ref 0–0.04)
IMM GRANULOCYTES NFR BLD AUTO: 1 % (ref 0–0.5)
LYMPHOCYTES # BLD: 2.2 K/UL (ref 0.8–3.5)
LYMPHOCYTES NFR BLD: 22 % (ref 12–49)
MCH RBC QN AUTO: 27.2 PG (ref 26–34)
MCHC RBC AUTO-ENTMCNC: 31.1 G/DL (ref 30–36.5)
MCV RBC AUTO: 87.6 FL (ref 80–99)
MONOCYTES # BLD: 1.1 K/UL (ref 0–1)
MONOCYTES NFR BLD: 11 % (ref 5–13)
NEUTS SEG # BLD: 6.6 K/UL (ref 1.8–8)
NEUTS SEG NFR BLD: 64 % (ref 32–75)
NRBC # BLD: 0 K/UL (ref 0–0.01)
NRBC BLD-RTO: 0 PER 100 WBC
PLATELET # BLD AUTO: 267 K/UL (ref 150–400)
PMV BLD AUTO: 11.2 FL (ref 8.9–12.9)
RBC # BLD AUTO: 4.37 M/UL (ref 3.8–5.2)
WBC # BLD AUTO: 10.3 K/UL (ref 3.6–11)

## 2021-01-10 PROCEDURE — 74011250637 HC RX REV CODE- 250/637: Performed by: SURGERY

## 2021-01-10 PROCEDURE — 74011250637 HC RX REV CODE- 250/637: Performed by: NURSE PRACTITIONER

## 2021-01-10 PROCEDURE — 71045 X-RAY EXAM CHEST 1 VIEW: CPT

## 2021-01-10 PROCEDURE — 85025 COMPLETE CBC W/AUTO DIFF WBC: CPT

## 2021-01-10 PROCEDURE — 94760 N-INVAS EAR/PLS OXIMETRY 1: CPT

## 2021-01-10 PROCEDURE — 74011000258 HC RX REV CODE- 258: Performed by: STUDENT IN AN ORGANIZED HEALTH CARE EDUCATION/TRAINING PROGRAM

## 2021-01-10 PROCEDURE — 74011250637 HC RX REV CODE- 250/637: Performed by: STUDENT IN AN ORGANIZED HEALTH CARE EDUCATION/TRAINING PROGRAM

## 2021-01-10 PROCEDURE — 74011250636 HC RX REV CODE- 250/636: Performed by: STUDENT IN AN ORGANIZED HEALTH CARE EDUCATION/TRAINING PROGRAM

## 2021-01-10 PROCEDURE — 74011250636 HC RX REV CODE- 250/636: Performed by: HOSPITALIST

## 2021-01-10 PROCEDURE — 74011250636 HC RX REV CODE- 250/636: Performed by: INTERNAL MEDICINE

## 2021-01-10 PROCEDURE — 36415 COLL VENOUS BLD VENIPUNCTURE: CPT

## 2021-01-10 RX ORDER — OXYCODONE AND ACETAMINOPHEN 5; 325 MG/1; MG/1
1 TABLET ORAL
Qty: 16 TAB | Refills: 0 | Status: SHIPPED | OUTPATIENT
Start: 2021-01-10 | End: 2021-01-15

## 2021-01-10 RX ORDER — GABAPENTIN 100 MG/1
100 CAPSULE ORAL 3 TIMES DAILY
Qty: 90 CAP | Refills: 0 | Status: SHIPPED | OUTPATIENT
Start: 2021-01-10

## 2021-01-10 RX ORDER — CALCIUM CARBONATE 200(500)MG
200 TABLET,CHEWABLE ORAL
Qty: 60 TAB | Refills: 0 | Status: SHIPPED | OUTPATIENT
Start: 2021-01-10

## 2021-01-10 RX ORDER — TRAMADOL HYDROCHLORIDE 50 MG/1
50 TABLET ORAL
Qty: 10 TAB | Refills: 0 | Status: SHIPPED | OUTPATIENT
Start: 2021-01-10 | End: 2021-01-15 | Stop reason: SDUPTHER

## 2021-01-10 RX ORDER — BENZONATATE 100 MG/1
100 CAPSULE ORAL
Qty: 15 CAP | Refills: 0 | Status: SHIPPED | OUTPATIENT
Start: 2021-01-10 | End: 2021-01-17

## 2021-01-10 RX ORDER — AZITHROMYCIN 250 MG/1
TABLET, FILM COATED ORAL
Qty: 6 TAB | Refills: 0 | Status: SHIPPED | OUTPATIENT
Start: 2021-01-10 | End: 2021-01-15

## 2021-01-10 RX ORDER — FLUCONAZOLE 150 MG/1
150 TABLET ORAL DAILY
Qty: 1 TAB | Refills: 0 | Status: SHIPPED | OUTPATIENT
Start: 2021-01-10 | End: 2021-01-11

## 2021-01-10 RX ORDER — NYSTATIN 100000 [USP'U]/ML
500000 SUSPENSION ORAL 4 TIMES DAILY
Qty: 473 ML | Refills: 0 | Status: SHIPPED | OUTPATIENT
Start: 2021-01-10

## 2021-01-10 RX ADMIN — CEFEPIME HYDROCHLORIDE 2 G: 2 INJECTION, POWDER, FOR SOLUTION INTRAVENOUS at 12:20

## 2021-01-10 RX ADMIN — Medication 1 AMPULE: at 12:21

## 2021-01-10 RX ADMIN — Medication 10 ML: at 04:34

## 2021-01-10 RX ADMIN — CEFEPIME HYDROCHLORIDE 2 G: 2 INJECTION, POWDER, FOR SOLUTION INTRAVENOUS at 04:33

## 2021-01-10 RX ADMIN — HYDROMORPHONE HYDROCHLORIDE 1 MG: 1 INJECTION, SOLUTION INTRAMUSCULAR; INTRAVENOUS; SUBCUTANEOUS at 12:20

## 2021-01-10 RX ADMIN — ACETAMINOPHEN 650 MG: 325 TABLET ORAL at 06:00

## 2021-01-10 RX ADMIN — CALCIUM CARBONATE (ANTACID) CHEW TAB 500 MG 200 MG: 500 CHEW TAB at 12:20

## 2021-01-10 RX ADMIN — ACETAMINOPHEN 650 MG: 325 TABLET ORAL at 04:33

## 2021-01-10 RX ADMIN — CALCIUM CARBONATE (ANTACID) CHEW TAB 500 MG 200 MG: 500 CHEW TAB at 09:20

## 2021-01-10 RX ADMIN — GABAPENTIN 100 MG: 100 CAPSULE ORAL at 09:20

## 2021-01-10 RX ADMIN — ACETAMINOPHEN 650 MG: 325 TABLET ORAL at 12:20

## 2021-01-10 RX ADMIN — NYSTATIN 500000 UNITS: 100000 SUSPENSION ORAL at 09:20

## 2021-01-10 RX ADMIN — ONDANSETRON 4 MG: 2 INJECTION INTRAMUSCULAR; INTRAVENOUS at 12:20

## 2021-01-10 RX ADMIN — NYSTATIN 500000 UNITS: 100000 SUSPENSION ORAL at 12:20

## 2021-01-10 RX ADMIN — HYDROMORPHONE HYDROCHLORIDE 1 MG: 1 INJECTION, SOLUTION INTRAMUSCULAR; INTRAVENOUS; SUBCUTANEOUS at 04:47

## 2021-01-10 NOTE — PROGRESS NOTES
1900--bedside report received from marshall cannon pt resting in bed oriented x 4, has no complaints at this time call bell in reach assessment as noted    2230--1mg dilaudid given per prn orders for right rib cage and abd pain    0100--pt sleeping soundly, call bell in reach    0500--after pt back from bathroom x for large BM, pt back to bed, am labs drawn and sent to lab, pt reports Kael  they going to do another chest x-ray, I told the other nurse last evening, I start have pain on my right side now and the shortness of breath is back too, i'm just concerned about it \",    0530--NPgriselda notified and talking to pt, chest xray orders pending     0630--pt up to chair, states \"I don't feel the shortness of breath so bad, when I focus on my breathing exercises    0700--bedside report given to marshall rivera who is assuming care of pt

## 2021-01-10 NOTE — PROGRESS NOTES
I have reviewed discharge instructions with the patient and spouse. The patient and spouse verbalized understanding. Peripheral IVs removed, personal belongings gathered, and pt discharged to front entrance via wheelchair, home with .

## 2021-01-10 NOTE — PROGRESS NOTES
Received notification from bedside RN about patient with regards to: patient's recent right sided discomfort on inhalation and recent onset of BENAVIDES  VS: /82, , RR 18, O2 sat 94%    Intervention given: portable CXR ordered

## 2021-01-10 NOTE — DISCHARGE SUMMARY
Hospitalist Discharge Summary     Patient ID:  Richard Books  [id number]  28 y.o.  1985 12/29/2020    PCP on record: Edward Gamez MD    Admit date: 12/29/2020  Discharge date and time: 1/10/2021    DISCHARGE DIAGNOSIS:  · Hospital-acquired pneumonia  · Pillcam stuck within distal ileum - POA  · S/p laparascopic ileocecectomy on 01/03  · Oral thrush  · Crohn's disease  · Intermittent sinus tachycardia  · Anxiety  · History of Crohn's disease with stricturing/inflammation and fistula/microperforation   · Anxiety and depression  · GERD  · Asthma    CONSULTATIONS:  IP CONSULT TO HOSPITALIST  IP CONSULT TO GASTROENTEROLOGY  IP CONSULT TO GENERAL SURGERY    Excerpted HPI from H&P of Anamaria Escobar MD:  K[age] An[age] y. o. female with PMHx significant for endometriosis, Crohn's disease, status post appendectomy and bowel resection presents to the ED with chief complaint of abdominal pain.  Patient has had a complicated course as far as her Crohn's disease.  Symptoms started in the summer 2019.  She started having abdominal pain and she had a PillCam study that then got stuck and required surgical removal with a bowel resection when a stricture was found.  She then was admitted in January 2020 with a microperforation.  She was started on Humira at that time which she continued until April when she was switched to azathioprine.  Throughout the summer she had some side effects that she thought was related to the azathioprine, but was doing better as far as her abdominal symptoms.  In October, with the approval of her GI specialist, she stopped all Crohn's medications due to potential side effects and the fact that she was doing better from her abdominal pain standpoint.  In November, she started having abdominal pain again and had an appointment last week with her GI doctor who suggested that she have another PillCam study.  She swallowed the PillCam last Wednesday and then started having abdominal pain 3 days later. Toshia Rebolledo has not come out yet.  She had an x-ray yesterday which showed the PillCam stuck in her ileum.  She was instructed by her GI doctor to use MiraLAX to try to help the PillCam come out and to come back tomorrow for another x-ray to see if it had moved, however tonight her abdominal pain worsened and she decided to come in for further evaluation.  She reports her pain is in her right mid abdomen it is severe and radiates to her back. Araseli Ellis has had nausea and vomiting today. Hector Garcia denies any fevers or chills.  She denies any dysuria, hematuria, urinary frequency.  She is followed by Kenny Yoon from GI.    ______________________________________________________________________  DISCHARGE SUMMARY/HOSPITAL COURSE:  for full details see H&P, daily progress notes, labs, consult notes. Hospital-acquired pneumonia  -Developed dyspnea, cough, pleuritic chest pain on hospital day 7. She had low-grade fever and leukocytosis. -Chest x-ray showed left basilar opacity. Sputum culture grew normal resp teresa and few yeast/candida albicans  -She was started on cefepime monotherapy for HCAP in light of low risk factor for MDR pathogens. Patient responded very well to treatment. Her cough, shortness of breath and chest pain improved. Leukocytosis resolved. She became afebrile. She received 4 days course of IV antibiotic. Patient otherwise stable. Very adamant about being discharged. She is allergic to fluoroquinolones. Macrolide listed as allergy but patient reported tolerating Azithromycin in the past multiple times. Will discharged on azithromycin to complete a total of 7 days course. She is instructed to follow-up with her PCP in 1 week.   She is to go to the emergency room if worsening of symptoms.     Pillcam stuck within distal ileum - POA  S/p laparascopic ileocecectomy on 01/03  Crohn's disease  - Presented with abdominal pain, N, V three days after swallowinf pillcam  - CT abd/pelvis revealed PillCam within the distal ileum in the RLQ.  - Failed non surgical methods to retrieve the pill (solumedrol + laxatives). Had Colonoscopy on  01/02, GI was unable to retrieve the pill cam because of lots of strictures   - Underwent surgery on 01/03,  pill cam found stuck in the previous anastomosis. Ileocecectomy done and pillcam removed  -Patient did well post surgery. Tolerated regular diet prior to discharge. She is to follow-up with GI outpatient. Oral thrush: Continue nystatin swish and spit  Hypokalemia - resolved     Intermittent sinus tachycardia -improved  -Thought to be related to pain and infection/pneumonia. Anxiety  -Patient with history of anxiety. Required as needed Xanax.     Hx of Crohn's disease with stricturing/inflammation and fistula/microperforation  In the past. F/u Trinity Golden MD   Anxiety and depression  GERD  IBS  Asthma, not in exacerbation    _______________________________________________________________________  Patient seen and examined by me on discharge day. Pertinent Findings:  Gen:    Not in distress  Chest: Clear lungs  CVS:   Regular rhythm. No edema  Abd:  Soft, not distended, not tender  Neuro:  Alert, oriented x3  _______________________________________________________________________  DISCHARGE MEDICATIONS:   Discharge Medication List as of 1/10/2021  2:16 PM      START taking these medications    Details   benzonatate (TESSALON) 100 mg capsule Take 1 Cap by mouth three (3) times daily as needed for Cough for up to 7 days. , Normal, Disp-15 Cap, R-0      calcium carbonate (TUMS) 200 mg calcium (500 mg) chew Take 1 Tab by mouth three (3) times daily (with meals). Indications: heartburn, Normal, Disp-60 Tab, R-0      gabapentin (NEURONTIN) 100 mg capsule Take 1 Cap by mouth three (3) times daily.  Max Daily Amount: 300 mg., Normal, Disp-90 Cap, R-0      nystatin (MYCOSTATIN) 100,000 unit/mL suspension Take 5 mL by mouth four (4) times daily. swish and spit, Normal, Disp-473 mL, R-0      traMADoL (ULTRAM) 50 mg tablet Take 1 Tab by mouth every six (6) hours as needed (15) for up to 5 days. Max Daily Amount: 200 mg., Normal, Disp-10 Tab, R-0      oxyCODONE-acetaminophen (Percocet) 5-325 mg per tablet Take 1 Tab by mouth every six (6) hours as needed for Pain for up to 5 days. Max Daily Amount: 4 Tabs., Normal, Disp-16 Tab, R-0      fluconazole (DIFLUCAN) 150 mg tablet Take 1 Tab by mouth daily for 1 day. FDA advises cautious prescribing of oral fluconazole in pregnancy. , Normal, Disp-1 Tab, R-0      azithromycin (ZITHROMAX) 250 mg tablet Take 500mg  PO x1 day then 250mg  Po daily for 4 days, Normal, Disp-6 Tab, R-0         CONTINUE these medications which have NOT CHANGED    Details   diphenhydrAMINE (BenadryL) 25 mg capsule Take 25 mg by mouth every six (6) hours as needed., Historical Med      ondansetron (Zofran ODT) 4 mg disintegrating tablet Take 1 Tab by mouth every eight (8) hours as needed for Nausea., Normal, Disp-10 Tab, R-0      acetaminophen (TYLENOL) 500 mg tablet Take 1,000 mg by mouth every six (6) hours as needed for Pain., Historical Med      promethazine (PHENERGAN) 25 mg suppository Insert 25 mg into rectum every six (6) hours as needed for Nausea., Historical Med         STOP taking these medications       adalimumab (Humira Pen) 40 mg/0.8 mL injection pen Comments:   Reason for Stopping:                 Patient Follow Up Instructions:    Activity: Activity as tolerated  Diet: Regular Diet  Wound Care: As directed      Follow-up Information     Follow up With Specialties Details Why Contact Info    Sarita Gotti MD General Surgery, Breast Surgery, Surgery, Oncology, Bariatrics Schedule an appointment as soon as possible for a visit in 2 weeks  [address]  [address] [place] [address]      Pola Bueno MD Family Medicine Schedule an appointment as soon as possible for a visit in 1 week  [id number] [employer] [address] [id number]  [phone]          ________________________________________________________________    Risk of deterioration: Moderate    Condition at Discharge:  Stable  __________________________________________________________________    Disposition  Home with family, no needs    ____________________________________________________________________    Code Status: Full Code  ___________________________________________________________________      Total time in minutes spent coordinating this discharge (includes going over instructions, follow-up, prescriptions, and preparing report for sign off to her PCP) :  35 minutes    Signed:  Chanell Gracia MD

## 2021-01-10 NOTE — PROGRESS NOTES
SUBJECTIVE:   Suman Burton is a 82 year old male who presents to clinic today for the following health issues:      Diabetes Follow-up      Patient is checking blood sugars: 1 times a week    Diabetic concerns: None     Symptoms of hypoglycemia (low blood sugar): none     Paresthesias (numbness or burning in feet) or sores: No     Date of last diabetic eye exam: UTD    BP Readings from Last 2 Encounters:   01/08/18 126/60   12/04/17 120/77     Hemoglobin A1C (%)   Date Value   12/04/2017 7.4 (H)   08/21/2017 7.3 (H)     LDL Cholesterol Calculated (mg/dL)   Date Value   04/17/2017 100   04/29/2014 112     Hypertension Follow-up      Outpatient blood pressures are being checked.    Low Salt Diet: not monitoring          Amount of exercise or physical activity: None    Problems taking medications regularly: No    Medication side effects: none  Diet: regular (no restrictions)      Eye(s) Problem    Duration: years    Description:  Location: left  Pain: no   Redness: no  Discharge: no    Accompanying signs and symptoms: decreased vision    History (Trauma, foreign body exposure,): None    Precipitating or alleviating factors (contact use): retinal vein/artery occlusion    Therapies tried and outcome: retinal specialist        Problem list and histories reviewed & adjusted, as indicated.  Additional history: Retired FP physician    Patient Active Problem List   Diagnosis     Adenocarcinoma (H)     Benign hypertension     Health Care Home     Cerebral infarction due to occlusion or stenosis of carotid artery     Chronic kidney disease, stage III (moderate)     Diabetes mellitus, type 2 (H)     Hyperlipidemia     Lumbago     Thoracic or lumbosacral neuritis or radiculitis, unspecified     Transient visual loss     Diabetes mellitus, type 2 (H)     Hypertension     Chronic atrial fibrillation (H)     Chronic pain syndrome     Hypersomnia     Past Surgical History:   Procedure Laterality Date     CHOLECYSTECTOMY        Problem: Falls - Risk of  Goal: *Absence of Falls  Description: Document Orlando Led Fall Risk and appropriate interventions in the flowsheet.   Outcome: Progressing Towards Goal  Note: Fall Risk Interventions:            Medication Interventions: Patient to call before getting OOB "LAPAROSCOPIC APPENDECTOMY         Social History   Substance Use Topics     Smoking status: Former Smoker     Smokeless tobacco: Never Used     Alcohol use 0.0 oz/week     0 Standard drinks or equivalent per week      Comment: Occasionally.      Family History   Problem Relation Age of Onset     DIABETES No family hx of      Coronary Artery Disease No family hx of      Hypertension No family hx of      Breast Cancer No family hx of      Colon Cancer No family hx of      Prostate Cancer No family hx of      Other Cancer No family hx of      Asthma No family hx of              Reviewed and updated as needed this visit by clinical staffTobacco  Allergies  Meds  Med Hx  Surg Hx  Fam Hx  Soc Hx      Reviewed and updated as needed this visit by Provider         ROS:  Constitutional, neuro, ENT, endocrine, pulmonary, cardiac, gastrointestinal, genitourinary, musculoskeletal, integument and psychiatric systems are negative, except as otherwise noted.  PSYCHIATRIC: POSITIVE for fatigue, hypersomnia during the day and insomnia at night      OBJECTIVE:                                                    /60  Pulse 75  Temp 97  F (36.1  C) (Tympanic)  Resp 16  Ht 5' 6.5\" (1.689 m)  Wt 155 lb (70.3 kg)  BMI 24.64 kg/m2  Body mass index is 24.64 kg/(m^2).  GENERAL APPEARANCE: healthy, alert and no distress  NEURO: mentation intact, speech normal and tremor both hands  PSYCH: mentation appears normal and affect normal/bright         ASSESSMENT/PLAN:                                                        ICD-10-CM    1. Benign hypertension I10    2. Chronic atrial fibrillation (H) I48.2 warfarin (COUMADIN) 3 MG tablet   3. Need for prophylactic vaccination and inoculation against influenza Z23 FLU VACCINE, INCREASED ANTIGEN, PRESV FREE, AGE 65+ [21720]     ADMIN INFLUENZA (For MEDICARE Patients ONLY) []   4. Hypersomnia G47.10        Patient Instructions   We had a lengthy discussion about the causes of his " hypersomnia during the day which then leads to difficulties with insomnia at night.  He is not getting any exercise.  We discussed a trial of an antidepressant.  His PHQ 9 score today was 5.  He was tried a number of years ago on an antidepressant which did not have any positive effects for him.  We did finally come to the conclusion that we would try having him increase his exercise by walking in place.  He will follow-up in one month.  Influenza vaccine was given today.  The patient no longer drives due to his decreased vision in his left eye.  He sees a retina specialist at the [facility] and is very happy with that care so will not get a second opinion from a different retina specialist.      Jamie Guerrier MD  [facility]    Injectable Influenza Immunization Documentation    1.  Is the person to be vaccinated sick today?   No    2. Does the person to be vaccinated have an allergy to a component   of the vaccine?   No  Egg Allergy Algorithm Link    3. Has the person to be vaccinated ever had a serious reaction   to influenza vaccine in the past?   No    4. Has the person to be vaccinated ever had Guillain-Barré syndrome?   No    Form completed by Talya Casas CMA

## 2021-01-10 NOTE — PROGRESS NOTES
Problem: Falls - Risk of  Goal: *Absence of Falls  Description: Document Luis A Wick Fall Risk and appropriate interventions in the flowsheet.   Outcome: Resolved/Met     Problem: Patient Education: Go to Patient Education Activity  Goal: Patient/Family Education  Outcome: Resolved/Met     Problem: Patient Education: Go to Patient Education Activity  Goal: Patient/Family Education  Outcome: Resolved/Met     Problem: Surgical Pathway Day of Surgery  Goal: Off Pathway (Use only if patient is Off Pathway)  Outcome: Resolved/Met  Goal: Activity/Safety  Outcome: Resolved/Met  Goal: Consults, if ordered  Outcome: Resolved/Met  Goal: Nutrition/Diet  Outcome: Resolved/Met  Goal: Medications  Outcome: Resolved/Met

## 2021-01-14 NOTE — PROGRESS NOTES
Pt sp Jan 2021  Laparoscopic small bowel stricture resection with Ileocectomy Jan 4, 2021,  with removal PILL CAM FB  Dr Rhonda Tavares , pathology with recurrent Crohns enteritis     SUBJECTIVE: Ravi Alonzo is a 28 y.o. female is seen for a routine postop check. Reports no problems with the wound or other issues. Activity, diet and bowels are normal. No pain. OBJECTIVE: Appears well. Neuro C A and O x3  CV-  Reg rate  Lungs:  CTA  Abd  soft  NT  Wounds are well healed without complications or infection. ASSESSMENT: normal postdischarge course, doing well. Patient did ask for more tramadol for short period of time and this seems reasonable, GI function is still finding its new norm through diarrhea. PLAN: Patient is instructed to ad val activity . Return PRN for any problems or concerns. Patient has follow-up with her gastroenterologist regarding the enteritis findings above and whether to alter her medications. Patient very pleased with results. Work note given to patient.   Patient asked for some more tramadol as she is using Tylenol regularly apparently cannot use nonsteroidals due to the ulcer issues and I did renew some of her tramadol for her electronically

## 2021-01-15 ENCOUNTER — OFFICE VISIT (OUTPATIENT)
Dept: SURGERY | Age: 36
End: 2021-01-15
Payer: COMMERCIAL

## 2021-01-15 VITALS
DIASTOLIC BLOOD PRESSURE: 89 MMHG | BODY MASS INDEX: 34.52 KG/M2 | SYSTOLIC BLOOD PRESSURE: 128 MMHG | HEART RATE: 115 BPM | HEIGHT: 62 IN | WEIGHT: 187.6 LBS | TEMPERATURE: 97.9 F | OXYGEN SATURATION: 97 %

## 2021-01-15 DIAGNOSIS — K50.012 CROHN'S DISEASE OF SMALL INTESTINE WITH INTESTINAL OBSTRUCTION (HCC): Primary | ICD-10-CM

## 2021-01-15 DIAGNOSIS — K50.019 CROHN'S DISEASE OF SMALL INTESTINE WITH COMPLICATION (HCC): ICD-10-CM

## 2021-01-15 DIAGNOSIS — R10.31 RIGHT LOWER QUADRANT ABDOMINAL PAIN: ICD-10-CM

## 2021-01-15 PROCEDURE — 99024 POSTOP FOLLOW-UP VISIT: CPT | Performed by: SURGERY

## 2021-01-15 RX ORDER — TRAMADOL HYDROCHLORIDE 50 MG/1
50 TABLET ORAL
Qty: 12 TAB | Refills: 0 | Status: SHIPPED | OUTPATIENT
Start: 2021-01-15 | End: 2021-01-21

## 2021-01-15 NOTE — PROGRESS NOTES
Chief Complaint   Patient presents with    Surgical Follow-up      Ileocecectomy. 01/03/2021     1. Have you been to the ER, urgent care clinic since your last visit? Hospitalized since your last visit? No    2. Have you seen or consulted any other health care providers outside of the [address] [place] [address] since your last visit? Include any pap smears or colon screening. No     Discussed advanced directive. Patient states that she does not have an advanced directive.

## 2021-01-15 NOTE — ADT AUTH CERT NOTES
Utilization Reviews DOS 1/9/21 - MAR 1/1/21-1/10/21 by Hawa Vazquez RN Review Entered Review Status 1/15/2021 09:42 In Primary Criteria Review Hospitalist Internal Medicine Progress Notes Date of Service: 01/09/21 2076 Assessment / Plan:  
Hospital-acquired pneumonia  
-shortness of breath, cough, pleuritic chest pain  
-Low-grade fever, leukocytosis - resolved. Afebrile for >48 hours  
-Chest x-ray shows left basilar opacity  
-Continue cefepime, Day #3. Will do monotherapy in light of low risk factor for MDR pathogens.  
-Sputum culture grew normal resp teresa and few yeast/candida albicans  
-Monitor CBC and fever curve. Will consider changing antibiotic to oral if afebrile for 48 hours Pillcam stuck within distal ileum - POA  
S/p laparascopic ileocecectomy on 01/03 Crohn's disease - Presented with abdominal pain, N, V three days after swallowinf pillcam  
- CT abd/pelvis revealed PillCam within the distal ileum in the RLQ.  
- Failed non surgical methods to retrieve the pill (solumedrol + laxatives). Had Colonoscopy on 01/02, GI was unable to retrieve the pill cam because of lots of strictures - Underwent surgery on 01/03, pill cam found stuck in the previous anastomosis. Ileocecectomy done and pillcam removed - Tolerating regular diet Hypokalemia - resolved  
-Continue to monitor Intermittent sinus tachycardia - possibly related to pain and infection/pneumonia.  
- Not on chemical DVT prophylaxis but patient up and walking, no leg swelling.  
-Continue to monitor Anxiety  
-Continue as needed Xanax Hx of Crohn's disease with stricturing/inflammation and fistula/microperforation In the past. F/u GI Carry MD Sharita  
Anxiety and depression GERD IBS Asthma, not currently in exacerbation Holding PO meds 2/2 npo status DVT Prophylaxis: scd GI Prophylaxis: not indicated Baseline: independent 30.0 - 39.9 Obese / Body mass index is 36.29 kg/m². Subjective: Chief Complaint / Reason for Physician Visit Less chest pain Breathing much better, no as short of breath as the last few days Cough getting better Objective: VITALS:  
Last 24hrs VS reviewed since prior progress note. Most recent are:  
Patient Vitals for the past 24 hrs:  
Temp Pulse Resp BP SpO2  
01/09/21 1531 98 °F (36.7 °C) 89 18 127/78 96 % 01/09/21 1133 98.2 °F (36.8 °C) (!) 110 18 130/87 95 % 01/09/21 0836 99.6 °F (37.6 °C) (!) 123 18 138/89 95 % 01/09/21 0426 98.7 °F (37.1 °C) (!) 116 18 123/77 98 % 01/08/21 2327 99.8 °F (37.7 °C) (!) 116 18 133/89 99 % 01/08/21 2038 99.4 °F (37.4 °C) (!) 123 18 (!) 144/93 94 % 01/08/21 1658 98.5 °F (36.9 °C) (!) 120 18 (!) 160/96 96 % Intake/Output Summary (Last 24 hours) at 1/9/2021 1648 Last data filed at 1/8/2021 1587 Gross per 24 hour Intake 620 ml Output  Net 620 ml Physical exam  
I had a face to face encounter and independently examined this patient on 1/2/2021, as outlined below: PHYSICAL EXAM:  
General: WD, WN. Sleepy , no acute distress EENT: EOMI. Anicteric sclerae. MMM Resp: CTA bilaterally, no wheezing or rales. No accessory muscle use CV: Regular rhythm, No edema GI: Soft, Non distended, Non tender. +Bowel sounds Neurologic: sleepy Psych: Good insight. Not anxious nor agitated Skin: No rashes. No jaundice Recent Labs 01/09/21  
7514 01/08/21  
0216 01/07/21  
4527 WBC 10.7 14.4* 10.9 HGB 10.8* 12.0 11.8 HCT 35.0 38.2 36.5  310 242 Medications 01/01 01/02 01/03 01/04 01/05 01/06 01/07 01/08 01/09 01/10 Completed Medications   
alcohol 62% (NOZIN) nasal  3 Ampule Dose: 3 Ampule Freq: ONCE Route: TP Start: 01/03/21 1200 End: 01/03/21 1129    11 (3 Ampule) Admin Instructions:              
Please verify that the patient doesn't have an orange (citrus) allergy. Contact provider if they have an allergy for an alternative. Use 3 applicators Pre-Op x1 Order ID: [id number] cefoTEtan (CEFOTAN) 2 g in 0.9% sodium chloride (MBP/ADV) 50 mL MBP Dose: 2 g 
Freq: ONCE Route: IV Start: 01/03/21 1300 End: 01/03/21 1752    13 (2 g) Order specific questions:    16 Antibiotic Indications Surgical Prophylaxis Order ID: [id number]              
diphenhydrAMINE (BENADRYL) capsule 50 mg  
Dose: 50 mg 
Freq: ONCE Route: PO 
Start: 12/30/20 2000 End: 12/30/20 2136 Order ID: [id number]              
fentaNYL citrate (PF) injection 25 mcg Dose: 25 mcg Freq: NOW Route: IV Start: 12/30/20 0129 End: 12/30/20 5896 Order ID: [id number]              
fentaNYL citrate (PF) injection 50 mcg Dose: 50 mcg Freq: NOW Route: IV Start: 12/29/20 2238 End: 12/29/20 2319 Order ID: [id number]              
iohexoL (OMNIPAQUE) 240 mg iodine/mL solution 50 mL Dose: 50 mL Freq: RAD ONCE Route: PO 
Start: 12/29/20 2245 End: 12/29/20 2319 Admin Instructions:              
1. The CT Technologist will notify the attending RN a time for the patient is to start the drinking the oral contrast. 
2. The Patient is to be NPO 4 hours prior to CT exam. 
3. Pour the entire contents of the Omnipaque® 240 Contrast Media Iohexol 240 mg / mL Injection Infusion Bottle 50 mL bottle into a 1000ml hospital cup.  
4. Without using ice, fill the entire cup with a clear liquid and stir well. (Juice, Sprite, or Ginger Ale) 5. Have the patient drink 1/3 of the cup every 30 minutes. 6. The attending RN will call the CT staff with the time the patient started drinking their oral contrast. 
7. CT will send for the patient approximately 2 hours after being notified of the drinking time. Order ID: [id number] iopamidoL (ISOVUE-370) 76 % injection 100 mL Dose: 100 mL Freq: RAD ONCE Route: IV Start: 12/29/20 2242 End: 12/30/20 3886 Order ID: [id number]              
ketorolac (TORADOL) injection 30 mg  
Dose: 30 mg 
Freq: EVERY 6 HOURS Route: IV Start: 01/05/21 1300 End: 01/06/21 1137      12 (30 mg) 06 (30 mg) Admin Instructions:      17 (30 mg) 11 (30 mg) Hold if creatinine clearance is less than 30 ml/min. If giving IV push, give over a minimum of 15 seconds. 23 (30 mg) Order ID: [id number]              
methylPREDNISolone (PF) (SOLU-MEDROL) injection 40 mg  
Dose: 40 mg 
Freq: ONCE Route: IV Start: 12/30/20 1400 End: 12/30/20 1335 Order ID: [id number]              
ondansetron [facility]) injection 4 mg Dose: 4 mg Freq: NOW Route: IV Start: 12/30/20 0129 End: 12/30/20 2465 Order ID: [id number]              
ondansetron [facility]) injection 4 mg Dose: 4 mg Freq: NOW Route: IV Start: 12/29/20 2238 End: 12/29/20 2319 Order ID: [id number]              
peg 3350-electrolytes (COLYTE) 4000 mL Dose: 4,000 mL Freq: ONCE Route: PO 
Start: 01/01/21 1800 End: 01/01/21 2017  20 (4,000 mL) Order ID: [id number]              
potassium bicarb-citric acid (EFFER-K) tablet 40 mEq Dose: 40 mEq Freq: 2 TIMES DAILY Route: PO 
Start: 01/07/21 1300 End: 01/07/21 1744        12 (40 mEq) Admin Instructions:        17 (40 mEq) Orange Cream flavored tablet: \"Dissolve tablet in 3 to 4 ounces (85 to 115 mL) of cold or ice water before drinking. \" Unflavored tablet: \"Dissolve tablet in 3 to 4 ounces (85 to 115 mL) of cold juice of choice before drinking. \" Order ID: [id number]              
potassium chloride (K-DUR, KLOR-CON) SR tablet 40 mEq Dose: 40 mEq Freq: NOW Route: PO 
Start: 01/06/21 1500 End: 01/06/21 1454       14 (40 mEq) Admin Instructions: Do not crush, break or chew. Swallow whole. Order ID: [id number]              
sodium chloride 0.9 % bolus infusion 1,000 mL Dose: 1,000 mL Freq: ONCE Route: IV Start: 12/29/20 2206 End: 12/29/20 2306 Order ID: [id number] Discontinued Medications Medications 01/01 01/02 01/03 01/04 01/05 01/06 01/07 01/08 01/09 01/10   
0.9% sodium chloride infusion Rate: 50 mL/hr Dose: 50 mL/hr Freq: CONTINUOUS Route: IV Start: 01/03/21 1900 End: 01/03/21 2259    22 (50 mL/hr) Admin Instructions:              
to be given by Endoscopy Nurse or Anesthesia for Endoscopic procedure Order ID: [id number]              
0.9% sodium chloride infusion Rate: 100 mL/hr Dose: 100 mL/hr Freq: CONTINUOUS Route: IV Start: 12/30/20 0459 End: 12/30/20 9231 Order ID: [id number]              
acetaminophen (TYLENOL) tablet 650 mg  
Dose: 650 mg 
Freq: EVERY 6 HOURS AS NEEDED Route: PO 
PRN Reasons: Mild Pain,Fever PRN Comment: For temp greater than 100.4 F (38 C) Start: 12/30/20 0458 End: 01/06/21 0952      03 (650 mg) Admin Instructions:      10 (650 mg) Joel Hilt 15 (650 mg) Order ID: [id number]      52 (650 mg) Or 
acetaminophen (TYLENOL) suppository 650 mg  
Dose: 650 mg 
Freq: EVERY 6 HOURS AS NEEDED Route: RE 
PRN Reasons: Mild Pain,Fever PRN Comment: For temp greater than 100.4 F (38 C) Start: 12/30/20 0458 End: 01/06/21 0067      03-See Alt Admin Instructions:      10-See Alt Joel Hilt 15-See Alt Order ID: [id number]      22-See Alt 
        
acetaminophen (TYLENOL) tablet 650 mg  
Dose: 650 mg 
Freq: EVERY 6 HOURS Route: PO 
Start: 01/06/21 1200 End: 01/10/21 1837       11 (650 mg) 05 (650 mg) 00 (650 mg) 00 
 00 
    
          05 (650 mg) 12 (650 mg) 04 (650 mg) Admin Instructions:       17 (650 mg) 12 (650 mg) 11 (650 mg) 17 (650 mg) 06 (650 mg) Romayor Hilt 23 (650 mg) 17 (650 mg) 17 (650 mg) 21 (650 mg) 12 (650 mg) Order ID: [id number]         18 (650 mg) alcohol 62% (NOZIN) nasal  1 Ampule Dose: 1 Ampule Freq: EVERY 12 HOURS Route: TP Start: 01/03/21 1200 End: 01/10/21 1837    12 
  10 (1 Ampule) 08 (1 Ampule) 09 (1 Ampule) 10 (1 Ampule) 10 (1 Ampule) 09 
 12 (1 Ampule) Admin Instructions:    21 (1 Ampule) 21 (1 Ampule) 20 (1 Ampule) 20 (1 Ampule) 21 (1 Ampule) 21 
 21 Please verify that the patient doesn't have an orange (citrus) allergy. Contact provider if they have an allergy for an alternative. Use 1 applicator [id number] until discharge from the hospital.              
Order ID: [id number] ALPRAZolam (XANAX) tablet 0.5 mg  
Dose: 0.5 mg 
Freq: 2 TIMES DAILY AS NEEDED Route: PO 
PRN Reason: Anxiety Start: 01/07/21 1122 End: 01/10/21 1837        15 (0.5 mg) 02 (0.5 mg) Order ID: [id number]              
benzocaine-menthoL (CHLORASEPTIC MAX) lozenge 1 Lozenge Dose: 1 Lozenge Freq: EVERY 2 HOURS AS NEEDED Route: PO 
PRN Reason: Sore throat Start: 01/02/21 1123 End: 01/10/21 1837 Order ID: [id number]              
benzonatate (TESSALON) capsule 100 mg Dose: 100 mg Freq: 3 TIMES DAILY AS NEEDED Route: PO 
PRN Reason: Cough Start: 01/08/21 1803 End: 01/10/21 1837 Admin Instructions: Do not chew or crush Order ID: [id number]              
bupivacaine-EPINEPHrine (PF) (SENSORCAINE PF) 0.5 %-1:200,000 injection Freq: AS NEEDED Start: 01/03/21 1308 End: 01/03/21 1638    13 (8 mL) Order ID: [id number]              
calcium carbonate (TUMS) chewable tablet 200 mg [elemental] Dose: 200 mg Freq: 3 TIMES DAILY WITH MEALS Route: PO Indications of Use: heartburn Start: 01/05/21 2300 End: 01/10/21 1837      22 (200 mg) 09 (200 mg) 10 (200 mg) 02 (200 mg) 08 (200 mg) 09 (200 mg) Admin Instructions:       11 (200 mg) 12 (200 mg) 08 (200 mg) 12 (200 mg) 12 (200 mg) 200 mg elemental Calcium = 500 mg calcium carbonate       17 (200 mg) 17 (200 mg) 11 (200 mg) 17 (200 mg) Order ID: [id number]         93 (200 mg) 21 (200 mg) 
    
calcium carbonate (TUMS) chewable tablet 200 mg [elemental] Dose: 200 mg Freq: 3 TIMES DAILY WITH MEALS Route: PO Indications of Use: heartburn Start: 01/06/21 0800 End: 01/05/21 2204 Admin Instructions:              
200 mg elemental Calcium = 500 mg calcium carbonate Order ID: [id number]              
calcium carbonate (TUMS) chewable tablet 200 mg [elemental] Dose: 200 mg Freq: 3 TIMES DAILY WITH MEALS Route: PO 
Start: 01/06/21 0800 End: 01/05/21 2157 Admin Instructions:              
200 mg elemental Calcium = 500 mg calcium carbonate Order ID: [id number]              
cefepime (MAXIPIME) 2 g in 0.9% sodium chloride (MBP/ADV) 100 mL MBP Dose: 2 g 
Freq: EVERY 8 HOURS Route: IV Start: 01/07/21 1200 End: 01/10/21 1837        12 (2 g) 05 (2 g) 03 (2 g) 04 (2 g) Admin Instructions:        20 [C] 11 (2 g) 12 (2 g) 12 (2 g) Give over 3 hours if possible (30 minutes in ED); may give over shorter time if required for timely administration of other IV meds. 20 (2 g) 
 21 (2 g) Order specific questions: Antibiotic Indications Pneumonia (HAP) HAP duration of therapy 7 days Order ID: [id number]              
diphenhydrAMINE (BENADRYL) injection 12.5 mg  
Dose: 12.5 mg 
Freq: AS NEEDED Route: IV 
PRN Reason: Itching Start: 01/03/21 1646 End: 01/03/21 1801 Admin Instructions: May repeat x 1 as directed by clinical condition. Notify anesthesiologist if treatment indicated. Order ID: [id number]              
diphenhydrAMINE (BENADRYL) injection 12.5 mg  
Dose: 12.5 mg 
Freq: AS NEEDED Route: IV 
PRN Reason: Itching Start: 01/02/21 0858 End: 01/02/21 1003 Admin Instructions: May repeat x 1 as directed by clinical condition. Notify anesthesiologist if treatment indicated. Order ID: [id number]              
fentaNYL citrate (PF) injection 25 mcg Dose: 25 mcg Freq: MULTIPLE Route: IV 
PRN Reasons: Moderate Pain,Severe Pain Start: 01/03/21 1646 End: 01/03/21 1801    16 (25 mcg) Admin Instructions: (Step 1 Main OR PACU) (Step 2 ASU PACU) May give every 3-5 minutes prn for pain. May be repeated x 8 doses to max total dose of 200 mcg as directed by clinical condition. Hold sedatives and narcotics for RR < 10. For severe pain, after maximum Fentanyl dose has been given, call Anesthesiologist.              
Order ID: [id number]              
fentaNYL citrate (PF) injection 25 mcg Dose: 25 mcg Freq: MULTIPLE Route: IV 
PRN Reasons: Moderate Pain,Severe Pain Start: 01/02/21 0858 End: 01/02/21 1003 Admin Instructions:              
Step 1: Begin with Fentanyl May give every 5 minutes as needed for moderate-severe pain. May be repeated x 8 doses to max total dose of 200 mcg as directed by clinical condition. Order ID: [id number]              
fentaNYL citrate (PF) injection 50 mcg Dose: 50 mcg Freq: AS NEEDED Route: IV 
PRN Reason: Severe Pain PRN Comment: Sedation, Pain, Anxiety Start: 01/02/21 0744 End: 01/02/21 0900 Admin Instructions: May repeat x 1 dose as dictated by clinical condition. Order ID: [id number]              
fentaNYL citrate (PF) injection 50 mcg Dose: 50 mcg Freq: EVERY 4 HOURS AS NEEDED Route: IV 
PRN Reason: Severe Pain Start: 12/30/20 0443 End: 01/10/21 1837 Order ID: [id number]              
gabapentin (NEURONTIN) capsule 100 mg Dose: 100 mg Freq: 3 TIMES DAILY Route: PO 
Start: 01/05/21 1600 End: 01/10/21 1837      15 (100 mg) 09 (100 mg) 10 (100 mg) 08 (100 mg) 08 (100 mg) 09 (100 mg) Admin Instructions:      20 (100 mg) [C] 17 (100 mg) 15 (100 mg) 17 (100 mg) 17 (100 mg) Avoid administration with ORAL aluminum or magnesium containing products within 2 hours of gabapentin dose. 22 
 23 (100 mg) 20 (100 mg) 20 (100 mg) 21 (100 mg) Order ID: [id number]        38 
  36 HYDROmorphone (PF) (DILAUDID) injection 0.2-0.5 mg  
Dose: 0.2-0.5 mg 
Freq: MULTIPLE Route: IV 
PRN Reason: Severe Pain Start: 01/03/21 1646 End: 01/03/21 1801 Admin Instructions: (Step 3 Main OR PACU) (Step 4 ASU PACU) If max dosage of Fentanyl is unsuccessful. Notify Anesthesiologist prior to Dilaudid administration if patient is scheduled for same day discharge. May give every 15 minutes as needed for severe pain. May be repeated to max total dose of 2 mg as directed by clinical condition. Hold sedatives and narcotics for RR < 10. Order ID: [id number] HYDROmorphone (PF) (DILAUDID) injection 0.2-0.5 mg  
Dose: 0.2-0.5 mg 
Freq: MULTIPLE Route: IV 
PRN Reason: Severe Pain Start: 01/02/21 0858 End: 01/02/21 1003 Admin Instructions:              
Step 3: For severe pain this may be used instead of morphine. Notify anesthesiology prior to Dilaudid administration if patient is scheduled for same day discharge or PCA. May give every 15 minutes as needed for severe pain to max total dose of 1 mg as directed by clinical condition. Notify anesthesiology if desired pain management is not achieved. Order ID: [id number] HYDROmorphone (PF) (DILAUDID) injection 0.5 mg  
Dose: 0.5 mg 
Freq: EVERY 4 HOURS AS NEEDED Route: IV 
PRN Reason: Severe Pain Start: 01/01/21 1223 End: 01/04/21 1120  14 (0.5 mg) 01 (0.5 mg) 08 (0.5 mg) 02 (0.5 mg) Admin Instructions:  20 (0.5 mg) 13 (0.5 mg) 08 (0.5 mg) For IV administration - give slowly over at least 2-3 minutes   18 (0.5 mg) Order ID: [id number] HYDROmorphone (PF) (DILAUDID) injection 1 mg Dose: 1 mg Freq: EVERY 3 HOURS AS NEEDED Route: IV 
PRN Reason: Severe Pain Start: 01/04/21 1200 End: 01/10/21 1837     14 (1 mg) 10 (1 mg) 03 (1 mg) 03 (1 mg) 17 (1 mg) 08 (1 mg) 04 (1 mg) Admin Instructions:     19 (1 mg) 14 (1 mg) 09 (1 mg) 20 (1 mg) 22 (1 mg) 16 (1 mg) 12 (1 mg) For IV administration - give slowly over at least 2-3 minutes      20 (1 mg) 12 (1 mg) 21 (1 mg) Order ID: [id number]       03 (1 mg) 
       
influenza vaccine 2020-21 (6 mos+)(PF) (FLUARIX/FLULAVAL/FLUZONE QUAD) injection 0.5 mL Dose: 0.5 mL Freq: PRIOR TO DISCHARGE Route: IM Start: 01/04/21 1129 End: 01/10/21 1837           (14) Order ID: [id number]              
iohexoL (OMNIPAQUE) 180 mg iodine/mL injection 40 mL Dose: 40 mL Freq: ONCE Route: PO 
Start: 12/29/20 2238 End: 12/29/20 2244 Order ID: [id number]              
lactated Ringers infusion Rate: 25 mL/hr Dose: 25 mL/hr Freq: CONTINUOUS Route: IV Start: 01/03/21 1700 End: 01/03/21 1801    17 Admin Instructions:              
NS for ESRD patients. Order ID: [id number]              
lactated Ringers infusion Rate: 25 mL/hr Dose: 25 mL/hr Freq: CONTINUOUS Route: IV Start: 01/03/21 1100 End: 01/03/21 1638    11 (25 mL/hr) Admin Instructions:              
NS for ESRD patients. Order ID: [id number]              
lactated Ringers infusion Rate: 25 mL/hr Dose: 25 mL/hr Freq: CONTINUOUS Route: IV Start: 01/02/21 0800 End: 01/02/21 0900   08 ( ) 
 07 Order ID: [id number]   08 ( ) 
           
lactated Ringers infusion Rate: 125 mL/hr Dose: 125 mL/hr Freq: CONTINUOUS Route: IV Start: 12/30/20 0728 End: 01/06/21 1258  09 (100 mL/hr) 06 (100 mL/hr) 15 ( ) 08 (100 mL/hr) 03 (125 mL/hr) 
 08 [C] 07 
 16 ( ) 
  14 (125 mL/hr) 
 12 (125 mL/hr) 12 Order ID: [id number]  14 (100 mL/hr) 
 10 (100 mL/hr) 
  19 (125 mL/hr) lidocaine (PF) (XYLOCAINE) 10 mg/mL (1 %) injection 0.1 mL Dose: 0.1 mL Freq: AS NEEDED Route: SC 
PRN Comment: For IV starts Start: 01/03/21 1030 End: 01/03/21 1638 Admin Instructions: For IV starts Order ID: [id number]              
lidocaine (PF) (XYLOCAINE) 10 mg/mL (1 %) injection 0.1 mL Dose: 0.1 mL Freq: AS NEEDED Route: SC 
PRN Comment: For IV starts Start: 01/02/21 0744 End: 01/02/21 0900 Admin Instructions: For IV starts Order ID: [id number]              
melatonin tablet 6 mg Dose: 6 mg Freq: BEDTIME PRN Route: PO 
PRN Reason: Insomnia Start: 12/31/20 2222 End: 01/10/21 1837      (21) 
   02 (6 mg) Admin Instructions:         22 (3 mg) [C] Not intended for use by pregnant or nursing women. Order ID: [id number]              
meperidine (DEMEROL) injection 12.5 mg  
Dose: 12.5 mg 
Freq: AS NEEDED Route: IV 
PRN Reasons: Moderate Pain,Severe Pain,Shivering PRN Comment: MAY REPEAT EVERY 5 MINUTES AS NEEDED Start: 01/03/21 1646 End: 01/03/21 1801 Admin Instructions: (Step 1 ASU PACU only) May repeat x 1 (two doses total). For pain score > 5. Do not administer in any patient with a history of seizures! Order ID: [id number]              
methylPREDNISolone (PF) (SOLU-MEDROL) injection 40 mg  
Dose: 40 mg 
Freq: EVERY 12 HOURS Route: IV Start: 12/31/20 1300 End: 01/04/21 1151  09 (40 mg) 10 (40 mg) 08 (40 mg) 08 (40 mg) Order ID: [id number]  98 (40 mg) 20 (40 mg) 21 (40 mg) 
           
midazolam (VERSED) injection 0.5 mg  
Dose: 0.5 mg 
Freq: EVERY 5 MIN AS NEEDED Route: IV 
PRN Reasons: Agitation,Anxiety Start: 01/02/21 0858 End: 01/02/21 1003 Admin Instructions: May repeat x 4 doses to max total dose of 2 mg as directed by clinical condition and if pain is well managed. (Notify anesthesiologist if symptoms continue after 1 mg total has been administered.) Order ID: [id number]              
morphine 10 mg/ml injection 2 mg Dose: 2 mg Freq: MULTIPLE Route: IV 
PRN Reasons: Moderate Pain,Severe Pain Start: 01/03/21 1646 End: 01/03/21 1801 Admin Instructions: (Step 2 Main OR PACU) (Step 3 ASU PACU) If max dosage of Fentanyl is unsuccessful. Notify Anesthesiologist prior to Morphine administartion if patient is scheduled for same day discharge. May give every 5 minutes as needed for moderate-severe pain. May be repeated to max total dose of 10 mg as directed by clinical condition. Hold sedatives and narcotics for RR < 10. Order ID: [id number]              
morphine 10 mg/ml injection 2 mg Dose: 2 mg Freq: MULTIPLE Route: IV 
PRN Reasons: Moderate Pain,Severe Pain Start: 01/02/21 0858 End: 01/02/21 1003 Admin Instructions:              
Step 2: If max dose of Fentanyl is unsuccessful. Notify anesthesiology prior to morphine administration if patient is scheduled for same-day discharge. May give every 5 minutes as needed for ongoing moderate-severe pain. May be repeated to max total dose of 20 mg as directed by clinical condition. Order ID: [id number]              
morphine injection 1 mg Dose: 1 mg Freq: EVERY 2 HOURS AS NEEDED Route: IV 
PRN Reason: Severe Pain Start: 12/30/20 0458 End: 01/04/21 1120    22 (1 mg) 04 (1 mg) Order ID: [id number]     10 (1 mg) 
         
nystatin (MYCOSTATIN) 100,000 unit/mL oral suspension 500,000 Units Dose: 500,000 Units Freq: 4 TIMES DAILY Route: PO 
Start: 01/09/21 1800 End: 01/10/21 1837          18 (500,000 Units) 09 (500,000 Units) Admin Instructions:          (22) 
 12 (500,000 Units) SHAKE WELL BEFORE USING Order ID: [id number]              
ondansetron [facility]) injection 4 mg Dose: 4 mg Freq: AS NEEDED Route: IV 
PRN Reason: Nausea or Vomiting Start: 01/03/21 1646 End: 01/03/21 1801    17 (4 mg) Admin Instructions:              
Step 1 for nausea and/or vomiting May have max total of 2 doses/day. Including intra-operative dose. Order ID: [id number]              
promethazine (PHENERGAN) tablet 12.5 mg  
Dose: 12.5 mg 
Freq: EVERY 6 HOURS AS NEEDED Route: PO 
PRN Reason: Nausea or Vomiting Start: 12/30/20 0458 End: 01/10/21 1837  07 (12.5 mg) 00 (12.5 mg) 22 (12.5 mg) 18-See Alt 12-See Alt Order ID: [id number]   64 (12.5 mg) Or 
ondansetron (ZOFRAN) injection 4 mg Dose: 4 mg Freq: EVERY 6 HOURS AS NEEDED Route: IV 
PRN Reason: Nausea or Vomiting Start: 12/30/20 0458 End: 01/10/21 1837  07-See Alt 00-See Alt 22-See Alt 18 (4 mg) 12 (4 mg) Admin Instructions:   20-See Alt Administer if oral route cannot be used Order ID: [id number]              
ondansetron [facility]) injection 4 mg Dose: 4 mg Freq: EVERY 4 HOURS AS NEEDED Route: IV 
PRN Reason: Nausea or Vomiting Start: 12/30/20 0443 End: 01/08/21 1408  20 (4 mg) 03 (4 mg) 02 (4 mg) Admin Instructions:              
Administer 4 to 7 mg IV over 30 seconds. Administer 8mg IV over 60 seconds. Administer doses greater than 8mg IV over at least 2 minutes. Order ID: [id number]              
polyethylene glycol (MIRALAX) packet 17 g Dose: 17 g Freq: 2 TIMES DAILY Route: PO 
Start: 01/05/21 1200 End: 01/10/21 1837      (12) [C] 
 09 (17 g) 
 (10) [C] 
  (08) 
 (08) 
 (08) Admin Instructions:      20 (17 g) (20) [C] 
 (20) 
  (20) 
 (20) First line therapy for constipation Order ID: [id number]              
polyethylene glycol (MIRALAX) packet 17 g Dose: 17 g Freq: DAILY PRN Route: PO 
PRN Reason: Constipation Start: 12/30/20 0458 End: 01/05/21 1108 Admin Instructions:              
First line therapy for constipation Order ID: [id number]              
sodium chloride (NS) flush 5-40 mL Dose: 5-40 mL Freq: AS NEEDED Route: IV 
PRN Reason: Line Patency Start: 01/03/21 1646 End: 01/03/21 1801 Admin Instructions:              
If following IV push medication, administer flush at the same rate as the IV push. Flush volume is determined by type of infusion therapy being given. For non-viscous solutions use Peripheral IV = 5 mL; Midline or Central Line = 10 mL/lumen. For viscous solutions (i.e. blood components, parenteral nutrition, contrast media, or after obtaining blood sample) use Peripheral IV= 10 mL; Midline or Central Line = 20 mL/lumen. Order ID: [id number]              
sodium chloride (NS) flush 5-40 mL Dose: 5-40 mL Freq: EVERY 8 HOURS Route: IV Start: 01/03/21 1700 End: 01/03/21 1801    17 Admin Instructions: For Line Patency: Peripheral IV = 5 mL; Midline or Central Line = 10 mL/lumen. Order ID: [id number]              
sodium chloride (NS) flush 5-40 mL Dose: 5-40 mL Freq: AS NEEDED Route: IV 
PRN Reason: Line Patency Start: 01/03/21 1030 End: 01/03/21 1638 Admin Instructions:              
If following IV push medication, administer flush at the same rate as the IV push. Flush volume is determined by type of infusion therapy being given. For non-viscous solutions use Peripheral IV = 5 mL; Midline or Central Line = 10 mL/lumen. For viscous solutions (i.e. blood components, parenteral nutrition, contrast media, or after obtaining blood sample) use Peripheral IV= 10 mL; Midline or Central Line = 20 mL/lumen. Order ID: [id number]              
sodium chloride (NS) flush 5-40 mL Dose: 5-40 mL Freq: EVERY 8 HOURS Route: IV Start: 01/03/21 1400 End: 01/03/21 1638    14 Admin Instructions: For Line Patency: Peripheral IV = 5 mL; Midline or Central Line = 10 mL/lumen. Order ID: [id number]              
sodium chloride (NS) flush 5-40 mL Dose: 5-40 mL Freq: AS NEEDED Route: IV 
PRN Reason: Line Patency Start: 01/02/21 0859 End: 01/02/21 1003 Admin Instructions:              
If following IV push medication, administer flush at the same rate as the IV push. Flush volume is determined by type of infusion therapy being given. For non-viscous solutions use Peripheral IV = 5 mL; Midline or Central Line = 10 mL/lumen. For viscous solutions (i.e. blood components, parenteral nutrition, contrast media, or after obtaining blood sample) use Peripheral IV= 10 mL; Midline or Central Line = 20 mL/lumen. Order ID: [id number]              
sodium chloride (NS) flush 5-40 mL Dose: 5-40 mL Freq: EVERY 8 HOURS Route: IV Start: 01/02/21 0900 End: 01/02/21 1003   09 Admin Instructions: For Line Patency: Peripheral IV = 5 mL; Midline or Central Line = 10 mL/lumen. Order ID: [id number]              
sodium chloride (NS) flush 5-40 mL Dose: 5-40 mL Freq: AS NEEDED Route: IV 
PRN Reason: Line Patency Start: 01/03/21 1805 End: 01/10/21 1837 Admin Instructions:              
If following IV push medication, administer flush at the same rate as the IV push. Flush volume is determined by type of infusion therapy being given. For non-viscous solutions use Peripheral IV = 5 mL; Midline or Central Line = 10 mL/lumen. For viscous solutions (i.e. blood components, parenteral nutrition, contrast media, or after obtaining blood sample) use Peripheral IV= 10 mL; Midline or Central Line = 20 mL/lumen. Order ID: [id number]              
sodium chloride (NS) flush 5-40 mL Dose: 5-40 mL Freq: EVERY 8 HOURS Route: IV Start: 01/03/21 2200 End: 01/10/21 1837    22 (10 mL) 05 (10 mL) 05 (10 mL) 
 03 [C] 05 (10 mL) 05 (10 mL) 03 (10 mL) 04 (10 mL) 05 (10 mL) 06 
 06 Admin Instructions:     14 (10 mL) 14 (10 mL) 
 06 
 14 
  13 (10 mL) 14 
 14 For Line Patency: Peripheral IV = 5 mL; Midline or Central Line = 10 mL/lumen.     23 (10 mL) 22 [C] 17 (10 mL) 20 (10 mL) 20 (10 mL) Order ID: [id number]       50 (10 mL) 22 
  22 
     
sodium chloride (NS) flush 5-40 mL Dose: 5-40 mL Freq: AS NEEDED Route: IV 
PRN Reason: Line Patency Start: 12/30/20 0458 End: 01/08/21 1407  06 (10 mL) Admin Instructions:              
If following IV push medication, administer flush at the same rate as the IV push. Flush volume is determined by type of infusion therapy being given. For non-viscous solutions use Peripheral IV = 5 mL; Midline or Central Line = 10 mL/lumen. For viscous solutions (i.e. blood components, parenteral nutrition, contrast media, or after obtaining blood sample) use Peripheral IV= 10 mL; Midline or Central Line = 20 mL/lumen. Order ID: [id number]              
sodium chloride (NS) flush 5-40 mL Dose: 5-40 mL Freq: EVERY 8 HOURS Route: IV Start: 12/30/20 0600 End: 01/08/21 1407  06 (10 mL) 06 (10 mL) 05 (10 mL) 05 (10 mL) 05 (10 mL) 03 (10 mL) [C] 
 06 [C] 06 Admin Instructions:  14 [C] 15 (10 mL) 17 (10 mL) 14 (10 mL) 14 (10 mL) 
 06 
 15 
  13 (10 mL) For Line Patency: Peripheral IV = 5 mL; Midline or Central Line = 10 mL/lumen. 20 (10 mL) 22 (10 mL) 22 (10 mL) 23 (10 mL) 22 
 14 
 20 (10 mL) Order ID: [id number]  47 
     78 [C] 22 
       
traMADoL (ULTRAM) tablet 50 mg  
Dose: 50 mg 
Freq: EVERY 6 HOURS AS NEEDED Route: PO 
PRN Reason: Moderate Pain Start: 01/06/21 1310 End: 01/10/21 1837       17 (50 mg) 10 (50 mg) Order ID: [id number]        49 (50 mg) Medications 01/01 01/02 01/03 01/04 01/05 01/06 01/07 01/08 01/09 01/10 DOS 1/8/21 by Kory Orellana RN Review Entered Review Status 1/15/2021 09:39 In Primary Criteria Review Hospitalist Internal Medicine Progress Notes Date of Service: 01/08/21 5812 Assessment / Plan:  
Hospital-acquired pneumonia  
-shortness of breath, cough, pleuritic chest pain  
-Low-grade fever, leukocytosis  
-Chest x-ray shows left basilar opacity  
-We will obtain sputum culture  
-Continue cefepime, Day #2. Will do monotherapy in light of low risk factor for MDR pathogens.  
-Sputum gram stain shows occasional WBCs and GPC in pairs. Culture pending  
-Monitor CBC and fever curve. Will consider changing antibiotic to oral if afebrile for 48 hours Pillcam stuck within distal ileum - POA  
S/p laparascopic ileocecectomy on 01/03 Crohn's disease - Presented with abdominal pain, N, V three days after swallowinf pillcam  
- CT abd/pelvis revealed PillCam within the distal ileum in the RLQ.  
- Failed non surgical methods to retrieve the pill (solumedrol + laxatives). Had Colonoscopy on 01/02, GI was unable to retrieve the pill cam because of lots of strictures - Underwent surgery on 01/03, pill cam found stuck in the previous anastomosis. Ileocecectomy done and pillcam removed - Tolerating regular diet Hypokalemia - resolved  
-Continue to monitor Intermittent sinus tachycardia - possibly related to pain and infection/pneumonia.  
- Not on chemical DVT prophylaxis but patient up and walking, no leg swelling.  
-Continue to monitor Anxiety  
-Continue as needed Xanax Hx of Crohn's disease with stricturing/inflammation and fistula/microperforation In the past. F/u GI Corwin Singh MD  
Anxiety and depression GERD IBS Asthma, not currently in exacerbation Holding PO meds 2/2 npo status Code Status: Full code DVT Prophylaxis: scd GI Prophylaxis: not indicated Baseline: independent 30.0 - 39.9 Obese / Body mass index is 36.29 kg/m². Subjective: Chief Complaint / Reason for Physician Visit Patient states the Xanax helped some Chest pain less today Shortness of breath when ambulating Abdominal pain fairly controlled Objective: VITALS:  
Last 24hrs VS reviewed since prior progress note. Most recent are:  
Patient Vitals for the past 24 hrs:  
Temp Pulse Resp BP SpO2  
01/08/21 0913 98.6 °F (37 °C) 99 18 131/80 96 % 01/08/21 0348  (!) 119     
01/08/21 0300  (!) 123     
01/08/21 0220 98.5 °F (36.9 °C) (!) 150 18 (!) 140/90 93 % 01/08/21 0009 98.7 °F (37.1 °C) (!) 124 18 (!) 150/86 93 % 01/07/21 2108  (!) 105     
01/07/21 2043 98.3 °F (36.8 °C) (!) 122 18 (!) 149/100 93 % 01/07/21 1606 99 °F (37.2 °C) (!) 117 16 (!) 143/90 93 % 01/07/21 1151 99.4 °F (37.4 °C) (!) 119 12 (!) 147/104 93 % Intake/Output Summary (Last 24 hours) at 1/8/2021 4665 Last data filed at 1/8/2021 0300 Gross per 24 hour Intake 520 ml Output  Net 520 ml Physical exam  
I had a face to face encounter and independently examined this patient on 1/2/2021, as outlined below: PHYSICAL EXAM:  
General: WD, WN. Sleepy , no acute distress EENT: EOMI. Anicteric sclerae. MMM Resp: CTA bilaterally, no wheezing or rales. No accessory muscle use CV: Regular rhythm, No edema GI: Soft, Non distended, Non tender. +Bowel sounds Neurologic: sleepy Psych: Good insight. Not anxious nor agitated Skin: No rashes. No jaundice Recent Labs 01/08/21  
0216 01/07/21  
0359 01/06/21  
0400 WBC 14.4* 10.9 14.0* HGB 12.0 11.8 11.5 HCT 38.2 36.5 35.5  242 218 Recent Labs 01/08/21  
0216 01/07/21  
0359 01/06/21  
0400  137 136   
K 3.9 3.3* 3.2*   
 105 104 CO2 29 28 26 GLU 89 81 70 BUN 7 8 9 CREA 0.74 0.59 0.61   
CA 8.6 8.1* 7.9*   
MG --  1.9 --    
SURGERY Progress Notes Date of Service: 01/08/21 1013 Assessment:  
Active Problems:  
Abdominal pain (8/6/2019) Crohn disease ([address] (9/9/2019) Hypokalemia (12/30/2020) Nausea & vomiting (12/30/2020) Foreign body in small intestine (12/30/2020) Doing well following surgery. Can be discharged from surgery perspective DOS 1/7/21 by Alana Mata RN Review Entered Review Status 1/15/2021 09:35 In Primary Criteria Review General Surgery Progress Notes Date of Service: 01/07/21 1022 Assessment:  
Active Problems:  
Abdominal pain (8/6/2019) Crohn disease ([address] (9/9/2019) Hypokalemia (12/30/2020) Nausea & vomiting (12/30/2020) Foreign body in small intestine (12/30/2020) Continues to be stable. + Return of bowel function. Plan: 1. Scheduled tylenol 2. Mobilize independently 3. Continue diet 4. Ready for discharge from surgical standpoint. Hospitalist Internal Medicine Progress Notes Date of Service: 01/07/21 4292 Assessment / Plan:  
Hospital-acquired pneumonia  
-shortness of breath, cough, pleuritic chest pain  
-Low-grade fever  
-Chest x-ray shows left basilar opacity  
-We will obtain sputum culture  
-Will start cefepime. Will do monotherapy in light of low risk factor for MDR pathogens. -Monitor CBC and fever curve. Will consider changing antibiotic to oral if afebrile for 48 hours Pillcam stuck within distal ileum - POA  
S/p laparascopic ileocecectomy on 01/03 Crohn's disease - Presented with abdominal pain, N, V three days after swallowinf pillcam  
- CT abd/pelvis revealed PillCam within the distal ileum in the RLQ.  
- GI on board. Failed trial of non surgical methods to retrieve the pill (solumedrol + laxatives). Had Colonoscopy on 01/02, GI was unable to retrieve the pill cam because of lots of strictures - Underwent surgery on 01/03, pill cam found stuck in the previous anastomosis. Ileocecectomy done and pillcam removed - On full liquid diet  
- Continue IV fluid for today, reassess tomorrow Hypokalemia  
-Replete and monitor Intermittent sinus tachycardia - possibly related to pain and infection/pneumonia.  
- Not on chemical DVT prophylaxis but patient up and walking, no leg swelling.  
-Continue to monitor Anxiety  
-We will start her on as needed Xanax Hx of Crohn's disease with stricturing/inflammation and fistula/microperforation In the past. F/u GI Westley Tavares MD  
Anxiety and depression GERD IBS Asthma, not currently in exacerbation Holding PO meds 2/2 npo status Code Status: Full code DVT Prophylaxis: scd GI Prophylaxis: not indicated Baseline: independent 30.0 - 39.9 Obese / Body mass index is 36.29 kg/m². Subjective: Chief Complaint / Reason for Physician Visit Complaints of left chest pain and worsening of shortness of breath Feels generally weak Frustrated and anxious about staying in the hospital  
Objective: VITALS:  
Last 24hrs VS reviewed since prior progress note. Most recent are:  
Patient Vitals for the past 24 hrs:  
Temp Pulse Resp BP SpO2  
01/07/21 1606 99 °F (37.2 °C) (!) 117 16 (!) 143/90 93 % 01/07/21 1151 99.4 °F (37.4 °C) (!) 119 12 (!) 147/104 93 % 01/07/21 0758 99 °F (37.2 °C) (!) 109 20 137/85 92 % 01/07/21 0351 98.8 °F (37.1 °C) (!) 104 16 (!) 159/90 94 % 01/07/21 0000  (!) 105     
01/06/21 2308 98.8 °F (37.1 °C) (!) 110 16 (!) 140/92 93 % 01/06/21 2000 99.1 °F (37.3 °C) (!) 120 18 (!) 140/72 96 % Intake/Output Summary (Last 24 hours) at 1/7/2021 1738 Last data filed at 1/7/2021 4175 Gross per 24 hour Intake 240 ml Output  Net 240 ml Physical exam  
I had a face to face encounter and independently examined this patient on 1/2/2021, as outlined below: PHYSICAL EXAM:  
 General: WD, WN. Sleepy , no acute distress EENT: EOMI. Anicteric sclerae. MMM Resp: CTA bilaterally, no wheezing or rales. No accessory muscle use CV: Regular rhythm, No edema GI: Soft, Non distended, Non tender. +Bowel sounds Neurologic: sleepy Psych: Good insight. Not anxious nor agitated Skin: No rashes. No jaundice Recent Labs 01/07/21  
0359 01/06/21  
0400 WBC 10.9 14.0* HGB 11.8 11.5 HCT 36.5 35.5  218 Recent Labs 01/07/21  
0359 01/06/21  
0400  136   
K 3.3* 3.2*   
 104 CO2 28 26 GLU 81 70 BUN 8 9 CREA 0.59 0.61   
CA 8.1* 7.9*   
MG 1.9 --    
DOS 1/3/21 Add'l Clinical by Selvin Bryant RN Review Entered Review Status 1/15/2021 09:32 In Primary Criteria Review SURGERY Op Notes Date of Service: 01/03/21 1837 DATE OF SERVICE: 01/03/2021 PREOPERATIVE DIAGNOSES:  
1. Foreign body within the small intestine. 2. Abdominal pain. 3. Crohn's disease. POSTOPERATIVE DIAGNOSES:  
1. Foreign body within the small intestine. 2. Abdominal pain. 3. Crohn's disease. PROCEDURE PERFORMED: Ileocecectomy. COMPLICATIONS: none SPECIMENS REMOVED: Distal ileum and cecum IMPLANTS: none ESTIMATED BLOOD LOSS: 100 mL. FINDINGS:  
1. Tattoo ink scattered throughout the abdominal cavity. 2. Adhesions in the right lower quadrant to both the anterior abdominal wall and interloop adhesions. 3. PillCam within the palpable segment of the previous small bowel anastomosis. 4. On gross inspection of the specimen on the back table, PillCam was found to be stuck in the blind end of the side-to-side functional end-to-end anastomosis. Camera could not be milked into either limb of the anastomosis. So an enterotomy was made in the specimen to remove the camera. INDICATIONS FOR PROCEDURE: The patient is a 27-year-old female with a history of Crohn's disease. The patient swallowed a PillCam 14 months ago that became lodged in her terminal ileum. The patient underwent surgical exploration with small bowel resection and primary anastomosis at that time to remove the retained small bowel foreign body. The patient was having Crohn's symptoms again approximately 2 months ago, so 2 weeks ago, the patient's gastroenterologist asked her to swallow another PillCam. This PillCam has not passed. The patient has tried laxatives without any success. The patient underwent colonoscopy was attempted retrieval of the PillCam yesterday which was unsuccessful. So, the patient presents for removal of the foreign body. Given that the patient has had problems with terminal ileitis for some time and a significant stricture was seen on patient's colonoscopy, a ileocecectomy was planned to remove the area of gross disease and to prevent recurrent obstruction. DOS 1/2/21 by Boone Phillips RN Review Entered Review Status 1/15/2021 09:28 In Primary Criteria Review Gastroenterology Procedures Date of Service: 01/02/21 2029 Colonoscopy Operative Report Indications: Stuck M2A capsule in terminal ileum Findings:  
Rectum: normal  
Sigmoid: normal  
Descending Colon: normal  
Transverse Colon: normal  
Ascending Colon: normal  
Cecum: normal, one tiny polyp near the IC valve - removed via cold biopsy. Terminal Ileum: normal. I advanced the scope into the ileum about 20 cms and encountered a fibrotic stricture which was too tight for the scope to traverse. Sequential dilation of the stricture was performed. I started with an 8 mm balloon and gradually used larger devices up to a maximum dilation of 15 mm. Despite this we were still unable to get the scope through the fibrotic stricture. There was no active inflammation seen. I blindly placed a Gwynneth Castro net through the narrowing In hopes of grabbing the capsule - to no avail. A small diverticulum was seen next to the stricture. I injected [place] ink just down stream from the stricture to tattoo the region and enhance localization for probable surgery soon. No active Crohn's was seen in the colon or very distal ileum Specimen Removed: specimen #3, 4 mm in size, located in the cecum removed by cold biopsy and sent for pathology Complications: None. EBL: None. Impression: Foreign body removal unsuccessful, pill camera is still in the distal ileum Fibrotic stricture in distal ileum causing hang up of PillCam - area tattooed No active inflammation seen Recommendations: --Await pathology. --Clear liquid diet only for now  
--Surgery to remove PillCam with stricturoplasty (alternatively could do endoscopic stricturotomy with a needle knife but surgeon should be standing by if this is done) Hospitalist Internal Medicine Progress Notes Date of Service: 01/02/21 1027 Assessment / Plan:  
Abd. Pain/N/V 2/2 below POA Pillcam is stuck within distal ileum POA Crohn's disease CT abd 1. No evidence of acute process in the abdomen or pelvis. 2. PillCam lies within the distal ileum in the right lower quadrant. 3. Hepatic steatosis. Xray abd KUB MPRESSION: Nonspecific intestinal gas pattern. PillCam overlies the right lower  
quadrant and likely lies within the distal ileum or cecum On CLD  
GI on board : started on solumedrol 40mg bid + laxatives to Allow pullcam passage Surgery consulted in case conservative doesn't work , pt might need retrieval of pillcam via surgery Pt c/o pain while eating Repeat KUB unchanged Had a colonoscopy today , GI was unable to retrieve the pill cam because of lots of strictures Plan for surgery on Monday Other PMhX Hx of Crohn's disease with stricturing/inflammation and fistula/microperforation In the past. F/u GI Wild Mcdonald MD  
Anxiety and depression GERD IBS Asthma, not currently in exacerbation Holding PO meds 2/2 npo status DVT Prophylaxis: scd GI Prophylaxis: not indicated Baseline: independent 30.0 - 39.9 Obese / Body mass index is 36.29 kg/m². Subjective: Chief Complaint / Reason for Physician Visit Seen after colonoscopy , reported sore throat \ Discussed with RN events overnight. Objective: VITALS:  
Last 24hrs VS reviewed since prior progress note. Most recent are:  
Patient Vitals for the past 24 hrs:  
Temp Pulse Resp BP SpO2  
01/02/21 1005  (!) 104 16 127/69 95 % 01/02/21 0945 98.7 °F (37.1 °C) 92 16 124/68 96 % 01/02/21 0930  96 15 125/73 97 % 01/02/21 0915  79 20 137/78 98 % 01/02/21 0910  97 23 134/84 97 % 01/02/21 0905  84 23 (!) 140/77 96 % 01/02/21 0902 98.7 °F (37.1 °C) 91 19 138/82 (!) 31 % 01/02/21 0900 98.7 °F (37.1 °C) 91 18 138/82 95 % 01/02/21 0732 98.5 °F (36.9 °C) 85 19 (!) 154/87 99 % 01/02/21 0322 97.6 °F (36.4 °C) 60 18 (!) 154/91 93 % 01/01/21 2327 97.6 °F (36.4 °C) 67 18 (!) 173/86 95 % 01/01/21 1926 98.2 °F (36.8 °C) 77 18 (!) 153/102 99 % 01/01/21 1224  88 16 (!) 154/88 96 % Intake/Output Summary (Last 24 hours) at 1/2/2021 1027 Last data filed at 1/2/2021 6521 Gross per 24 hour Intake 5085 ml Output 5 ml Net 5080 ml I had a face to face encounter and independently examined this patient on 1/2/2021, as outlined below: PHYSICAL EXAM:  
General: WD, WN. Alert, cooperative, no acute distress EENT: EOMI. Anicteric sclerae. MMM Resp: CTA bilaterally, no wheezing or rales. No accessory muscle use CV: Regular rhythm, No edema GI: Soft, Non distended, Non tender. +Bowel sounds Neurologic: Alert and oriented X 3, normal speech, Psych: Good insight. Not anxious nor agitated Skin: No rashes. No jaundice SURGERY Progress Notes Date of Service: 01/02/21 1147 Assessment:  
Active Problems:  
Abdominal pain (8/6/2019) Crohn disease ([address] (9/9/2019) Hypokalemia (12/30/2020) Nausea & vomiting (12/30/2020) Foreign body in small intestine (12/30/2020) Patient with TI stricture and stuck Pill cam. Endoscopic removal unsuccessful. Recommend ileocecectomy in the morning to allow time for gas from endoscopy to pass DOS 1/1/21 by Amelia Kevin RN Review Entered Review Status 1/15/2021 09:23 In Primary Criteria Review Hospitalist Internal Medicine Progress Notes Date of Service: 01/01/21 9655 Assessment / Plan:  
Abd. Pain/N/V 2/2 below POA Pillcam is stuck within distal ileum POA Crohn's disease CT abd 1. No evidence of acute process in the abdomen or pelvis. 2. PillCam lies within the distal ileum in the right lower quadrant. 3. Hepatic steatosis. Xray abd KUB MPRESSION: Nonspecific intestinal gas pattern. PillCam overlies the right lower  
quadrant and likely lies within the distal ileum or cecum On CLD  
GI on board : started on solumedrol 40mg bid + laxatives to Allow pullcam passage Surgery consulted in case conservative doesn't work , pt might need retrieval of pillcam via surgery Pt c/o pain while eating Repeat KUB unchanged Other PMhX Hx of Crohn's disease with stricturing/inflammation and fistula/microperforation In the past. F/u GI Leonel Reilly MD  
Anxiety and depression GERD IBS Asthma, not currently in exacerbation Holding PO meds 2/2 npo status DVT Prophylaxis: scd GI Prophylaxis: not indicated Baseline: independent 30.0 - 39.9 Obese / Body mass index is 36.29 kg/m². Subjective: Chief Complaint / Reason for Physician Visit Was nauseous today and had an episode of vomiting . Describe dull abdominal pain with some throbbing episodes. Discussed with RN events overnight. Objective: VITALS:  
Last 24hrs VS reviewed since prior progress note. Most recent are:  
Patient Vitals for the past 24 hrs:  
Temp Pulse Resp BP SpO2  
01/01/21 0742 98.2 °F (36.8 °C) 87 15 (!) 139/90 97 % 01/01/21 0335 97.4 °F (36.3 °C) 85 18 135/83 98 % 01/01/21 0049 97.9 °F (36.6 °C) 77 16 (!) 134/90 96 % 12/31/20 2003 97.9 °F (36.6 °C) 79 18 138/87 97 % 12/31/20 1505 97.9 °F (36.6 °C) 89 18 (!) 150/82 96 % 12/31/20 1251 97.9 °F (36.6 °C) 74 18 (!) 145/95 97 % Intake/Output Summary (Last 24 hours) at 1/1/2021 0957 Last data filed at 1/1/2021 5175 Gross per 24 hour Intake 950 ml Output 200 ml Net 750 ml I had a face to face encounter and independently examined this patient on 1/1/2021, as outlined below: PHYSICAL EXAM:  
General: WD, WN. Alert, cooperative, no acute distress EENT: EOMI. Anicteric sclerae. MMM Resp: CTA bilaterally, no wheezing or rales. No accessory muscle use CV: Regular rhythm, No edema GI: Soft, Non distended, Non tender. +Bowel sounds Neurologic: Alert and oriented X 3, normal speech, Psych: Good insight. Not anxious nor agitated Skin: No rashes. No jaundice General Surgery Progress Notes Date of Service: 01/01/21 1322 Assessment:  
Active Problems:  
Abdominal pain (8/6/2019) Crohn disease ([address] (9/9/2019) Hypokalemia (12/30/2020) Nausea & vomiting (12/30/2020) Foreign body in small intestine (12/30/2020) Plan/Recommendations/Medical Decision Making:  
Continue present treatment Patient with some nausea but PillCam does not appear to be causing total obstruction. X-rays reviewed for the past several days appears the PillCam is in the right lower abdomen terminal ileum. Plan is for follow-up abdominal x-rays over the weekend and if no improvement Sunday then CT scan to help identify issues and consider surgery Monday with Dr. Fitz Perera if PillCam not progressing. Patient aware. Gastroenterology Progress Notes Date of Service: 01/01/21 6688 Assessment: · I reviewed CT and KUB images · I think the capsule is within reach of a colonoscope If we can get into the terminal ileum as long as there is no stricture · Even if there is a stricture perhaps it can be dilated with a balloon · If colonoscopy fails then would do surgery Plan: · Would try Golytely prep over next day or two · If it doesn't pass colonoscopy on Saturday or Sunday · If that fails surgery on Monday

## 2022-03-18 PROBLEM — K50.90 CROHN'S DISEASE (HCC): Status: ACTIVE | Noted: 2020-01-06

## 2022-03-18 PROBLEM — N83.209 OVARIAN CYST RUPTURE: Status: ACTIVE | Noted: 2019-08-05

## 2022-03-19 PROBLEM — R11.2 NAUSEA & VOMITING: Status: ACTIVE | Noted: 2020-12-30

## 2022-03-19 PROBLEM — K56.609 BOWEL OBSTRUCTION (HCC): Status: ACTIVE | Noted: 2019-09-09

## 2022-03-19 PROBLEM — A41.9 SEPSIS (HCC): Status: ACTIVE | Noted: 2020-01-06

## 2022-03-19 PROBLEM — Z90.710 S/P HYSTERECTOMY: Status: ACTIVE | Noted: 2018-10-15

## 2022-03-19 PROBLEM — R11.2 NAUSEA AND VOMITING: Status: ACTIVE | Noted: 2020-01-06

## 2022-03-19 PROBLEM — K50.90 CROHN DISEASE (HCC): Status: ACTIVE | Noted: 2019-09-09

## 2022-03-20 PROBLEM — R10.9 ABDOMINAL PAIN: Status: ACTIVE | Noted: 2019-08-06

## 2022-03-20 PROBLEM — E87.6 HYPOKALEMIA: Status: ACTIVE | Noted: 2020-12-30

## 2022-03-20 PROBLEM — T18.3XXA FOREIGN BODY IN SMALL INTESTINE: Status: ACTIVE | Noted: 2020-12-30

## 2022-03-20 PROBLEM — R11.2 NAUSEA WITH VOMITING: Status: ACTIVE | Noted: 2019-08-06

## 2022-09-08 NOTE — PROGRESS NOTES
Pt  Lab was sent down and they said it clotted and they needed another MBP pt refused to allow me to draw labs again difficulty understanding pt d/t stutter Repeated moving lip and pausing in between questions  Mart-1 - Negative Histology Text: MART-1 staining demonstrates a normal density and pattern of melanocytes along the dermal-epidermal junction. The surgical margins are negative for tumor cells.

## 2023-01-21 ENCOUNTER — HOSPITAL ENCOUNTER (EMERGENCY)
Age: 38
Discharge: HOME | End: 2023-01-22
Attending: STUDENT IN AN ORGANIZED HEALTH CARE EDUCATION/TRAINING PROGRAM
Payer: COMMERCIAL

## 2023-01-21 ENCOUNTER — APPOINTMENT (OUTPATIENT)
Dept: CT IMAGING | Age: 38
End: 2023-01-21
Attending: STUDENT IN AN ORGANIZED HEALTH CARE EDUCATION/TRAINING PROGRAM
Payer: COMMERCIAL

## 2023-01-21 DIAGNOSIS — K50.90 CROHN'S DISEASE WITHOUT COMPLICATION, UNSPECIFIED GASTROINTESTINAL TRACT LOCATION (HCC): Primary | ICD-10-CM

## 2023-01-21 LAB
APPEARANCE UR: CLEAR
BACTERIA URNS QL MICRO: NEGATIVE /HPF
BILIRUB UR QL: NEGATIVE
COLOR UR: ABNORMAL
EPITH CASTS URNS QL MICRO: ABNORMAL /LPF
ERYTHROCYTE [DISTWIDTH] IN BLOOD BY AUTOMATED COUNT: 13.1 % (ref 11.5–14.5)
GLUCOSE UR STRIP.AUTO-MCNC: NEGATIVE MG/DL
HCG UR QL: NEGATIVE
HCT VFR BLD AUTO: 44.1 % (ref 35–47)
HGB BLD-MCNC: 14.5 G/DL (ref 11.5–16)
HGB UR QL STRIP: ABNORMAL
HYALINE CASTS URNS QL MICRO: ABNORMAL /LPF (ref 0–2)
KETONES UR QL STRIP.AUTO: ABNORMAL MG/DL
LEUKOCYTE ESTERASE UR QL STRIP.AUTO: NEGATIVE
MCH RBC QN AUTO: 28.1 PG (ref 26–34)
MCHC RBC AUTO-ENTMCNC: 32.9 G/DL (ref 30–36.5)
MCV RBC AUTO: 85.5 FL (ref 80–99)
NITRITE UR QL STRIP.AUTO: NEGATIVE
NRBC # BLD: 0 K/UL (ref 0–0.01)
NRBC BLD-RTO: 0 PER 100 WBC
PH UR STRIP: 6 (ref 5–8)
PLATELET # BLD AUTO: 366 K/UL (ref 150–400)
PMV BLD AUTO: 9.8 FL (ref 8.9–12.9)
PROT UR STRIP-MCNC: 100 MG/DL
RBC # BLD AUTO: 5.16 M/UL (ref 3.8–5.2)
RBC #/AREA URNS HPF: ABNORMAL /HPF (ref 0–5)
SP GR UR REFRACTOMETRY: 1.02
UA: UC IF INDICATED,UAUC: ABNORMAL
UROBILINOGEN UR QL STRIP.AUTO: 0.2 EU/DL (ref 0.2–1)
WBC # BLD AUTO: 7.6 K/UL (ref 3.6–11)
WBC URNS QL MICRO: ABNORMAL /HPF (ref 0–4)

## 2023-01-21 PROCEDURE — 74011000636 HC RX REV CODE- 636: Performed by: STUDENT IN AN ORGANIZED HEALTH CARE EDUCATION/TRAINING PROGRAM

## 2023-01-21 PROCEDURE — 81001 URINALYSIS AUTO W/SCOPE: CPT

## 2023-01-21 PROCEDURE — 36415 COLL VENOUS BLD VENIPUNCTURE: CPT

## 2023-01-21 PROCEDURE — 81025 URINE PREGNANCY TEST: CPT

## 2023-01-21 PROCEDURE — 85027 COMPLETE CBC AUTOMATED: CPT

## 2023-01-21 PROCEDURE — 99285 EMERGENCY DEPT VISIT HI MDM: CPT

## 2023-01-21 PROCEDURE — 74177 CT ABD & PELVIS W/CONTRAST: CPT

## 2023-01-21 RX ADMIN — IOPAMIDOL 100 ML: 755 INJECTION, SOLUTION INTRAVENOUS at 23:15

## 2023-01-21 NOTE — Clinical Note
[address]  [facility] EMERGENCY DEPT  [address] [place] [address]  [place] [id number] [phone]    Work/School Note    Date: 1/21/2023    To Whom It May concern:    Dusty Carney was seen and treated today in the emergency room by the following provider(s):  Attending Provider: Shauna Nguyen MD.      Dusty Carney is excused from work/school on 01/22/23 and 01/23/23. She is medically clear to return to work/school on 1/24/2023.        Sincerely,          Ryan Reed MD

## 2023-01-22 VITALS
RESPIRATION RATE: 16 BRPM | TEMPERATURE: 98.1 F | WEIGHT: 184.97 LBS | HEIGHT: 62 IN | SYSTOLIC BLOOD PRESSURE: 144 MMHG | OXYGEN SATURATION: 97 % | HEART RATE: 96 BPM | BODY MASS INDEX: 34.04 KG/M2 | DIASTOLIC BLOOD PRESSURE: 114 MMHG

## 2023-01-22 LAB
ALBUMIN SERPL-MCNC: 3.1 G/DL (ref 3.5–5)
ALBUMIN/GLOB SERPL: 0.9 (ref 1.1–2.2)
ALP SERPL-CCNC: 73 U/L (ref 45–117)
ALT SERPL-CCNC: 23 U/L (ref 12–78)
ANION GAP SERPL CALC-SCNC: 8 MMOL/L (ref 5–15)
AST SERPL-CCNC: 16 U/L (ref 15–37)
BILIRUB SERPL-MCNC: 0.2 MG/DL (ref 0.2–1)
BUN SERPL-MCNC: 16 MG/DL (ref 6–20)
BUN/CREAT SERPL: 23 (ref 12–20)
CALCIUM SERPL-MCNC: 7.9 MG/DL (ref 8.5–10.1)
CHLORIDE SERPL-SCNC: 105 MMOL/L (ref 97–108)
CO2 SERPL-SCNC: 23 MMOL/L (ref 21–32)
CREAT SERPL-MCNC: 0.71 MG/DL (ref 0.55–1.02)
GLOBULIN SER CALC-MCNC: 3.4 G/DL (ref 2–4)
GLUCOSE SERPL-MCNC: 98 MG/DL (ref 65–100)
LIPASE SERPL-CCNC: 127 U/L (ref 73–393)
POTASSIUM SERPL-SCNC: 3.5 MMOL/L (ref 3.5–5.1)
PROT SERPL-MCNC: 6.5 G/DL (ref 6.4–8.2)
SODIUM SERPL-SCNC: 136 MMOL/L (ref 136–145)

## 2023-01-22 PROCEDURE — 83690 ASSAY OF LIPASE: CPT

## 2023-01-22 PROCEDURE — 74011250636 HC RX REV CODE- 250/636: Performed by: STUDENT IN AN ORGANIZED HEALTH CARE EDUCATION/TRAINING PROGRAM

## 2023-01-22 PROCEDURE — 96361 HYDRATE IV INFUSION ADD-ON: CPT

## 2023-01-22 PROCEDURE — 74011250637 HC RX REV CODE- 250/637: Performed by: STUDENT IN AN ORGANIZED HEALTH CARE EDUCATION/TRAINING PROGRAM

## 2023-01-22 PROCEDURE — 36415 COLL VENOUS BLD VENIPUNCTURE: CPT

## 2023-01-22 PROCEDURE — 80053 COMPREHEN METABOLIC PANEL: CPT

## 2023-01-22 PROCEDURE — 96375 TX/PRO/DX INJ NEW DRUG ADDON: CPT

## 2023-01-22 PROCEDURE — 96374 THER/PROPH/DIAG INJ IV PUSH: CPT

## 2023-01-22 RX ORDER — ACETAMINOPHEN 325 MG/1
975 TABLET ORAL
Qty: 20 TABLET | Refills: 0 | Status: SHIPPED | OUTPATIENT
Start: 2023-01-22

## 2023-01-22 RX ORDER — POLYETHYLENE GLYCOL 3350 17 G/17G
17 POWDER, FOR SOLUTION ORAL DAILY
Qty: 116 G | Refills: 0 | Status: SHIPPED | OUTPATIENT
Start: 2023-01-22 | End: 2023-01-29

## 2023-01-22 RX ORDER — OXYCODONE HYDROCHLORIDE 5 MG/1
5 TABLET ORAL
Status: COMPLETED | OUTPATIENT
Start: 2023-01-22 | End: 2023-01-22

## 2023-01-22 RX ORDER — ONDANSETRON 2 MG/ML
4 INJECTION INTRAMUSCULAR; INTRAVENOUS
Status: COMPLETED | OUTPATIENT
Start: 2023-01-22 | End: 2023-01-22

## 2023-01-22 RX ORDER — OXYCODONE HYDROCHLORIDE 10 MG/1
5 TABLET ORAL
Qty: 9 TABLET | Refills: 0 | Status: SHIPPED | OUTPATIENT
Start: 2023-01-22 | End: 2023-01-28

## 2023-01-22 RX ORDER — HYDROMORPHONE HYDROCHLORIDE 1 MG/ML
0.5 INJECTION, SOLUTION INTRAMUSCULAR; INTRAVENOUS; SUBCUTANEOUS ONCE
Status: COMPLETED | OUTPATIENT
Start: 2023-01-22 | End: 2023-01-22

## 2023-01-22 RX ADMIN — SODIUM CHLORIDE 1000 ML: 9 INJECTION, SOLUTION INTRAVENOUS at 00:32

## 2023-01-22 RX ADMIN — HYDROMORPHONE HYDROCHLORIDE 0.5 MG: 1 INJECTION, SOLUTION INTRAMUSCULAR; INTRAVENOUS; SUBCUTANEOUS at 00:32

## 2023-01-22 RX ADMIN — OXYCODONE HYDROCHLORIDE 5 MG: 5 TABLET ORAL at 02:24

## 2023-01-22 RX ADMIN — ONDANSETRON 4 MG: 2 INJECTION INTRAMUSCULAR; INTRAVENOUS at 00:32

## 2023-02-09 ENCOUNTER — HOSPITAL ENCOUNTER (EMERGENCY)
Age: 38
Discharge: HOME | End: 2023-02-09
Attending: EMERGENCY MEDICINE
Payer: COMMERCIAL

## 2023-02-09 ENCOUNTER — APPOINTMENT (OUTPATIENT)
Dept: CT IMAGING | Age: 38
End: 2023-02-09
Attending: EMERGENCY MEDICINE
Payer: COMMERCIAL

## 2023-02-09 VITALS
DIASTOLIC BLOOD PRESSURE: 102 MMHG | BODY MASS INDEX: 32.2 KG/M2 | RESPIRATION RATE: 17 BRPM | HEART RATE: 110 BPM | TEMPERATURE: 97.8 F | SYSTOLIC BLOOD PRESSURE: 136 MMHG | HEIGHT: 62 IN | OXYGEN SATURATION: 98 % | WEIGHT: 175 LBS

## 2023-02-09 DIAGNOSIS — I10 ACCELERATED HYPERTENSION: ICD-10-CM

## 2023-02-09 DIAGNOSIS — R20.2 NUMBNESS AND TINGLING: ICD-10-CM

## 2023-02-09 DIAGNOSIS — R20.0 NUMBNESS AND TINGLING: ICD-10-CM

## 2023-02-09 DIAGNOSIS — R42 POSTURAL DIZZINESS: ICD-10-CM

## 2023-02-09 DIAGNOSIS — G44.209 ACUTE NON INTRACTABLE TENSION-TYPE HEADACHE: Primary | ICD-10-CM

## 2023-02-09 LAB
ALBUMIN SERPL-MCNC: 3.8 G/DL (ref 3.5–5)
ALBUMIN/GLOB SERPL: 1 (ref 1.1–2.2)
ALP SERPL-CCNC: 74 U/L (ref 45–117)
ALT SERPL-CCNC: 30 U/L (ref 12–78)
ANION GAP SERPL CALC-SCNC: 6 MMOL/L (ref 5–15)
AST SERPL-CCNC: 20 U/L (ref 15–37)
ATRIAL RATE: 87 BPM
BASOPHILS # BLD: 0 K/UL (ref 0–0.1)
BASOPHILS NFR BLD: 0 % (ref 0–1)
BILIRUB SERPL-MCNC: 0.5 MG/DL (ref 0.2–1)
BUN SERPL-MCNC: 18 MG/DL (ref 6–20)
BUN/CREAT SERPL: 23 (ref 12–20)
CALCIUM SERPL-MCNC: 9.2 MG/DL (ref 8.5–10.1)
CALCULATED P AXIS, ECG09: 52 DEGREES
CALCULATED R AXIS, ECG10: 50 DEGREES
CALCULATED T AXIS, ECG11: 52 DEGREES
CHLORIDE SERPL-SCNC: 107 MMOL/L (ref 97–108)
CO2 SERPL-SCNC: 26 MMOL/L (ref 21–32)
CREAT SERPL-MCNC: 0.78 MG/DL (ref 0.55–1.02)
DIAGNOSIS, 93000: NORMAL
DIFFERENTIAL METHOD BLD: ABNORMAL
EOSINOPHIL # BLD: 0.1 K/UL (ref 0–0.4)
EOSINOPHIL NFR BLD: 1 % (ref 0–7)
ERYTHROCYTE [DISTWIDTH] IN BLOOD BY AUTOMATED COUNT: 13.5 % (ref 11.5–14.5)
GLOBULIN SER CALC-MCNC: 3.9 G/DL (ref 2–4)
GLUCOSE SERPL-MCNC: 83 MG/DL (ref 65–100)
HCT VFR BLD AUTO: 46.9 % (ref 35–47)
HGB BLD-MCNC: 15.4 G/DL (ref 11.5–16)
IMM GRANULOCYTES # BLD AUTO: 0 K/UL (ref 0–0.04)
IMM GRANULOCYTES NFR BLD AUTO: 0 % (ref 0–0.5)
LYMPHOCYTES # BLD: 2.7 K/UL (ref 0.8–3.5)
LYMPHOCYTES NFR BLD: 31 % (ref 12–49)
MCH RBC QN AUTO: 28.1 PG (ref 26–34)
MCHC RBC AUTO-ENTMCNC: 32.8 G/DL (ref 30–36.5)
MCV RBC AUTO: 85.4 FL (ref 80–99)
MONOCYTES # BLD: 0.5 K/UL (ref 0–1)
MONOCYTES NFR BLD: 6 % (ref 5–13)
NEUTS SEG # BLD: 5.6 K/UL (ref 1.8–8)
NEUTS SEG NFR BLD: 62 % (ref 32–75)
NRBC # BLD: 0 K/UL (ref 0–0.01)
NRBC BLD-RTO: 0 PER 100 WBC
P-R INTERVAL, ECG05: 146 MS
PLATELET # BLD AUTO: 364 K/UL (ref 150–400)
PMV BLD AUTO: 10.1 FL (ref 8.9–12.9)
POTASSIUM SERPL-SCNC: 3.5 MMOL/L (ref 3.5–5.1)
PROT SERPL-MCNC: 7.7 G/DL (ref 6.4–8.2)
Q-T INTERVAL, ECG07: 360 MS
QRS DURATION, ECG06: 92 MS
QTC CALCULATION (BEZET), ECG08: 433 MS
RBC # BLD AUTO: 5.49 M/UL (ref 3.8–5.2)
SODIUM SERPL-SCNC: 139 MMOL/L (ref 136–145)
TROPONIN I SERPL HS-MCNC: 6 NG/L (ref 0–51)
VENTRICULAR RATE, ECG03: 87 BPM
WBC # BLD AUTO: 9 K/UL (ref 3.6–11)

## 2023-02-09 PROCEDURE — 74011250637 HC RX REV CODE- 250/637: Performed by: EMERGENCY MEDICINE

## 2023-02-09 PROCEDURE — 36415 COLL VENOUS BLD VENIPUNCTURE: CPT

## 2023-02-09 PROCEDURE — 85025 COMPLETE CBC W/AUTO DIFF WBC: CPT

## 2023-02-09 PROCEDURE — 99284 EMERGENCY DEPT VISIT MOD MDM: CPT

## 2023-02-09 PROCEDURE — 84484 ASSAY OF TROPONIN QUANT: CPT

## 2023-02-09 PROCEDURE — 70450 CT HEAD/BRAIN W/O DYE: CPT

## 2023-02-09 PROCEDURE — 96361 HYDRATE IV INFUSION ADD-ON: CPT

## 2023-02-09 PROCEDURE — 80053 COMPREHEN METABOLIC PANEL: CPT

## 2023-02-09 PROCEDURE — 74011250636 HC RX REV CODE- 250/636: Performed by: EMERGENCY MEDICINE

## 2023-02-09 PROCEDURE — 96374 THER/PROPH/DIAG INJ IV PUSH: CPT

## 2023-02-09 PROCEDURE — 96375 TX/PRO/DX INJ NEW DRUG ADDON: CPT

## 2023-02-09 RX ORDER — AMLODIPINE BESYLATE 10 MG/1
10 TABLET ORAL DAILY
Qty: 20 TABLET | Refills: 0 | Status: SHIPPED | OUTPATIENT
Start: 2023-02-09 | End: 2023-03-01

## 2023-02-09 RX ORDER — PROCHLORPERAZINE EDISYLATE 5 MG/ML
10 INJECTION INTRAMUSCULAR; INTRAVENOUS ONCE
Status: COMPLETED | OUTPATIENT
Start: 2023-02-09 | End: 2023-02-09

## 2023-02-09 RX ORDER — HYDRALAZINE HYDROCHLORIDE 20 MG/ML
20 INJECTION INTRAMUSCULAR; INTRAVENOUS ONCE
Status: COMPLETED | OUTPATIENT
Start: 2023-02-09 | End: 2023-02-09

## 2023-02-09 RX ORDER — BUTALBITAL, ACETAMINOPHEN AND CAFFEINE 50; 325; 40 MG/1; MG/1; MG/1
2 TABLET ORAL
Status: COMPLETED | OUTPATIENT
Start: 2023-02-09 | End: 2023-02-09

## 2023-02-09 RX ORDER — LORAZEPAM 2 MG/ML
1 INJECTION INTRAMUSCULAR ONCE
Status: COMPLETED | OUTPATIENT
Start: 2023-02-09 | End: 2023-02-09

## 2023-02-09 RX ORDER — BUTALBITAL, ACETAMINOPHEN AND CAFFEINE 300; 40; 50 MG/1; MG/1; MG/1
1 CAPSULE ORAL
Qty: 20 CAPSULE | Refills: 0 | Status: SHIPPED | OUTPATIENT
Start: 2023-02-09

## 2023-02-09 RX ORDER — DIPHENHYDRAMINE HYDROCHLORIDE 50 MG/ML
25 INJECTION, SOLUTION INTRAMUSCULAR; INTRAVENOUS
Status: COMPLETED | OUTPATIENT
Start: 2023-02-09 | End: 2023-02-09

## 2023-02-09 RX ADMIN — PROCHLORPERAZINE EDISYLATE 10 MG: 5 INJECTION INTRAMUSCULAR; INTRAVENOUS at 16:27

## 2023-02-09 RX ADMIN — HYDRALAZINE HYDROCHLORIDE 20 MG: 20 INJECTION INTRAMUSCULAR; INTRAVENOUS at 16:25

## 2023-02-09 RX ADMIN — LORAZEPAM 1 MG: 2 INJECTION INTRAMUSCULAR; INTRAVENOUS at 19:20

## 2023-02-09 RX ADMIN — SODIUM CHLORIDE 1000 ML: 9 INJECTION, SOLUTION INTRAVENOUS at 18:46

## 2023-02-09 RX ADMIN — DIPHENHYDRAMINE HYDROCHLORIDE 25 MG: 50 INJECTION, SOLUTION INTRAMUSCULAR; INTRAVENOUS at 16:24

## 2023-02-09 RX ADMIN — BUTALBITAL, ACETAMINOPHEN, AND CAFFEINE 2 TABLET: 50; 325; 40 TABLET ORAL at 16:23

## 2023-02-09 NOTE — Clinical Note
[address]  [facility] EMERGENCY DEPT  [address] [place] [id number] [phone]    Work/School Note    Date: 2/9/2023    To Whom It May concern:    Kasi Wing was seen and treated today in the emergency room by the following provider(s):  Attending Provider: Graciela Dye MD.      Kasi Wing is excused from work/school on 02/09/23 and 02/10/23. She is medically clear to return to work/school on 2/11/2023.        Sincerely,          Vikram Steward MD

## 2023-02-09 NOTE — ED TRIAGE NOTES
Pt arrives to ed w reports of h/a w nausea onset Monday. Hx HTN. Feels pressure in head. Reports floaters increasing in frequency, tips of fingers tingling.

## 2023-02-09 NOTE — Clinical Note
[address]  [facility] EMERGENCY DEPT  [address] [place] [address]  [place] [id number] [phone]    Work/School Note    Date: 2/9/2023    To Whom It May concern:    Tyler Cruz was seen and treated today in the emergency room by the following provider(s):  Attending Provider: Jonathan Solitario MD.      Tyler Cruz is excused from work/school on 02/09/23 and 02/10/23. She is medically clear to return to work/school on 2/11/2023.        Sincerely,          CHAPO Thakkar

## 2023-02-09 NOTE — DISCHARGE INSTRUCTIONS
Thank You! It was a pleasure taking care of you in our Emergency Department today. We know that when you come to [facility], you are entrusting us with your health, comfort, and safety. Our physicians and nurses honor that trust, and truly appreciate the opportunity to care for you and your loved ones. We also value your feedback. If you receive a survey about your Emergency Department experience today, please fill it out. We care about our patients' feedback, and we listen to what you have to say. Thank you. Dr. Sade Farah M.D.      ________________________________________________________________________  I have included a copy of your lab results and/or radiologic studies from today's visit so you can have them easily available at your follow-up visit. We hope you feel better and please do not hesitate to contact the ED if you have any questions at all!     Recent Results (from the past 12 hour(s))   EKG, 12 LEAD, INITIAL    Collection Time: 02/09/23  3:52 PM   Result Value Ref Range    Ventricular Rate 87 BPM    Atrial Rate 87 BPM    P-R Interval 146 ms    QRS Duration 92 ms    Q-T Interval 360 ms    QTC Calculation (Bezet) 433 ms    Calculated P Axis 52 degrees    Calculated R Axis 50 degrees    Calculated T Axis 52 degrees    Diagnosis       Normal sinus rhythm  Normal ECG  When compared with ECG of 05-JAN-2021 17:24,  No significant change was found     CBC WITH AUTOMATED DIFF    Collection Time: 02/09/23  4:06 PM   Result Value Ref Range    WBC 9.0 3.6 - 11.0 K/uL    RBC 5.49 (H) 3.80 - 5.20 M/uL    HGB 15.4 11.5 - 16.0 g/dL    HCT 46.9 35.0 - 47.0 %    MCV 85.4 80.0 - 99.0 FL    MCH 28.1 26.0 - 34.0 PG    MCHC 32.8 30.0 - 36.5 g/dL    RDW 13.5 11.5 - 14.5 %    PLATELET 260 431 - 350 K/uL    MPV 10.1 8.9 - 12.9 FL    NRBC 0.0 0  WBC    ABSOLUTE NRBC 0.00 0.00 - 0.01 K/uL    NEUTROPHILS 62 32 - 75 %    LYMPHOCYTES 31 12 - 49 %    MONOCYTES 6 5 - 13 % EOSINOPHILS 1 0 - 7 %    BASOPHILS 0 0 - 1 %    IMMATURE GRANULOCYTES 0 0.0 - 0.5 %    ABS. NEUTROPHILS 5.6 1.8 - 8.0 K/UL    ABS. LYMPHOCYTES 2.7 0.8 - 3.5 K/UL    ABS. MONOCYTES 0.5 0.0 - 1.0 K/UL    ABS. EOSINOPHILS 0.1 0.0 - 0.4 K/UL    ABS. BASOPHILS 0.0 0.0 - 0.1 K/UL    ABS. IMM. GRANS. 0.0 0.00 - 0.04 K/UL    DF AUTOMATED     METABOLIC PANEL, COMPREHENSIVE    Collection Time: 02/09/23  4:06 PM   Result Value Ref Range    Sodium 139 136 - 145 mmol/L    Potassium 3.5 3.5 - 5.1 mmol/L    Chloride 107 97 - 108 mmol/L    CO2 26 21 - 32 mmol/L    Anion gap 6 5 - 15 mmol/L    Glucose 83 65 - 100 mg/dL    BUN 18 6 - 20 MG/DL    Creatinine 0.78 0.55 - 1.02 MG/DL    BUN/Creatinine ratio 23 (H) 12 - 20      eGFR >60 >60 ml/min/1.73m2    Calcium 9.2 8.5 - 10.1 MG/DL    Bilirubin, total 0.5 0.2 - 1.0 MG/DL    ALT (SGPT) 30 12 - 78 U/L    AST (SGOT) 20 15 - 37 U/L    Alk. phosphatase 74 45 - 117 U/L    Protein, total 7.7 6.4 - 8.2 g/dL    Albumin 3.8 3.5 - 5.0 g/dL    Globulin 3.9 2.0 - 4.0 g/dL    A-G Ratio 1.0 (L) 1.1 - 2.2     TROPONIN-HIGH SENSITIVITY    Collection Time: 02/09/23  4:06 PM   Result Value Ref Range    Troponin-High Sensitivity 6 0 - 51 ng/L       CT HEAD WO CONT   Final Result   No acute intracranial hemorrhage, mass or infarct. CT Results  (Last 48 hours)                 02/09/23 1528  CT HEAD WO CONT Final result    Impression:  No acute intracranial hemorrhage, mass or infarct. Narrative:  INDICATION: acute headache/dizziness        Exam: Noncontrast CT of the brain is performed with 5 mm collimation. CT dose reduction was achieved with the use of the standardized protocol   tailored for this examination and automatic exposure control for dose   modulation. FINDINGS: There is no acute intracranial hemorrhage, mass, mass effect or   herniation. Ventricular system is normal. The gray-white matter differentiation   is well-preserved.  The mastoid air cells are well pneumatized. The visualized   paranasal sinuses are normal.                 The exam and treatment you received in the Emergency Department were for an urgent problem and are not intended as complete care. It is important that you follow up with a doctor, nurse practitioner, or physician assistant for ongoing care. If your symptoms become worse or you do not improve as expected and you are unable to reach your usual health care provider, you should return to the Emergency Department. We are available 24 hours a day. Please take your discharge instructions with you when you go to your follow-up appointment. If a prescription has been provided, please have it filled as soon as possible to prevent a delay in treatment. Read the entire medication instruction sheet provided to you by the pharmacy. If you have any questions or reservations about taking the medication due to side effects or interactions with other medications, please call your primary care physician or contact the ER to speak with the charge nurse. Please make an appointment with your family doctor or the physician you were referred to for follow-up of this visit as instructed on your discharge paperwork. Return to the ER if you are unable to be seen or if you are unable to be seen in a timely manner. Should you experience abdominal pain lasting greater than 6 hours, chest pain, difficulty breathing, fever/chills, numbness/tingling, skin changes or other symptoms that concern you, return to the ED sooner. If you feel worse over the next 24 hours, please return to the ED. We are available 24 hours a day. Thank you for trusting us with your care!

## 2023-02-09 NOTE — ED NOTES
Pt became lightheaded, felt vision going black at periphery, felt wobbly when standing. Dr. Yi Ramirez notified, at bedside to assess. Pt provided with juice and crackers, monitor x3, call bell in reach.

## 2023-02-09 NOTE — Clinical Note
[address]  [facility] EMERGENCY DEPT  [address] [id number] [phone]    Work/School Note    Date: 2/9/2023    To Whom It May concern:    Shoaib Nicole was seen and treated today in the emergency room by the following provider(s):  Attending Provider: Linda Silvestre MD.      Shoaib Nicole is excused from work/school on 02/09/23 and 02/10/23. She is medically clear to return to work/school on 2/11/2023.        Sincerely,          Chrisotph Bush MD

## 2023-02-10 NOTE — ED PROVIDER NOTES
EMERGENCY DEPARTMENT HISTORY AND PHYSICAL EXAM            Please note that this dictation was completed with the assistance of \"Dragon\", the computer voice recognition software. Quite often unanticipated grammatical, syntax, homophones, and other interpretive errors are inadvertently transcribed by the computer software. Please disregard these errors and any errors that have escaped final proofreading. Thank you. Date of Evaluation: 02/10/23  Patient: Anaid Curtis  Patient Age and Sex: 40 y.o. female   MRN: [id number]  CSN: [id number]  PCP: Maritza Riley MD    History of Present Illness     Chief Complaint   Patient presents with    Headache    Hypertension     History Provided By: Patient/family/EMS (if available)    HPI Limitations : None    HPI: Anaid Curtis, 40 y.o. female with past medical history as documented below presents to the ED with c/o of 3 days of mild to moderate HA, dizziness, nausea and feeling numbness and tingling in his fingers. No falls or injuries. Pt does have hx of HTN but not on medications. No visual disturbances. No speech disturbances. No focal numbness or weakness. Pt denies any other exacerbating or ameliorating factors. There are no other complaints, changes or physical findings pertinent to the HPI at this time. Nursing Notes were all reviewed and agreed with or any disagreements were addressed in the HPI.     Past History   Past Medical History:  Past Medical History:   Diagnosis Date    Abnormal Pap smear     Biopsy done last year and came back ok    Asthma     flares with bronchitis has been over a year as stated 10/5/2018    Autoimmune disease ([facility] [address]     Crohns    Crohn's disease ([facility] [address] 08/2019    Difficult intravenous access     GERD (gastroesophageal reflux disease)     IBS (irritable bowel syndrome)     Kidney stones     Nausea & vomiting     PONV-nausea    Psychiatric disorder     anxiety & depression    PUD (peptic ulcer disease)        Past Surgical History:  Past Surgical History:   Procedure Laterality Date    COLONOSCOPY N/A 2019    COLONOSCOPY performed by Lauren Yates MD at [facility]COPY    COLONOSCOPY N/A 2021    COLONOSCOPY performed by Renee Burgos MD at [facility] MAIN OR    HX APPENDECTOMY      7th grade. HX COLONOSCOPY      HX ENDOSCOPY      HX GYN          HX GYN  2004        HX GYN      hysterectomy    HX HEENT      dental surgery    HX OTHER SURGICAL  09/10/2019    s/p Diagnostic laparoscopy with lysis of adhesions, Robitc Assist,Open small bowel resection (with removal of PillCam)     HX OTHER SURGICAL  2021     Ileocecectomy. Family History:   Family history reviewed and was non-contributory, unless specified below:  Family History   Problem Relation Age of Onset    Heart Disease Mother     Diabetes Mother     Cancer Maternal Aunt         colon cancer       Social History:  Social History     Tobacco Use    Smoking status: Former     Packs/day: 0.25     Types: Cigarettes     Quit date: 2017     Years since quittin.4    Smokeless tobacco: Never   Substance Use Topics    Alcohol use: Not Currently     Comment: occassionally    Drug use: No       Allergies: Allergies   Allergen Reactions    Avelox [Moxifloxacin] Swelling and Unknown (comments)     But pt tolerated cipro    Macrolide Antibiotics Hives, Rash and Swelling     Per patient reported as a previous reaction to a \"mycin\" drug. Did not know which drug. Nsaids (Non-Steroidal Anti-Inflammatory Drug) Other (comments)     D/t esophageal erosion and GERD       Current Medications:  No current facility-administered medications on file prior to encounter. Current Outpatient Medications on File Prior to Encounter   Medication Sig Dispense Refill    acetaminophen (TYLENOL) 325 mg tablet Take 3 Tablets by mouth every eight (8) hours as needed for Pain.  20 Tablet 0    calcium carbonate (TUMS) 200 mg calcium (500 mg) chew Take 1 Tab by mouth three (3) times daily (with meals). Indications: heartburn 60 Tab 0    gabapentin (NEURONTIN) 100 mg capsule Take 1 Cap by mouth three (3) times daily. Max Daily Amount: 300 mg. 90 Cap 0    nystatin (MYCOSTATIN) 100,000 unit/mL suspension Take 5 mL by mouth four (4) times daily. swish and spit 473 mL 0    diphenhydrAMINE (BenadryL) 25 mg capsule Take 25 mg by mouth every six (6) hours as needed. ondansetron (Zofran ODT) 4 mg disintegrating tablet Take 1 Tab by mouth every eight (8) hours as needed for Nausea. 10 Tab 0    promethazine (PHENERGAN) 25 mg suppository Insert 25 mg into rectum every six (6) hours as needed for Nausea. Review of Systems   A complete ROS was reviewed by me today and all other systems negative, unless otherwise specified below:  Review of Systems   Constitutional: Negative. Negative for chills and fever. HENT: Negative. Negative for congestion and sore throat. Eyes: Negative. Respiratory: Negative. Negative for cough, chest tightness, shortness of breath and wheezing. Cardiovascular: Negative. Negative for chest pain, palpitations and leg swelling. Gastrointestinal:  Positive for nausea. Negative for abdominal distention, abdominal pain, blood in stool, constipation, diarrhea and vomiting. Endocrine: Negative. Genitourinary: Negative. Negative for difficulty urinating, dysuria, flank pain, frequency, hematuria and urgency. Musculoskeletal: Negative. Negative for back pain and neck stiffness. Skin: Negative. Negative for rash. Allergic/Immunologic: Negative. Neurological:  Positive for dizziness, numbness and headaches. Negative for syncope, weakness and light-headedness. Hematological: Negative. Psychiatric/Behavioral: Negative. Negative for confusion and self-injury.     Physical Exam   Patient Vitals for the past 24 hrs:   Temp Pulse Resp BP SpO2   02/09/23 2137 97.8 °F (36.6 °C) (!) 110 -- (!) 136/102 98 %   02/09/23 2011 -- (!) 113 17 -- 97 %   02/09/23 1900 97.7 °F (36.5 °C) (!) 124 16 (!) 155/82 99 %   02/09/23 1844 -- (!) 131 15 (!) 169/89 98 %   02/09/23 1800 -- (!) 109 15 -- 99 %   02/09/23 1745 -- (!) 112 18 133/87 99 %   02/09/23 1733 -- 86 21 110/69 99 %   02/09/23 1700 -- -- 15 (!) 144/87 99 %   02/09/23 1658 -- (!) 108 17 -- 99 %   02/09/23 1630 -- (!) 117 16 (!) 155/106 100 %   02/09/23 1629 -- (!) 104 18 (!) 147/101 100 %   02/09/23 1340 97.7 °F (36.5 °C) 89 18 (!) 197/126 100 %       Physical Exam  Vitals and nursing note reviewed. Constitutional:       General: She is not in acute distress. Appearance: She is well-developed. She is not diaphoretic. HENT:      Head: Normocephalic and atraumatic. Mouth/Throat:      Pharynx: No oropharyngeal exudate. Eyes:      Conjunctiva/sclera: Conjunctivae normal.      Pupils: Pupils are equal, round, and reactive to light. Cardiovascular:      Rate and Rhythm: Normal rate and regular rhythm. Heart sounds: Normal heart sounds. No murmur heard. No friction rub. No gallop. Pulmonary:      Effort: Pulmonary effort is normal. No respiratory distress. Breath sounds: Normal breath sounds. No wheezing or rales. Chest:      Chest wall: No tenderness. Abdominal:      General: Bowel sounds are normal. There is no distension. Palpations: Abdomen is soft. There is no mass. Tenderness: There is no abdominal tenderness. There is no guarding or rebound. Musculoskeletal:         General: No tenderness or deformity. Normal range of motion. Cervical back: Normal range of motion. Skin:     General: Skin is warm. Findings: No rash. Neurological:      Mental Status: She is alert and oriented to person, place, and time. Cranial Nerves: No cranial nerve deficit. Motor: No abnormal muscle tone.       Coordination: Coordination normal.      Deep Tendon Reflexes: Reflexes normal.     Diagnostic Studies     LABORATORY RESULTS:  I have personally reviewed and interpreted all available laboratory results. Recent Results (from the past 24 hour(s))   EKG, 12 LEAD, INITIAL    Collection Time: 02/09/23  3:52 PM   Result Value Ref Range    Ventricular Rate 87 BPM    Atrial Rate 87 BPM    P-R Interval 146 ms    QRS Duration 92 ms    Q-T Interval 360 ms    QTC Calculation (Bezet) 433 ms    Calculated P Axis 52 degrees    Calculated R Axis 50 degrees    Calculated T Axis 52 degrees    Diagnosis       Normal sinus rhythm  Normal ECG  When compared with ECG of 05-JAN-2021 17:24,  No significant change was found     CBC WITH AUTOMATED DIFF    Collection Time: 02/09/23  4:06 PM   Result Value Ref Range    WBC 9.0 3.6 - 11.0 K/uL    RBC 5.49 (H) 3.80 - 5.20 M/uL    HGB 15.4 11.5 - 16.0 g/dL    HCT 46.9 35.0 - 47.0 %    MCV 85.4 80.0 - 99.0 FL    MCH 28.1 26.0 - 34.0 PG    MCHC 32.8 30.0 - 36.5 g/dL    RDW 13.5 11.5 - 14.5 %    PLATELET 579 736 - 152 K/uL    MPV 10.1 8.9 - 12.9 FL    NRBC 0.0 0  WBC    ABSOLUTE NRBC 0.00 0.00 - 0.01 K/uL    NEUTROPHILS 62 32 - 75 %    LYMPHOCYTES 31 12 - 49 %    MONOCYTES 6 5 - 13 %    EOSINOPHILS 1 0 - 7 %    BASOPHILS 0 0 - 1 %    IMMATURE GRANULOCYTES 0 0.0 - 0.5 %    ABS. NEUTROPHILS 5.6 1.8 - 8.0 K/UL    ABS. LYMPHOCYTES 2.7 0.8 - 3.5 K/UL    ABS. MONOCYTES 0.5 0.0 - 1.0 K/UL    ABS. EOSINOPHILS 0.1 0.0 - 0.4 K/UL    ABS. BASOPHILS 0.0 0.0 - 0.1 K/UL    ABS. IMM.  GRANS. 0.0 0.00 - 0.04 K/UL    DF AUTOMATED     METABOLIC PANEL, COMPREHENSIVE    Collection Time: 02/09/23  4:06 PM   Result Value Ref Range    Sodium 139 136 - 145 mmol/L    Potassium 3.5 3.5 - 5.1 mmol/L    Chloride 107 97 - 108 mmol/L    CO2 26 21 - 32 mmol/L    Anion gap 6 5 - 15 mmol/L    Glucose 83 65 - 100 mg/dL    BUN 18 6 - 20 MG/DL    Creatinine 0.78 0.55 - 1.02 MG/DL    BUN/Creatinine ratio 23 (H) 12 - 20      eGFR >60 >60 ml/min/1.73m2    Calcium 9.2 8.5 - 10.1 MG/DL    Bilirubin, total 0.5 0.2 - 1.0 MG/DL    ALT (SGPT) 30 12 - 78 U/L    AST (SGOT) 20 15 - 37 U/L    Alk. phosphatase 74 45 - 117 U/L    Protein, total 7.7 6.4 - 8.2 g/dL    Albumin 3.8 3.5 - 5.0 g/dL    Globulin 3.9 2.0 - 4.0 g/dL    A-G Ratio 1.0 (L) 1.1 - 2.2     TROPONIN-HIGH SENSITIVITY    Collection Time: 02/09/23  4:06 PM   Result Value Ref Range    Troponin-High Sensitivity 6 0 - 51 ng/L       RADIOLOGY RESULTS:  I have personally reviewed and interpreted all available imaging studies and agree with radiology interpretation. CT HEAD WO CONT   Final Result   No acute intracranial hemorrhage, mass or infarct. CT Results  (Last 48 hours)                 02/09/23 1528  CT HEAD WO CONT Final result    Impression:  No acute intracranial hemorrhage, mass or infarct. Narrative:  INDICATION: acute headache/dizziness        Exam: Noncontrast CT of the brain is performed with 5 mm collimation. CT dose reduction was achieved with the use of the standardized protocol   tailored for this examination and automatic exposure control for dose   modulation. FINDINGS: There is no acute intracranial hemorrhage, mass, mass effect or   herniation. Ventricular system is normal. The gray-white matter differentiation   is well-preserved. The mastoid air cells are well pneumatized. The visualized   paranasal sinuses are normal.                 CXR Results  (Last 48 hours)      None          CT HEAD WO CONT   Final Result   No acute intracranial hemorrhage, mass or infarct. MEDICAL DECISION MAKING   I am the first and primary ED physician for this patient's ED visit today. I reviewed our EMR for any past records that may contribute to the patient's current condition, including their past medical, surgical, social and family history. This also includes their most recent ED visits, previous hospitalizations and prior diagnostic data. I have reviewed and summarized the most pertinent findings in my HPI and MDM.     Vital Signs Reviewed:  Patient Vitals for the past 24 hrs: Temp Pulse Resp BP SpO2   02/09/23 2137 97.8 °F (36.6 °C) (!) 110 -- (!) 136/102 98 %   02/09/23 2011 -- (!) 113 17 -- 97 %   02/09/23 1900 97.7 °F (36.5 °C) (!) 124 16 (!) 155/82 99 %   02/09/23 1844 -- (!) 131 15 (!) 169/89 98 %   02/09/23 1800 -- (!) 109 15 -- 99 %   02/09/23 1745 -- (!) 112 18 133/87 99 %   02/09/23 1733 -- 86 21 110/69 99 %   02/09/23 1700 -- -- 15 (!) 144/87 99 %   02/09/23 1658 -- (!) 108 17 -- 99 %   02/09/23 1630 -- (!) 117 16 (!) 155/106 100 %   02/09/23 1629 -- (!) 104 18 (!) 147/101 100 %   02/09/23 1340 97.7 °F (36.5 °C) 89 18 (!) 197/126 100 %     Pulse Oximetry Analysis: 100% on RA with good pleth    Cardiac Monitor:   Rate: 89 bpm  The cardiac monitor revealed the following rhythm as interpreted by me: Normal Sinus Rhythm  Cardiac monitoring was ordered to monitor patient for signs of cardiac dysrhythmia, which they are at risk for based on their history and/or risk for cardiovascular disease and/or metabolic abnormalities. EKG interpretation:   Billable EKG reviewed by ED Physician in the absence of a cardiologist: Yes  Rhythm: normal sinus rhythm; and regular . Rate (approx.): 87; Axis: normal; P wave: normal; QRS interval: normal ; ST/T wave: normal; Other findings: normal. This EKG was interpreted by Kira Ochoa MD     Records Reviewed: Nursing Notes, Old Medical Records, Previous electrocardiograms, Previous Radiology Studies and Previous Laboratory Studies, EMS reports    DIFFERENTIAL DIAGNOSIS AND MDM:  Patient presenting with dizziness, headache Pt has stable vitals with a nonfocal exam. DDx is broad and includes dehydration, orthostatic hypotension, arrhythmia, electrolyte disturbance, ACS, BPPV, labyrinthitis, otitis media, medication toxicity. There are no red flag symptoms such as diplopia, dysmetria, dysarthria, ataxia or unilateral numbness or weakness. Presentation not c/w posterior CVA / VBI.  Will obtain EKG, labs, check orthostatics, provide IVF's and symptomatic treatment and reassess. Will reassess the need for neuroimaging. Ryan Brown  results have been reviewed with her. She has been counseled regarding her diagnosis. She verbally conveys understanding and agreement of the signs, symptoms, diagnosis, treatment with Meclizine and prognosis and additionally agrees to follow up as recommended with Vestibular and [address] or Neurology PRN. Review of Prior Records and External Documents:   reviewed: YES - I have reviewed the patient's controlled substance prescription history thru the Prescription Monitoring Program so that the prescription(s) for a controlled substance can be given. Social Determinants of Health:  Patients evaluation and management were significantly impacted by social determinants of health. Social Determinants affecting Dx or Tx: None      Social History     Socioeconomic History    Marital status: LEGALLY    Tobacco Use    Smoking status: Former     Packs/day: 0.25     Types: Cigarettes     Quit date: 2017     Years since quittin.4    Smokeless tobacco: Never   Substance and Sexual Activity    Alcohol use: Not Currently     Comment: occassionally    Drug use: No    Sexual activity: Yes     Partners: Male     Birth control/protection: None     ED Course: Progress Notes, Reevaluation, and Consults:  Initial assessment performed. I discussed presenting problems and concerns, and my formulated plan for today's visit with the patient and any available family members. I have encouraged them to ask questions as they arise throughout the visit.      ED Physician Orders:   Orders Placed This Encounter    CT HEAD WO CONT    CBC WITH AUTOMATED DIFF    METABOLIC PANEL, COMPREHENSIVE    Hold Sample    TROPONIN-HIGH SENSITIVITY    EKG 12 LEAD INITIAL    butalbital-acetaminophen-caffeine (FIORICET, ESGIC) -40 mg per tablet 2 Tablet    prochlorperazine (COMPAZINE) injection 10 mg    diphenhydrAMINE (BENADRYL) injection 25 mg    hydrALAZINE (APRESOLINE) 20 mg/mL injection 20 mg    butalbital-acetaminophen-caff (Fioricet) -40 mg per capsule    amLODIPine (Norvasc) 10 mg tablet    sodium chloride 0.9 % bolus infusion 1,000 mL    LORazepam (ATIVAN) injection 1 mg     ED Medications Given:   Medications   butalbital-acetaminophen-caffeine (FIORICET, ESGIC) -40 mg per tablet 2 Tablet (2 Tablets Oral Given 2/9/23 1623)   prochlorperazine (COMPAZINE) injection 10 mg (10 mg IntraVENous Given 2/9/23 1627)   diphenhydrAMINE (BENADRYL) injection 25 mg (25 mg IntraVENous Given 2/9/23 1624)   hydrALAZINE (APRESOLINE) 20 mg/mL injection 20 mg (20 mg IntraVENous Given 2/9/23 1625)   sodium chloride 0.9 % bolus infusion 1,000 mL (0 mL IntraVENous IV Completed 2/9/23 1946)   LORazepam (ATIVAN) injection 1 mg (1 mg IntraVENous Given 2/9/23 1920)     ED Physician Interpretation of Test Results: All results were independently reviewed and interpreted by myself, notably showing:     RADIOLOGY:  Non-plain film images such as CT, ultrasound and MRI are read by the radiologist. Gabriella Common radiographic images are visualized and preliminarily interpreted by the ED Provider with the below findings:     Imaging interpreted by me:     Interpretation per the Radiologist below, if available at the time of this note:     CT HEAD WO CONT   Final Result   No acute intracranial hemorrhage, mass or infarct. CT Results  (Last 48 hours)                 02/09/23 1528  CT HEAD WO CONT Final result    Impression:  No acute intracranial hemorrhage, mass or infarct. Narrative:  INDICATION: acute headache/dizziness        Exam: Noncontrast CT of the brain is performed with 5 mm collimation. CT dose reduction was achieved with the use of the standardized protocol   tailored for this examination and automatic exposure control for dose   modulation. FINDINGS: There is no acute intracranial hemorrhage, mass, mass effect or   herniation. Ventricular system is normal. The gray-white matter differentiation   is well-preserved. The mastoid air cells are well pneumatized. The visualized   paranasal sinuses are normal.                 CXR Results  (Last 48 hours)      None          CT HEAD WO CONT   Final Result   No acute intracranial hemorrhage, mass or infarct. My interpretation of EKG: No STEMI. See my EKG interpretation in ED course above. My interpretation of laboratory results: CBC and CMP WNL, trop negative    Progress Note:  I have just re-evaluated the patient. Pt reports improvement of her symptoms. I have reviewed her vital signs and determined there is currently no worsening in their condition or physical exam. Results have been reviewed with them and their questions have been answered. I will continue to review further results as they come available. Amount and/or Complexity of Data Reviewed  Medical Complexity: HIGH  Presentation: ACUTE and SEVERE  Clinical lab tests: ordered as appropriate & reviewed  Tests in the radiology section of CPT®: ordered as appropriate & reviewed  Tests in the medicine section of CPT®: ordered as appropriate & reviewed  Obtain history from someone other than the patient: yes  Review and summarize past medical records: yes  Independent visualization of images, tracings, or specimens: yes    Risks  OTC drugs. Prescription drug management. Parenteral controlled substances. Progress Note:  BP on arrival 197/126, HR 89, plan for IV hydralazine for malignant HTN and will reassess    Progress Note:  20 mg IV hydralazine given, repeat /87, will continue to monitor.  After IV compazine, pt became tachycardic, lightheaded, will order additional fluids, IV ativan, suspect medication reaction    CRITICAL CARE NOTE :  IMPENDING DETERIORATION -Cardiovascular, CNS, Metabolic, and Renal  ASSOCIATED RISK FACTORS - Hypotension, Dysrhythmia, Metabolic changes, Dehydration, Vascular Compromise, and CNS Decompensation  MANAGEMENT- Bedside Assessment and Supervision of Care  INTERPRETATION -  Xrays, CT Scan, ECG, Blood Pressure, and Cardiac Output Measures   INTERVENTIONS - hemodynamic mngmt, Neurologic interventions , and Metabolic interventions  CASE REVIEW - Nursing and Family  TREATMENT RESPONSE -Improved  PERFORMED BY - Self    NOTES   :  I personally spent 35 minutes of critical care time with this patient. This is time spent at this critically ill patient's bedside actively involved in patient care as well as the coordination of care and discussions with the patient's family. This includes time involved in ordering and reviewing of laboratory studies, pulse oximetry, re-evaluation of patient's condition, examination of patient, evaluation of patient's response to treatment, ordering and performing treatments and interventions, review of old charts, consultations with specialist, discussions with family regarding pertinent collateral history and plan of care, bedside attention and documentation. During this entire length of time I was immediately available to the patient. This does not include time spent on separately reported billable procedures. Critical Care: The reason for providing this level of medical care for this critically-ill patient was due to a critical illness that impaired one or more vital organ systems, such that there was a high probability of imminent or life-threatening deterioration in the patient's condition. This care involved the highest level of preparedness to intervene urgently. This care involved high complexity decision making to assess, manipulate, and support vital system functions, to treat this degree of vital organ system failure, and to prevent further life threatening deterioration of the patients condition requiring frequent assessments and interventions.     Andres Vergara MD    DISCHARGE  Pt reassessed and symptoms noted to have improved significantly after ED treatment. Nash David labs and imaging have been reviewed with her and available family. She verbally conveys understanding and agreement of the signs, symptoms, diagnosis, treatment and prognosis and additionally agrees to follow up as recommended with Dr. Magdiel Chu MD and/or specialist as instructed. She agrees with the care plan we have created and conveys that all of her questions have been answered. Additionally, I have put together a packet of discharge instructions for her that include: 1) Educational information regarding their diagnosis, 2) How to care for their diagnosis at home, as well a 3) List of reasons why they would want to return to the ED prior to their follow-up appointment should their condition change or symptoms worsen. I have answered all questions to the patient's satisfaction. Strict return precautions given. She and/or family conveyed understanding and agreement with care plan. Vital signs stable for discharge. PLAN  1. Return precautions as discussed with patient and available family/caregiver. 2.   Discharge Medication List as of 2/9/2023  9:30 PM      No current facility-administered medications for this encounter. Current Outpatient Medications:     butalbital-acetaminophen-caff (Fioricet) -40 mg per capsule, Take 1 Capsule by mouth every four (4) hours as needed for Migraine or Headache. Indications: tension headache, Disp: 20 Capsule, Rfl: 0    amLODIPine (Norvasc) 10 mg tablet, Take 1 Tablet by mouth daily for 20 days. , Disp: 20 Tablet, Rfl: 0    acetaminophen (TYLENOL) 325 mg tablet, Take 3 Tablets by mouth every eight (8) hours as needed for Pain., Disp: 20 Tablet, Rfl: 0    calcium carbonate (TUMS) 200 mg calcium (500 mg) chew, Take 1 Tab by mouth three (3) times daily (with meals). Indications: heartburn, Disp: 60 Tab, Rfl: 0    gabapentin (NEURONTIN) 100 mg capsule, Take 1 Cap by mouth three (3) times daily.  Max Daily Amount: 300 mg., Disp: 90 Cap, Rfl: 0    nystatin (MYCOSTATIN) 100,000 unit/mL suspension, Take 5 mL by mouth four (4) times daily. swish and spit, Disp: 473 mL, Rfl: 0    diphenhydrAMINE (BenadryL) 25 mg capsule, Take 25 mg by mouth every six (6) hours as needed. , Disp: , Rfl:     ondansetron (Zofran ODT) 4 mg disintegrating tablet, Take 1 Tab by mouth every eight (8) hours as needed for Nausea., Disp: 10 Tab, Rfl: 0    promethazine (PHENERGAN) 25 mg suppository, Insert 25 mg into rectum every six (6) hours as needed for Nausea., Disp: , Rfl:     3. Follow-up Information       Follow up With Specialties Details Why Contact Info    [facility] EMERGENCY DEPT Emergency Medicine  As needed, If symptoms worsen [address] [place] [address]  [phone]    Em Torres., MD [facility] Medicine   [phone] [address]  [phone]            Instructed to return to ED if worse  FINAL DIAGNOSIS   Clinical Impression:  1. Acute non intractable tension-type headache    2. Accelerated hypertension    3. Postural dizziness    4. Numbness and tingling      Attestation:  I am the attending of record for this patient. I personally performed the services described in this documentation on this date, 2/9/2023 for patient, Ursula Gallo. I have reviewed the chart and verified that the record is accurate and complete.       Kira Ochoa MD (Electronic Signature)

## 2023-02-10 NOTE — ED NOTES
Bedside shift change report given to [facility] (oncoming nurse) by Bethanie Shannon (offgoing nurse). Report included the following information SBAR, Kardex, ED Summary, and MAR.

## 2023-02-12 LAB
ATRIAL RATE: 87 BPM
CALCULATED P AXIS, ECG09: 52 DEGREES
CALCULATED R AXIS, ECG10: 50 DEGREES
CALCULATED T AXIS, ECG11: 52 DEGREES
DIAGNOSIS, 93000: NORMAL
P-R INTERVAL, ECG05: 146 MS
Q-T INTERVAL, ECG07: 360 MS
QRS DURATION, ECG06: 92 MS
QTC CALCULATION (BEZET), ECG08: 433 MS
VENTRICULAR RATE, ECG03: 87 BPM

## 2023-06-09 RX ORDER — ZOLPIDEM TARTRATE 5 MG/1
5 TABLET ORAL NIGHTLY PRN
COMMUNITY

## 2023-06-16 ENCOUNTER — APPOINTMENT (OUTPATIENT)
Facility: HOSPITAL | Age: 38
End: 2023-06-16
Attending: SURGERY
Payer: COMMERCIAL

## 2023-06-16 ENCOUNTER — HOSPITAL ENCOUNTER (OUTPATIENT)
Facility: HOSPITAL | Age: 38
Setting detail: OUTPATIENT SURGERY
Discharge: HOME | End: 2023-06-16
Attending: SURGERY | Admitting: SURGERY
Payer: COMMERCIAL

## 2023-06-16 VITALS
HEART RATE: 83 BPM | BODY MASS INDEX: 30.91 KG/M2 | HEIGHT: 62 IN | RESPIRATION RATE: 13 BRPM | SYSTOLIC BLOOD PRESSURE: 131 MMHG | TEMPERATURE: 98 F | WEIGHT: 167.99 LBS | OXYGEN SATURATION: 96 % | DIASTOLIC BLOOD PRESSURE: 92 MMHG

## 2023-06-16 DIAGNOSIS — R52 PAIN: Primary | ICD-10-CM

## 2023-06-16 LAB
ALBUMIN SERPL-MCNC: 3.5 G/DL (ref 3.5–5)
ALBUMIN/GLOB SERPL: 1 (ref 1.1–2.2)
ALP SERPL-CCNC: 80 U/L (ref 45–117)
ALT SERPL-CCNC: 37 U/L (ref 12–78)
ANION GAP SERPL CALC-SCNC: 7 MMOL/L (ref 5–15)
AST SERPL-CCNC: 17 U/L (ref 15–37)
BILIRUB SERPL-MCNC: 0.4 MG/DL (ref 0.2–1)
BUN SERPL-MCNC: 13 MG/DL (ref 6–20)
BUN/CREAT SERPL: 16 (ref 12–20)
CALCIUM SERPL-MCNC: 8.7 MG/DL (ref 8.5–10.1)
CHLORIDE SERPL-SCNC: 109 MMOL/L (ref 97–108)
CO2 SERPL-SCNC: 23 MMOL/L (ref 21–32)
CREAT SERPL-MCNC: 0.83 MG/DL (ref 0.55–1.02)
ERYTHROCYTE [DISTWIDTH] IN BLOOD BY AUTOMATED COUNT: 13.5 % (ref 11.5–14.5)
GLOBULIN SER CALC-MCNC: 3.6 G/DL (ref 2–4)
GLUCOSE SERPL-MCNC: 88 MG/DL (ref 65–100)
HCT VFR BLD AUTO: 47.5 % (ref 35–47)
HGB BLD-MCNC: 15.3 G/DL (ref 11.5–16)
LIPASE SERPL-CCNC: 144 U/L (ref 73–393)
MCH RBC QN AUTO: 27.7 PG (ref 26–34)
MCHC RBC AUTO-ENTMCNC: 32.2 G/DL (ref 30–36.5)
MCV RBC AUTO: 85.9 FL (ref 80–99)
NRBC # BLD: 0 K/UL (ref 0–0.01)
NRBC BLD-RTO: 0 PER 100 WBC
PLATELET # BLD AUTO: 352 K/UL (ref 150–400)
PMV BLD AUTO: 9.9 FL (ref 8.9–12.9)
POTASSIUM SERPL-SCNC: 3.9 MMOL/L (ref 3.5–5.1)
PROT SERPL-MCNC: 7.1 G/DL (ref 6.4–8.2)
RBC # BLD AUTO: 5.53 M/UL (ref 3.8–5.2)
SODIUM SERPL-SCNC: 139 MMOL/L (ref 136–145)
WBC # BLD AUTO: 7.4 K/UL (ref 3.6–11)

## 2023-06-16 PROCEDURE — 2580000003 HC RX 258: Performed by: SURGERY

## 2023-06-16 PROCEDURE — 3700000000 HC ANESTHESIA ATTENDED CARE: Performed by: SURGERY

## 2023-06-16 PROCEDURE — 36415 COLL VENOUS BLD VENIPUNCTURE: CPT

## 2023-06-16 PROCEDURE — C1758 CATHETER, URETERAL: HCPCS | Performed by: SURGERY

## 2023-06-16 PROCEDURE — 2580000003 HC RX 258: Performed by: ANESTHESIOLOGY

## 2023-06-16 PROCEDURE — 3700000001 HC ADD 15 MINUTES (ANESTHESIA): Performed by: SURGERY

## 2023-06-16 PROCEDURE — 2709999900 HC NON-CHARGEABLE SUPPLY: Performed by: SURGERY

## 2023-06-16 PROCEDURE — 2720000010 HC SURG SUPPLY STERILE: Performed by: SURGERY

## 2023-06-16 PROCEDURE — 85027 COMPLETE CBC AUTOMATED: CPT

## 2023-06-16 PROCEDURE — 2500000003 HC RX 250 WO HCPCS: Performed by: SURGERY

## 2023-06-16 PROCEDURE — 3600000004 HC SURGERY LEVEL 4 BASE: Performed by: SURGERY

## 2023-06-16 PROCEDURE — 7100000000 HC PACU RECOVERY - FIRST 15 MIN: Performed by: SURGERY

## 2023-06-16 PROCEDURE — 7100000001 HC PACU RECOVERY - ADDTL 15 MIN: Performed by: SURGERY

## 2023-06-16 PROCEDURE — 6360000002 HC RX W HCPCS: Performed by: ANESTHESIOLOGY

## 2023-06-16 PROCEDURE — 83690 ASSAY OF LIPASE: CPT

## 2023-06-16 PROCEDURE — 6360000004 HC RX CONTRAST MEDICATION: Performed by: SURGERY

## 2023-06-16 PROCEDURE — 47563 LAPARO CHOLECYSTECTOMY/GRAPH: CPT | Performed by: SURGERY

## 2023-06-16 PROCEDURE — 88304 TISSUE EXAM BY PATHOLOGIST: CPT

## 2023-06-16 PROCEDURE — 74300 X-RAY BILE DUCTS/PANCREAS: CPT | Performed by: SURGERY

## 2023-06-16 PROCEDURE — 80053 COMPREHEN METABOLIC PANEL: CPT

## 2023-06-16 PROCEDURE — 3600000014 HC SURGERY LEVEL 4 ADDTL 15MIN: Performed by: SURGERY

## 2023-06-16 RX ORDER — HYDROMORPHONE HYDROCHLORIDE 1 MG/ML
0.25 INJECTION, SOLUTION INTRAMUSCULAR; INTRAVENOUS; SUBCUTANEOUS EVERY 5 MIN PRN
Status: DISCONTINUED | OUTPATIENT
Start: 2023-06-16 | End: 2023-06-16 | Stop reason: HOSPADM

## 2023-06-16 RX ORDER — SODIUM CHLORIDE 0.9 % (FLUSH) 0.9 %
5-40 SYRINGE (ML) INJECTION PRN
Status: DISCONTINUED | OUTPATIENT
Start: 2023-06-16 | End: 2023-06-16 | Stop reason: HOSPADM

## 2023-06-16 RX ORDER — CALCIUM CARBONATE 500 MG/1
1 TABLET, CHEWABLE ORAL DAILY
COMMUNITY

## 2023-06-16 RX ORDER — ACETAMINOPHEN 325 MG/1
650 TABLET ORAL 4 TIMES DAILY PRN
COMMUNITY
Start: 2023-06-16

## 2023-06-16 RX ORDER — BUPIVACAINE HYDROCHLORIDE AND EPINEPHRINE 5; 5 MG/ML; UG/ML
INJECTION, SOLUTION PERINEURAL PRN
Status: DISCONTINUED | OUTPATIENT
Start: 2023-06-16 | End: 2023-06-16 | Stop reason: ALTCHOICE

## 2023-06-16 RX ORDER — 0.9 % SODIUM CHLORIDE 0.9 %
INTRAVENOUS SOLUTION INTRAVENOUS CONTINUOUS PRN
Status: COMPLETED | OUTPATIENT
Start: 2023-06-16 | End: 2023-06-16

## 2023-06-16 RX ORDER — OXYCODONE HYDROCHLORIDE 5 MG/1
5 TABLET ORAL EVERY 6 HOURS PRN
Qty: 20 TABLET | Refills: 0 | Status: SHIPPED | OUTPATIENT
Start: 2023-06-16 | End: 2023-06-20 | Stop reason: SDUPTHER

## 2023-06-16 RX ORDER — SODIUM CHLORIDE, SODIUM LACTATE, POTASSIUM CHLORIDE, CALCIUM CHLORIDE 600; 310; 30; 20 MG/100ML; MG/100ML; MG/100ML; MG/100ML
INJECTION, SOLUTION INTRAVENOUS CONTINUOUS
Status: DISCONTINUED | OUTPATIENT
Start: 2023-06-16 | End: 2023-06-16 | Stop reason: HOSPADM

## 2023-06-16 RX ORDER — SODIUM CHLORIDE 0.9 % (FLUSH) 0.9 %
5-40 SYRINGE (ML) INJECTION EVERY 12 HOURS SCHEDULED
Status: DISCONTINUED | OUTPATIENT
Start: 2023-06-16 | End: 2023-06-16 | Stop reason: HOSPADM

## 2023-06-16 RX ORDER — SODIUM CHLORIDE 9 MG/ML
INJECTION, SOLUTION INTRAVENOUS PRN
Status: DISCONTINUED | OUTPATIENT
Start: 2023-06-16 | End: 2023-06-16 | Stop reason: HOSPADM

## 2023-06-16 RX ORDER — FENTANYL CITRATE 50 UG/ML
50 INJECTION, SOLUTION INTRAMUSCULAR; INTRAVENOUS EVERY 5 MIN PRN
Status: COMPLETED | OUTPATIENT
Start: 2023-06-16 | End: 2023-06-16

## 2023-06-16 RX ORDER — POLYETHYLENE GLYCOL 3350 17 G/17G
17 POWDER, FOR SOLUTION ORAL DAILY
Qty: 507 G | Refills: 0 | COMMUNITY
Start: 2023-06-16 | End: 2023-06-21

## 2023-06-16 RX ORDER — ONDANSETRON 4 MG/1
4 TABLET, FILM COATED ORAL EVERY 8 HOURS PRN
Qty: 8 TABLET | Refills: 1 | Status: SHIPPED | OUTPATIENT
Start: 2023-06-16

## 2023-06-16 RX ADMIN — SODIUM CHLORIDE, POTASSIUM CHLORIDE, SODIUM LACTATE AND CALCIUM CHLORIDE: 600; 310; 30; 20 INJECTION, SOLUTION INTRAVENOUS at 07:09

## 2023-06-16 RX ADMIN — FENTANYL CITRATE 50 MCG: 50 INJECTION, SOLUTION INTRAMUSCULAR; INTRAVENOUS at 10:18

## 2023-06-16 RX ADMIN — FENTANYL CITRATE 50 MCG: 50 INJECTION, SOLUTION INTRAMUSCULAR; INTRAVENOUS at 11:20

## 2023-06-16 ASSESSMENT — PAIN DESCRIPTION - ORIENTATION
ORIENTATION: MID
ORIENTATION: MID

## 2023-06-16 ASSESSMENT — PAIN SCALES - GENERAL
PAINLEVEL_OUTOF10: 6
PAINLEVEL_OUTOF10: 5

## 2023-06-16 ASSESSMENT — PAIN DESCRIPTION - DESCRIPTORS
DESCRIPTORS: DISCOMFORT
DESCRIPTORS: SORE

## 2023-06-16 ASSESSMENT — PAIN DESCRIPTION - LOCATION
LOCATION: ABDOMEN
LOCATION: ABDOMEN

## 2023-06-16 NOTE — FLOWSHEET NOTE
06/16/23 0845   Family Communication    Relationship to Patient Spouse    Phone Number [facility], [phone]   Family/Significant Other Update Other (comment)  (Family notification system)   Delivery Origin Nurse  Minh Rawls RN)   Family Communication   Family Update Message Surgeon working;Patient stable

## 2023-06-16 NOTE — OP NOTE
[facility] OPERATIVE REPORT    6/16/2023        Pre-operative Diagnosis: Gallstones [K80.20]    Post-operative Diagnosis: same    OPERATIVE PROCEDURE:  1. Laparoscopic cholecystectomy. 2.  Intraoperative cholangiogram with intraoperative interpritation. Surgeon: Wilberto Elizondo MD    Assistant: None    Anesthesia: General plus Local    Estimated Blood Loss: Minimal    FINDINGS:   The patient was noted to have a gallbladder with thin wall, stones, bile, and a few surounding adhesions . Intraoperative cholangiogram was obtained. Radiologist was not present during the case. Intraoperative interpretation initially revealed limited filling of the proximal ducts, but after repositioning the cholangiogram clamp, it revealed filling of a normal length cystic duct, a common bile duct, and all proximal and distal structures with free flow of contrast into the duodenum without any filling defects noted. The liver appeared normal. The ileocolonic anastomosis was identified in the right upper quadrant. There were a few surrounding adhesions that were taken down. She was also noted to have some light adhesions in the right lower quadrant lower midline were sharply divided as well.  4 mL of Vistaseal was used over the area of adhesiolysis in the right upper quadrant. So I will have to  SPECIMEN:    ID Type Source Tests Collected by Time Destination   1 : GALLBLADDER Tissue Gallbladder SURGICAL PATHOLOGY Severa Linker, MD 6/16/2023 0908         DRAINS:  None. COMPLICATIONS:  None. DESCRIPTION OF PROCEDURE:  After satisfactory induction of general  anesthesia, the patient was kept in the supine position. The patient's  abdomen was prepped and draped sterilely. After infusion of the skin with local anesthetic, a small incision was made  and a supraumbilical 5-mm trocar was placed intra-abdominally under  visualization. Pneumoperitoneum was achieved. The abdomen was explored  with findings noted above.   A 12-mm port was

## 2023-06-16 NOTE — PERIOP NOTE
Fibrin Sealant (Human) VISTASEAL 4mL  Ref No. VST04  Lot No. [id number]  Expiration Date: 10/23/2024    Used PRN.

## 2023-06-16 NOTE — DISCHARGE INSTRUCTIONS
doctor tells you it is okay. You may drive when you are no longer taking pain medicine and can quickly move your foot from the gas pedal to the brake. You must also be able to sit comfortably for a long period of time, even if you do not plan to go far. For a laparoscopic surgery, most people can go back to work or their normal routine in 1 to 2 weeks, but it may take longer. For an open surgery, it will probably take 4 to 6 weeks before you get back to your normal routine. Diet  Eat smaller meals more often instead of fewer larger meals. You can eat a normal diet, but avoid eating fatty foods for about 1 month. Fatty foods include hamburger, whole milk, cheese, and many snack foods. If your stomach is upset, try bland, low-fat foods like plain rice, broiled chicken, toast, and yogurt. Drink plenty of fluids (unless your doctor tells you not to). If you have diarrhea, try avoiding spicy foods, dairy products, fatty foods, and alcohol. You can also watch to see if specific foods cause it, and stop eating them. If the diarrhea continues for more than 2 weeks, talk to your doctor. You may notice that your bowel movements are not regular right after your surgery. This is common. Try to avoid constipation and straining with bowel movements. You may want to take a fiber supplement every day. If you have not had a bowel movement after a couple of days, ask your doctor about taking a mild laxative. Medicines  Take pain medicines exactly as directed. If the doctor gave you a prescription medicine for pain, take it as prescribed. If you are not taking a prescription pain medicine, take an over-the-counter medicine such as acetaminophen (Tylenol), ibuprofen (Advil, Motrin), or naproxen (Aleve). Read and follow all instructions on the label. Do not take two or more pain medicines at the same time unless the doctor told you to. Many pain medicines contain acetaminophen, which is Tylenol.  Too much Tylenol can be

## 2023-06-16 NOTE — H&P
Medication Sig Start Date End Date Taking? Authorizing Provider   calcium carbonate (TUMS) 500 MG chewable tablet Take 1 tablet by mouth daily   Yes Historical Provider, MD   zolpidem (AMBIEN) 5 MG tablet Take 1 tablet by mouth nightly as needed for Sleep. Yes Historical Provider, MD   acetaminophen (TYLENOL) 325 MG tablet Take by mouth every 8 hours as needed 1/22/23   Ar Automatic Reconciliation   butalbital-APAP-caffeine -40 MG CAPS per capsule Take 1 capsule by mouth every 4 hours as needed 2/9/23   Ar Automatic Reconciliation   ondansetron (ZOFRAN-ODT) 4 MG disintegrating tablet Take by mouth every 8 hours as needed 4/10/20   Ar Automatic Reconciliation   promethazine (PHENERGAN) 25 MG suppository Place rectally every 6 hours as needed    Ar Automatic Reconciliation     Allergies   Allergen Reactions    Moxifloxacin Swelling     Other reaction(s): Unknown (comments)  But pt tolerated cipro    Nsaids Other (See Comments)     D/t esophageal erosion and GERD    Macrolides And Ketolides Hives, Rash and Swelling     Per patient reported as a previous reaction to a \"mycin\" drug. Did not know which drug. Prochlorperazine Palpitations        Review of Systems:  A comprehensive review of systems was negative except for that written in the History of Present Illness. Objective: Intake and Output:    No intake/output data recorded. No intake/output data recorded. Physical Exam:   Lungs: clear to auscultation bilaterally  Heart: regular rate and rhythm  Abdomen: soft, non-tender.  No masses,  no organomegaly        Data Review:   Recent Results (from the past 24 hour(s))   CBC    Collection Time: 06/16/23  6:39 AM   Result Value Ref Range    WBC 7.4 3.6 - 11.0 K/uL    RBC 5.53 (H) 3.80 - 5.20 M/uL    Hemoglobin 15.3 11.5 - 16.0 g/dL    Hematocrit 47.5 (H) 35.0 - 47.0 %    MCV 85.9 80.0 - 99.0 FL    MCH 27.7 26.0 - 34.0 PG    MCHC 32.2 30.0 - 36.5 g/dL    RDW 13.5 11.5 - 14.5 %    Platelets [phone]

## 2023-06-20 ENCOUNTER — TELEPHONE (OUTPATIENT)
Age: 38
End: 2023-06-20

## 2023-06-20 DIAGNOSIS — R52 PAIN: ICD-10-CM

## 2023-06-20 RX ORDER — OXYCODONE HYDROCHLORIDE 5 MG/1
5 TABLET ORAL EVERY 6 HOURS PRN
Qty: 15 TABLET | Refills: 0 | Status: SHIPPED | OUTPATIENT
Start: 2023-06-20 | End: 2023-06-20 | Stop reason: SDUPTHER

## 2023-06-20 RX ORDER — OXYCODONE HYDROCHLORIDE 5 MG/1
5 TABLET ORAL EVERY 6 HOURS PRN
Qty: 15 TABLET | Refills: 0 | Status: SHIPPED | OUTPATIENT
Start: 2023-06-20 | End: 2023-06-27

## 2023-06-20 NOTE — TELEPHONE ENCOUNTER
Patient mother is requesting refills on Concerta 36mg and 18mg. Patient will be out before next appt    Spoke with patient, supplied her with  # [phone] to obtain date and time for US, also informed her she can go [facility] for labs the same day. Express understanding.

## 2023-06-20 NOTE — TELEPHONE ENCOUNTER
Patient called saying they [facility], but the [facility] called to say [facility]'s on [facility]. The patientwould like it sent over instead to the [facility] at  [address] [place] [address] Rd, [place], [address] [facility]  They do have the medication available. Please advise.

## 2023-06-20 NOTE — TELEPHONE ENCOUNTER
Patient called in regards to puss bump on my on incision that's starting to ooze through the glue and its white please advise    [phone]

## 2023-06-20 NOTE — TELEPHONE ENCOUNTER
Called Denisha Cruz    I told her that if she is still in enough pain that she needs more narcotics, we need to check an ultrasound and labs to rule out surgical complications. She is agreeable. Jeannettemo Sifuentes, please ensure she gets this done. Message states to call the prescription into [facility]. [facility] was not entered into [facility]. I entered it and rewrote the prescription.

## 2023-06-20 NOTE — TELEPHONE ENCOUNTER
Had surgery on Friday will be out of pain medication by this evening requesting to have a few more please advise    [phone]

## 2023-06-20 NOTE — TELEPHONE ENCOUNTER
Still in pain. Will check US and labs. Rx refilled. Orders Placed This Encounter    US ABDOMEN LIMITED           Standing Status:   Future     Standing Expiration Date:   6/20/2024     Order Specific Question:   Reason for exam:     Answer:   ruq pain s/p cholecystectomy    CBC     Standing Status:   Future     Standing Expiration Date:   6/20/2024    Comprehensive Metabolic Panel     Standing Status:   Future     Standing Expiration Date:   6/20/2024    Lipase     Standing Status:   Future     Standing Expiration Date:   6/20/2024    oxyCODONE (ROXICODONE) 5 MG immediate release tablet     Sig: Take 1 tablet by mouth every 6 hours as needed for Pain for up to 7 days. Intended supply: 5 days.  Take lowest dose possible to manage pain Max Daily Amount: 20 mg     Dispense:  15 tablet     Refill:  0     Reduce doses taken as pain becomes manageable Reduce doses taken as pain becomes manageable

## 2023-07-03 ENCOUNTER — OFFICE VISIT (OUTPATIENT)
Age: 38
End: 2023-07-03

## 2023-07-03 VITALS
BODY MASS INDEX: 30.84 KG/M2 | OXYGEN SATURATION: 98 % | RESPIRATION RATE: 18 BRPM | TEMPERATURE: 98 F | DIASTOLIC BLOOD PRESSURE: 87 MMHG | HEIGHT: 62 IN | WEIGHT: 167.6 LBS | SYSTOLIC BLOOD PRESSURE: 132 MMHG | HEART RATE: 72 BPM

## 2023-07-03 DIAGNOSIS — Z09 POSTOPERATIVE EXAMINATION: Primary | ICD-10-CM

## 2023-07-03 PROCEDURE — 99024 POSTOP FOLLOW-UP VISIT: CPT | Performed by: SURGERY

## 2023-07-03 NOTE — PROGRESS NOTES
Chief Complaint   Patient presents with    Post-Op Check     S/P Laparoscopic cholecystectomy, Intraoperative cholangiogram with intraoperative interpritation on 6/16     Reviewed pathology and provided a copy: Mild chronic cholecystitis    Called complaining of pain after surgery. We sent her to check an ultrasound and labs. Also refilled her narcotic prescription. She refill the prescription but had trouble getting the ultrasound scheduled. Tolerating PO  Eating small meals  Decreased appetite    BMs: runny and pale    No longer on pain meds    Pain currently controlled without pain meds    Physical Exam:   Abdominal exam: Soft, non-distended, appropriatly tender. Wound: clean, dry, no drainage      We will call and help her schedule the ultrasound. She does not need an appointment for the labs. I have asked her to go get the labs drawn now. If ultrasound labs look okay, I suspect some of her remaining symptoms may be her Crohn's. She has follow-up scheduled with Dr. Fortunato Barrera. Continue unrestricted activity.   Follow-up: presley Mcfadden MD FACS

## 2024-04-18 NOTE — ED NOTES
IV fluids infusing.
Pt vomiting. Requests nausea & pain med. MD notified. 0230 - Pt has not vomited since medication administered. 7003 - Pt c/o abd pain 7/10. Given IV Fentanyl. IV infiltrated during the 10mL saline flush. IV removed. Ultrasound guided IV to be placed. Pt states that her veins blow easily and needs 22 or 24 gauge. Eyal, tech informed and states he will place IV.
Pt was in phlebotomy getting blood work done, Pt became very pale and listless, pt remained responsive brought to room 8, MD yusuf notified. Pt IV established and pt connected to monitoring. Pt remains alert and oriented. Rating pain 10/10 \" it is way over a 10/10. \". Pt states \"this pill thing has to come out now! \".
Report given to Jamel Arana RN. They were informed of patient chief complaint, current status, orders completed (to include IV access/medications/radiology testing), outstanding orders that still need to be completed, and the treatment plan. Ensured no questions or concerns regarding the patient prior to departure.
[Follow-Up: _____] : a [unfilled] follow-up visit

## 2024-11-08 ENCOUNTER — APPOINTMENT (OUTPATIENT)
Facility: HOSPITAL | Age: 39
End: 2024-11-08
Payer: COMMERCIAL

## 2024-11-08 ENCOUNTER — HOSPITAL ENCOUNTER (EMERGENCY)
Facility: HOSPITAL | Age: 39
Discharge: HOME | End: 2024-11-08
Payer: COMMERCIAL

## 2024-11-08 VITALS
RESPIRATION RATE: 18 BRPM | DIASTOLIC BLOOD PRESSURE: 98 MMHG | HEART RATE: 88 BPM | BODY MASS INDEX: 27.59 KG/M2 | TEMPERATURE: 98.5 F | HEIGHT: 62 IN | OXYGEN SATURATION: 98 % | WEIGHT: 149.91 LBS | SYSTOLIC BLOOD PRESSURE: 133 MMHG

## 2024-11-08 DIAGNOSIS — R10.30 LOWER ABDOMINAL PAIN: Primary | ICD-10-CM

## 2024-11-08 DIAGNOSIS — K50.919 EXACERBATION OF CROHN'S DISEASE WITH COMPLICATION (HCC): ICD-10-CM

## 2024-11-08 LAB
ALBUMIN SERPL-MCNC: 3.6 G/DL (ref 3.5–5)
ALBUMIN/GLOB SERPL: 1.1 (ref 1.1–2.2)
ALP SERPL-CCNC: 63 U/L (ref 45–117)
ALT SERPL-CCNC: 24 U/L (ref 12–78)
ANION GAP SERPL CALC-SCNC: 6 MMOL/L (ref 2–12)
APPEARANCE UR: CLEAR
AST SERPL-CCNC: 15 U/L (ref 15–37)
BACTERIA URNS QL MICRO: NEGATIVE /HPF
BASOPHILS # BLD: 0.1 K/UL (ref 0–0.1)
BASOPHILS NFR BLD: 1 % (ref 0–1)
BILIRUB SERPL-MCNC: 0.9 MG/DL (ref 0.2–1)
BILIRUB UR QL: NEGATIVE
BUN SERPL-MCNC: 14 MG/DL (ref 6–20)
BUN/CREAT SERPL: 18 (ref 12–20)
CALCIUM SERPL-MCNC: 9.3 MG/DL (ref 8.5–10.1)
CHLORIDE SERPL-SCNC: 111 MMOL/L (ref 97–108)
CO2 SERPL-SCNC: 21 MMOL/L (ref 21–32)
COLOR UR: NORMAL
COMMENT:: NORMAL
CREAT SERPL-MCNC: 0.78 MG/DL (ref 0.55–1.02)
DIFFERENTIAL METHOD BLD: ABNORMAL
EOSINOPHIL # BLD: 0.1 K/UL (ref 0–0.4)
EOSINOPHIL NFR BLD: 1 % (ref 0–7)
EPITH CASTS URNS QL MICRO: NORMAL /LPF
ERYTHROCYTE [DISTWIDTH] IN BLOOD BY AUTOMATED COUNT: 13.2 % (ref 11.5–14.5)
GLOBULIN SER CALC-MCNC: 3.4 G/DL (ref 2–4)
GLUCOSE SERPL-MCNC: 88 MG/DL (ref 65–100)
GLUCOSE UR STRIP.AUTO-MCNC: NEGATIVE MG/DL
HCT VFR BLD AUTO: 46.4 % (ref 35–47)
HGB BLD-MCNC: 15.2 G/DL (ref 11.5–16)
HGB UR QL STRIP: NEGATIVE
HYALINE CASTS URNS QL MICRO: NORMAL /LPF (ref 0–2)
IMM GRANULOCYTES # BLD AUTO: 0 K/UL (ref 0–0.04)
IMM GRANULOCYTES NFR BLD AUTO: 0 % (ref 0–0.5)
KETONES UR QL STRIP.AUTO: NEGATIVE MG/DL
LEUKOCYTE ESTERASE UR QL STRIP.AUTO: NEGATIVE
LYMPHOCYTES # BLD: 2.7 K/UL (ref 0.8–3.5)
LYMPHOCYTES NFR BLD: 33 % (ref 12–49)
MCH RBC QN AUTO: 28.8 PG (ref 26–34)
MCHC RBC AUTO-ENTMCNC: 32.8 G/DL (ref 30–36.5)
MCV RBC AUTO: 87.9 FL (ref 80–99)
MONOCYTES # BLD: 0.5 K/UL (ref 0–1)
MONOCYTES NFR BLD: 6 % (ref 5–13)
NEUTS SEG # BLD: 4.9 K/UL (ref 1.8–8)
NEUTS SEG NFR BLD: 59 % (ref 32–75)
NITRITE UR QL STRIP.AUTO: NEGATIVE
NRBC # BLD: 0 K/UL (ref 0–0.01)
NRBC BLD-RTO: 0 PER 100 WBC
PH UR STRIP: 5.5 (ref 5–8)
PLATELET # BLD AUTO: 346 K/UL (ref 150–400)
PMV BLD AUTO: 10.2 FL (ref 8.9–12.9)
POTASSIUM SERPL-SCNC: 4 MMOL/L (ref 3.5–5.1)
PROT SERPL-MCNC: 7 G/DL (ref 6.4–8.2)
PROT UR STRIP-MCNC: NEGATIVE MG/DL
RBC # BLD AUTO: 5.28 M/UL (ref 3.8–5.2)
RBC #/AREA URNS HPF: NORMAL /HPF (ref 0–5)
SODIUM SERPL-SCNC: 138 MMOL/L (ref 136–145)
SP GR UR REFRACTOMETRY: 1
SPECIMEN HOLD: NORMAL
URINE CULTURE IF INDICATED: NORMAL
UROBILINOGEN UR QL STRIP.AUTO: 0.2 EU/DL (ref 0.2–1)
WBC # BLD AUTO: 8.4 K/UL (ref 3.6–11)
WBC URNS QL MICRO: NORMAL /HPF (ref 0–4)

## 2024-11-08 PROCEDURE — 6360000004 HC RX CONTRAST MEDICATION

## 2024-11-08 PROCEDURE — 80053 COMPREHEN METABOLIC PANEL: CPT

## 2024-11-08 PROCEDURE — 85025 COMPLETE CBC W/AUTO DIFF WBC: CPT

## 2024-11-08 PROCEDURE — 96374 THER/PROPH/DIAG INJ IV PUSH: CPT

## 2024-11-08 PROCEDURE — 36415 COLL VENOUS BLD VENIPUNCTURE: CPT

## 2024-11-08 PROCEDURE — 2580000003 HC RX 258

## 2024-11-08 PROCEDURE — 6370000000 HC RX 637 (ALT 250 FOR IP)

## 2024-11-08 PROCEDURE — 96375 TX/PRO/DX INJ NEW DRUG ADDON: CPT

## 2024-11-08 PROCEDURE — 74177 CT ABD & PELVIS W/CONTRAST: CPT

## 2024-11-08 PROCEDURE — 99285 EMERGENCY DEPT VISIT HI MDM: CPT

## 2024-11-08 PROCEDURE — 6360000002 HC RX W HCPCS

## 2024-11-08 PROCEDURE — 81001 URINALYSIS AUTO W/SCOPE: CPT

## 2024-11-08 RX ORDER — ONDANSETRON 2 MG/ML
4 INJECTION INTRAMUSCULAR; INTRAVENOUS
Status: COMPLETED | OUTPATIENT
Start: 2024-11-08 | End: 2024-11-08

## 2024-11-08 RX ORDER — HYDROMORPHONE HYDROCHLORIDE 1 MG/ML
1 INJECTION, SOLUTION INTRAMUSCULAR; INTRAVENOUS; SUBCUTANEOUS
Status: COMPLETED | OUTPATIENT
Start: 2024-11-08 | End: 2024-11-08

## 2024-11-08 RX ORDER — IOPAMIDOL 755 MG/ML
100 INJECTION, SOLUTION INTRAVASCULAR
Status: COMPLETED | OUTPATIENT
Start: 2024-11-08 | End: 2024-11-08

## 2024-11-08 RX ORDER — PREDNISONE 10 MG/1
10 TABLET ORAL DAILY
Qty: 147 TABLET | Refills: 0 | Status: SHIPPED | OUTPATIENT
Start: 2024-11-08 | End: 2024-12-20

## 2024-11-08 RX ORDER — OXYCODONE HYDROCHLORIDE 5 MG/1
5 TABLET ORAL
Status: COMPLETED | OUTPATIENT
Start: 2024-11-08 | End: 2024-11-08

## 2024-11-08 RX ORDER — 0.9 % SODIUM CHLORIDE 0.9 %
500 INTRAVENOUS SOLUTION INTRAVENOUS
Status: COMPLETED | OUTPATIENT
Start: 2024-11-08 | End: 2024-11-08

## 2024-11-08 RX ORDER — OXYCODONE HYDROCHLORIDE 5 MG/1
5 TABLET ORAL EVERY 4 HOURS PRN
Qty: 18 TABLET | Refills: 0 | Status: SHIPPED | OUTPATIENT
Start: 2024-11-08 | End: 2024-11-11

## 2024-11-08 RX ORDER — MORPHINE SULFATE 4 MG/ML
4 INJECTION, SOLUTION INTRAMUSCULAR; INTRAVENOUS
Status: COMPLETED | OUTPATIENT
Start: 2024-11-08 | End: 2024-11-08

## 2024-11-08 RX ADMIN — MORPHINE SULFATE 4 MG: 4 INJECTION, SOLUTION INTRAMUSCULAR; INTRAVENOUS at 08:55

## 2024-11-08 RX ADMIN — OXYCODONE 5 MG: 5 TABLET ORAL at 13:29

## 2024-11-08 RX ADMIN — ONDANSETRON 4 MG: 2 INJECTION, SOLUTION INTRAMUSCULAR; INTRAVENOUS at 08:55

## 2024-11-08 RX ADMIN — IOHEXOL 50 ML: 240 INJECTION, SOLUTION INTRATHECAL; INTRAVASCULAR; INTRAVENOUS; ORAL at 09:22

## 2024-11-08 RX ADMIN — IOPAMIDOL 100 ML: 755 INJECTION, SOLUTION INTRAVENOUS at 11:31

## 2024-11-08 RX ADMIN — HYDROMORPHONE HYDROCHLORIDE 1 MG: 1 INJECTION, SOLUTION INTRAMUSCULAR; INTRAVENOUS; SUBCUTANEOUS at 11:51

## 2024-11-08 RX ADMIN — SODIUM CHLORIDE 500 ML: 9 INJECTION, SOLUTION INTRAVENOUS at 08:55

## 2024-11-08 ASSESSMENT — PAIN DESCRIPTION - DESCRIPTORS: DESCRIPTORS: CRAMPING

## 2024-11-08 ASSESSMENT — PAIN - FUNCTIONAL ASSESSMENT: PAIN_FUNCTIONAL_ASSESSMENT: 0-10

## 2024-11-08 ASSESSMENT — PAIN SCALES - GENERAL: PAINLEVEL_OUTOF10: 6

## 2024-11-08 ASSESSMENT — PAIN DESCRIPTION - ORIENTATION: ORIENTATION: RIGHT;LOWER

## 2024-11-08 ASSESSMENT — LIFESTYLE VARIABLES
HOW MANY STANDARD DRINKS CONTAINING ALCOHOL DO YOU HAVE ON A TYPICAL DAY: PATIENT DOES NOT DRINK
HOW OFTEN DO YOU HAVE A DRINK CONTAINING ALCOHOL: NEVER

## 2024-11-08 ASSESSMENT — PAIN DESCRIPTION - LOCATION: LOCATION: ABDOMEN

## 2024-11-08 NOTE — ED PROVIDER NOTES
predniSONE (DELTASONE) 10 MG tablet Take 1 tablet by mouth daily 6 tablets for 7 days, 5 tablets for 7 days, 4 tablets for 7 days, 3 tablets for 7 days, 2 tablets for 7 days, 1 tablet for 7 days 147 tablet 0    oxyCODONE (ROXICODONE) 5 MG immediate release tablet Take 1 tablet by mouth every 4 hours as needed for Pain for up to 3 days. Intended supply: 3 days. Take lowest dose possible to manage pain Max Daily Amount: 30 mg 18 tablet 0    calcium carbonate (TUMS) 500 MG chewable tablet Take 1 tablet by mouth daily      acetaminophen (TYLENOL) 325 MG tablet Take 2 tablets by mouth 4 times daily as needed for Pain      ondansetron (ZOFRAN) 4 MG tablet Take 1 tablet by mouth every 8 hours as needed for Nausea or Vomiting 8 tablet 1    zolpidem (AMBIEN) 5 MG tablet Take 1 tablet by mouth nightly as needed for Sleep.      butalbital-APAP-caffeine -40 MG CAPS per capsule Take 1 capsule by mouth every 4 hours as needed      promethazine (PHENERGAN) 25 MG suppository Place rectally every 6 hours as needed         Social Determinants of Health:   Social Determinants of Health     Tobacco Use: Medium Risk (11/8/2024)    Patient History     Smoking Tobacco Use: Former     Smokeless Tobacco Use: Never     Passive Exposure: Not on file   Alcohol Use: Not At Risk (11/8/2024)    AUDIT-C     Frequency of Alcohol Consumption: Never     Average Number of Drinks: Patient does not drink     Frequency of Binge Drinking: Never   Financial Resource Strain: Not on file   Food Insecurity: Not on file   Transportation Needs: Not on file   Physical Activity: Not on file   Stress: Not on file   Social Connections: Not on file   Intimate Partner Violence: Not on file   Depression: Not at risk (4/13/2023)    PHQ-2     PHQ-2 Score: 0   Housing Stability: Not on file   Interpersonal Safety: Not At Risk (11/8/2024)    Interpersonal Safety Domain Source: IP Abuse Screening     Physical abuse: Denies     Verbal abuse: Denies     Emotional  worsening symptoms        DISCHARGE MEDICATIONS:     Medication List        START taking these medications      oxyCODONE 5 MG immediate release tablet  Commonly known as: Roxicodone  Take 1 tablet by mouth every 4 hours as needed for Pain for up to 3 days. Intended supply: 3 days. Take lowest dose possible to manage pain Max Daily Amount: 30 mg     predniSONE 10 MG tablet  Commonly known as: DELTASONE  Take 1 tablet by mouth daily 6 tablets for 7 days, 5 tablets for 7 days, 4 tablets for 7 days, 3 tablets for 7 days, 2 tablets for 7 days, 1 tablet for 7 days            ASK your doctor about these medications      acetaminophen 325 MG tablet  Commonly known as: TYLENOL  Take 2 tablets by mouth 4 times daily as needed for Pain     butalbital-APAP-caffeine -40 MG Caps per capsule     calcium carbonate 500 MG chewable tablet  Commonly known as: TUMS     ondansetron 4 MG tablet  Commonly known as: ZOFRAN  Take 1 tablet by mouth every 8 hours as needed for Nausea or Vomiting     promethazine 25 MG suppository  Commonly known as: PHENERGAN     zolpidem 5 MG tablet  Commonly known as: AMBIEN               Where to Get Your Medications        These medications were sent to [facility]/pharmacy #[id number] - [place], [place] - [address] [facility] - P [phone] - F [phone]  [address] [facility] [address]      Hours: 24-hours Phone: [phone]   oxyCODONE 5 MG immediate release tablet  predniSONE 10 MG tablet           DISCONTINUED MEDICATIONS:  Discharge Medication List as of 11/8/2024  1:25 PM        I have seen and evaluated the patient autonomously. My supervision physician was on site and available for consultation if needed.     I am the Primary Clinician of Record: HAI Perez NP (electronically signed)    (Please note that parts of this dictation were completed with voice recognition software. Quite often unanticipated grammatical, syntax, homophones, and other interpretive errors

## 2024-11-08 NOTE — ED TRIAGE NOTES
TRIAGE: Patient arrives ambulatory from home with low right sided abdominal pain. She is a long and complicated history with Crohn's. Multiple surgeries. She is supposed to be starting a new medication and is going through pre testing for this. She is nauseous. She has had 5 - 6 bowel movements this morning. She just started seeing Dr. Mireles for GI.

## 2024-11-08 NOTE — DISCHARGE INSTRUCTIONS
Thank you for choosing our Emergency Department for your care.  It is our privilege to care for you in your time of need.  In the next several days, you may receive a survey via email or mailed to your home about your experience with our team.  We would greatly appreciate you taking a few minutes to complete the survey, as we use this information to learn what we have done well and what we could be doing better. Thank you for trusting us with your care!    Below you will find a list of your tests from today's visit.   Labs  Recent Results (from the past 12 hour(s))   Extra Tubes Hold    Collection Time: 11/08/24  8:40 AM   Result Value Ref Range    Specimen HOld pst,sst, lav     Comment:        Add-on orders for these samples will be processed based on acceptable specimen integrity and analyte stability, which may vary by analyte.   CBC with Auto Differential    Collection Time: 11/08/24  8:40 AM   Result Value Ref Range    WBC 8.4 3.6 - 11.0 K/uL    RBC 5.28 (H) 3.80 - 5.20 M/uL    Hemoglobin 15.2 11.5 - 16.0 g/dL    Hematocrit 46.4 35.0 - 47.0 %    MCV 87.9 80.0 - 99.0 FL    MCH 28.8 26.0 - 34.0 PG    MCHC 32.8 30.0 - 36.5 g/dL    RDW 13.2 11.5 - 14.5 %    Platelets 346 150 - 400 K/uL    MPV 10.2 8.9 - 12.9 FL    Nucleated RBCs 0.0 0  WBC    nRBC 0.00 0.00 - 0.01 K/uL    Neutrophils % 59 32 - 75 %    Lymphocytes % 33 12 - 49 %    Monocytes % 6 5 - 13 %    Eosinophils % 1 0 - 7 %    Basophils % 1 0 - 1 %    Immature Granulocytes % 0 0.0 - 0.5 %    Neutrophils Absolute 4.9 1.8 - 8.0 K/UL    Lymphocytes Absolute 2.7 0.8 - 3.5 K/UL    Monocytes Absolute 0.5 0.0 - 1.0 K/UL    Eosinophils Absolute 0.1 0.0 - 0.4 K/UL    Basophils Absolute 0.1 0.0 - 0.1 K/UL    Immature Granulocytes Absolute 0.0 0.00 - 0.04 K/UL    Differential Type AUTOMATED     Urinalysis with Reflex to Culture    Collection Time: 11/08/24  8:56 AM    Specimen: Urine   Result Value Ref Range    Color, UA YELLOW/STRAW      Appearance CLEAR

## 2024-11-08 NOTE — ED NOTES
Patient with urgent need to go to bathroom; CT made aware than patient has finished contrast and already having diarrhea

## 2025-01-02 ENCOUNTER — OFFICE VISIT (OUTPATIENT)
Age: 40
End: 2025-01-02
Payer: COMMERCIAL

## 2025-01-02 VITALS
DIASTOLIC BLOOD PRESSURE: 91 MMHG | HEART RATE: 87 BPM | WEIGHT: 148 LBS | SYSTOLIC BLOOD PRESSURE: 138 MMHG | HEIGHT: 62 IN | BODY MASS INDEX: 27.23 KG/M2

## 2025-01-02 DIAGNOSIS — R10.2 CHRONIC PELVIC PAIN IN FEMALE: ICD-10-CM

## 2025-01-02 DIAGNOSIS — N73.6 PELVIC ADHESIVE DISEASE: ICD-10-CM

## 2025-01-02 DIAGNOSIS — G89.29 CHRONIC PELVIC PAIN IN FEMALE: ICD-10-CM

## 2025-01-02 DIAGNOSIS — N80.9 ENDOMETRIOSIS: Primary | ICD-10-CM

## 2025-01-02 DIAGNOSIS — K50.90 CROHN'S DISEASE WITHOUT COMPLICATION, UNSPECIFIED GASTROINTESTINAL TRACT LOCATION (HCC): ICD-10-CM

## 2025-01-02 DIAGNOSIS — N94.10 DYSPAREUNIA IN FEMALE: ICD-10-CM

## 2025-01-02 PROCEDURE — 99204 OFFICE O/P NEW MOD 45 MIN: CPT | Performed by: OBSTETRICS & GYNECOLOGY

## 2025-01-02 RX ORDER — RISANKIZUMAB-RZAA 150 MG/ML
INJECTION SUBCUTANEOUS
COMMUNITY

## 2025-01-02 NOTE — PROGRESS NOTES
New patient referred by Dr. Cowan for endometriosis.   Vitals:    01/02/25 0813 01/02/25 0818   BP: (!) 147/100 (!) 138/91   Site: Right Upper Arm Right Upper Arm   Position: Sitting Sitting   Pulse: 82 87   Weight: 67.1 kg (148 lb)    Height: 1.575 m (5' 2.01\")     Pt states she has not had her bp medication this morning and is in pain.   
calculus, or hydronephrosis.  STOMACH: Unremarkable.  SMALL BOWEL: No dilatation or wall thickening. There appears to have been distal  ileal resection with ileocolic anastomosis  COLON: No dilatation or wall thickening.  APPENDIX: Not demonstrated.  PERITONEUM: No ascites or pneumoperitoneum.  RETROPERITONEUM: No lymphadenopathy or aortic aneurysm.  REPRODUCTIVE ORGANS: Hysterectomy.  URINARY BLADDER: No mass or calculus.  BONES: No destructive bone lesion.  ABDOMINAL WALL: No mass or hernia.  ADDITIONAL COMMENTS: N/A     IMPRESSION:  No acute findings. See report for details.      IMPRESSION/PLAN:  Carmita Suresh is a 39 y.o. female with a diagnosis of chronic pelvic pain, endometriosis, and Crohn's disease.  I reviewed with Carmita Suresh her medical records, physical exam, and review of symptoms.  I personally reviewed the images from her last three CT scans with her and independently interpreted the findings.  The left ovary appears to be relatively unchanged over the duration of the three scans and is located in its expected location along the left pelvic sidewall. The right ovary is a bit higher and is very small.  Neither ovary is in contact with the vaginal cuff, so I am not sure that this is the cause for her dyspareunia.  I explained that if we do proceed with surgery, I would recommend removing both ovaries, with the hope being that it would be the last surgery related to her gynecologic organs.  Also, if the pain is due to endometriosis, then taking out both ovaries would render her menopausal and would hopefully help cause regression of any additional endometriosis.  She agrees that she wants this to be the last surgery and agrees to proceed with bilateral salpingooophorectomy.  All of her bowel surgeries, cholecystectomy, and hysterectomy have been performed laparoscopically.  I suggested that we approach this robotically, as I suspect that she will have significant pelvic and peritoneal adhesions.  She

## 2025-01-20 ENCOUNTER — HOSPITAL ENCOUNTER (OUTPATIENT)
Facility: HOSPITAL | Age: 40
Discharge: HOME | End: 2025-01-23
Payer: COMMERCIAL

## 2025-01-20 VITALS
RESPIRATION RATE: 16 BRPM | SYSTOLIC BLOOD PRESSURE: 144 MMHG | DIASTOLIC BLOOD PRESSURE: 92 MMHG | HEIGHT: 62 IN | TEMPERATURE: 98.5 F | OXYGEN SATURATION: 98 % | WEIGHT: 151 LBS | BODY MASS INDEX: 27.79 KG/M2 | HEART RATE: 74 BPM

## 2025-01-20 LAB
ALBUMIN SERPL-MCNC: 3.4 G/DL (ref 3.5–5)
ALBUMIN/GLOB SERPL: 1.1 (ref 1.1–2.2)
ALP SERPL-CCNC: 52 U/L (ref 45–117)
ALT SERPL-CCNC: 30 U/L (ref 12–78)
ANION GAP SERPL CALC-SCNC: 4 MMOL/L (ref 2–12)
AST SERPL-CCNC: 17 U/L (ref 15–37)
BASOPHILS # BLD: 0.04 K/UL (ref 0–0.1)
BASOPHILS NFR BLD: 0.5 % (ref 0–1)
BILIRUB SERPL-MCNC: 0.6 MG/DL (ref 0.2–1)
BUN SERPL-MCNC: 13 MG/DL (ref 6–20)
BUN/CREAT SERPL: 16 (ref 12–20)
CALCIUM SERPL-MCNC: 9 MG/DL (ref 8.5–10.1)
CHLORIDE SERPL-SCNC: 105 MMOL/L (ref 97–108)
CO2 SERPL-SCNC: 25 MMOL/L (ref 21–32)
CREAT SERPL-MCNC: 0.8 MG/DL (ref 0.55–1.02)
DIFFERENTIAL METHOD BLD: NORMAL
EKG ATRIAL RATE: 71 BPM
EKG DIAGNOSIS: NORMAL
EKG P AXIS: 10 DEGREES
EKG P-R INTERVAL: 144 MS
EKG Q-T INTERVAL: 374 MS
EKG QRS DURATION: 90 MS
EKG QTC CALCULATION (BAZETT): 406 MS
EKG R AXIS: 16 DEGREES
EKG T AXIS: 33 DEGREES
EKG VENTRICULAR RATE: 71 BPM
EOSINOPHIL # BLD: 0.05 K/UL (ref 0–0.4)
EOSINOPHIL NFR BLD: 0.6 % (ref 0–7)
ERYTHROCYTE [DISTWIDTH] IN BLOOD BY AUTOMATED COUNT: 13.6 % (ref 11.5–14.5)
GLOBULIN SER CALC-MCNC: 3.2 G/DL (ref 2–4)
GLUCOSE SERPL-MCNC: 90 MG/DL (ref 65–100)
HCT VFR BLD AUTO: 44.1 % (ref 35–47)
HGB BLD-MCNC: 14.2 G/DL (ref 11.5–16)
IMM GRANULOCYTES # BLD AUTO: 0.02 K/UL (ref 0–0.04)
IMM GRANULOCYTES NFR BLD AUTO: 0.3 % (ref 0–0.5)
LYMPHOCYTES # BLD: 2.59 K/UL (ref 0.8–3.5)
LYMPHOCYTES NFR BLD: 33.5 % (ref 12–49)
MCH RBC QN AUTO: 28.1 PG (ref 26–34)
MCHC RBC AUTO-ENTMCNC: 32.2 G/DL (ref 30–36.5)
MCV RBC AUTO: 87.3 FL (ref 80–99)
MONOCYTES # BLD: 0.58 K/UL (ref 0–1)
MONOCYTES NFR BLD: 7.5 % (ref 5–13)
NEUTS SEG # BLD: 4.44 K/UL (ref 1.8–8)
NEUTS SEG NFR BLD: 57.6 % (ref 32–75)
NRBC # BLD: 0 K/UL (ref 0–0.01)
NRBC BLD-RTO: 0 PER 100 WBC
PLATELET # BLD AUTO: 357 K/UL (ref 150–400)
PMV BLD AUTO: 10.1 FL (ref 8.9–12.9)
POTASSIUM SERPL-SCNC: 4.2 MMOL/L (ref 3.5–5.1)
PROT SERPL-MCNC: 6.6 G/DL (ref 6.4–8.2)
RBC # BLD AUTO: 5.05 M/UL (ref 3.8–5.2)
SODIUM SERPL-SCNC: 134 MMOL/L (ref 136–145)
WBC # BLD AUTO: 7.7 K/UL (ref 3.6–11)

## 2025-01-20 PROCEDURE — 93005 ELECTROCARDIOGRAM TRACING: CPT | Performed by: OBSTETRICS & GYNECOLOGY

## 2025-01-20 PROCEDURE — 80053 COMPREHEN METABOLIC PANEL: CPT

## 2025-01-20 PROCEDURE — 85025 COMPLETE CBC W/AUTO DIFF WBC: CPT

## 2025-01-20 PROCEDURE — 93010 ELECTROCARDIOGRAM REPORT: CPT | Performed by: INTERNAL MEDICINE

## 2025-01-20 PROCEDURE — 36415 COLL VENOUS BLD VENIPUNCTURE: CPT

## 2025-01-20 RX ORDER — DICYCLOMINE HYDROCHLORIDE 10 MG/1
10 CAPSULE ORAL PRN
COMMUNITY

## 2025-01-20 RX ORDER — OXYCODONE HYDROCHLORIDE 5 MG/1
5 CAPSULE ORAL EVERY 6 HOURS PRN
COMMUNITY

## 2025-01-20 RX ORDER — LISINOPRIL 10 MG/1
10 TABLET ORAL EVERY MORNING
COMMUNITY

## 2025-01-20 ASSESSMENT — PAIN SCALES - GENERAL: PAINLEVEL_OUTOF10: 4

## 2025-01-20 ASSESSMENT — PAIN DESCRIPTION - PAIN TYPE: TYPE: CHRONIC PAIN

## 2025-01-20 ASSESSMENT — PAIN DESCRIPTION - ORIENTATION: ORIENTATION: LEFT

## 2025-01-20 ASSESSMENT — PAIN DESCRIPTION - DESCRIPTORS: DESCRIPTORS: ACHING;CRAMPING

## 2025-01-20 ASSESSMENT — PAIN DESCRIPTION - LOCATION: LOCATION: ABDOMEN

## 2025-01-20 NOTE — PERIOP NOTE
Pre op and medication instructions printed and reviewed with patient. Opportunity for questions given and all questions were answered. Surgical site infection sheet given      Consent form signed and on chart for scheduled surgery on January 27, 2025 @ [facility] with Dr. Orville Guzman.

## 2025-01-20 NOTE — PERIOP NOTE
[address] [address]   MAIN OR                                  [phone]    MAIN PRE OP             [phone]                                                                                AMBULATORY PRE OP          [phone]  PRE-ADMISSION TESTING    [phone]     Surgery Date:  01-       Is surgery arrival time given by surgeon?      If “NO”, [facility] staff will call you between 4 and 7pm the day before your surgery with your arrival time. (If your surgery is on a Monday, we will call you the Friday before.)    Call [phone] after 7pm Monday-Friday if you did not receive this call.    INSTRUCTIONS BEFORE YOUR SURGERY   When You  Arrive Arrive at [facility] Patient Access on 1st floor the day of your surgery.  Have your insurance card, photo ID,living will/advanced directive/POA (if applicable),  and any copayment (if needed)   Food   and   Drink NO solid food after midnight the night before surgery. You can drink clear liquids from midnight until ONE hour prior to your arrival at the hospital on the day of your surgery. Clear liquids include:  Water  Fruit juices without pulp (NO ORANGE JUICE)  Carbonated beverages  Black coffee(no cream/milk)  Tea(no cream/milk)  Gatorade    No alcohol (beer, wine, liquor) or marijuana (smoking) 24 hours, edibles (3 days). Stop smoking cigarettes 14 days before surgery (helps w/healing and breathing).   Medications to   TAKE   Morning of Surgery MEDICATIONS TO TAKE THE MORNING OF SURGERY WITH A SIP OF WATER: None    You may take these medications, IF NEEDED, the morning of surgery: Oxycodone if taking on day of surgery must take four hours prior to arrival .    Ask your surgeon/prescribing doctor for instructions on taking or stopping these medications prior to surgery:    Medications to STOP  before surgery Non-Steroidal anti-inflammatory Drugs (NSAID's): for example, Diclofenac

## 2025-01-23 NOTE — H&P
small bowel resection (with removal of PillCam)     UPPER GASTROINTESTINAL ENDOSCOPY      WISDOM TOOTH EXTRACTION        Social History:  Social History     Tobacco Use    Smoking status: Former     Current packs/day: 0.00     Types: Cigarettes     Quit date: 2019     Years since quittin.3    Smokeless tobacco: Never   Substance Use Topics    Alcohol use: Not Currently      Family History:  Family History   Problem Relation Age of Onset    Heart Disease Mother     Diabetes Mother     Heart Surgery Mother         CABG    Heart Attack Father         stent placed    Heart Surgery Father     No Known Problems Brother     Colon Cancer Maternal Aunt     No Known Problems Son     Anesth Problems Neg Hx       Medications: (reviewed)  No current facility-administered medications for this encounter.     Current Outpatient Medications   Medication Sig    lisinopril (PRINIVIL;ZESTRIL) 10 MG tablet Take 1 tablet by mouth every morning    oxyCODONE 5 MG capsule Take 1 capsule by mouth every 6 hours as needed for Pain. Max Daily Amount: 20 mg    dicyclomine (BENTYL) 10 MG capsule Take 1 capsule by mouth as needed    Risankizumab-rzaa (SKYRIZI) 150 MG/ML SOSY every 30 days    calcium carbonate (TUMS) 500 MG chewable tablet Take 1 tablet by mouth daily as needed    acetaminophen (TYLENOL) 325 MG tablet Take 2 tablets by mouth 4 times daily as needed for Pain    ondansetron (ZOFRAN) 4 MG tablet Take 1 tablet by mouth every 8 hours as needed for Nausea or Vomiting    zolpidem (AMBIEN) 5 MG tablet Take 1 tablet by mouth nightly as needed for Sleep.     Allergies: (reviewed)  Allergies   Allergen Reactions    Chlorhexidine Itching and Rash     Immediate reaction of white pustules develop on skin    Moxifloxacin Swelling     Other reaction(s): Unknown (comments)  But pt tolerated cipro    Nsaids Other (See Comments)     D/t esophageal erosion and GERD    Macrolides And Ketolides Hives, Rash and Swelling     Per patient reported

## 2025-01-27 ENCOUNTER — ANESTHESIA (OUTPATIENT)
Facility: HOSPITAL | Age: 40
End: 2025-01-27
Payer: COMMERCIAL

## 2025-01-27 ENCOUNTER — HOSPITAL ENCOUNTER (OUTPATIENT)
Facility: HOSPITAL | Age: 40
Setting detail: OUTPATIENT SURGERY
Discharge: HOME | End: 2025-01-27
Attending: OBSTETRICS & GYNECOLOGY | Admitting: OBSTETRICS & GYNECOLOGY
Payer: COMMERCIAL

## 2025-01-27 ENCOUNTER — ANESTHESIA EVENT (OUTPATIENT)
Facility: HOSPITAL | Age: 40
End: 2025-01-27
Payer: COMMERCIAL

## 2025-01-27 VITALS
WEIGHT: 151 LBS | HEIGHT: 62 IN | RESPIRATION RATE: 20 BRPM | DIASTOLIC BLOOD PRESSURE: 91 MMHG | HEART RATE: 86 BPM | TEMPERATURE: 98.4 F | SYSTOLIC BLOOD PRESSURE: 123 MMHG | OXYGEN SATURATION: 98 % | BODY MASS INDEX: 27.79 KG/M2

## 2025-01-27 DIAGNOSIS — K50.90 CROHN'S DISEASE WITHOUT COMPLICATION, UNSPECIFIED GASTROINTESTINAL TRACT LOCATION (HCC): ICD-10-CM

## 2025-01-27 DIAGNOSIS — N80.9 ENDOMETRIOSIS: Primary | ICD-10-CM

## 2025-01-27 LAB — HCG UR QL: NEGATIVE

## 2025-01-27 PROCEDURE — 81025 URINE PREGNANCY TEST: CPT

## 2025-01-27 PROCEDURE — 2720000010 HC SURG SUPPLY STERILE: Performed by: OBSTETRICS & GYNECOLOGY

## 2025-01-27 PROCEDURE — 7100000000 HC PACU RECOVERY - FIRST 15 MIN: Performed by: OBSTETRICS & GYNECOLOGY

## 2025-01-27 PROCEDURE — 7100000001 HC PACU RECOVERY - ADDTL 15 MIN: Performed by: OBSTETRICS & GYNECOLOGY

## 2025-01-27 PROCEDURE — 3700000000 HC ANESTHESIA ATTENDED CARE: Performed by: OBSTETRICS & GYNECOLOGY

## 2025-01-27 PROCEDURE — 88305 TISSUE EXAM BY PATHOLOGIST: CPT

## 2025-01-27 PROCEDURE — 3600000019 HC SURGERY ROBOT ADDTL 15MIN: Performed by: OBSTETRICS & GYNECOLOGY

## 2025-01-27 PROCEDURE — 6360000002 HC RX W HCPCS

## 2025-01-27 PROCEDURE — 6370000000 HC RX 637 (ALT 250 FOR IP)

## 2025-01-27 PROCEDURE — 7100000010 HC PHASE II RECOVERY - FIRST 15 MIN: Performed by: OBSTETRICS & GYNECOLOGY

## 2025-01-27 PROCEDURE — S2900 ROBOTIC SURGICAL SYSTEM: HCPCS | Performed by: OBSTETRICS & GYNECOLOGY

## 2025-01-27 PROCEDURE — 3700000001 HC ADD 15 MINUTES (ANESTHESIA): Performed by: OBSTETRICS & GYNECOLOGY

## 2025-01-27 PROCEDURE — 6360000002 HC RX W HCPCS: Performed by: PHYSICIAN ASSISTANT

## 2025-01-27 PROCEDURE — 6370000000 HC RX 637 (ALT 250 FOR IP): Performed by: PHYSICIAN ASSISTANT

## 2025-01-27 PROCEDURE — 2500000003 HC RX 250 WO HCPCS

## 2025-01-27 PROCEDURE — 3600000009 HC SURGERY ROBOT BASE: Performed by: OBSTETRICS & GYNECOLOGY

## 2025-01-27 PROCEDURE — 6370000000 HC RX 637 (ALT 250 FOR IP): Performed by: ANESTHESIOLOGY

## 2025-01-27 PROCEDURE — 6360000002 HC RX W HCPCS: Performed by: OBSTETRICS & GYNECOLOGY

## 2025-01-27 PROCEDURE — 2580000003 HC RX 258: Performed by: PHYSICIAN ASSISTANT

## 2025-01-27 PROCEDURE — 2709999900 HC NON-CHARGEABLE SUPPLY: Performed by: OBSTETRICS & GYNECOLOGY

## 2025-01-27 PROCEDURE — 2500000003 HC RX 250 WO HCPCS: Performed by: PHYSICIAN ASSISTANT

## 2025-01-27 PROCEDURE — 6360000002 HC RX W HCPCS: Performed by: ANESTHESIOLOGY

## 2025-01-27 RX ORDER — ACETAMINOPHEN 500 MG
1000 TABLET ORAL ONCE
Status: DISCONTINUED | OUTPATIENT
Start: 2025-01-27 | End: 2025-01-27 | Stop reason: HOSPADM

## 2025-01-27 RX ORDER — ACETAMINOPHEN 500 MG
1000 TABLET ORAL ONCE
Status: COMPLETED | OUTPATIENT
Start: 2025-01-27 | End: 2025-01-27

## 2025-01-27 RX ORDER — NALOXONE HYDROCHLORIDE 0.4 MG/ML
INJECTION, SOLUTION INTRAMUSCULAR; INTRAVENOUS; SUBCUTANEOUS PRN
Status: DISCONTINUED | OUTPATIENT
Start: 2025-01-27 | End: 2025-01-27 | Stop reason: HOSPADM

## 2025-01-27 RX ORDER — SODIUM CHLORIDE 0.9 % (FLUSH) 0.9 %
5-40 SYRINGE (ML) INJECTION PRN
Status: DISCONTINUED | OUTPATIENT
Start: 2025-01-27 | End: 2025-01-27 | Stop reason: HOSPADM

## 2025-01-27 RX ORDER — MIDAZOLAM HYDROCHLORIDE 1 MG/ML
INJECTION, SOLUTION INTRAMUSCULAR; INTRAVENOUS
Status: DISCONTINUED | OUTPATIENT
Start: 2025-01-27 | End: 2025-01-27 | Stop reason: SDUPTHER

## 2025-01-27 RX ORDER — HYDROMORPHONE HYDROCHLORIDE 1 MG/ML
0.5 INJECTION, SOLUTION INTRAMUSCULAR; INTRAVENOUS; SUBCUTANEOUS EVERY 5 MIN PRN
Status: DISCONTINUED | OUTPATIENT
Start: 2025-01-27 | End: 2025-01-27 | Stop reason: HOSPADM

## 2025-01-27 RX ORDER — SODIUM CHLORIDE, SODIUM LACTATE, POTASSIUM CHLORIDE, CALCIUM CHLORIDE 600; 310; 30; 20 MG/100ML; MG/100ML; MG/100ML; MG/100ML
INJECTION, SOLUTION INTRAVENOUS CONTINUOUS
Status: DISCONTINUED | OUTPATIENT
Start: 2025-01-27 | End: 2025-01-27 | Stop reason: HOSPADM

## 2025-01-27 RX ORDER — ONDANSETRON 2 MG/ML
4 INJECTION INTRAMUSCULAR; INTRAVENOUS AS NEEDED
Status: DISCONTINUED | OUTPATIENT
Start: 2025-01-27 | End: 2025-01-27 | Stop reason: HOSPADM

## 2025-01-27 RX ORDER — MIDAZOLAM HYDROCHLORIDE 2 MG/2ML
2 INJECTION, SOLUTION INTRAMUSCULAR; INTRAVENOUS PRN
Status: DISCONTINUED | OUTPATIENT
Start: 2025-01-27 | End: 2025-01-27 | Stop reason: HOSPADM

## 2025-01-27 RX ORDER — OXYCODONE HYDROCHLORIDE 5 MG/1
5 CAPSULE ORAL EVERY 6 HOURS PRN
Qty: 28 CAPSULE | Refills: 0 | Status: SHIPPED | OUTPATIENT
Start: 2025-01-27 | End: 2025-01-30 | Stop reason: ALTCHOICE

## 2025-01-27 RX ORDER — SODIUM CHLORIDE 9 MG/ML
INJECTION, SOLUTION INTRAVENOUS PRN
Status: DISCONTINUED | OUTPATIENT
Start: 2025-01-27 | End: 2025-01-27 | Stop reason: HOSPADM

## 2025-01-27 RX ORDER — ONDANSETRON 2 MG/ML
INJECTION INTRAMUSCULAR; INTRAVENOUS
Status: DISCONTINUED | OUTPATIENT
Start: 2025-01-27 | End: 2025-01-27 | Stop reason: SDUPTHER

## 2025-01-27 RX ORDER — FENTANYL CITRATE 50 UG/ML
INJECTION, SOLUTION INTRAMUSCULAR; INTRAVENOUS
Status: DISCONTINUED | OUTPATIENT
Start: 2025-01-27 | End: 2025-01-27 | Stop reason: SDUPTHER

## 2025-01-27 RX ORDER — DEXAMETHASONE SODIUM PHOSPHATE 4 MG/ML
INJECTION, SOLUTION INTRA-ARTICULAR; INTRALESIONAL; INTRAMUSCULAR; INTRAVENOUS; SOFT TISSUE
Status: DISCONTINUED | OUTPATIENT
Start: 2025-01-27 | End: 2025-01-27 | Stop reason: SDUPTHER

## 2025-01-27 RX ORDER — PROPOFOL 10 MG/ML
INJECTION, EMULSION INTRAVENOUS
Status: DISCONTINUED | OUTPATIENT
Start: 2025-01-27 | End: 2025-01-27 | Stop reason: SDUPTHER

## 2025-01-27 RX ORDER — SODIUM CHLORIDE 0.9 % (FLUSH) 0.9 %
5-40 SYRINGE (ML) INJECTION EVERY 12 HOURS SCHEDULED
Status: DISCONTINUED | OUTPATIENT
Start: 2025-01-27 | End: 2025-01-27 | Stop reason: HOSPADM

## 2025-01-27 RX ORDER — SCOPOLAMINE 1 MG/3D
PATCH, EXTENDED RELEASE TRANSDERMAL
Status: DISCONTINUED | OUTPATIENT
Start: 2025-01-27 | End: 2025-01-27 | Stop reason: SDUPTHER

## 2025-01-27 RX ORDER — BUPIVACAINE HYDROCHLORIDE 5 MG/ML
INJECTION, SOLUTION EPIDURAL; INTRACAUDAL PRN
Status: DISCONTINUED | OUTPATIENT
Start: 2025-01-27 | End: 2025-01-27 | Stop reason: HOSPADM

## 2025-01-27 RX ORDER — ROCURONIUM BROMIDE 10 MG/ML
INJECTION, SOLUTION INTRAVENOUS
Status: DISCONTINUED | OUTPATIENT
Start: 2025-01-27 | End: 2025-01-27 | Stop reason: SDUPTHER

## 2025-01-27 RX ORDER — HYDRALAZINE HYDROCHLORIDE 20 MG/ML
10 INJECTION INTRAMUSCULAR; INTRAVENOUS
Status: DISCONTINUED | OUTPATIENT
Start: 2025-01-27 | End: 2025-01-27 | Stop reason: HOSPADM

## 2025-01-27 RX ORDER — PROCHLORPERAZINE EDISYLATE 5 MG/ML
5 INJECTION INTRAMUSCULAR; INTRAVENOUS
Status: DISCONTINUED | OUTPATIENT
Start: 2025-01-27 | End: 2025-01-27 | Stop reason: HOSPADM

## 2025-01-27 RX ORDER — MAGNESIUM SULFATE HEPTAHYDRATE 40 MG/ML
INJECTION, SOLUTION INTRAVENOUS
Status: DISCONTINUED | OUTPATIENT
Start: 2025-01-27 | End: 2025-01-27 | Stop reason: SDUPTHER

## 2025-01-27 RX ORDER — OXYCODONE HYDROCHLORIDE 5 MG/1
5 TABLET ORAL
Status: COMPLETED | OUTPATIENT
Start: 2025-01-27 | End: 2025-01-27

## 2025-01-27 RX ORDER — DEXMEDETOMIDINE HYDROCHLORIDE 100 UG/ML
INJECTION, SOLUTION INTRAVENOUS
Status: DISCONTINUED | OUTPATIENT
Start: 2025-01-27 | End: 2025-01-27 | Stop reason: SDUPTHER

## 2025-01-27 RX ORDER — FENTANYL CITRATE 50 UG/ML
100 INJECTION, SOLUTION INTRAMUSCULAR; INTRAVENOUS
Status: DISCONTINUED | OUTPATIENT
Start: 2025-01-27 | End: 2025-01-27 | Stop reason: HOSPADM

## 2025-01-27 RX ORDER — PHENYLEPHRINE HCL IN 0.9% NACL 0.4MG/10ML
SYRINGE (ML) INTRAVENOUS
Status: DISCONTINUED | OUTPATIENT
Start: 2025-01-27 | End: 2025-01-27 | Stop reason: SDUPTHER

## 2025-01-27 RX ORDER — LIDOCAINE HYDROCHLORIDE 10 MG/ML
1 INJECTION, SOLUTION EPIDURAL; INFILTRATION; INTRACAUDAL; PERINEURAL
Status: DISCONTINUED | OUTPATIENT
Start: 2025-01-27 | End: 2025-01-27 | Stop reason: HOSPADM

## 2025-01-27 RX ORDER — FENTANYL CITRATE 50 UG/ML
25 INJECTION, SOLUTION INTRAMUSCULAR; INTRAVENOUS EVERY 5 MIN PRN
Status: COMPLETED | OUTPATIENT
Start: 2025-01-27 | End: 2025-01-27

## 2025-01-27 RX ORDER — ONDANSETRON 2 MG/ML
4 INJECTION INTRAMUSCULAR; INTRAVENOUS
Status: COMPLETED | OUTPATIENT
Start: 2025-01-27 | End: 2025-01-27

## 2025-01-27 RX ORDER — MEPERIDINE HYDROCHLORIDE 25 MG/ML
12.5 INJECTION INTRAMUSCULAR; INTRAVENOUS; SUBCUTANEOUS ONCE
Status: COMPLETED | OUTPATIENT
Start: 2025-01-27 | End: 2025-01-27

## 2025-01-27 RX ORDER — GLYCOPYRROLATE 0.2 MG/ML
INJECTION, SOLUTION INTRAMUSCULAR; INTRAVENOUS
Status: DISCONTINUED | OUTPATIENT
Start: 2025-01-27 | End: 2025-01-27 | Stop reason: SDUPTHER

## 2025-01-27 RX ORDER — LIDOCAINE HYDROCHLORIDE 20 MG/ML
INJECTION, SOLUTION EPIDURAL; INFILTRATION; INTRACAUDAL; PERINEURAL
Status: DISCONTINUED | OUTPATIENT
Start: 2025-01-27 | End: 2025-01-27 | Stop reason: SDUPTHER

## 2025-01-27 RX ADMIN — MEPERIDINE HYDROCHLORIDE 12.5 MG: 25 INJECTION, SOLUTION INTRAMUSCULAR; INTRAVENOUS; SUBCUTANEOUS at 09:48

## 2025-01-27 RX ADMIN — PROPOFOL 25 MG: 10 INJECTION, EMULSION INTRAVENOUS at 08:58

## 2025-01-27 RX ADMIN — FENTANYL CITRATE 50 MCG: 50 INJECTION INTRAMUSCULAR; INTRAVENOUS at 07:40

## 2025-01-27 RX ADMIN — FENTANYL CITRATE 25 MCG: 50 INJECTION INTRAMUSCULAR; INTRAVENOUS at 10:10

## 2025-01-27 RX ADMIN — SODIUM CHLORIDE, POTASSIUM CHLORIDE, SODIUM LACTATE AND CALCIUM CHLORIDE: 600; 310; 30; 20 INJECTION, SOLUTION INTRAVENOUS at 07:08

## 2025-01-27 RX ADMIN — SUGAMMADEX 150 MG: 100 INJECTION, SOLUTION INTRAVENOUS at 08:59

## 2025-01-27 RX ADMIN — GLYCOPYRROLATE 0.2 MG: 0.2 INJECTION, SOLUTION INTRAMUSCULAR; INTRAVENOUS at 07:55

## 2025-01-27 RX ADMIN — Medication 40 MCG: at 07:59

## 2025-01-27 RX ADMIN — FENTANYL CITRATE 25 MCG: 50 INJECTION INTRAMUSCULAR; INTRAVENOUS at 10:00

## 2025-01-27 RX ADMIN — FENTANYL CITRATE 50 MCG: 50 INJECTION INTRAMUSCULAR; INTRAVENOUS at 07:32

## 2025-01-27 RX ADMIN — MAGNESIUM SULFATE HEPTAHYDRATE 2000 MG: 40 INJECTION, SOLUTION INTRAVENOUS at 07:40

## 2025-01-27 RX ADMIN — ONDANSETRON 4 MG: 2 INJECTION INTRAMUSCULAR; INTRAVENOUS at 08:30

## 2025-01-27 RX ADMIN — DEXAMETHASONE SODIUM PHOSPHATE 8 MG: 4 INJECTION INTRA-ARTICULAR; INTRALESIONAL; INTRAMUSCULAR; INTRAVENOUS; SOFT TISSUE at 07:40

## 2025-01-27 RX ADMIN — DEXMEDETOMIDINE 10 MCG: 100 INJECTION, SOLUTION, CONCENTRATE INTRAVENOUS at 08:11

## 2025-01-27 RX ADMIN — PROPOFOL 25 MG: 10 INJECTION, EMULSION INTRAVENOUS at 08:46

## 2025-01-27 RX ADMIN — FENTANYL CITRATE 25 MCG: 50 INJECTION INTRAMUSCULAR; INTRAVENOUS at 09:53

## 2025-01-27 RX ADMIN — LIDOCAINE HYDROCHLORIDE 70 MG: 20 INJECTION, SOLUTION EPIDURAL; INFILTRATION; INTRACAUDAL; PERINEURAL at 07:32

## 2025-01-27 RX ADMIN — ONDANSETRON 4 MG: 2 INJECTION INTRAMUSCULAR; INTRAVENOUS at 09:35

## 2025-01-27 RX ADMIN — ROCURONIUM BROMIDE 40 MG: 10 INJECTION, SOLUTION INTRAVENOUS at 07:32

## 2025-01-27 RX ADMIN — WATER 2000 MG: 1 INJECTION INTRAMUSCULAR; INTRAVENOUS; SUBCUTANEOUS at 07:40

## 2025-01-27 RX ADMIN — ROCURONIUM BROMIDE 10 MG: 10 INJECTION, SOLUTION INTRAVENOUS at 08:05

## 2025-01-27 RX ADMIN — PROPOFOL 50 MG: 10 INJECTION, EMULSION INTRAVENOUS at 08:35

## 2025-01-27 RX ADMIN — FENTANYL CITRATE 25 MCG: 50 INJECTION INTRAMUSCULAR; INTRAVENOUS at 10:19

## 2025-01-27 RX ADMIN — PROPOFOL 25 MG: 10 INJECTION, EMULSION INTRAVENOUS at 08:50

## 2025-01-27 RX ADMIN — PROPOFOL 200 MG: 10 INJECTION, EMULSION INTRAVENOUS at 07:32

## 2025-01-27 RX ADMIN — MIDAZOLAM 2 MG: 1 INJECTION INTRAMUSCULAR; INTRAVENOUS at 07:28

## 2025-01-27 RX ADMIN — HYDROMORPHONE HYDROCHLORIDE 0.5 MG: 1 INJECTION, SOLUTION INTRAMUSCULAR; INTRAVENOUS; SUBCUTANEOUS at 08:50

## 2025-01-27 RX ADMIN — HYDROMORPHONE HYDROCHLORIDE 0.5 MG: 1 INJECTION, SOLUTION INTRAMUSCULAR; INTRAVENOUS; SUBCUTANEOUS at 08:45

## 2025-01-27 RX ADMIN — MIDAZOLAM 2 MG: 1 INJECTION INTRAMUSCULAR; INTRAVENOUS at 07:25

## 2025-01-27 RX ADMIN — Medication 30 MG: at 07:37

## 2025-01-27 RX ADMIN — SCOPALAMINE 1 PATCH: 1 PATCH, EXTENDED RELEASE TRANSDERMAL at 07:20

## 2025-01-27 RX ADMIN — ACETAMINOPHEN 1000 MG: 500 TABLET ORAL at 06:55

## 2025-01-27 RX ADMIN — OXYCODONE HYDROCHLORIDE 5 MG: 5 TABLET ORAL at 10:41

## 2025-01-27 RX ADMIN — PROPOFOL 25 MG: 10 INJECTION, EMULSION INTRAVENOUS at 08:54

## 2025-01-27 ASSESSMENT — PAIN DESCRIPTION - DESCRIPTORS
DESCRIPTORS: ACHING
DESCRIPTORS: ACHING;CRAMPING
DESCRIPTORS: ACHING;SHARP
DESCRIPTORS: SORE
DESCRIPTORS: ACHING
DESCRIPTORS: ACHING;CRAMPING
DESCRIPTORS: ACHING;CRAMPING

## 2025-01-27 ASSESSMENT — PAIN DESCRIPTION - ORIENTATION
ORIENTATION: INNER

## 2025-01-27 ASSESSMENT — PAIN DESCRIPTION - LOCATION
LOCATION: ABDOMEN

## 2025-01-27 ASSESSMENT — PAIN SCALES - GENERAL
PAINLEVEL_OUTOF10: 3
PAINLEVEL_OUTOF10: 0
PAINLEVEL_OUTOF10: 3
PAINLEVEL_OUTOF10: 6
PAINLEVEL_OUTOF10: 7
PAINLEVEL_OUTOF10: 5
PAINLEVEL_OUTOF10: 3
PAINLEVEL_OUTOF10: 5

## 2025-01-27 ASSESSMENT — PAIN - FUNCTIONAL ASSESSMENT
PAIN_FUNCTIONAL_ASSESSMENT: 0-10
PAIN_FUNCTIONAL_ASSESSMENT: ACTIVITIES ARE NOT PREVENTED

## 2025-01-27 NOTE — ANESTHESIA POSTPROCEDURE EVALUATION
Department of Anesthesiology  Postprocedure Note    Patient: Carmita Suresh  MRN: [id number]  YOB: 1985  Date of evaluation: 1/27/2025    Procedure Summary       Date: 01/27/25 Room / Location: [facility] MAIN OR [address] [place] [address] MAIN OR    Anesthesia Start: 0725 Anesthesia Stop: 0912    Procedure: ROBOT ASSISTED LAPAROSCOPIC BILATERAL SALPINGO-OOPHORECTOMY, LYSIS OF ADHESIONS (Bilateral: Abdomen/Perineum) Diagnosis:       Endometriosis      (Endometriosis [N80.9])    Providers: Orville Guzman Jr., MD Responsible Provider: Don Collier MD    Anesthesia Type: General ASA Status: 2            Anesthesia Type: General    Greer Phase I: Greer Score: 9    Greer Phase II: Greer Score: 10    Anesthesia Post Evaluation    Patient location during evaluation: PACU  Patient participation: complete - patient participated  Level of consciousness: responsive to verbal stimuli and sleepy but conscious  Pain score: 2  Airway patency: patent  Cardiovascular status: blood pressure returned to baseline  Respiratory status: acceptable  Hydration status: stable  Comments: +Post-Anesthesia Evaluation and Assessment    Patient: Carmita Suresh MRN: [id number]  SSN: [id number]   YOB: 1985  Age: 39 y.o.  Sex: female          Cardiovascular Function/Vital Signs    BP (!) 123/91   Pulse 86   Temp 98.4 °F (36.9 °C) (Oral)   Resp 20   Ht 1.575 m (5' 2\")   Wt 68.5 kg (151 lb)   SpO2 98%   BMI 27.62 kg/m²     Patient is status post Procedure(s):  ROBOT ASSISTED LAPAROSCOPIC BILATERAL SALPINGO-OOPHORECTOMY, LYSIS OF ADHESIONS.    Nausea/Vomiting: Controlled.    Postoperative hydration reviewed and adequate.    Pain:      Managed.    Neurological Status:       At baseline.    Mental Status and Level of Consciousness: Arousable.    Pulmonary Status:       Adequate oxygenation and airway patent.    Complications related to anesthesia: None    Post-anesthesia assessment completed. No concerns.    I have evaluated the patient

## 2025-01-27 NOTE — DISCHARGE INSTRUCTIONS
[facility]  [address] [place] [address], [address]  [place] [address]  P [phone]  F [phone]       YOUR DISCHARGE INSTRUCTIONS      Dear Ms. Carmita Suresh,    Please review your instructions with your nurse and ask any questions so you have all the information you need to recover well at home.  If you do not feel you have everything you need to succeed and be safe after you leave the hospital, please discuss these concerns with your nurse.  As always, call for any questions at home.    Take Home Medications     See Discharge Medication Review provided to you by your nurse. If you did not receive one, request this prior to your discharge.    It is important that you take your medications as they are prescribed.  Your prescriptions are sent to your pharmacy on record.   Keep your medications in the bottles provided by the pharmacist and keep a list of the medication names, dosages, times to be taken and what they are for in your wallet.   Do not take other medications without consulting your doctor.   If you are prescribed enoxaparin (Lovenox) or Apixaban (Eliquis) following your surgery and are taking a baby aspirin daily, please stop taking the Aspirin until your course of enoxaparin/Eliquis is completed.  You may take Tylenol and Ibuprofen or Aleve for pain.  If this is not sufficient to control your pain, Tramadol or another pain medication will be prescribed for you.   You should take a daily gentle stool softener such as a colace pill or dulcolax suppository for constipation as this is not uncommon after surgery and/or while on pain medication. If not prescribed, this can be found over the counter. If constipation persists for >24 hours you should take a dose of Milk of Magnesia. Call if your constipation continues.    Diet    Stay hydrated and drink fluids such as gatorade and water. This will also help prevent constipation and dehydration. Limit any usual caffeine intake such as soda,

## 2025-01-27 NOTE — BRIEF OP NOTE
Brief Postoperative Note      Patient: Carmita Suresh  YOB: 1985  MRN: [id number]    Date of Procedure: 1/27/2025    Pre-Op Diagnosis Codes:      * Endometriosis [N80.9]    Post-Op Diagnosis: Post-Op Diagnosis Codes:     * Endometriosis [N80.9]       Procedure(s):  ROBOT ASSISTED LAPAROSCOPIC BILATERAL SALPINGO-OOPHORECTOMY, LYSIS OF ADHESIONS    Surgeon(s):  Orville Guzman Jr., MD    Assistant:  Physician Assistant: Brittney Valladares PA-C    Anesthesia: General    Estimated Blood Loss (mL): less than 50     Complications: None    Specimens:   ID Type Source Tests Collected by Time Destination   1 : LEFT OVARY WITH ATTACHED PELVIC SIDEWALL Tissue Ovary SURGICAL PATHOLOGY Orville Guzman Jr., MD 1/27/2025 0822    2 : RIGHT OVARY WITH ATTACHED PELVIC SIDEWALL Tissue Ovary SURGICAL PATHOLOGY Orville Guzman Jr., MD 1/27/2025 0823        Implants:  * No implants in log *      Drains:   [REMOVED] Urinary Catheter 01/27/25 2 Way (Removed)       Findings:  Infection Present At Time Of Surgery (PATOS) (choose all levels that have infection present):  No infection present  Other Findings:  Adhesions of the omentum to anterior abdominal wall in right lower quadrant.  Powder burns scattered throughout the peritoneal cavity, consistent with endometriosis.  Ovaries adherent to corresponding pelvic sidewalls.  There appeared to be some active endometriosis adjacent to each ovary.  These areas were excised en bloc with the ovaries.  Adhesions of the rectosigmoid colon to the left pelvic sidewall, cul de sac, and vaginal cuff.  These were freed.      Electronically signed by Orville Guzman MD on 1/27/2025 at 8:48 AM

## 2025-01-27 NOTE — OP NOTE
Gynecologic Oncology Operative Report    Carmita COHN McLamb  1/27/2025    Pre-operative dx:  Endometriosis, Crohn's disease, pelvic pain    Post-operative dx:  Endometriosis, Crohn's disease, pelvic pain, pelvic and peritoneal adhesions     Procedure:  Robotic-assisted laparoscopic bilateral oophorectomy, lysis of adhesions     Surgeon:  Orville Guzman MD     Assistant:  Brittney Valladares PA-C (Assistance was required due to the difficulty of the procedure.  They actively assisted with the necessary dissection and proper identification of relevant anatomy throughout the course of the procedure.)     Anesthesia:  GETA     EBL:  <50 cc     Complications:  None    Implants:  None     Specimens:    ID Type Source Tests Collected by Time Destination   1 : LEFT OVARY WITH ATTACHED PELVIC SIDEWALL Tissue Ovary SURGICAL PATHOLOGY Orville Guzman Jr., MD 1/27/2025 0822     2 : RIGHT OVARY WITH ATTACHED PELVIC SIDEWALL Tissue Ovary SURGICAL PATHOLOGY Orville Guzman Jr., MD 1/27/2025 0823       Operative indications:  40 yo WF with history of pelvic endometriosis, Crohn's disease, and chronic pelvic pain.  She has had numerous prior surgeries, including multiple bowel resections and hysterectomy.  Due to the persistent paid, presumed to be from endometriosis, she was referred to me for BSO.  I recommended a robotic approach due to the likelihood of significant adhesions.      Operative findings:  Adhesions of the omentum to anterior abdominal wall in right lower quadrant.  Powder burns scattered throughout the peritoneal cavity, consistent with endometriosis.  Ovaries adherent to corresponding pelvic sidewalls.  There appeared to be some active endometriosis adjacent to each ovary.  These areas were excised en bloc with the ovaries.  Adhesions of the rectosigmoid colon to the left pelvic sidewall, cul de sac, and vaginal cuff.  These were freed.    Procedure in detail: After the risks, benefits, indications, and alternatives of the

## 2025-01-30 ENCOUNTER — TELEPHONE (OUTPATIENT)
Age: 40
End: 2025-01-30

## 2025-01-30 DIAGNOSIS — G89.18 POST-OP PAIN: Primary | ICD-10-CM

## 2025-01-30 RX ORDER — OXYCODONE HYDROCHLORIDE 5 MG/1
5 TABLET ORAL EVERY 4 HOURS PRN
Qty: 20 TABLET | Refills: 0 | Status: SHIPPED | OUTPATIENT
Start: 2025-01-30 | End: 2025-02-02

## 2025-01-30 NOTE — TELEPHONE ENCOUNTER
Received phone call from patient.  She is requesting a new prescription for pain medication.  She states that she is feeling side effects from the oxycodone capsules which she received.  She has tolerated tablets in the past much better. She also reports that she is experiencing clear discharge from several of her incisions.  She does admit to having problems with irritation to skin glue in the past.  No fevers or chills. She does admit to itching around her incisions.  Suggested oral and topical benadryl.  Offered to see patient in office to remove skin glue.  Patient will attempt to send us a picture of her incisions.   Luis Manuel

## 2025-02-03 DIAGNOSIS — G89.18 POST-OP PAIN: ICD-10-CM

## 2025-02-03 RX ORDER — OXYCODONE HYDROCHLORIDE 5 MG/1
5 TABLET ORAL EVERY 4 HOURS PRN
Qty: 20 TABLET | Refills: 0 | Status: SHIPPED | OUTPATIENT
Start: 2025-02-03 | End: 2025-02-07 | Stop reason: SDUPTHER

## 2025-02-07 ENCOUNTER — TELEPHONE (OUTPATIENT)
Age: 40
End: 2025-02-07

## 2025-02-07 DIAGNOSIS — G89.18 POST-OP PAIN: ICD-10-CM

## 2025-02-07 RX ORDER — OXYCODONE HYDROCHLORIDE 5 MG/1
5 TABLET ORAL EVERY 6 HOURS PRN
Qty: 20 TABLET | Refills: 0 | Status: SHIPPED | OUTPATIENT
Start: 2025-02-07 | End: 2025-02-12

## 2025-02-07 NOTE — PROGRESS NOTES
Returned phone call to patient.  Patient reports persistent pain in her abdomen/incisional pain.  No fevers or chills. Does not feel general malaise.  Feels that she is having few good days followed bad days and is frustrated with her progress.  Complains of hot flashes. Has been weaning off of her pain medication. Taking oxycodone about 3 times per day.  Occasionally 4. Will refill oxycodone.  Reassured patient that what she is feeling is still within the normal recovery process.  Encouraged patient to use lidocaine patches to help with pain.  She can continue using tylenol and motrin. Patient states that she has reached out to her regular gyn to ask about Paxil and low dose estrogen which had been discussed before to alleviate menopausal symptoms. I think this will help as well.  She reports that her skin is feeling better after removing the skin glue. Overall, she does feel like she is feeling better this week than she did last week.  We will continue to monitor her progress. Don't feel that any testing is needed at this time.   Luis Manuel

## 2025-02-07 NOTE — TELEPHONE ENCOUNTER
Please call re some persistent symptoms she's having following her surgery with Dr. Guzman on 1/27.

## 2025-02-14 ENCOUNTER — PATIENT MESSAGE (OUTPATIENT)
Age: 40
End: 2025-02-14

## 2025-02-14 DIAGNOSIS — G89.29 CHRONIC PELVIC PAIN IN FEMALE: Primary | ICD-10-CM

## 2025-02-14 DIAGNOSIS — R10.2 CHRONIC PELVIC PAIN IN FEMALE: Primary | ICD-10-CM

## 2025-02-17 RX ORDER — GABAPENTIN 100 MG/1
100 CAPSULE ORAL 3 TIMES DAILY
Qty: 90 CAPSULE | Refills: 0 | Status: SHIPPED | OUTPATIENT
Start: 2025-02-17 | End: 2025-03-19

## 2025-02-25 ENCOUNTER — OFFICE VISIT (OUTPATIENT)
Age: 40
End: 2025-02-25

## 2025-02-25 ENCOUNTER — TELEPHONE (OUTPATIENT)
Age: 40
End: 2025-02-25

## 2025-02-25 VITALS
DIASTOLIC BLOOD PRESSURE: 97 MMHG | HEART RATE: 80 BPM | HEIGHT: 62 IN | WEIGHT: 145 LBS | BODY MASS INDEX: 26.68 KG/M2 | SYSTOLIC BLOOD PRESSURE: 150 MMHG

## 2025-02-25 DIAGNOSIS — N80.9 ENDOMETRIOSIS: Primary | ICD-10-CM

## 2025-02-25 DIAGNOSIS — R10.2 CHRONIC PELVIC PAIN IN FEMALE: ICD-10-CM

## 2025-02-25 DIAGNOSIS — N94.10 DYSPAREUNIA IN FEMALE: ICD-10-CM

## 2025-02-25 DIAGNOSIS — N73.6 PELVIC ADHESIVE DISEASE: ICD-10-CM

## 2025-02-25 DIAGNOSIS — G89.29 CHRONIC PELVIC PAIN IN FEMALE: ICD-10-CM

## 2025-02-25 DIAGNOSIS — K50.90 CROHN'S DISEASE WITHOUT COMPLICATION, UNSPECIFIED GASTROINTESTINAL TRACT LOCATION (HCC): ICD-10-CM

## 2025-02-25 PROCEDURE — 99024 POSTOP FOLLOW-UP VISIT: CPT | Performed by: OBSTETRICS & GYNECOLOGY

## 2025-02-25 NOTE — PROGRESS NOTES
Post operative follow up.   Vitals:    02/25/25 1305 02/25/25 1317   BP: (!) 142/103 (!) 150/97   Site: Right Upper Arm Right Upper Arm   Position: Sitting Sitting   Pulse: 87 80   Weight: 65.8 kg (145 lb)    Height: 1.575 m (5' 2.01\")       
01/20/2025 09:36 AM     01/20/2025 09:36 AM    CO2 25 01/20/2025 09:36 AM    BUN 13 01/20/2025 09:36 AM    GFRAA >60 01/08/2021 02:16 AM         Imaging:  Outside pelvic ultrasound from [facility] reviewed.  4 cm complex left ovarian cyst consistent with endometrioma.  Right ovary not seen.  No free fluid.  Absent uterus.      CT of abdomen/pelvis (11/8/24)  LOWER THORAX: No significant abnormality in the incidentally imaged lower chest.  LIVER: No mass.  BILIARY TREE: Cholecystectomy. CBD is not dilated.  SPLEEN: within normal limits.  PANCREAS: No mass or ductal dilatation.  ADRENALS: Unremarkable.  KIDNEYS: No mass, calculus, or hydronephrosis.  STOMACH: Unremarkable.  SMALL BOWEL: No dilatation or wall thickening. There appears to have been distal  ileal resection with ileocolic anastomosis  COLON: No dilatation or wall thickening.  APPENDIX: Not demonstrated.  PERITONEUM: No ascites or pneumoperitoneum.  RETROPERITONEUM: No lymphadenopathy or aortic aneurysm.  REPRODUCTIVE ORGANS: Hysterectomy.  URINARY BLADDER: No mass or calculus.  BONES: No destructive bone lesion.  ABDOMINAL WALL: No mass or hernia.  ADDITIONAL COMMENTS: N/A     IMPRESSION:  No acute findings. See report for details.      IMPRESSION/PLAN:  Carmita Suresh is a 39 y.o. female with a diagnosis of chronic pelvic pain, endometriosis, and Crohn's disease.  She is now 4 weeks RA-Lsc bilateral oophorectomy.  Pathology consistent with endometriosis.  She is recovering as expected from surgery.  I reviewed the pathology with her.  I reassured her that her pain should begin to improve.  She may follow-up with me as needed and continue routine gynecologic care with Dr. Kadie Cowan at [facility] [place].       An electronic signature was used to sign this note.    Orville Guzman MD  02/25/25

## 2025-03-03 ENCOUNTER — PATIENT MESSAGE (OUTPATIENT)
Age: 40
End: 2025-03-03

## 2025-03-07 DIAGNOSIS — R10.2 CHRONIC PELVIC PAIN IN FEMALE: ICD-10-CM

## 2025-03-07 DIAGNOSIS — G89.29 CHRONIC PELVIC PAIN IN FEMALE: ICD-10-CM

## 2025-03-07 RX ORDER — GABAPENTIN 100 MG/1
CAPSULE ORAL
Qty: 150 CAPSULE | Refills: 0 | Status: SHIPPED | OUTPATIENT
Start: 2025-03-07 | End: 2025-04-06

## 2025-04-15 NOTE — ED PROVIDER NOTES
EMERGENCY DEPARTMENT HISTORY AND PHYSICAL EXAM      Date: 1/21/2023  Patient Name: Dusty Carney    History of Presenting Illness     Chief Complaint   Patient presents with    Abdominal Pain     Pt arrives ambulatory to triage for right sided abdominal pain that began this evening. Hx of Crohn's that has been well controled previously. Hx of perf and resection x2 years ago. Pt advises that she saw her GI doctor recently and was told to increase her medication back in November. +Nausea. Denies analgesics PTA and denies urinary changes. Pt states this feel similar to hx of perf. History Provided By: Patient    HPI: Dusty Carney, 40 y.o. female with a past medical history significant for medical problems as stated below presents to the ED with cc of abdominal pain. Patient's pain is right sided which is normally the case when she has pain related to her Crohn's disease. She has a history of perforation sp resection 2 years ago. Over the past few months she has had worsening pain and has been following with her gastroenterologist, Dr. Jonathon Olmos. He recently recommended increasing her Humira from every two week to every week. She has rarely had to take steroids for her inflammatory bowel disease flares. She tells me that she recently had a decrease in her fecal calprotectin. She has had a decrease in her stools recently as well, but continued to have bowel function. Within the past week she had a TVUS which she reports was normal.  Denies vaginal bleeding and discharge. Has had nausea, but no vomiting. PCP: Nena Harris MD    No current facility-administered medications on file prior to encounter. Current Outpatient Medications on File Prior to Encounter   Medication Sig Dispense Refill    calcium carbonate (TUMS) 200 mg calcium (500 mg) chew Take 1 Tab by mouth three (3) times daily (with meals).  Indications: heartburn 60 Tab 0    gabapentin (NEURONTIN) 100 mg capsule Take 1 Cap by mouth three (3) times daily. Max Daily Amount: 300 mg. 90 Cap 0    nystatin (MYCOSTATIN) 100,000 unit/mL suspension Take 5 mL by mouth four (4) times daily. swish and spit 473 mL 0    diphenhydrAMINE (BenadryL) 25 mg capsule Take 25 mg by mouth every six (6) hours as needed. ondansetron (Zofran ODT) 4 mg disintegrating tablet Take 1 Tab by mouth every eight (8) hours as needed for Nausea. 10 Tab 0    promethazine (PHENERGAN) 25 mg suppository Insert 25 mg into rectum every six (6) hours as needed for Nausea. [DISCONTINUED] acetaminophen (TYLENOL) 500 mg tablet Take 1,000 mg by mouth every six (6) hours as needed for Pain. Past History     Past Medical History:  Past Medical History:   Diagnosis Date    Abnormal Pap smear     Biopsy done last year and came back ok    Asthma     flares with bronchitis has been over a year as stated 10/5/2018    Autoimmune disease ([facility] [address]     Crohns    Crohn's disease ([facility] [address] 2019    Difficult intravenous access     GERD (gastroesophageal reflux disease)     IBS (irritable bowel syndrome)     Kidney stones     Nausea & vomiting     PONV-nausea    Psychiatric disorder     anxiety & depression    PUD (peptic ulcer disease)        Past Surgical History:  Past Surgical History:   Procedure Laterality Date    COLONOSCOPY N/A 2019    COLONOSCOPY performed by Shaquille Scott MD at [facility] ENDOSCOPY    COLONOSCOPY N/A 2021    COLONOSCOPY performed by Chuck Rolon MD at [facility] MAIN OR    HX APPENDECTOMY      7th grade. HX COLONOSCOPY      HX ENDOSCOPY      HX GYN          HX GYN  2004        HX GYN      hysterectomy    HX HEENT      dental surgery    HX OTHER SURGICAL  09/10/2019    s/p Diagnostic laparoscopy with lysis of adhesions, Robitc Assist,Open small bowel resection (with removal of PillCam)     HX OTHER SURGICAL  2021     Ileocecectomy.        Family History:  Family History   Problem Relation Age of Onset    Heart Disease Mother Diabetes Mother     Cancer Maternal Aunt         colon cancer       Social History:  Social History     Tobacco Use    Smoking status: Former     Packs/day: 0.25     Types: Cigarettes     Quit date: 2017     Years since quittin.3    Smokeless tobacco: Never   Substance Use Topics    Alcohol use: Not Currently     Comment: occassionally    Drug use: No       Allergies: Allergies   Allergen Reactions    Avelox [Moxifloxacin] Swelling and Unknown (comments)     But pt tolerated cipro    Macrolide Antibiotics Hives, Rash and Swelling     Per patient reported as a previous reaction to a \"mycin\" drug. Did not know which drug. Nsaids (Non-Steroidal Anti-Inflammatory Drug) Other (comments)     D/t esophageal erosion and GERD       Review of Systems   Review of Systems  Per HPI    Physical Exam   Physical Exam  Vitals reviewed. Constitutional:       General: She is not in acute distress. Appearance: Normal appearance. HENT:      Head: Normocephalic and atraumatic. Mouth/Throat:      Mouth: Mucous membranes are moist.   Eyes:      General: No scleral icterus. Conjunctiva/sclera: Conjunctivae normal.      Pupils: Pupils are equal, round, and reactive to light. Cardiovascular:      Rate and Rhythm: Normal rate and regular rhythm. Pulses: Normal pulses. Pulmonary:      Effort: Pulmonary effort is normal.      Breath sounds: Normal breath sounds. Abdominal:      General: Abdomen is flat. There is no distension. Palpations: Abdomen is soft. Tenderness: There is abdominal tenderness. There is no guarding or rebound. Comments: RUQ and RLQ tenderness   Musculoskeletal:         General: Normal range of motion. Cervical back: Normal range of motion and neck supple. Skin:     General: Skin is warm and dry. Capillary Refill: Capillary refill takes less than 2 seconds. Neurological:      General: No focal deficit present. Mental Status: She is alert.    Psychiatric: Mood and Affect: Mood normal.       Diagnostic Study Results     Labs -     Recent Results (from the past 24 hour(s))   CBC W/O DIFF    Collection Time: 01/21/23 10:30 PM   Result Value Ref Range    WBC 7.6 3.6 - 11.0 K/uL    RBC 5.16 3.80 - 5.20 M/uL    HGB 14.5 11.5 - 16.0 g/dL    HCT 44.1 35.0 - 47.0 %    MCV 85.5 80.0 - 99.0 FL    MCH 28.1 26.0 - 34.0 PG    MCHC 32.9 30.0 - 36.5 g/dL    RDW 13.1 11.5 - 14.5 %    PLATELET 195 859 - 064 K/uL    MPV 9.8 8.9 - 12.9 FL    NRBC 0.0 0  WBC    ABSOLUTE NRBC 0.00 0.00 - 0.01 K/uL   URINALYSIS W/ REFLEX CULTURE    Collection Time: 01/21/23 10:30 PM    Specimen: Urine   Result Value Ref Range    Color YELLOW/STRAW      Appearance CLEAR CLEAR      Specific gravity 1.022      pH (UA) 6.0 5.0 - 8.0      Protein 100 (A) NEG mg/dL    Glucose Negative NEG mg/dL    Ketone TRACE (A) NEG mg/dL    Bilirubin Negative NEG      Blood TRACE (A) NEG      Urobilinogen 0.2 0.2 - 1.0 EU/dL    Nitrites Negative NEG      Leukocyte Esterase Negative NEG      UA:UC IF INDICATED CULTURE NOT INDICATED BY UA RESULT      WBC 0-4 0 - 4 /hpf    RBC 10-20 0 - 5 /hpf    Epithelial cells MODERATE (A) FEW /lpf    Bacteria Negative NEG /hpf    Hyaline cast 0-2 0 - 2 /lpf   HCG URINE, QL. - POC    Collection Time: 01/21/23 10:51 PM   Result Value Ref Range    Pregnancy test,urine (POC) Negative NEG     LIPASE    Collection Time: 01/22/23 12:18 AM   Result Value Ref Range    Lipase 127 73 - 135 U/L   METABOLIC PANEL, COMPREHENSIVE    Collection Time: 01/22/23 12:18 AM   Result Value Ref Range    Sodium 136 136 - 145 mmol/L    Potassium 3.5 3.5 - 5.1 mmol/L    Chloride 105 97 - 108 mmol/L    CO2 23 21 - 32 mmol/L    Anion gap 8 5 - 15 mmol/L    Glucose 98 65 - 100 mg/dL    BUN 16 6 - 20 MG/DL    Creatinine 0.71 0.55 - 1.02 MG/DL    BUN/Creatinine ratio 23 (H) 12 - 20      eGFR >60 >60 ml/min/1.73m2    Calcium 7.9 (L) 8.5 - 10.1 MG/DL    Bilirubin, total 0.2 0.2 - 1.0 MG/DL    ALT (SGPT) 23 12 - 78 U/L    AST (SGOT) 16 15 - 37 U/L    Alk. phosphatase 73 45 - 117 U/L    Protein, total 6.5 6.4 - 8.2 g/dL    Albumin 3.1 (L) 3.5 - 5.0 g/dL    Globulin 3.4 2.0 - 4.0 g/dL    A-G Ratio 0.9 (L) 1.1 - 2.2         Radiologic Studies -   CT ABD PELV W CONT   Final Result   No acute intraperitoneal process is identified. Please see above for additional nonemergent incidental findings. CT Results  (Last 48 hours)                 01/21/23 2313  CT ABD PELV W CONT Final result    Impression:  No acute intraperitoneal process is identified. Please see above for additional nonemergent incidental findings. Narrative:      CLINICAL HISTORY: abdominal pain   INDICATION: abdominal pain   COMPARISON: 2020. CONTRAST: 100  ml Isovue 370   TECHNIQUE:    Multislice helical CT was performed of the abdomen and pelvis following   uneventful rapid bolus intravenous contrast administration. Oral contrast was   not administered. Contiguous 5 mm axial images were reconstructed and lung and   soft tissue windows were generated. Coronal and sagittal reformations were   generated. CT dose reduction was achieved through use of a standardized   protocol tailored for this examination and automatic exposure control for dose   modulation. FINDINGS:   LUNG BASES:No mass lesion or effusion. LIVER: Hepatic steatosis. There is no intrahepatic duct dilatation. There is no   hepatic parenchymal mass. Hepatic enhancement pattern is within normal limits. Portal vein is patent. GALLBLADDER:  Cholelithiasis. SPLEEN/PANCREAS: No mass. There is no pancreatic duct dilatation. There is no   evidence of splenomegaly. ADRENALS/KIDNEYS: No mass. There is no hydronephrosis. There is no renal mass. There is no perinephric mass. STOMACH: No dilatation or wall thickening. COLON AND SMALL BOWEL: Fecal stasis. Partial colectomy. There is no free   intraperitoneal air.  There is no evidence of incarceration or obstruction. No   mesenteric adenopathy. PERITONEUM: Unremarkable. APPENDIX: Unremarkable. BLADDER/REPRODUCTIVE ORGANS: Hysterectomy. RETROPERITONEUM: Unremarkable. The abdominal aorta is normal in caliber. No   aneurysm. No retroperitoneal adenopathy. OSSEOUS STRUCTURES: No destructive bone lesion. CXR Results  (Last 48 hours)      None          Medical Decision Making   I am the first provider for this patient. I reviewed the vital signs, available nursing notes, past medical history, past surgical history, family history and social history. Vital Signs-Reviewed the patient's vital signs. Patient Vitals for the past 12 hrs:   Temp Pulse Resp BP SpO2   01/22/23 0200 -- -- -- (!) 144/114 97 %   01/22/23 0130 -- -- -- (!) 150/105 95 %   01/22/23 0100 -- -- -- (!) 147/97 96 %   01/22/23 0032 -- -- -- (!) 147/122 97 %   01/21/23 2145 98.1 °F (36.7 °C) 96 16 (!) 182/132 98 %     Records Reviewed: Nursing records and medical records reviewed    Medical Decision Making  Pt presents to the emergency department with acute on chronic right-sided abdominal pain. Patient w a hx of Crohn's disease and is concerned that she is having a flare. States this feels similar to her previous flares. Labs and imaging ordered in triage before my evaluation of patient with no leukocytosis, no signs of end-organ damage. CT with no acute intra-abdominal findings. Does show cholelithiasis, but no obvious GB wall thickening or pericholecystic fluid on my review. GB stone appears to be in the fundus of the gallbladder rather than the neck. Also shows fecal stasis. Patient given IV morphine, IV zofran, and IVF in the ED and symptoms improved. Discharged with prescription for PO oxycodone and a bowel regimen. Think patient's symptoms are due to a combination of constipation and underlying inflammatory bowel disease, but less likely related to cholecystitis.   Considered further management in ED or hospitalization, but symptoms seemed well-controlled. Discussed importance of close followup with her GI provider as well as strict return precautions. Amount and/or Complexity of Data Reviewed  External Data Reviewed: notes. Details: general surgery notes from 2021  Labs: ordered. Decision-making details documented in ED Course. Radiology: ordered. Decision-making details documented in ED Course. Risk  OTC drugs. Prescription drug management. ED Course:   Initial assessment performed. The patients presenting problems have been discussed, and they are in agreement with the care plan formulated and outlined with them. I have encouraged them to ask questions as they arise throughout their visit. Medications Administered       HYDROmorphone (DILAUDID) injection 0.5 mg       Admin Date  01/22/2023 Action  Given Dose  0.5 mg Route  IntraVENous Administered By  Elizabet Ngo RN              iopamidoL (ISOVUE-370) 370 mg iodine /mL (76 %) injection 100 mL       Admin Date  01/21/2023 Action  Given Dose  100 mL Route  IntraVENous Administered By  Candy CORTEZ              ondansetron [facility]) injection 4 mg       Admin Date  01/22/2023 Action  Given Dose  4 mg Route  IntraVENous Administered By  Elizabet Ngo RN              sodium chloride 0.9 % bolus infusion 1,000 mL       Admin Date  01/22/2023 Action  New Bag Dose  1,000 mL Route  IntraVENous Administered By  Elizabet Ngo RN                  Critical Care:  None    Disposition:  Home    DISCHARGE PLAN:  1. Discharge Medication List as of 1/22/2023  2:15 AM        START taking these medications    Details   oxyCODONE IR (ROXICODONE) 10 mg tab immediate release tablet Take 0.5 Tablets by mouth every eight (8) hours as needed for Pain for up to 6 days.  Max Daily Amount: 15 mg., Normal, Disp-9 Tablet, R-0           CONTINUE these medications which have CHANGED    Details   acetaminophen (TYLENOL) 325 mg tablet Take 3 Tablets by mouth every eight (8) hours as needed for Pain., Normal, Disp-20 Tablet, R-0           CONTINUE these medications which have NOT CHANGED    Details   calcium carbonate (TUMS) 200 mg calcium (500 mg) chew Take 1 Tab by mouth three (3) times daily (with meals). Indications: heartburn, Normal, Disp-60 Tab, R-0      gabapentin (NEURONTIN) 100 mg capsule Take 1 Cap by mouth three (3) times daily. Max Daily Amount: 300 mg., Normal, Disp-90 Cap, R-0      nystatin (MYCOSTATIN) 100,000 unit/mL suspension Take 5 mL by mouth four (4) times daily. swish and spit, Normal, Disp-473 mL, R-0      diphenhydrAMINE (BenadryL) 25 mg capsule Take 25 mg by mouth every six (6) hours as needed., Historical Med      ondansetron (Zofran ODT) 4 mg disintegrating tablet Take 1 Tab by mouth every eight (8) hours as needed for Nausea., Normal, Disp-10 Tab, R-0      promethazine (PHENERGAN) 25 mg suppository Insert 25 mg into rectum every six (6) hours as needed for Nausea., Historical Med           2. Follow-up Information       Follow up With Specialties Details Why Contact Info    Karen Cleveland MD [facility] Medicine Schedule an appointment as soon as possible for a visit   [address]  [phone]      Marina Azul MD Gastroenterology Schedule an appointment as soon as possible for a visit   [address] [place] [address]  [phone]            3. Return to ED if worse     Diagnosis     Clinical Impression:   1. Crohn's disease without complication, unspecified gastrointestinal tract location [facility])      Attestations:    Bartolome Grullon MD    Please note that this dictation was completed with [employer] the computer voice recognition software. Quite often unanticipated grammatical, syntax, homophones, and other interpretive errors are inadvertently transcribed by the computer software. Please disregard these errors. Please excuse any errors that have escaped final proofreading.   Thank you. Calm/Appropriate

## 2025-05-02 ENCOUNTER — APPOINTMENT (OUTPATIENT)
Facility: HOSPITAL | Age: 40
End: 2025-05-02
Payer: COMMERCIAL

## 2025-05-02 ENCOUNTER — HOSPITAL ENCOUNTER (EMERGENCY)
Facility: HOSPITAL | Age: 40
Discharge: HOME | End: 2025-05-02
Payer: COMMERCIAL

## 2025-05-02 VITALS
HEART RATE: 97 BPM | BODY MASS INDEX: 26.69 KG/M2 | WEIGHT: 145.06 LBS | DIASTOLIC BLOOD PRESSURE: 83 MMHG | SYSTOLIC BLOOD PRESSURE: 118 MMHG | HEIGHT: 62 IN | RESPIRATION RATE: 18 BRPM | OXYGEN SATURATION: 97 % | TEMPERATURE: 97.9 F

## 2025-05-02 DIAGNOSIS — N13.4 HYDROURETER: Primary | ICD-10-CM

## 2025-05-02 DIAGNOSIS — N21.0 BLADDER STONE: ICD-10-CM

## 2025-05-02 DIAGNOSIS — N13.30 HYDRONEPHROSIS, UNSPECIFIED HYDRONEPHROSIS TYPE: ICD-10-CM

## 2025-05-02 LAB
ALBUMIN SERPL-MCNC: 3.7 G/DL (ref 3.5–5)
ALBUMIN/GLOB SERPL: 0.9 (ref 1.1–2.2)
ALP SERPL-CCNC: 73 U/L (ref 45–117)
ALT SERPL-CCNC: 54 U/L (ref 12–78)
ANION GAP SERPL CALC-SCNC: 10 MMOL/L (ref 2–12)
APPEARANCE UR: CLEAR
AST SERPL-CCNC: 54 U/L (ref 15–37)
BACTERIA URNS QL MICRO: NEGATIVE /HPF
BASOPHILS # BLD: 0.06 K/UL (ref 0–0.1)
BASOPHILS NFR BLD: 0.5 % (ref 0–1)
BILIRUB SERPL-MCNC: 0.5 MG/DL (ref 0.2–1)
BILIRUB UR QL CFM: NEGATIVE
BUN SERPL-MCNC: 19 MG/DL (ref 6–20)
BUN/CREAT SERPL: 18 (ref 12–20)
CALCIUM SERPL-MCNC: 9.5 MG/DL (ref 8.5–10.1)
CHLORIDE SERPL-SCNC: 106 MMOL/L (ref 97–108)
CO2 SERPL-SCNC: 21 MMOL/L (ref 21–32)
COLOR UR: ABNORMAL
COMMENT:: NORMAL
CREAT SERPL-MCNC: 1.05 MG/DL (ref 0.55–1.02)
DIFFERENTIAL METHOD BLD: ABNORMAL
EOSINOPHIL # BLD: 0.05 K/UL (ref 0–0.4)
EOSINOPHIL NFR BLD: 0.4 % (ref 0–7)
EPITH CASTS URNS QL MICRO: ABNORMAL /LPF
ERYTHROCYTE [DISTWIDTH] IN BLOOD BY AUTOMATED COUNT: 13.6 % (ref 11.5–14.5)
GLOBULIN SER CALC-MCNC: 4.2 G/DL (ref 2–4)
GLUCOSE SERPL-MCNC: 125 MG/DL (ref 65–100)
GLUCOSE UR STRIP.AUTO-MCNC: NEGATIVE MG/DL
HCG SERPL QL: NEGATIVE
HCT VFR BLD AUTO: 40.4 % (ref 35–47)
HGB BLD-MCNC: 13.2 G/DL (ref 11.5–16)
HGB UR QL STRIP: NEGATIVE
IMM GRANULOCYTES # BLD AUTO: 0.07 K/UL (ref 0–0.04)
IMM GRANULOCYTES NFR BLD AUTO: 0.6 % (ref 0–0.5)
KETONES UR QL STRIP.AUTO: NEGATIVE MG/DL
LEUKOCYTE ESTERASE UR QL STRIP.AUTO: ABNORMAL
LYMPHOCYTES # BLD: 1.38 K/UL (ref 0.8–3.5)
LYMPHOCYTES NFR BLD: 11.6 % (ref 12–49)
MCH RBC QN AUTO: 28.6 PG (ref 26–34)
MCHC RBC AUTO-ENTMCNC: 32.7 G/DL (ref 30–36.5)
MCV RBC AUTO: 87.4 FL (ref 80–99)
MONOCYTES # BLD: 0.51 K/UL (ref 0–1)
MONOCYTES NFR BLD: 4.3 % (ref 5–13)
MUCOUS THREADS URNS QL MICRO: ABNORMAL /LPF
NEUTS SEG # BLD: 9.79 K/UL (ref 1.8–8)
NEUTS SEG NFR BLD: 82.6 % (ref 32–75)
NITRITE UR QL STRIP.AUTO: POSITIVE
NRBC # BLD: 0 K/UL (ref 0–0.01)
NRBC BLD-RTO: 0 PER 100 WBC
PH UR STRIP: 5 (ref 5–8)
PLATELET # BLD AUTO: 305 K/UL (ref 150–400)
PMV BLD AUTO: 9.6 FL (ref 8.9–12.9)
POTASSIUM SERPL-SCNC: 4.5 MMOL/L (ref 3.5–5.1)
PROT SERPL-MCNC: 7.9 G/DL (ref 6.4–8.2)
PROT UR STRIP-MCNC: 30 MG/DL
RBC # BLD AUTO: 4.62 M/UL (ref 3.8–5.2)
RBC #/AREA URNS HPF: ABNORMAL /HPF (ref 0–5)
SODIUM SERPL-SCNC: 137 MMOL/L (ref 136–145)
SP GR UR REFRACTOMETRY: 1.02
SPECIMEN HOLD: NORMAL
URINE CULTURE IF INDICATED: ABNORMAL
UROBILINOGEN UR QL STRIP.AUTO: 1 EU/DL (ref 0.2–1)
WBC # BLD AUTO: 11.9 K/UL (ref 3.6–11)
WBC URNS QL MICRO: ABNORMAL /HPF (ref 0–4)

## 2025-05-02 PROCEDURE — 96361 HYDRATE IV INFUSION ADD-ON: CPT

## 2025-05-02 PROCEDURE — 96375 TX/PRO/DX INJ NEW DRUG ADDON: CPT

## 2025-05-02 PROCEDURE — 6360000002 HC RX W HCPCS

## 2025-05-02 PROCEDURE — 96376 TX/PRO/DX INJ SAME DRUG ADON: CPT

## 2025-05-02 PROCEDURE — 80053 COMPREHEN METABOLIC PANEL: CPT

## 2025-05-02 PROCEDURE — 6360000004 HC RX CONTRAST MEDICATION

## 2025-05-02 PROCEDURE — 81001 URINALYSIS AUTO W/SCOPE: CPT

## 2025-05-02 PROCEDURE — 96374 THER/PROPH/DIAG INJ IV PUSH: CPT

## 2025-05-02 PROCEDURE — 36415 COLL VENOUS BLD VENIPUNCTURE: CPT

## 2025-05-02 PROCEDURE — 84703 CHORIONIC GONADOTROPIN ASSAY: CPT

## 2025-05-02 PROCEDURE — 99285 EMERGENCY DEPT VISIT HI MDM: CPT

## 2025-05-02 PROCEDURE — 85025 COMPLETE CBC W/AUTO DIFF WBC: CPT

## 2025-05-02 PROCEDURE — 74177 CT ABD & PELVIS W/CONTRAST: CPT

## 2025-05-02 PROCEDURE — 2580000003 HC RX 258

## 2025-05-02 PROCEDURE — 2500000003 HC RX 250 WO HCPCS

## 2025-05-02 RX ORDER — HYDROMORPHONE HYDROCHLORIDE 1 MG/ML
1 INJECTION, SOLUTION INTRAMUSCULAR; INTRAVENOUS; SUBCUTANEOUS
Status: COMPLETED | OUTPATIENT
Start: 2025-05-02 | End: 2025-05-02

## 2025-05-02 RX ORDER — 0.9 % SODIUM CHLORIDE 0.9 %
1000 INTRAVENOUS SOLUTION INTRAVENOUS ONCE
Status: COMPLETED | OUTPATIENT
Start: 2025-05-02 | End: 2025-05-02

## 2025-05-02 RX ORDER — CEFDINIR 300 MG/1
300 CAPSULE ORAL 2 TIMES DAILY
Qty: 14 CAPSULE | Refills: 0 | Status: SHIPPED | OUTPATIENT
Start: 2025-05-02 | End: 2025-05-09

## 2025-05-02 RX ORDER — IOPAMIDOL 755 MG/ML
100 INJECTION, SOLUTION INTRAVASCULAR
Status: COMPLETED | OUTPATIENT
Start: 2025-05-02 | End: 2025-05-02

## 2025-05-02 RX ORDER — ONDANSETRON 2 MG/ML
4 INJECTION INTRAMUSCULAR; INTRAVENOUS ONCE
Status: COMPLETED | OUTPATIENT
Start: 2025-05-02 | End: 2025-05-02

## 2025-05-02 RX ORDER — OXYCODONE AND ACETAMINOPHEN 5; 325 MG/1; MG/1
1 TABLET ORAL EVERY 6 HOURS PRN
Qty: 12 TABLET | Refills: 0 | Status: SHIPPED | OUTPATIENT
Start: 2025-05-02 | End: 2025-05-05

## 2025-05-02 RX ORDER — MORPHINE SULFATE 4 MG/ML
4 INJECTION, SOLUTION INTRAMUSCULAR; INTRAVENOUS
Status: COMPLETED | OUTPATIENT
Start: 2025-05-02 | End: 2025-05-02

## 2025-05-02 RX ORDER — HYDROMORPHONE HYDROCHLORIDE 1 MG/ML
0.5 INJECTION, SOLUTION INTRAMUSCULAR; INTRAVENOUS; SUBCUTANEOUS
Status: COMPLETED | OUTPATIENT
Start: 2025-05-02 | End: 2025-05-02

## 2025-05-02 RX ADMIN — ONDANSETRON 4 MG: 2 INJECTION, SOLUTION INTRAMUSCULAR; INTRAVENOUS at 09:07

## 2025-05-02 RX ADMIN — HYDROMORPHONE HYDROCHLORIDE 1 MG: 1 INJECTION, SOLUTION INTRAMUSCULAR; INTRAVENOUS; SUBCUTANEOUS at 10:17

## 2025-05-02 RX ADMIN — MORPHINE SULFATE 4 MG: 4 INJECTION INTRAVENOUS at 09:25

## 2025-05-02 RX ADMIN — HYDROMORPHONE HYDROCHLORIDE 0.5 MG: 1 INJECTION, SOLUTION INTRAMUSCULAR; INTRAVENOUS; SUBCUTANEOUS at 12:41

## 2025-05-02 RX ADMIN — SODIUM CHLORIDE 1000 ML: 0.9 INJECTION, SOLUTION INTRAVENOUS at 09:22

## 2025-05-02 RX ADMIN — IOPAMIDOL 100 ML: 755 INJECTION, SOLUTION INTRAVENOUS at 10:51

## 2025-05-02 RX ADMIN — SODIUM CHLORIDE 1000 ML: 0.9 INJECTION, SOLUTION INTRAVENOUS at 11:30

## 2025-05-02 ASSESSMENT — PAIN SCALES - GENERAL
PAINLEVEL_OUTOF10: 10
PAINLEVEL_OUTOF10: 2
PAINLEVEL_OUTOF10: 2
PAINLEVEL_OUTOF10: 10

## 2025-05-02 ASSESSMENT — PAIN DESCRIPTION - ORIENTATION
ORIENTATION: RIGHT
ORIENTATION: RIGHT;LOWER
ORIENTATION: RIGHT
ORIENTATION: RIGHT

## 2025-05-02 ASSESSMENT — ENCOUNTER SYMPTOMS
ABDOMINAL PAIN: 0
VOMITING: 1
NAUSEA: 1

## 2025-05-02 ASSESSMENT — PAIN - FUNCTIONAL ASSESSMENT
PAIN_FUNCTIONAL_ASSESSMENT: PREVENTS OR INTERFERES SOME ACTIVE ACTIVITIES AND ADLS
PAIN_FUNCTIONAL_ASSESSMENT: PREVENTS OR INTERFERES SOME ACTIVE ACTIVITIES AND ADLS

## 2025-05-02 ASSESSMENT — PAIN DESCRIPTION - LOCATION
LOCATION: FLANK
LOCATION: FLANK
LOCATION: BACK
LOCATION: FLANK

## 2025-05-02 ASSESSMENT — PAIN DESCRIPTION - DESCRIPTORS: DESCRIPTORS: SHARP

## 2025-05-02 ASSESSMENT — PAIN DESCRIPTION - PAIN TYPE
TYPE: ACUTE PAIN
TYPE: ACUTE PAIN

## 2025-05-02 ASSESSMENT — PAIN DESCRIPTION - FREQUENCY: FREQUENCY: CONTINUOUS

## 2025-05-02 NOTE — ED PROVIDER NOTES
[facility] EMERGENCY DEPARTMENT  EMERGENCY DEPARTMENT ENCOUNTER         Pt Name: Carmita Suresh  MRN: [id number]  Birthdate 1985  Date of evaluation: 5/2/2025  Provider: Bette Lees PA-C   PCP: Gama Farmer Jr., MD  Note Started: 12:38 PM EDT 5/2/25     CHIEF COMPLAINT       Chief Complaint   Patient presents with    Back Pain    Dysuria     Pt wheeled into triage for c/o right sided lower back pain. Pt reports she is currently being treated for UTI and has been on 2 antibiotics without relief.     Nausea        HISTORY OF PRESENT ILLNESS: 1 or more elements      History From: Patient  HPI Limitations: None  Arrival Mode:     Carmita Suresh is a 39 y.o. female who presents with concerns for UTI this been ongoing for about 2 weeks.  She initially was placed on Macrobid which she states did not work.  She went back to urgent care with severe and worsening dysuria and was given cefdinir.  Patient states that she felt some mild relief until she woke up this morning and severe right sided flank pain.  Patient is crying and vomiting on exam.  She states that she has had a kidney stone in the past and it felt pretty similar.  She does not recall which side the kidney stone is on, did not require intervention, she passed on her own.  Patient denies any blood in her urine.  She denies any fevers body aches or chills.  Patient states is the worst pain of her life.  Patient has a history of Crohn's disease.  Vital signs within normal limits at this time.     Nursing Notes were all reviewed and agreed with or any disagreements were addressed in the HPI.  Please see MDM for additional details of HPI and ROS     REVIEW OF SYSTEMS      Review of Systems   Constitutional:  Negative for chills, fatigue and fever.   Gastrointestinal:  Positive for nausea and vomiting. Negative for abdominal pain.   Genitourinary:  Positive for dysuria and flank pain. Negative for pelvic pain.   All other systems reviewed and are  mouth every 8 hours as needed for Nausea or Vomiting     Skyrizi 150 MG/ML Sosy  Generic drug: Risankizumab-rzaa     zolpidem 5 MG tablet  Commonly known as: AMBIEN               Where to Get Your Medications        These medications were sent to [facility]/pharmacy #[id number] - [place], [place] - [address] - P [phone] - F [phone]  [address] [facility], [facility] [address]      Hours: 24-hours Phone: [phone]   cefdinir 300 MG capsule  oxyCODONE-acetaminophen 5-325 MG per tablet           DISCONTINUED MEDICATIONS:  Discharge Medication List as of 5/2/2025 12:42 PM          I have seen and evaluated the patient autonomously. My supervision physician was on site and available for consultation if needed.     I am the Primary Clinician of Record.   Bette Lees PA-C (electronically signed)    (Please note that parts of this dictation were completed with voice recognition software. Quite often unanticipated grammatical, syntax, homophones, and other interpretive errors are inadvertently transcribed by the computer software. Please disregards these errors. Please excuse any errors that have escaped final proofreading.)     Bette Lees PA-C  05/02/25 [id number]

## 2025-05-02 NOTE — ED NOTES
Assumed care of patient. Pt resting in position of comfort. Call bell within reach.  at bedside. Pt reports right flank pain radiating around to RLQ for a few days. Initially had UTI s/s, had a virtual appt and was given macrobid. S/S continued and pt was seen at an urgent care and given another antibiotics. Pain started last night and is unrelieved with motrin/tylenol. Pt tearful and reports a lot of pain with nausea.     Pt and  report pt is a difficult IV and requires US guided IV. Tech is at bedside

## 2025-05-02 NOTE — ED TRIAGE NOTES
Pt wheeled into triage for c/o right sided lower back pain. Pt reports she is currently being treated for UTI and has been on 2 antibiotics without relief.

## (undated) DEVICE — SUT SLK 3-0 30IN SH BLK --

## (undated) DEVICE — TIP COVER ACCESSORY

## (undated) DEVICE — SOLUTION ANTIFOG VIS SYS CLEARIFY LAPSCP

## (undated) DEVICE — BLADELESS OBTURATOR: Brand: WECK VISTA

## (undated) DEVICE — KENDALL RADIOLUCENT FOAM MONITORING ELECTRODE RECTANGULAR SHAPE: Brand: KENDALL

## (undated) DEVICE — STAPLER SHEATH: Brand: ENDOWRIST

## (undated) DEVICE — 40580 - THE PINK PAD - ADVANCED TRENDELENBURG POSITIONING KIT: Brand: 40580 - THE PINK PAD - ADVANCED TRENDELENBURG POSITIONING KIT

## (undated) DEVICE — HYPODERMIC SAFETY NEEDLE: Brand: MAGELLAN

## (undated) DEVICE — VISUALIZATION SYSTEM: Brand: CLEARIFY

## (undated) DEVICE — SOLUTION IV 1000ML 0.9% SOD CHL PH 5 INJ USP VIAFLX PLAS

## (undated) DEVICE — 1200 GUARD II KIT W/5MM TUBE W/O VAC TUBE: Brand: GUARDIAN

## (undated) DEVICE — NEEDLE HYPO 25GA L1.5IN BVL ORIENTED ECLIPSE

## (undated) DEVICE — 3M™ TEGADERM™ TRANSPARENT FILM DRESSING FRAME STYLE, 1624W, 2-3/8 IN X 2-3/4 IN (6 CM X 7 CM), 100/CT 4CT/CASE: Brand: 3M™ TEGADERM™

## (undated) DEVICE — Device

## (undated) DEVICE — FCPS RMFG RAD JAW 4LC 160CM --

## (undated) DEVICE — INFECTION CONTROL KIT SYS

## (undated) DEVICE — GLOVE,SURG,SENSICARE,ALOE,LF,PF,7: Brand: MEDLINE

## (undated) DEVICE — SET ADMIN 16ML TBNG L100IN 2 Y INJ SITE IV PIGGY BK DISP

## (undated) DEVICE — TROCAR: Brand: KII SLEEVE

## (undated) DEVICE — TUBING, SUCTION, 1/4" X 12', STRAIGHT: Brand: MEDLINE

## (undated) DEVICE — DRAPE,REIN 53X77,STERILE: Brand: MEDLINE

## (undated) DEVICE — LIQUIBAND RAPID ADHESIVE 36/CS 0.8ML: Brand: MEDLINE

## (undated) DEVICE — GOWN,SIRUS,FABRNF,XL,20/CS: Brand: MEDLINE

## (undated) DEVICE — NEONATAL-ADULT SPO2 SENSOR: Brand: NELLCOR

## (undated) DEVICE — GLOVE SURG 7.5 11.7IN BEAD CUF LIGHT BRN SENSICARE LTX FREE

## (undated) DEVICE — DEVICE TRNSF SPIK STL 2008S] MICROTEK MEDICAL INC]

## (undated) DEVICE — SUTURE PDS II SZ 1 L27IN ABSRB VLT CT-1 L36MM 1/2 CIR Z341H

## (undated) DEVICE — SOLUTION IRRIG 1000ML 0.9% SOD CHL USP POUR PLAS BTL

## (undated) DEVICE — TUBING INSUF 0.3UM FLTR W/ LUERLOCK CONN

## (undated) DEVICE — APPLICATOR FLEXIBLE 360D 40CM --

## (undated) DEVICE — RELOAD STPL L60MM H1.5-3.6MM REG TISS BLU GRIPPING SURF B

## (undated) DEVICE — SYR 10ML LUER LOK 1/5ML GRAD --

## (undated) DEVICE — TOWEL SURG W17XL27IN STD BLU COT NONFENESTRATED PREWASHED

## (undated) DEVICE — LIGHT HANDLE: Brand: DEVON

## (undated) DEVICE — GARMENT,MEDLINE,DVT,INT,CALF,MED, GEN2: Brand: MEDLINE

## (undated) DEVICE — LAPAROSCOPIC TROCAR SLEEVE/SINGLE USE: Brand: KII® OPTICAL ACCESS SYSTEM

## (undated) DEVICE — SUTURE MCRYL SZ 4-0 L27IN ABSRB UD L19MM PS-2 1/2 CIR PRIM Y426H

## (undated) DEVICE — TAPE,CLOTH/SILK,CURAD,3"X10YD,LF,40/CS: Brand: CURAD

## (undated) DEVICE — GLOVE SURG SZ 7 L12IN FNGR THK79MIL GRN LTX FREE

## (undated) DEVICE — INSUFFLATION NEEDLE TO ESTABLISH PNEUMOPERITONEUM.: Brand: INSUFFLATION NEEDLE

## (undated) DEVICE — DRAPE SURG EQUIP W105XH13XL20IN 3 ARM DISPOSABLE DA VINCI S

## (undated) DEVICE — GYN LAPAROSCOPY - SMH: Brand: MEDLINE INDUSTRIES, INC.

## (undated) DEVICE — TROCAR: Brand: KII FIOS FIRST ENTRY

## (undated) DEVICE — STAPLER INT L75MM CUT LN L73MM STPL LN L77MM BLU B FRM 8

## (undated) DEVICE — SYRINGE MED 10ML LUERLOCK TIP W/O SFTY DISP

## (undated) DEVICE — TOWEL 4 PLY TISS 19X30 SUE WHT

## (undated) DEVICE — VCARE DX UTERINE MANIPULATOR/INJECTOR CANNULA: Brand: VCARE DX

## (undated) DEVICE — STERILE POLYISOPRENE POWDER-FREE SURGICAL GLOVES: Brand: PROTEXIS

## (undated) DEVICE — TROCAR SITE CLOSURE DEVICE: Brand: ENDO CLOSE

## (undated) DEVICE — SUTURE SZ 0 27IN 5/8 CIR UR-6  TAPER PT VIOLET ABSRB VICRYL J603H

## (undated) DEVICE — FORCEPS BX L240CM JAW DIA2.8MM L CAP W/ NDL MIC MESH TOOTH

## (undated) DEVICE — SYR 5ML 1/5 GRAD LL NSAF LF --

## (undated) DEVICE — SEAL

## (undated) DEVICE — COVER LT HNDL PLAS RIG 1 PER PK

## (undated) DEVICE — APPLICATOR BNDG 1MM ADH PREMIERPRO EXOFIN

## (undated) DEVICE — TROCAR ENDOSCP L100MM DIA5MM BLDELSS STBL SL THRD OPT VW

## (undated) DEVICE — TRI-LUMEN FILTERED TUBE SET WITH ACTIVATED CHARCOAL FILTER: Brand: AIRSEAL

## (undated) DEVICE — SURGICAL PROCEDURE PACK GYN LAPAROSCOPY CUST SMH LF

## (undated) DEVICE — INTENDED FOR TISSUE SEPARATION, AND OTHER PROCEDURES THAT REQUIRE A SHARP SURGICAL BLADE TO PUNCTURE OR CUT.: Brand: BARD-PARKER ® CARBON RIB-BACK BLADES

## (undated) DEVICE — VCARE MEDIUM, UTERINE MANIPULATOR, VAGINAL-CERVICAL-AHLUWALIA'S-RETRACTOR-ELEVATOR: Brand: VCARE

## (undated) DEVICE — SUTURE VCRL SZ 3-0 L27IN ABSRB UD L26MM SH 1/2 CIR J416H

## (undated) DEVICE — SINGLE-USE BIOPSY FORCEPS: Brand: RADIAL JAW 4

## (undated) DEVICE — BLADE CLIPPER GEN PURP NS

## (undated) DEVICE — PREP SKN CHLRAPRP APL 26ML STR --

## (undated) DEVICE — PAD SANIT NPKN 4IN GRD

## (undated) DEVICE — TOTAL TRAY, 16FR 10ML SIL FOLEY, URN: Brand: MEDLINE

## (undated) DEVICE — BAG SPEC REM 224ML W4XL6IN DIA10MM 1 HND GYN DISP ENDOPCH

## (undated) DEVICE — SHEAR HARMONIC ACET 5MMX36CM -- ACE PLUS

## (undated) DEVICE — (D)PREP SKN CHLRAPRP APPL 26ML -- CONVERT TO ITEM 371833

## (undated) DEVICE — ESOPHAGEAL/PYLORIC/COLONIC/BILIARY WIREGUIDED BALLOON DILATATION CATHETER: Brand: CRE™ PRO

## (undated) DEVICE — ARM DRAPE

## (undated) DEVICE — VESSEL SEALER: Brand: ENDOWRIST;DAVINCI SI

## (undated) DEVICE — SEALANT FIBRIN 10 CC FRZN PRE FILLED SYR TISSEEL

## (undated) DEVICE — CLICKLINE SCISSORS INSERT: Brand: CLICKLINE

## (undated) DEVICE — STAPLER CANNULA SEAL: Brand: ENDOWRIST

## (undated) DEVICE — RELOAD STPL L75MM OPN H3.8MM CLS 1.5MM WIRE DIA0.2MM REG

## (undated) DEVICE — DECANTER BAG 9": Brand: MEDLINE INDUSTRIES, INC.

## (undated) DEVICE — GOWN,SIRUS,NONRNF,SETINSLV,XL,20/CS: Brand: MEDLINE

## (undated) DEVICE — 4-PORT MANIFOLD: Brand: NEPTUNE 2

## (undated) DEVICE — NEEDLE CNTRST INJ 0.51X4 MM THER INTERJECT

## (undated) DEVICE — CONTINU-FLO SOLUTION SET, 2 INJECTION SITES, MALE LUER LOCK ADAPTER WITH RETRACTABLE COLLAR, LARGE BORE STOPCOCK WITH ROTATING MALE LUER LOCK EXTENSION SET, 2 INJECTION SITES, MALE LUER LOCK ADAPTER WITH RETRACTABLE COLLAR: Brand: INTERLINK/CONTINU-FLO

## (undated) DEVICE — Z DISCONTINUED PER MEDLINE LINE GAS SAMPLING O2/CO2 LNG AD 13 FT NSL W/ TBNG FILTERLINE

## (undated) DEVICE — GENERAL LAPAROSCOPY-MRMC: Brand: MEDLINE INDUSTRIES, INC.

## (undated) DEVICE — TRAY PREP DRY W/ PREM GLV 2 APPL 6 SPNG 2 UNDPD 1 OVERWRAP

## (undated) DEVICE — SUTURE ABSORBABLE BRAIDED 2-0 CT-1 27 IN UD VICRYL J259H

## (undated) DEVICE — NEEDLE HYPO 18GA L1.5IN PNK S STL HUB POLYPR SHLD REG BVL

## (undated) DEVICE — GLOVE SURG SZ 75 L1212IN FNGR THK138MIL BRN LTX FREE

## (undated) DEVICE — CANISTER, RIGID, 3000CC: Brand: MEDLINE INDUSTRIES, INC.

## (undated) DEVICE — SUTURE MCRYL SZ 4-0 L18IN ABSRB UD L19MM PS-2 3/8 CIR PRIM Y496G

## (undated) DEVICE — SET 2ND L34IN N DEHP THE QUEENS MED CNTR VALUELINK

## (undated) DEVICE — SEAL UNIV 5-8MM DISP BX/10 -- DA VINCI XI - SNGL USE

## (undated) DEVICE — KENDALL DL ECG CABLE AND LEAD WIRE SYSTEM, 3-LEAD, SINGLE PATIENT USE: Brand: KENDALL

## (undated) DEVICE — X-RAY DETECTABLE SPONGES,16 PLY: Brand: VISTEC

## (undated) DEVICE — DISPOSABLE DISTAL ATTACHMENT: Brand: DISPOSABLE DISTAL ATTACHMENT

## (undated) DEVICE — SUTURE VCRL SZ 0 L27IN ABSRB UD SH L26MM 1/2 CIR TAPERPOINT J418H

## (undated) DEVICE — SET TUBE INSUFFLATION HI FLOW PNEUMOCLEAR

## (undated) DEVICE — CATHETER URET 4FR L70CM POLYUR OLV FLX TIP KINK RESIST W/

## (undated) DEVICE — SUTURE PERMAHAND SZ 2-0 L30IN NONABSORBABLE BLK SILK W/O A305H

## (undated) DEVICE — BAG SPEC BIOHZRD 10 X 10 IN --

## (undated) DEVICE — WOUND RETRACTOR AND PROTECTOR: Brand: ALEXIS WOUND PROTECTOR-RETRACTOR

## (undated) DEVICE — REM POLYHESIVE ADULT PATIENT RETURN ELECTRODE: Brand: VALLEYLAB

## (undated) DEVICE — SOLUTION IRRIG 3000ML 0.9% SOD CHL FLX CONT 0797208] ICU MEDICAL INC]

## (undated) DEVICE — POOLE SUCTION INSTRUMENT WITH REMOVABLE SHEATH: Brand: POOLE

## (undated) DEVICE — ELECTRO LUBE IS A SINGLE PATIENT USE DEVICE THAT IS INTENDED TO BE USED ON ELECTROSURGICAL ELECTRODES TO REDUCE STICKING.: Brand: KEY SURGICAL ELECTRO LUBE

## (undated) DEVICE — TUBING FLTR PLUME AWAY EVAC W/ SUCT DEV DISP PUREVIEW

## (undated) DEVICE — DISSECTOR ULTRASONIC SONICISION 7 5MM 39CM CURVED JAW CORDLESS

## (undated) DEVICE — YANKAUER,BULB TIP,W/O VENT,RIGID,STERILE: Brand: MEDLINE

## (undated) DEVICE — SPONGE LAP 18X18IN STRL -- 5/PK

## (undated) DEVICE — BLADE,CARBON-STEEL,11,STRL,DISPOSABLE,TB: Brand: MEDLINE

## (undated) DEVICE — NEEDLE HYPO 22GA L1.5IN BLK S STL HUB POLYPR SHLD REG BVL

## (undated) DEVICE — STERILE POLYISOPRENE POWDER-FREE SURGICAL GLOVES WITH EMOLLIENT COATING: Brand: PROTEXIS

## (undated) DEVICE — BASIN EMSIS 16OZ GRAPHITE PLAS KID SHP MOLD GRAD FOR ORAL

## (undated) DEVICE — SOLUTION IV 500ML 0.9% SOD CHL PH 5 INJ USP VIAFLX PLAS

## (undated) DEVICE — GLOVE SURG SZ 7.5 L11.2IN THK9.8MIL STRW LTX POLYMER BEAD

## (undated) DEVICE — TROCAR: Brand: KII® SLEEVE

## (undated) DEVICE — STAPLER INT L90MM REG VERY THCK TISS TI LN LNAR PROX

## (undated) DEVICE — APPLIER CLP M L L11.4IN DIA10MM ENDOSCP ROT MULT FOR LIG

## (undated) DEVICE — SOLUTION IRRIGATION H2O 0797305] ICU MEDICAL INC]

## (undated) DEVICE — C-ARM: Brand: UNBRANDED

## (undated) DEVICE — SHEARS ENDOSCP L36CM DIA5MM ULTRASONIC CRV TIP W/ ADV

## (undated) DEVICE — CANNULA SEAL

## (undated) DEVICE — KIT,1200CC CANISTER,3/16"X6' TUBING: Brand: MEDLINE INDUSTRIES, INC.

## (undated) DEVICE — TISSUE RETRIEVAL SYSTEM: Brand: INZII RETRIEVAL SYSTEM

## (undated) DEVICE — SYR 3ML LL TIP 1/10ML GRAD --

## (undated) DEVICE — SUTURE MONOCRYL + SZ 4-0 L27IN ABSRB UD L19MM PS-2 3/8 CIR MCP426H

## (undated) DEVICE — STRAP,POSITIONING,KNEE/BODY,FOAM,4X60": Brand: MEDLINE

## (undated) DEVICE — RELOAD STPL L60MM H1-2.6MM MESENTERY THN TISS WHT 6 ROW

## (undated) DEVICE — SUTURE VCRL SZ 3-0 L18IN ABSRB UD L26MM SH 1/2 CIR J864D

## (undated) DEVICE — SURGICAL PROCEDURE KIT GEN LAPAROSCOPY LF

## (undated) DEVICE — PACK,BASIC,SIRUS,V: Brand: MEDLINE

## (undated) DEVICE — SEALANT TISS FIBRIN 4 CC HUM 4 PK KT VISTASEAL

## (undated) DEVICE — SOLIDIFIER MEDC 1200ML -- CONVERT TO 356117

## (undated) DEVICE — SUT SLK 2-0SH 30IN BLK --

## (undated) DEVICE — SOLUTION IV 1000ML 0.9% SOD CHL

## (undated) DEVICE — CATH IV AUTOGRD BC BLU 22GA 25 -- INSYTE

## (undated) DEVICE — SUTURE STRATAFIX SPRL PDS + SZ 2-0 L6IN ABSRB VLT L36MM SXPP1B409

## (undated) DEVICE — RETRIEVER STONE REM W4XL5.5CM SHTH DIA2.5MM UNIV SPR LIKE

## (undated) DEVICE — APPLICATOR LAP 35 CM 2 RIGID VISTASEAL

## (undated) DEVICE — PENCIL ES CRD L10FT HND SWCHING ROCK SWCH W/ EDGE COAT BLDE

## (undated) DEVICE — HANDLE LT SNAP ON ULT DURABLE LENS FOR TRUMPF ALC DISPOSABLE

## (undated) DEVICE — GAUZE PKG STRP IODOFRM 1/4X5YD --

## (undated) DEVICE — REDUCER CANN ENDOWRIST 12-8MM -- DA VINCI XI - SNGL USE

## (undated) DEVICE — SOLUTION LACTATED RINGERS INJECTION USP

## (undated) DEVICE — SOLUTION IRRIGATION NACL 0.9% 1000 ML FLX CONTAINER

## (undated) DEVICE — SYSTEM FASCIAL CLSR UNIQUE SHLDED WNG SAFE UNIF CONSISTENT

## (undated) DEVICE — TRAY CATH SIL 16FR 10 CA STATLOK

## (undated) DEVICE — CONTAINER SPEC 20 ML LID NEUT BUFF FORMALIN 10 % POLYPR STS

## (undated) DEVICE — SUTURE STRATAFIX SYMMETRIC SZ 1 L18IN ABSRB VLT CT1 L36CM SXPP1A404

## (undated) DEVICE — TUBING, SUCTION, 1/4" X 10', STRAIGHT: Brand: MEDLINE

## (undated) DEVICE — AIRSEAL 8 MM ACCESS PORT AND LOW PROFILE OBTURATOR WITH BLADELESS OPTICAL TIP, 120 MM LENGTH: Brand: AIRSEAL

## (undated) DEVICE — PAD PT POS 36 IN SURGYPAD DISP